# Patient Record
Sex: FEMALE | Race: WHITE | Employment: FULL TIME | URBAN - METROPOLITAN AREA
[De-identification: names, ages, dates, MRNs, and addresses within clinical notes are randomized per-mention and may not be internally consistent; named-entity substitution may affect disease eponyms.]

---

## 2017-02-11 RX ORDER — METFORMIN HYDROCHLORIDE 500 MG/1
TABLET, EXTENDED RELEASE ORAL
Qty: 90 TABLET | Refills: 0 | Status: SHIPPED | OUTPATIENT
Start: 2017-02-11 | End: 2017-05-12 | Stop reason: SDUPTHER

## 2017-03-03 ENCOUNTER — OFFICE VISIT (OUTPATIENT)
Dept: FAMILY MEDICINE CLINIC | Age: 62
End: 2017-03-03

## 2017-03-03 VITALS
RESPIRATION RATE: 12 BRPM | TEMPERATURE: 95.9 F | WEIGHT: 253 LBS | HEART RATE: 81 BPM | BODY MASS INDEX: 39.71 KG/M2 | OXYGEN SATURATION: 93 % | HEIGHT: 67 IN | SYSTOLIC BLOOD PRESSURE: 138 MMHG | DIASTOLIC BLOOD PRESSURE: 88 MMHG

## 2017-03-03 DIAGNOSIS — J06.9 ACUTE UPPER RESPIRATORY INFECTION: Primary | ICD-10-CM

## 2017-03-03 DIAGNOSIS — D22.9 ATYPICAL MOLE: ICD-10-CM

## 2017-03-03 PROCEDURE — 3017F COLORECTAL CA SCREEN DOC REV: CPT | Performed by: FAMILY MEDICINE

## 2017-03-03 PROCEDURE — 1036F TOBACCO NON-USER: CPT | Performed by: FAMILY MEDICINE

## 2017-03-03 PROCEDURE — 3014F SCREEN MAMMO DOC REV: CPT | Performed by: FAMILY MEDICINE

## 2017-03-03 PROCEDURE — 99213 OFFICE O/P EST LOW 20 MIN: CPT | Performed by: FAMILY MEDICINE

## 2017-03-03 PROCEDURE — G8427 DOCREV CUR MEDS BY ELIG CLIN: HCPCS | Performed by: FAMILY MEDICINE

## 2017-03-03 PROCEDURE — G8419 CALC BMI OUT NRM PARAM NOF/U: HCPCS | Performed by: FAMILY MEDICINE

## 2017-03-03 PROCEDURE — G8482 FLU IMMUNIZE ORDER/ADMIN: HCPCS | Performed by: FAMILY MEDICINE

## 2017-03-27 RX ORDER — ATORVASTATIN CALCIUM 20 MG/1
TABLET, FILM COATED ORAL
Qty: 90 TABLET | Refills: 1 | Status: SHIPPED | OUTPATIENT
Start: 2017-03-27 | End: 2017-09-21 | Stop reason: SDUPTHER

## 2017-05-12 RX ORDER — METFORMIN HYDROCHLORIDE 500 MG/1
TABLET, EXTENDED RELEASE ORAL
Qty: 90 TABLET | Refills: 1 | Status: SHIPPED | OUTPATIENT
Start: 2017-05-12 | End: 2017-10-31 | Stop reason: SDUPTHER

## 2017-06-23 RX ORDER — BENAZEPRIL HYDROCHLORIDE AND HYDROCHLOROTHIAZIDE 20; 12.5 MG/1; MG/1
TABLET ORAL
Qty: 90 TABLET | Refills: 0 | Status: SHIPPED | OUTPATIENT
Start: 2017-06-23 | End: 2017-09-21 | Stop reason: SDUPTHER

## 2017-09-21 ENCOUNTER — OFFICE VISIT (OUTPATIENT)
Dept: FAMILY MEDICINE CLINIC | Age: 62
End: 2017-09-21

## 2017-09-21 VITALS
TEMPERATURE: 96.4 F | DIASTOLIC BLOOD PRESSURE: 84 MMHG | WEIGHT: 246 LBS | SYSTOLIC BLOOD PRESSURE: 122 MMHG | HEART RATE: 96 BPM | HEIGHT: 67 IN | BODY MASS INDEX: 38.61 KG/M2 | RESPIRATION RATE: 16 BRPM

## 2017-09-21 DIAGNOSIS — E78.5 DYSLIPIDEMIA: ICD-10-CM

## 2017-09-21 DIAGNOSIS — Z12.31 ENCOUNTER FOR SCREENING MAMMOGRAM FOR BREAST CANCER: ICD-10-CM

## 2017-09-21 DIAGNOSIS — B96.89 ACUTE BACTERIAL SINUSITIS: Primary | ICD-10-CM

## 2017-09-21 DIAGNOSIS — J01.90 ACUTE BACTERIAL SINUSITIS: Primary | ICD-10-CM

## 2017-09-21 DIAGNOSIS — R05.9 COUGH: ICD-10-CM

## 2017-09-21 DIAGNOSIS — I10 ESSENTIAL HYPERTENSION: ICD-10-CM

## 2017-09-21 PROCEDURE — G8417 CALC BMI ABV UP PARAM F/U: HCPCS | Performed by: FAMILY MEDICINE

## 2017-09-21 PROCEDURE — G8427 DOCREV CUR MEDS BY ELIG CLIN: HCPCS | Performed by: FAMILY MEDICINE

## 2017-09-21 PROCEDURE — 1036F TOBACCO NON-USER: CPT | Performed by: FAMILY MEDICINE

## 2017-09-21 PROCEDURE — 3017F COLORECTAL CA SCREEN DOC REV: CPT | Performed by: FAMILY MEDICINE

## 2017-09-21 PROCEDURE — 99213 OFFICE O/P EST LOW 20 MIN: CPT | Performed by: FAMILY MEDICINE

## 2017-09-21 PROCEDURE — 3014F SCREEN MAMMO DOC REV: CPT | Performed by: FAMILY MEDICINE

## 2017-09-21 RX ORDER — GUAIFENESIN AND CODEINE PHOSPHATE 100; 10 MG/5ML; MG/5ML
5 SOLUTION ORAL EVERY 4 HOURS PRN
Qty: 180 ML | Refills: 0 | Status: SHIPPED | OUTPATIENT
Start: 2017-09-21 | End: 2017-10-13 | Stop reason: ALTCHOICE

## 2017-09-21 RX ORDER — AZITHROMYCIN 250 MG/1
TABLET, FILM COATED ORAL
Qty: 1 PACKET | Refills: 1 | Status: SHIPPED | OUTPATIENT
Start: 2017-09-21 | End: 2017-10-01

## 2017-09-21 RX ORDER — ATORVASTATIN CALCIUM 20 MG/1
TABLET, FILM COATED ORAL
Qty: 90 TABLET | Refills: 1 | Status: SHIPPED | OUTPATIENT
Start: 2017-09-21 | End: 2018-03-17 | Stop reason: SDUPTHER

## 2017-09-21 RX ORDER — BENAZEPRIL HYDROCHLORIDE AND HYDROCHLOROTHIAZIDE 20; 12.5 MG/1; MG/1
TABLET ORAL
Qty: 90 TABLET | Refills: 1 | Status: SHIPPED | OUTPATIENT
Start: 2017-09-21 | End: 2018-03-17 | Stop reason: SDUPTHER

## 2017-09-30 ENCOUNTER — HOSPITAL ENCOUNTER (OUTPATIENT)
Dept: WOMENS IMAGING | Age: 62
Discharge: HOME | End: 2017-09-30
Payer: COMMERCIAL

## 2017-09-30 DIAGNOSIS — Z12.31 ENCOUNTER FOR SCREENING MAMMOGRAM FOR BREAST CANCER: ICD-10-CM

## 2017-09-30 PROCEDURE — G0202 SCR MAMMO BI INCL CAD: HCPCS

## 2017-10-13 ENCOUNTER — OFFICE VISIT (OUTPATIENT)
Dept: FAMILY MEDICINE CLINIC | Age: 62
End: 2017-10-13

## 2017-10-13 VITALS
SYSTOLIC BLOOD PRESSURE: 125 MMHG | OXYGEN SATURATION: 93 % | WEIGHT: 246.8 LBS | DIASTOLIC BLOOD PRESSURE: 70 MMHG | TEMPERATURE: 97.2 F | BODY MASS INDEX: 41.12 KG/M2 | HEIGHT: 65 IN | HEART RATE: 92 BPM

## 2017-10-13 DIAGNOSIS — Z00.00 ANNUAL PHYSICAL EXAM: Primary | ICD-10-CM

## 2017-10-13 DIAGNOSIS — Z00.00 ANNUAL PHYSICAL EXAM: ICD-10-CM

## 2017-10-13 DIAGNOSIS — E78.5 DYSLIPIDEMIA: ICD-10-CM

## 2017-10-13 DIAGNOSIS — Z23 NEED FOR INFLUENZA VACCINATION: ICD-10-CM

## 2017-10-13 DIAGNOSIS — E11.9 TYPE 2 DIABETES MELLITUS WITHOUT COMPLICATION, WITHOUT LONG-TERM CURRENT USE OF INSULIN (HCC): ICD-10-CM

## 2017-10-13 DIAGNOSIS — I10 ESSENTIAL HYPERTENSION: ICD-10-CM

## 2017-10-13 LAB
ALBUMIN SERPL-MCNC: 4.4 G/DL (ref 3.9–4.9)
ALP BLD-CCNC: 108 U/L (ref 40–130)
ALT SERPL-CCNC: 42 U/L (ref 0–33)
ANION GAP SERPL CALCULATED.3IONS-SCNC: 18 MEQ/L (ref 7–13)
AST SERPL-CCNC: 32 U/L (ref 0–35)
BASOPHILS ABSOLUTE: 0.1 K/UL (ref 0–0.2)
BASOPHILS RELATIVE PERCENT: 0.8 %
BILIRUB SERPL-MCNC: 0.5 MG/DL (ref 0–1.2)
BUN BLDV-MCNC: 13 MG/DL (ref 8–23)
CALCIUM SERPL-MCNC: 10.2 MG/DL (ref 8.6–10.2)
CHLORIDE BLD-SCNC: 100 MEQ/L (ref 98–107)
CHOLESTEROL, TOTAL: 179 MG/DL (ref 0–199)
CO2: 24 MEQ/L (ref 22–29)
CREAT SERPL-MCNC: 0.55 MG/DL (ref 0.5–0.9)
EOSINOPHILS ABSOLUTE: 0.3 K/UL (ref 0–0.7)
EOSINOPHILS RELATIVE PERCENT: 3.6 %
GFR AFRICAN AMERICAN: >60
GFR NON-AFRICAN AMERICAN: >60
GLOBULIN: 2.4 G/DL (ref 2.3–3.5)
GLUCOSE BLD-MCNC: 185 MG/DL (ref 74–109)
HBA1C MFR BLD: 8 % (ref 4.8–5.9)
HCT VFR BLD CALC: 44.3 % (ref 37–47)
HDLC SERPL-MCNC: 51 MG/DL (ref 40–59)
HEMOGLOBIN: 15.1 G/DL (ref 12–16)
LDL CHOLESTEROL CALCULATED: 90 MG/DL (ref 0–129)
LYMPHOCYTES ABSOLUTE: 3 K/UL (ref 1–4.8)
LYMPHOCYTES RELATIVE PERCENT: 35.2 %
MCH RBC QN AUTO: 31.1 PG (ref 27–31.3)
MCHC RBC AUTO-ENTMCNC: 34.2 % (ref 33–37)
MCV RBC AUTO: 91 FL (ref 82–100)
MONOCYTES ABSOLUTE: 0.5 K/UL (ref 0.2–0.8)
MONOCYTES RELATIVE PERCENT: 6.5 %
NEUTROPHILS ABSOLUTE: 4.5 K/UL (ref 1.4–6.5)
NEUTROPHILS RELATIVE PERCENT: 53.9 %
PDW BLD-RTO: 13.2 % (ref 11.5–14.5)
PLATELET # BLD: 286 K/UL (ref 130–400)
POTASSIUM SERPL-SCNC: 4.6 MEQ/L (ref 3.5–5.1)
RBC # BLD: 4.87 M/UL (ref 4.2–5.4)
SODIUM BLD-SCNC: 142 MEQ/L (ref 132–144)
TOTAL PROTEIN: 6.8 G/DL (ref 6.4–8.1)
TRIGL SERPL-MCNC: 188 MG/DL (ref 0–200)
WBC # BLD: 8.4 K/UL (ref 4.8–10.8)

## 2017-10-13 PROCEDURE — 90688 IIV4 VACCINE SPLT 0.5 ML IM: CPT | Performed by: FAMILY MEDICINE

## 2017-10-13 PROCEDURE — 90471 IMMUNIZATION ADMIN: CPT | Performed by: FAMILY MEDICINE

## 2017-10-13 PROCEDURE — 99396 PREV VISIT EST AGE 40-64: CPT | Performed by: FAMILY MEDICINE

## 2017-10-13 ASSESSMENT — PATIENT HEALTH QUESTIONNAIRE - PHQ9
2. FEELING DOWN, DEPRESSED OR HOPELESS: 0
SUM OF ALL RESPONSES TO PHQ9 QUESTIONS 1 & 2: 0
1. LITTLE INTEREST OR PLEASURE IN DOING THINGS: 0
SUM OF ALL RESPONSES TO PHQ QUESTIONS 1-9: 0

## 2017-10-13 NOTE — PROGRESS NOTES
Subjective:      Patient ID: Abdelrahman Santo is a 65 y.o. female. Chief Complaint   Patient presents with    Diabetes     takes medication everyday, checks sugar at home occasionally. maintains a good diet.  Hypertension     takes medication everyday. does not check blood pressure at home. maintains a good diet.  Hyperlipidemia     avoids fried and fatty foods. excersices.  Annual Exam       HPI    Here for physical exam and checkup today no shortness breath chest pain nausea vomiting      Getting overall her congestion drainage from the other day which is really help some        Try to watch her sugars but doesn't always succeed      Allergies   Allergen Reactions    Pcn [Penicillins]      Throat closes       Outpatient Encounter Prescriptions as of 10/13/2017   Medication Sig Dispense Refill    benazepril-hydrochlorthiazide (LOTENSIN HCT) 20-12.5 MG per tablet TAKE ONE TABLET BY MOUTH EVERY DAY 90 tablet 1    atorvastatin (LIPITOR) 20 MG tablet TAKE 1 TABLET EVERY DAY 90 tablet 1    metFORMIN (GLUCOPHAGE-XR) 500 MG extended release tablet TAKE 1 TABLET BY MOUTH DAILY (WITH BREAKFAST). 90 tablet 1    metaxalone (SKELAXIN) 800 MG tablet Take 1 tablet by mouth 3 times daily 60 tablet 5    aspirin EC 81 MG EC tablet Take 1 tablet by mouth daily. 30 tablet 3    CINNAMON PO Take  by mouth.  Omega-3 Fatty Acids (FISH OIL PO) Take  by mouth.  Ascorbic Acid (VITAMIN C PO) Take  by mouth.  CRANBERRY PO Take  by mouth.  GARLIC PO Take  by mouth.  Multiple Vitamin (THERA) TABS Take  by mouth.  FIBER PO Take  by mouth.  [DISCONTINUED] guaiFENesin-codeine (CHERATUSSIN AC) 100-10 MG/5ML syrup Take 5 mLs by mouth every 4 hours as needed for Cough or Congestion 180 mL 0     No facility-administered encounter medications on file as of 10/13/2017.       Social History     Social History    Marital status:      Spouse name: N/A    Number of children: 2    or changes please call us at once , follow-up in the office as planned,

## 2017-11-01 RX ORDER — METFORMIN HYDROCHLORIDE 500 MG/1
TABLET, EXTENDED RELEASE ORAL
Qty: 90 TABLET | Refills: 1 | Status: SHIPPED | OUTPATIENT
Start: 2017-11-01 | End: 2018-05-07 | Stop reason: SDUPTHER

## 2018-01-29 ENCOUNTER — OFFICE VISIT (OUTPATIENT)
Dept: FAMILY MEDICINE CLINIC | Age: 63
End: 2018-01-29
Payer: COMMERCIAL

## 2018-01-29 VITALS
HEART RATE: 78 BPM | BODY MASS INDEX: 38.14 KG/M2 | SYSTOLIC BLOOD PRESSURE: 118 MMHG | DIASTOLIC BLOOD PRESSURE: 82 MMHG | OXYGEN SATURATION: 96 % | RESPIRATION RATE: 15 BRPM | WEIGHT: 243 LBS | HEIGHT: 67 IN

## 2018-01-29 DIAGNOSIS — E11.9 TYPE 2 DIABETES MELLITUS WITHOUT COMPLICATION, WITHOUT LONG-TERM CURRENT USE OF INSULIN (HCC): ICD-10-CM

## 2018-01-29 DIAGNOSIS — E11.9 TYPE 2 DIABETES MELLITUS WITHOUT COMPLICATION, WITHOUT LONG-TERM CURRENT USE OF INSULIN (HCC): Primary | ICD-10-CM

## 2018-01-29 LAB
CREATININE URINE: 70.5 MG/DL
HBA1C MFR BLD: 6.5 %
MICROALBUMIN UR-MCNC: 1.9 MG/DL
MICROALBUMIN/CREAT UR-RTO: 27 MG/G (ref 0–30)

## 2018-01-29 PROCEDURE — 3014F SCREEN MAMMO DOC REV: CPT | Performed by: FAMILY MEDICINE

## 2018-01-29 PROCEDURE — 3017F COLORECTAL CA SCREEN DOC REV: CPT | Performed by: FAMILY MEDICINE

## 2018-01-29 PROCEDURE — 1036F TOBACCO NON-USER: CPT | Performed by: FAMILY MEDICINE

## 2018-01-29 PROCEDURE — 99213 OFFICE O/P EST LOW 20 MIN: CPT | Performed by: FAMILY MEDICINE

## 2018-01-29 PROCEDURE — G8484 FLU IMMUNIZE NO ADMIN: HCPCS | Performed by: FAMILY MEDICINE

## 2018-01-29 PROCEDURE — G8417 CALC BMI ABV UP PARAM F/U: HCPCS | Performed by: FAMILY MEDICINE

## 2018-01-29 PROCEDURE — 83036 HEMOGLOBIN GLYCOSYLATED A1C: CPT | Performed by: FAMILY MEDICINE

## 2018-01-29 PROCEDURE — 3044F HG A1C LEVEL LT 7.0%: CPT | Performed by: FAMILY MEDICINE

## 2018-01-29 PROCEDURE — G8427 DOCREV CUR MEDS BY ELIG CLIN: HCPCS | Performed by: FAMILY MEDICINE

## 2018-02-27 DIAGNOSIS — Z12.11 COLON CANCER SCREENING: Primary | ICD-10-CM

## 2018-03-19 RX ORDER — ATORVASTATIN CALCIUM 20 MG/1
TABLET, FILM COATED ORAL
Qty: 90 TABLET | Refills: 1 | Status: SHIPPED | OUTPATIENT
Start: 2018-03-19 | End: 2018-09-18 | Stop reason: SDUPTHER

## 2018-03-19 RX ORDER — BENAZEPRIL HYDROCHLORIDE AND HYDROCHLOROTHIAZIDE 20; 12.5 MG/1; MG/1
TABLET ORAL
Qty: 90 TABLET | Refills: 1 | Status: SHIPPED | OUTPATIENT
Start: 2018-03-19 | End: 2018-09-18 | Stop reason: SDUPTHER

## 2018-04-23 ENCOUNTER — OFFICE VISIT (OUTPATIENT)
Dept: PRIMARY CARE CLINIC | Age: 63
End: 2018-04-23
Payer: COMMERCIAL

## 2018-04-23 VITALS
RESPIRATION RATE: 16 BRPM | DIASTOLIC BLOOD PRESSURE: 72 MMHG | SYSTOLIC BLOOD PRESSURE: 136 MMHG | HEIGHT: 67 IN | HEART RATE: 83 BPM | WEIGHT: 244.5 LBS | OXYGEN SATURATION: 96 % | BODY MASS INDEX: 38.37 KG/M2 | TEMPERATURE: 97.9 F

## 2018-04-23 DIAGNOSIS — R05.9 COUGH: ICD-10-CM

## 2018-04-23 DIAGNOSIS — J01.00 ACUTE NON-RECURRENT MAXILLARY SINUSITIS: Primary | ICD-10-CM

## 2018-04-23 PROCEDURE — 3017F COLORECTAL CA SCREEN DOC REV: CPT | Performed by: PHYSICIAN ASSISTANT

## 2018-04-23 PROCEDURE — 99213 OFFICE O/P EST LOW 20 MIN: CPT | Performed by: PHYSICIAN ASSISTANT

## 2018-04-23 PROCEDURE — G8427 DOCREV CUR MEDS BY ELIG CLIN: HCPCS | Performed by: PHYSICIAN ASSISTANT

## 2018-04-23 PROCEDURE — G8417 CALC BMI ABV UP PARAM F/U: HCPCS | Performed by: PHYSICIAN ASSISTANT

## 2018-04-23 PROCEDURE — 1036F TOBACCO NON-USER: CPT | Performed by: PHYSICIAN ASSISTANT

## 2018-04-23 RX ORDER — PROMETHAZINE HYDROCHLORIDE AND CODEINE PHOSPHATE 6.25; 1 MG/5ML; MG/5ML
5 SYRUP ORAL 4 TIMES DAILY PRN
Qty: 140 ML | Refills: 0 | Status: SHIPPED | OUTPATIENT
Start: 2018-04-23 | End: 2019-02-13 | Stop reason: SDUPTHER

## 2018-04-23 RX ORDER — LEVOFLOXACIN 500 MG/1
500 TABLET, FILM COATED ORAL DAILY
Qty: 10 TABLET | Refills: 0 | Status: SHIPPED | OUTPATIENT
Start: 2018-04-23 | End: 2018-05-03

## 2018-04-23 ASSESSMENT — ENCOUNTER SYMPTOMS
SINUS PRESSURE: 1
SORE THROAT: 0
COUGH: 1

## 2018-05-07 RX ORDER — METFORMIN HYDROCHLORIDE 500 MG/1
TABLET, EXTENDED RELEASE ORAL
Qty: 90 TABLET | Refills: 0 | Status: SHIPPED | OUTPATIENT
Start: 2018-05-07 | End: 2018-08-03 | Stop reason: SDUPTHER

## 2018-07-02 ENCOUNTER — OFFICE VISIT (OUTPATIENT)
Dept: FAMILY MEDICINE CLINIC | Age: 63
End: 2018-07-02
Payer: COMMERCIAL

## 2018-07-02 VITALS
HEART RATE: 98 BPM | TEMPERATURE: 98 F | SYSTOLIC BLOOD PRESSURE: 130 MMHG | RESPIRATION RATE: 16 BRPM | BODY MASS INDEX: 38.54 KG/M2 | OXYGEN SATURATION: 96 % | WEIGHT: 239.8 LBS | DIASTOLIC BLOOD PRESSURE: 82 MMHG | HEIGHT: 66 IN

## 2018-07-02 DIAGNOSIS — L24.9 IRRITANT CONTACT DERMATITIS, UNSPECIFIED TRIGGER: Primary | ICD-10-CM

## 2018-07-02 PROCEDURE — 3017F COLORECTAL CA SCREEN DOC REV: CPT | Performed by: FAMILY MEDICINE

## 2018-07-02 PROCEDURE — G8417 CALC BMI ABV UP PARAM F/U: HCPCS | Performed by: FAMILY MEDICINE

## 2018-07-02 PROCEDURE — G8427 DOCREV CUR MEDS BY ELIG CLIN: HCPCS | Performed by: FAMILY MEDICINE

## 2018-07-02 PROCEDURE — 99212 OFFICE O/P EST SF 10 MIN: CPT | Performed by: FAMILY MEDICINE

## 2018-07-02 PROCEDURE — 1036F TOBACCO NON-USER: CPT | Performed by: FAMILY MEDICINE

## 2018-07-04 NOTE — PROGRESS NOTES
Subjective:      Patient ID: Veto Castillo is a 59 y.o. female. Chief Complaint   Patient presents with    Rash     Pt presents today with a rash on her right arm states she was gardening in her back yard and than got the rash. Onset tuesday. Pt states that it is itching and it is getting worse.  Health Maintenance     Reviewed with pt denied shingles and pneumonia will be going to gynecologist for cervical cancer screening and refused colonoscopy       HPI    Here today for rash in her right arm got worsens gardening now she's got a rash ounces (Tuesday and itching getting worse eating drinking pretty well otherwise          Allergies   Allergen Reactions    Pcn [Penicillins]      Throat closes       Outpatient Encounter Prescriptions as of 7/2/2018   Medication Sig Dispense Refill    hydrocortisone 2.5 % cream Apply topically 2 times daily 2 Tube 2    metFORMIN (GLUCOPHAGE-XR) 500 MG extended release tablet TAKE 1 TABLET BY MOUTH DAILY (WITH BREAKFAST). 90 tablet 0    atorvastatin (LIPITOR) 20 MG tablet TAKE 1 TABLET BY MOUTH EVERY DAY 90 tablet 1    benazepril-hydrochlorthiazide (LOTENSIN HCT) 20-12.5 MG per tablet TAKE ONE TABLET BY MOUTH EVERY DAY 90 tablet 1    metaxalone (SKELAXIN) 800 MG tablet Take 1 tablet by mouth 3 times daily 60 tablet 5    aspirin EC 81 MG EC tablet Take 1 tablet by mouth daily. 30 tablet 3    CINNAMON PO Take  by mouth.  Omega-3 Fatty Acids (FISH OIL PO) Take  by mouth.  Ascorbic Acid (VITAMIN C PO) Take  by mouth.  CRANBERRY PO Take  by mouth.  GARLIC PO Take  by mouth.  Multiple Vitamin (THERA) TABS Take  by mouth.  FIBER PO Take  by mouth. No facility-administered encounter medications on file as of 7/2/2018.       Social History     Social History    Marital status:      Spouse name: N/A    Number of children: 2    Years of education: N/A     Occupational History     Other     Social History Main Topics  Smoking status: Former Smoker     Packs/day: 0.50     Years: 20.00     Types: Cigarettes     Quit date: 8/13/2002    Smokeless tobacco: Never Used    Alcohol use No    Drug use: No    Sexual activity: Not Currently     Other Topics Concern    Not on file     Social History Narrative    No narrative on file     Family History   Problem Relation Age of Onset    Diabetes Mother     High Blood Pressure Father     Stroke Father     Heart Surgery Father      Past Medical History:   Diagnosis Date    Allergic rhinitis     Dyslipidemia     Hypertension     Type II or unspecified type diabetes mellitus without mention of complication, not stated as uncontrolled      Past Surgical History:   Procedure Laterality Date    FRACTURE SURGERY      right forearm         REVIEW OF SYSTEMS:   REVIEW OF SYSTEMS:   Patient seen today for exam.  Denies any problems with hearing, headaches or vision. Denies any shortness of breath, chest pain, nausea or vomiting. No black stool, no blood in the stool. No heartburn. Denies any problems with constipation or diarrhea either. No dysuria type symptoms. Objective:     /82 (Site: Left Arm, Position: Sitting, Cuff Size: Large Adult)   Pulse 98   Temp 98 °F (36.7 °C) (Temporal)   Resp 16   Ht 5' 6\" (1.676 m)   Wt 239 lb 12.8 oz (108.8 kg)   LMP 08/13/2006   SpO2 96%   BMI 38.70 kg/m²     Physical Exam      O:    Neurologic exam unremarkable. DTRs in upper and lower extremities within normal limits. Full strength noted    Skin- positive rash bilateral arms right sacral left with some papules and pustules consistent with contact       Assessment:       Diagnosis Orders   1.  Irritant contact dermatitis, unspecified trigger               Plan:        Orders Placed This Encounter   Medications    hydrocortisone 2.5 % cream     Sig: Apply topically 2 times daily     Dispense:  2 Tube     Refill:  2     No orders of the defined types were placed in this

## 2018-07-05 ENCOUNTER — TELEPHONE (OUTPATIENT)
Dept: FAMILY MEDICINE CLINIC | Age: 63
End: 2018-07-05

## 2018-07-06 NOTE — TELEPHONE ENCOUNTER
Lmom for pt doesn't need to come in for lab, just see  in October and will do some blood work at that time (unless she has lab orders from another dr)

## 2018-08-06 RX ORDER — METFORMIN HYDROCHLORIDE 500 MG/1
TABLET, EXTENDED RELEASE ORAL
Qty: 90 TABLET | Refills: 1 | Status: SHIPPED | OUTPATIENT
Start: 2018-08-06 | End: 2019-02-01 | Stop reason: SDUPTHER

## 2018-09-18 RX ORDER — ATORVASTATIN CALCIUM 20 MG/1
TABLET, FILM COATED ORAL
Qty: 90 TABLET | Refills: 1 | Status: SHIPPED | OUTPATIENT
Start: 2018-09-18 | End: 2019-03-19 | Stop reason: SDUPTHER

## 2018-09-18 RX ORDER — BENAZEPRIL HYDROCHLORIDE AND HYDROCHLOROTHIAZIDE 20; 12.5 MG/1; MG/1
TABLET ORAL
Qty: 90 TABLET | Refills: 1 | Status: SHIPPED | OUTPATIENT
Start: 2018-09-18 | End: 2019-03-19 | Stop reason: SDUPTHER

## 2018-10-12 ENCOUNTER — OFFICE VISIT (OUTPATIENT)
Dept: FAMILY MEDICINE CLINIC | Age: 63
End: 2018-10-12
Payer: COMMERCIAL

## 2018-10-12 VITALS
HEART RATE: 82 BPM | RESPIRATION RATE: 16 BRPM | SYSTOLIC BLOOD PRESSURE: 134 MMHG | OXYGEN SATURATION: 94 % | TEMPERATURE: 97.4 F | HEIGHT: 67 IN | WEIGHT: 243.8 LBS | DIASTOLIC BLOOD PRESSURE: 82 MMHG | BODY MASS INDEX: 38.27 KG/M2

## 2018-10-12 DIAGNOSIS — Z00.00 ANNUAL PHYSICAL EXAM: Primary | ICD-10-CM

## 2018-10-12 DIAGNOSIS — E11.9 TYPE 2 DIABETES MELLITUS WITHOUT COMPLICATION, WITHOUT LONG-TERM CURRENT USE OF INSULIN (HCC): ICD-10-CM

## 2018-10-12 DIAGNOSIS — Z12.39 SCREENING FOR BREAST CANCER: ICD-10-CM

## 2018-10-12 DIAGNOSIS — Z23 NEED FOR INFLUENZA VACCINATION: ICD-10-CM

## 2018-10-12 DIAGNOSIS — Z00.00 ANNUAL PHYSICAL EXAM: ICD-10-CM

## 2018-10-12 LAB
ALBUMIN SERPL-MCNC: 4.5 G/DL (ref 3.9–4.9)
ALP BLD-CCNC: 109 U/L (ref 40–130)
ALT SERPL-CCNC: 45 U/L (ref 0–33)
ANION GAP SERPL CALCULATED.3IONS-SCNC: 16 MEQ/L (ref 7–13)
AST SERPL-CCNC: 43 U/L (ref 0–35)
BASOPHILS ABSOLUTE: 0.1 K/UL (ref 0–0.2)
BASOPHILS RELATIVE PERCENT: 1.5 %
BILIRUB SERPL-MCNC: 0.5 MG/DL (ref 0–1.2)
BUN BLDV-MCNC: 13 MG/DL (ref 8–23)
CALCIUM SERPL-MCNC: 9.8 MG/DL (ref 8.6–10.2)
CHLORIDE BLD-SCNC: 98 MEQ/L (ref 98–107)
CHOLESTEROL, TOTAL: 169 MG/DL (ref 0–199)
CO2: 25 MEQ/L (ref 22–29)
CREAT SERPL-MCNC: 0.51 MG/DL (ref 0.5–0.9)
EOSINOPHILS ABSOLUTE: 0.3 K/UL (ref 0–0.7)
EOSINOPHILS RELATIVE PERCENT: 3.3 %
GFR AFRICAN AMERICAN: >60
GFR NON-AFRICAN AMERICAN: >60
GLOBULIN: 2.5 G/DL (ref 2.3–3.5)
GLUCOSE BLD-MCNC: 201 MG/DL (ref 74–109)
HBA1C MFR BLD: 8.3 % (ref 4.8–5.9)
HCT VFR BLD CALC: 46.4 % (ref 37–47)
HDLC SERPL-MCNC: 51 MG/DL (ref 40–59)
HEMOGLOBIN: 15.9 G/DL (ref 12–16)
LDL CHOLESTEROL CALCULATED: 75 MG/DL (ref 0–129)
LYMPHOCYTES ABSOLUTE: 2.9 K/UL (ref 1–4.8)
LYMPHOCYTES RELATIVE PERCENT: 34.6 %
MCH RBC QN AUTO: 31.9 PG (ref 27–31.3)
MCHC RBC AUTO-ENTMCNC: 34.2 % (ref 33–37)
MCV RBC AUTO: 93.3 FL (ref 82–100)
MONOCYTES ABSOLUTE: 0.5 K/UL (ref 0.2–0.8)
MONOCYTES RELATIVE PERCENT: 6 %
NEUTROPHILS ABSOLUTE: 4.6 K/UL (ref 1.4–6.5)
NEUTROPHILS RELATIVE PERCENT: 54.6 %
PDW BLD-RTO: 13.4 % (ref 11.5–14.5)
PLATELET # BLD: 207 K/UL (ref 130–400)
POTASSIUM SERPL-SCNC: 4.8 MEQ/L (ref 3.5–5.1)
RBC # BLD: 4.97 M/UL (ref 4.2–5.4)
SODIUM BLD-SCNC: 139 MEQ/L (ref 132–144)
TOTAL PROTEIN: 7 G/DL (ref 6.4–8.1)
TRIGL SERPL-MCNC: 214 MG/DL (ref 0–200)
WBC # BLD: 8.3 K/UL (ref 4.8–10.8)

## 2018-10-12 PROCEDURE — 90471 IMMUNIZATION ADMIN: CPT | Performed by: FAMILY MEDICINE

## 2018-10-12 PROCEDURE — 99396 PREV VISIT EST AGE 40-64: CPT | Performed by: FAMILY MEDICINE

## 2018-10-12 PROCEDURE — G8482 FLU IMMUNIZE ORDER/ADMIN: HCPCS | Performed by: FAMILY MEDICINE

## 2018-10-12 PROCEDURE — 90688 IIV4 VACCINE SPLT 0.5 ML IM: CPT | Performed by: FAMILY MEDICINE

## 2018-10-12 ASSESSMENT — PATIENT HEALTH QUESTIONNAIRE - PHQ9
SUM OF ALL RESPONSES TO PHQ9 QUESTIONS 1 & 2: 0
1. LITTLE INTEREST OR PLEASURE IN DOING THINGS: 0
SUM OF ALL RESPONSES TO PHQ QUESTIONS 1-9: 0
SUM OF ALL RESPONSES TO PHQ QUESTIONS 1-9: 0
2. FEELING DOWN, DEPRESSED OR HOPELESS: 0

## 2018-10-12 NOTE — PROGRESS NOTES
Vaccine Information Sheet, \"Influenza - Inactivated\"  given to Selina Godinez, or parent/legal guardian of  Selina Godinez and verbalized understanding. Patient responses:    Have you ever had a reaction to a flu vaccine? No  Are you able to eat eggs without adverse effects? Yes  Do you have any current illness? No  Have you ever had Guillian Tecumseh Syndrome? No    Flu vaccine given per order. Please see immunization tab.

## 2018-10-20 ENCOUNTER — HOSPITAL ENCOUNTER (OUTPATIENT)
Dept: WOMENS IMAGING | Age: 63
Discharge: HOME | End: 2018-10-22
Payer: COMMERCIAL

## 2018-10-20 DIAGNOSIS — Z12.39 SCREENING FOR BREAST CANCER: ICD-10-CM

## 2018-10-20 PROCEDURE — 77067 SCR MAMMO BI INCL CAD: CPT

## 2019-02-01 RX ORDER — METFORMIN HYDROCHLORIDE 500 MG/1
500 TABLET, EXTENDED RELEASE ORAL
Qty: 90 TABLET | Refills: 1 | Status: SHIPPED | OUTPATIENT
Start: 2019-02-01

## 2019-02-13 ENCOUNTER — OFFICE VISIT (OUTPATIENT)
Dept: PRIMARY CARE CLINIC | Age: 64
End: 2019-02-13
Payer: COMMERCIAL

## 2019-02-13 VITALS
HEART RATE: 85 BPM | DIASTOLIC BLOOD PRESSURE: 82 MMHG | TEMPERATURE: 98.7 F | OXYGEN SATURATION: 97 % | HEIGHT: 67 IN | SYSTOLIC BLOOD PRESSURE: 128 MMHG | BODY MASS INDEX: 37.9 KG/M2 | WEIGHT: 241.5 LBS | RESPIRATION RATE: 14 BRPM

## 2019-02-13 DIAGNOSIS — J06.9 ACUTE UPPER RESPIRATORY INFECTION: Primary | ICD-10-CM

## 2019-02-13 DIAGNOSIS — R05.9 COUGH: ICD-10-CM

## 2019-02-13 PROCEDURE — 99213 OFFICE O/P EST LOW 20 MIN: CPT | Performed by: NURSE PRACTITIONER

## 2019-02-13 RX ORDER — PROMETHAZINE HYDROCHLORIDE AND CODEINE PHOSPHATE 6.25; 1 MG/5ML; MG/5ML
5 SYRUP ORAL 4 TIMES DAILY PRN
Qty: 60 ML | Refills: 0 | Status: SHIPPED | OUTPATIENT
Start: 2019-02-13 | End: 2019-02-16

## 2019-02-13 RX ORDER — AZITHROMYCIN 250 MG/1
TABLET, FILM COATED ORAL
Qty: 1 PACKET | Refills: 0 | Status: SHIPPED | OUTPATIENT
Start: 2019-02-13

## 2019-02-13 ASSESSMENT — ENCOUNTER SYMPTOMS
SORE THROAT: 1
SHORTNESS OF BREATH: 0
SINUS PAIN: 1
RHINORRHEA: 1
WHEEZING: 0
COUGH: 1
SINUS PRESSURE: 1

## 2019-02-13 ASSESSMENT — PATIENT HEALTH QUESTIONNAIRE - PHQ9
SUM OF ALL RESPONSES TO PHQ QUESTIONS 1-9: 0
SUM OF ALL RESPONSES TO PHQ9 QUESTIONS 1 & 2: 0
SUM OF ALL RESPONSES TO PHQ QUESTIONS 1-9: 0
2. FEELING DOWN, DEPRESSED OR HOPELESS: 0
1. LITTLE INTEREST OR PLEASURE IN DOING THINGS: 0

## 2019-03-19 DIAGNOSIS — E78.5 DYSLIPIDEMIA: ICD-10-CM

## 2019-03-19 DIAGNOSIS — I10 ESSENTIAL HYPERTENSION: Primary | ICD-10-CM

## 2019-03-19 RX ORDER — BENAZEPRIL HYDROCHLORIDE AND HYDROCHLOROTHIAZIDE 20; 12.5 MG/1; MG/1
TABLET ORAL
Qty: 90 TABLET | Refills: 0 | Status: SHIPPED | OUTPATIENT
Start: 2019-03-19

## 2019-03-19 RX ORDER — ATORVASTATIN CALCIUM 20 MG/1
TABLET, FILM COATED ORAL
Qty: 90 TABLET | Refills: 0 | Status: SHIPPED | OUTPATIENT
Start: 2019-03-19

## 2019-04-24 ENCOUNTER — TELEPHONE (OUTPATIENT)
Dept: FAMILY MEDICINE CLINIC | Age: 64
End: 2019-04-24

## 2023-05-22 ENCOUNTER — OFFICE VISIT (OUTPATIENT)
Dept: PRIMARY CARE | Facility: CLINIC | Age: 68
End: 2023-05-22
Payer: MEDICARE

## 2023-05-22 VITALS
TEMPERATURE: 97 F | HEIGHT: 66 IN | RESPIRATION RATE: 16 BRPM | SYSTOLIC BLOOD PRESSURE: 100 MMHG | DIASTOLIC BLOOD PRESSURE: 62 MMHG | WEIGHT: 220 LBS | OXYGEN SATURATION: 97 % | HEART RATE: 98 BPM | BODY MASS INDEX: 35.36 KG/M2

## 2023-05-22 DIAGNOSIS — I10 ESSENTIAL HYPERTENSION: ICD-10-CM

## 2023-05-22 DIAGNOSIS — E66.01 CLASS 2 SEVERE OBESITY DUE TO EXCESS CALORIES WITH SERIOUS COMORBIDITY AND BODY MASS INDEX (BMI) OF 35.0 TO 35.9 IN ADULT (MULTI): ICD-10-CM

## 2023-05-22 DIAGNOSIS — R42 DIZZINESS: Primary | ICD-10-CM

## 2023-05-22 DIAGNOSIS — E11.9 TYPE 2 DIABETES MELLITUS WITHOUT COMPLICATION, WITHOUT LONG-TERM CURRENT USE OF INSULIN (MULTI): ICD-10-CM

## 2023-05-22 DIAGNOSIS — I95.2 HYPOTENSION DUE TO DRUGS: ICD-10-CM

## 2023-05-22 PROBLEM — C54.1 ENDOMETRIAL CARCINOMA (MULTI): Status: ACTIVE | Noted: 2023-05-22

## 2023-05-22 PROBLEM — D64.9 ANEMIA: Status: ACTIVE | Noted: 2023-05-22

## 2023-05-22 PROBLEM — R19.5 POSITIVE COLORECTAL CANCER SCREENING USING COLOGUARD TEST: Status: ACTIVE | Noted: 2023-05-22

## 2023-05-22 PROBLEM — I82.409 DVT (DEEP VENOUS THROMBOSIS) (MULTI): Status: ACTIVE | Noted: 2023-05-22

## 2023-05-22 PROCEDURE — 3074F SYST BP LT 130 MM HG: CPT | Performed by: FAMILY MEDICINE

## 2023-05-22 PROCEDURE — 1159F MED LIST DOCD IN RCRD: CPT | Performed by: FAMILY MEDICINE

## 2023-05-22 PROCEDURE — 3078F DIAST BP <80 MM HG: CPT | Performed by: FAMILY MEDICINE

## 2023-05-22 PROCEDURE — 99214 OFFICE O/P EST MOD 30 MIN: CPT | Performed by: FAMILY MEDICINE

## 2023-05-22 PROCEDURE — 3008F BODY MASS INDEX DOCD: CPT | Performed by: FAMILY MEDICINE

## 2023-05-22 PROCEDURE — 1036F TOBACCO NON-USER: CPT | Performed by: FAMILY MEDICINE

## 2023-05-22 RX ORDER — BENAZEPRIL HYDROCHLORIDE AND HYDROCHLOROTHIAZIDE 20; 12.5 MG/1; MG/1
1 TABLET ORAL DAILY
COMMUNITY
End: 2023-07-10

## 2023-05-22 RX ORDER — EMPAGLIFLOZIN 10 MG/1
10 TABLET, FILM COATED ORAL DAILY
COMMUNITY
Start: 2023-02-25 | End: 2023-05-26

## 2023-05-22 RX ORDER — ASPIRIN 81 MG/1
81 TABLET ORAL DAILY
COMMUNITY
Start: 2022-06-28

## 2023-05-22 RX ORDER — METFORMIN HYDROCHLORIDE 500 MG/1
500 TABLET, EXTENDED RELEASE ORAL 2 TIMES DAILY
COMMUNITY
End: 2023-07-03

## 2023-05-22 RX ORDER — ATORVASTATIN CALCIUM 20 MG/1
20 TABLET, FILM COATED ORAL DAILY
COMMUNITY
End: 2023-08-23

## 2023-05-22 ASSESSMENT — ENCOUNTER SYMPTOMS
LOSS OF SENSATION IN FEET: 0
OCCASIONAL FEELINGS OF UNSTEADINESS: 0
DEPRESSION: 0

## 2023-05-22 NOTE — PROGRESS NOTES
"Subjective   Patient ID: Monica Musa is a 67 y.o. female who presents for Dizziness.    HPI    Pt is here for dizziness x 3 day  Pt states feeling dizziness and lightheaded only when she stands up.  Pt when she move her head or neck it gets worse.  pt states having pressure in he sinus and behind her eyes  Pt  states when she massage around sinus area she start to feel better.       Denies any edema in the legs.  She is taking Jardiance.  She noticed all of this started with Jardiance.    No other concern    Review of systems  ; Patient seen today for exam denies any problems with headaches or vision, denies any shortness of breath chest pain nausea or vomiting, no black stool no blood in the stool no heartburn type symptoms denies any problems with constipation or diarrhea, and no dysuria-type symptoms    The patient's allergies medications were reviewed with them today    The patient's social family and surgical history or also reviewed here today, along with her past medical history.     Objective     Alert and active in  no acute distress  HEENT TMs clear oropharynx negative nares clear no drainage noted neck supple  With no adenopathy   Heart regular rate and rhythm without murmur and no carotid bruits  Lungs- clear to auscultation bilaterally, no wheeze or rhonchi noted  Thyroid -negative masses or nodularity  Abdomen- soft times four quadrants , bowel sounds positive no masses or organomegaly, negative tenderness guarding or rebound  Neurological exam unremarkable- DTRs in upper and lower extremities within normal limits.   skin -no lesions noted    Hair is sitting near ear drum of left ear.      /62 (BP Location: Right arm, Patient Position: Sitting, BP Cuff Size: Adult)   Pulse 98   Temp 36.1 °C (97 °F) (Temporal)   Resp 16   Ht 1.676 m (5' 6\")   Wt 99.8 kg (220 lb)   SpO2 97%   BMI 35.51 kg/m²     Allergies   Allergen Reactions    Penicillins Anaphylaxis and Swelling "       Assessment/Plan   Problem List Items Addressed This Visit    None  Visit Diagnoses       Dizziness    -  Primary    BMI 35.0-35.9,adult        Class 2 severe obesity due to excess calories with serious comorbidity and body mass index (BMI) of 35.0 to 35.9 in adult (CMS/[facility])        Essential hypertension        Type 2 diabetes mellitus without complication, without long-term current use of insulin (CMS/[facility])              Discussed patient's BMI and to institute calorie reduction and increase exercise to decrease risk of diabetes and heart disease in the future.    Increase hydration.    Hold Benazepril/hydrochlorothiazide for the next few days.  When she starts feeling better, she can restart 1/2 tablet of this medication.  If she is doing well in 10-14 days, we will prescribed Benazepril 10 mg, removing hydrochlorothiazide.    If anything worsens or changes please call us at once, follow up in the office as planned.    Scribe Attestation  By signing my name below, ICorrina MA, Scribe   attest that this documentation has been prepared under the direction and in the presence of Guerrero Tavarez DO.

## 2023-05-25 ENCOUNTER — TELEPHONE (OUTPATIENT)
Dept: PRIMARY CARE | Facility: CLINIC | Age: 68
End: 2023-05-25
Payer: MEDICARE

## 2023-05-25 DIAGNOSIS — I10 PRIMARY HYPERTENSION: Primary | ICD-10-CM

## 2023-05-25 RX ORDER — BENAZEPRIL HYDROCHLORIDE 5 MG/1
5 TABLET ORAL DAILY
Qty: 30 TABLET | Refills: 1 | Status: SHIPPED | OUTPATIENT
Start: 2023-05-25 | End: 2023-06-19

## 2023-05-25 NOTE — TELEPHONE ENCOUNTER
Patient calling - was in to see you on 5-22-23 for dizziness.   Her BP was low, at 100/62.   You advised her to stop taking her Benazepril and to call back in a few days to let you know how she was feeling.     She reports that the dizziness has resolved, and light-headedness has improved.     She has not checked her BP at home.     She would like to know fi you want her to start a different BP medication, or cut her current one in half.     Please advise     Julisa- [phone]

## 2023-05-26 DIAGNOSIS — E11.9 TYPE 2 DIABETES MELLITUS WITHOUT COMPLICATIONS (MULTI): ICD-10-CM

## 2023-05-26 RX ORDER — EMPAGLIFLOZIN 10 MG/1
TABLET, FILM COATED ORAL
Qty: 90 TABLET | Refills: 1 | Status: SHIPPED | OUTPATIENT
Start: 2023-05-26 | End: 2023-08-30

## 2023-06-17 DIAGNOSIS — I10 PRIMARY HYPERTENSION: ICD-10-CM

## 2023-06-19 RX ORDER — BENAZEPRIL HYDROCHLORIDE 5 MG/1
TABLET ORAL
Qty: 30 TABLET | Refills: 5 | Status: SHIPPED | OUTPATIENT
Start: 2023-06-19 | End: 2023-12-13

## 2023-07-02 DIAGNOSIS — E11.9 TYPE 2 DIABETES MELLITUS WITHOUT COMPLICATION, WITHOUT LONG-TERM CURRENT USE OF INSULIN (MULTI): ICD-10-CM

## 2023-07-03 RX ORDER — METFORMIN HYDROCHLORIDE 500 MG/1
TABLET, EXTENDED RELEASE ORAL
Qty: 180 TABLET | Refills: 1 | Status: SHIPPED | OUTPATIENT
Start: 2023-07-03 | End: 2023-12-20 | Stop reason: SDUPTHER

## 2023-07-09 DIAGNOSIS — I10 ESSENTIAL (PRIMARY) HYPERTENSION: ICD-10-CM

## 2023-07-10 RX ORDER — BENAZEPRIL HYDROCHLORIDE AND HYDROCHLOROTHIAZIDE 20; 12.5 MG/1; MG/1
TABLET ORAL
Qty: 90 TABLET | Refills: 0 | Status: SHIPPED | OUTPATIENT
Start: 2023-07-10 | End: 2023-08-30

## 2023-08-22 DIAGNOSIS — E78.5 HYPERLIPIDEMIA, UNSPECIFIED: ICD-10-CM

## 2023-08-23 RX ORDER — ATORVASTATIN CALCIUM 20 MG/1
20 TABLET, FILM COATED ORAL DAILY
Qty: 90 TABLET | Refills: 0 | Status: SHIPPED | OUTPATIENT
Start: 2023-08-23 | End: 2023-08-30

## 2023-08-28 DIAGNOSIS — E11.9 TYPE 2 DIABETES MELLITUS WITHOUT COMPLICATIONS (MULTI): ICD-10-CM

## 2023-08-28 DIAGNOSIS — E78.5 HYPERLIPIDEMIA, UNSPECIFIED: ICD-10-CM

## 2023-08-28 DIAGNOSIS — I10 ESSENTIAL (PRIMARY) HYPERTENSION: ICD-10-CM

## 2023-08-30 RX ORDER — ATORVASTATIN CALCIUM 20 MG/1
20 TABLET, FILM COATED ORAL DAILY
Qty: 90 TABLET | Refills: 0 | Status: SHIPPED | OUTPATIENT
Start: 2023-08-30 | End: 2023-12-20 | Stop reason: SDUPTHER

## 2023-08-30 RX ORDER — BENAZEPRIL HYDROCHLORIDE AND HYDROCHLOROTHIAZIDE 20; 12.5 MG/1; MG/1
TABLET ORAL
Qty: 90 TABLET | Refills: 0 | Status: SHIPPED | OUTPATIENT
Start: 2023-08-30 | End: 2023-10-02 | Stop reason: ALTCHOICE

## 2023-08-30 RX ORDER — EMPAGLIFLOZIN 10 MG/1
TABLET, FILM COATED ORAL
Qty: 90 TABLET | Refills: 0 | Status: SHIPPED | OUTPATIENT
Start: 2023-08-30 | End: 2024-02-01

## 2023-10-02 ENCOUNTER — TELEPHONE (OUTPATIENT)
Dept: PRIMARY CARE | Facility: CLINIC | Age: 68
End: 2023-10-02
Payer: MEDICARE

## 2023-10-02 NOTE — TELEPHONE ENCOUNTER
PATIENT CALLED IN BECAUSE SHE IS CONCERNED WITH THE benazepriL-hydrochlorothiazide (Lotensin HCT) 20-12.5 mg tablet [[id number]] BEING ON HER CHART BECAUSE SHE STATES SHE IS NO LONGER ON THIS MEDICATION.  SHE STATED SHE ONLY TAKES THE BENAZEPRIL 5MG    PLEASE UPDATE MEDICATION LIST

## 2023-10-17 ENCOUNTER — TELEPHONE (OUTPATIENT)
Dept: PRIMARY CARE | Facility: CLINIC | Age: 68
End: 2023-10-17
Payer: MEDICARE

## 2023-12-10 DIAGNOSIS — I10 PRIMARY HYPERTENSION: ICD-10-CM

## 2023-12-12 NOTE — TELEPHONE ENCOUNTER
Patient made an appointment for 12/20/23  Has enough meds until then  Please refuse med  Aware you are out of the office today

## 2023-12-13 RX ORDER — BENAZEPRIL HYDROCHLORIDE 5 MG/1
TABLET ORAL
Qty: 90 TABLET | Refills: 0 | Status: SHIPPED | OUTPATIENT
Start: 2023-12-13 | End: 2023-12-20 | Stop reason: SDUPTHER

## 2023-12-18 PROBLEM — E78.5 DYSLIPIDEMIA: Status: ACTIVE | Noted: 2023-12-18

## 2023-12-18 PROBLEM — I10 HYPERTENSION: Status: ACTIVE | Noted: 2019-03-05

## 2023-12-18 PROBLEM — E78.5 HYPERLIPIDEMIA: Status: ACTIVE | Noted: 2020-05-05

## 2023-12-18 PROBLEM — E11.9 TYPE 2 DIABETES MELLITUS WITHOUT COMPLICATION (MULTI): Status: ACTIVE | Noted: 2019-03-05

## 2023-12-18 PROBLEM — J30.9 ALLERGIC RHINITIS: Status: ACTIVE | Noted: 2023-12-18

## 2023-12-18 NOTE — PROGRESS NOTES
Subjective   Patient ID: Monica Musa is a 68 y.o. female who presents for Medicare Annual Wellness Visit Subsequent, Diabetes, Hypertension, and Wrist Pain.    HPI    AWV      DM  Does not  check glucose at home   Today glucose was did not check it today  Eats a generally healthy diet  Staying active  Currently taking jardiance ,metFORMIN XR   Last eye exam last year  Does not see a Podiatrist    HTN, HLD follow up  Denies chest pain,SOB, swelling, headaches, lightheadedness or dizziness.   Does not  check BP at home.   Currently taking benazepril ,atorvastatin     Pt is having right wrist and hand pain   Ongoing for 3 week ago  Pt is having numbness in the finger tips        No other concern or question      Advanced Care Planning  Monica Musa and I discussed their advance care plan preferences such as living will, and durable health care power of , which include: no Attempt Resuscitation, yes Intubate & Ventilate, yes Comfort Care or Life- Sustaning Care. I reminded patient to talk with their health care agent, son  Gian, about their health care goals. Note reflects patient's free will and care goals as expressed to me.      Review of systems  ; Patient seen today for exam denies any problems with headaches or vision, denies any shortness of breath chest pain nausea or vomiting, no black stool no blood in the stool no heartburn type symptoms denies any problems with constipation or diarrhea, and no dysuria-type symptoms    The patient's allergies medications were reviewed with them today    The patient's social family and surgical history or also reviewed here today, along with her past medical history.     Objective     Alert and active in  no acute distress  HEENT TMs clear oropharynx negative nares clear no drainage noted neck supple  With no adenopathy   Heart regular rate and rhythm without murmur and no carotid bruits  Lungs- clear to auscultation bilaterally, no wheeze or rhonchi  "noted  Thyroid -negative masses or nodularity  Abdomen- soft times four quadrants , bowel sounds positive no masses or organomegaly, negative tenderness guarding or rebound  Neurological exam unremarkable- DTRs in upper and lower extremities within normal limits.   skin -no lesions noted      /70 (BP Location: Left arm, Patient Position: Sitting, BP Cuff Size: Adult)   Pulse 74   Temp 36.2 °C (97.2 °F) (Temporal)   Resp 16   Ht 1.676 m (5' 6\")   Wt 100 kg (221 lb)   SpO2 96%   BMI 35.67 kg/m²     Allergies   Allergen Reactions    Penicillins Anaphylaxis and Swelling    Bee Venom Protein (Honey Bee) Swelling       Assessment/Plan   Problem List Items Addressed This Visit       Endometrial carcinoma (CMS/[facility])    Hyperlipidemia    Relevant Orders    Lipid Panel    Hypertension    Relevant Medications    benazepril (Lotensin) 5 mg tablet    Other Relevant Orders    CBC and Auto Differential    Comprehensive Metabolic Panel    Dyslipidemia    Type 2 diabetes mellitus without complication (CMS/[facility])    Relevant Medications    metFORMIN  mg 24 hr tablet    Other Relevant Orders    Albumin , Urine Random    Hemoglobin A1c    BMI 35.0-35.9,adult    Medicare annual wellness visit, subsequent    Class 2 severe obesity due to excess calories with serious comorbidity and body mass index (BMI) of 35.0 to 35.9 in adult (CMS/[facility])     Other Visit Diagnoses       Hyperlipidemia, unspecified        Relevant Medications    atorvastatin (Lipitor) 20 mg tablet    Other Relevant Orders    Lipid Panel    Type 2 diabetes mellitus without complications (CMS/[facility])        Relevant Orders    Albumin , Urine Random    Hemoglobin A1c          Medicare wellness questionnaire reviewed in detail. Advanced Directives reviewed today. Patient advised to provide the office with a copy if has not already done so. No problems with activities of daily living.  Falls risks reviewed.     Discussed patient's BMI and to institute calorie " reduction and increase exercise to decrease risk of diabetes and heart disease in the future.    Refill Atorvastatin, Benazepril, Metformin.    Recommend following up with ophthalmologist once yearly for diabetes.     Labs have been ordered, she/he will have these performed and we will contact her/him with results.  (CBC, CMP, Lipid, A1C, Albumin)  We will wait to send Jardiance until after her labs have been reviewed.    If anything worsens or changes please call us at once, follow up in the office as planned.    Scribe Attestation  By signing my name below, I, Corrina Jack MA, Scribe   attest that this documentation has been prepared under the direction and in the presence of Guerrero Tavarez DO.

## 2023-12-20 ENCOUNTER — LAB (OUTPATIENT)
Dept: LAB | Facility: LAB | Age: 68
End: 2023-12-20
Payer: MEDICARE

## 2023-12-20 ENCOUNTER — OFFICE VISIT (OUTPATIENT)
Dept: PRIMARY CARE | Facility: CLINIC | Age: 68
End: 2023-12-20
Payer: MEDICARE

## 2023-12-20 ENCOUNTER — TELEPHONE (OUTPATIENT)
Dept: PRIMARY CARE | Facility: CLINIC | Age: 68
End: 2023-12-20

## 2023-12-20 VITALS
RESPIRATION RATE: 16 BRPM | HEART RATE: 74 BPM | HEIGHT: 66 IN | SYSTOLIC BLOOD PRESSURE: 132 MMHG | TEMPERATURE: 97.2 F | OXYGEN SATURATION: 96 % | BODY MASS INDEX: 35.52 KG/M2 | WEIGHT: 221 LBS | DIASTOLIC BLOOD PRESSURE: 70 MMHG

## 2023-12-20 DIAGNOSIS — E11.9 TYPE 2 DIABETES MELLITUS WITHOUT COMPLICATION, UNSPECIFIED WHETHER LONG TERM INSULIN USE (MULTI): ICD-10-CM

## 2023-12-20 DIAGNOSIS — R53.81 MALAISE AND FATIGUE: Primary | ICD-10-CM

## 2023-12-20 DIAGNOSIS — E11.9 TYPE 2 DIABETES MELLITUS WITHOUT COMPLICATION, WITHOUT LONG-TERM CURRENT USE OF INSULIN (MULTI): ICD-10-CM

## 2023-12-20 DIAGNOSIS — R20.2 PARESTHESIA OF BOTH HANDS: ICD-10-CM

## 2023-12-20 DIAGNOSIS — I10 HYPERTENSION, UNSPECIFIED TYPE: ICD-10-CM

## 2023-12-20 DIAGNOSIS — Z00.00 MEDICARE ANNUAL WELLNESS VISIT, SUBSEQUENT: ICD-10-CM

## 2023-12-20 DIAGNOSIS — R53.83 MALAISE AND FATIGUE: Primary | ICD-10-CM

## 2023-12-20 DIAGNOSIS — E78.5 HYPERLIPIDEMIA, UNSPECIFIED: ICD-10-CM

## 2023-12-20 DIAGNOSIS — M25.532 BILATERAL WRIST PAIN: ICD-10-CM

## 2023-12-20 DIAGNOSIS — C54.1 ENDOMETRIAL CARCINOMA (MULTI): ICD-10-CM

## 2023-12-20 DIAGNOSIS — E78.5 HYPERLIPIDEMIA, UNSPECIFIED HYPERLIPIDEMIA TYPE: ICD-10-CM

## 2023-12-20 DIAGNOSIS — E78.5 DYSLIPIDEMIA: ICD-10-CM

## 2023-12-20 DIAGNOSIS — E11.9 TYPE 2 DIABETES MELLITUS WITHOUT COMPLICATIONS (MULTI): ICD-10-CM

## 2023-12-20 DIAGNOSIS — I10 PRIMARY HYPERTENSION: ICD-10-CM

## 2023-12-20 DIAGNOSIS — M25.531 BILATERAL WRIST PAIN: ICD-10-CM

## 2023-12-20 DIAGNOSIS — E66.01 CLASS 2 SEVERE OBESITY DUE TO EXCESS CALORIES WITH SERIOUS COMORBIDITY AND BODY MASS INDEX (BMI) OF 35.0 TO 35.9 IN ADULT (MULTI): ICD-10-CM

## 2023-12-20 PROBLEM — E66.812 CLASS 2 SEVERE OBESITY DUE TO EXCESS CALORIES WITH SERIOUS COMORBIDITY AND BODY MASS INDEX (BMI) OF 35.0 TO 35.9 IN ADULT: Status: ACTIVE | Noted: 2023-12-20

## 2023-12-20 LAB
ALBUMIN SERPL BCP-MCNC: 4.6 G/DL (ref 3.4–5)
ALP SERPL-CCNC: 91 U/L (ref 33–136)
ALT SERPL W P-5'-P-CCNC: 37 U/L (ref 7–45)
ANION GAP SERPL CALC-SCNC: 15 MMOL/L (ref 10–20)
AST SERPL W P-5'-P-CCNC: 26 U/L (ref 9–39)
BASOPHILS # BLD AUTO: 0.08 X10*3/UL (ref 0–0.1)
BASOPHILS NFR BLD AUTO: 1.3 %
BILIRUB SERPL-MCNC: 0.4 MG/DL (ref 0–1.2)
BUN SERPL-MCNC: 15 MG/DL (ref 6–23)
CALCIUM SERPL-MCNC: 9.8 MG/DL (ref 8.6–10.3)
CHLORIDE SERPL-SCNC: 103 MMOL/L (ref 98–107)
CHOLEST SERPL-MCNC: 173 MG/DL (ref 0–199)
CHOLESTEROL/HDL RATIO: 3.1
CO2 SERPL-SCNC: 27 MMOL/L (ref 21–32)
CREAT SERPL-MCNC: 0.63 MG/DL (ref 0.5–1.05)
CREAT UR-MCNC: 55.6 MG/DL (ref 20–320)
EOSINOPHIL # BLD AUTO: 0.19 X10*3/UL (ref 0–0.7)
EOSINOPHIL NFR BLD AUTO: 3.1 %
ERYTHROCYTE [DISTWIDTH] IN BLOOD BY AUTOMATED COUNT: 13.2 % (ref 11.5–14.5)
EST. AVERAGE GLUCOSE BLD GHB EST-MCNC: 166 MG/DL
GFR SERPL CREATININE-BSD FRML MDRD: >90 ML/MIN/1.73M*2
GLUCOSE SERPL-MCNC: 166 MG/DL (ref 74–99)
HBA1C MFR BLD: 7.4 %
HCT VFR BLD AUTO: 47.4 % (ref 36–46)
HDLC SERPL-MCNC: 56 MG/DL
HGB BLD-MCNC: 15.7 G/DL (ref 12–16)
IMM GRANULOCYTES # BLD AUTO: 0.03 X10*3/UL (ref 0–0.7)
IMM GRANULOCYTES NFR BLD AUTO: 0.5 % (ref 0–0.9)
LDLC SERPL CALC-MCNC: 79 MG/DL
LYMPHOCYTES # BLD AUTO: 2.31 X10*3/UL (ref 1.2–4.8)
LYMPHOCYTES NFR BLD AUTO: 37.9 %
MCH RBC QN AUTO: 30.4 PG (ref 26–34)
MCHC RBC AUTO-ENTMCNC: 33.1 G/DL (ref 32–36)
MCV RBC AUTO: 92 FL (ref 80–100)
MICROALBUMIN UR-MCNC: 56 MG/L
MICROALBUMIN/CREAT UR: 100.7 UG/MG CREAT
MONOCYTES # BLD AUTO: 0.54 X10*3/UL (ref 0.1–1)
MONOCYTES NFR BLD AUTO: 8.9 %
NEUTROPHILS # BLD AUTO: 2.94 X10*3/UL (ref 1.2–7.7)
NEUTROPHILS NFR BLD AUTO: 48.3 %
NON HDL CHOLESTEROL: 117 MG/DL (ref 0–149)
NRBC BLD-RTO: 0 /100 WBCS (ref 0–0)
PLATELET # BLD AUTO: 317 X10*3/UL (ref 150–450)
POTASSIUM SERPL-SCNC: 4.4 MMOL/L (ref 3.5–5.3)
PROT SERPL-MCNC: 7.1 G/DL (ref 6.4–8.2)
RBC # BLD AUTO: 5.17 X10*6/UL (ref 4–5.2)
SODIUM SERPL-SCNC: 141 MMOL/L (ref 136–145)
TRIGL SERPL-MCNC: 190 MG/DL (ref 0–149)
VLDL: 38 MG/DL (ref 0–40)
WBC # BLD AUTO: 6.1 X10*3/UL (ref 4.4–11.3)

## 2023-12-20 PROCEDURE — 4010F ACE/ARB THERAPY RXD/TAKEN: CPT | Performed by: FAMILY MEDICINE

## 2023-12-20 PROCEDURE — 82570 ASSAY OF URINE CREATININE: CPT

## 2023-12-20 PROCEDURE — 3075F SYST BP GE 130 - 139MM HG: CPT | Performed by: FAMILY MEDICINE

## 2023-12-20 PROCEDURE — 99213 OFFICE O/P EST LOW 20 MIN: CPT | Performed by: FAMILY MEDICINE

## 2023-12-20 PROCEDURE — 36415 COLL VENOUS BLD VENIPUNCTURE: CPT

## 2023-12-20 PROCEDURE — 82043 UR ALBUMIN QUANTITATIVE: CPT

## 2023-12-20 PROCEDURE — 85025 COMPLETE CBC W/AUTO DIFF WBC: CPT

## 2023-12-20 PROCEDURE — G0439 PPPS, SUBSEQ VISIT: HCPCS | Performed by: FAMILY MEDICINE

## 2023-12-20 PROCEDURE — 80061 LIPID PANEL: CPT

## 2023-12-20 PROCEDURE — 1170F FXNL STATUS ASSESSED: CPT | Performed by: FAMILY MEDICINE

## 2023-12-20 PROCEDURE — 1036F TOBACCO NON-USER: CPT | Performed by: FAMILY MEDICINE

## 2023-12-20 PROCEDURE — 3008F BODY MASS INDEX DOCD: CPT | Performed by: FAMILY MEDICINE

## 2023-12-20 PROCEDURE — 1159F MED LIST DOCD IN RCRD: CPT | Performed by: FAMILY MEDICINE

## 2023-12-20 PROCEDURE — 83036 HEMOGLOBIN GLYCOSYLATED A1C: CPT

## 2023-12-20 PROCEDURE — 3078F DIAST BP <80 MM HG: CPT | Performed by: FAMILY MEDICINE

## 2023-12-20 PROCEDURE — 80053 COMPREHEN METABOLIC PANEL: CPT

## 2023-12-20 RX ORDER — ATORVASTATIN CALCIUM 20 MG/1
20 TABLET, FILM COATED ORAL DAILY
Qty: 90 TABLET | Refills: 1 | Status: SHIPPED | OUTPATIENT
Start: 2023-12-20 | End: 2024-06-05

## 2023-12-20 RX ORDER — METFORMIN HYDROCHLORIDE 500 MG/1
500 TABLET, EXTENDED RELEASE ORAL 2 TIMES DAILY
Qty: 180 TABLET | Refills: 1 | Status: SHIPPED | OUTPATIENT
Start: 2023-12-20 | End: 2024-06-05

## 2023-12-20 RX ORDER — BENAZEPRIL HYDROCHLORIDE 5 MG/1
5 TABLET ORAL DAILY
Qty: 90 TABLET | Refills: 1 | Status: SHIPPED | OUTPATIENT
Start: 2023-12-20 | End: 2024-02-01 | Stop reason: SDUPTHER

## 2023-12-20 ASSESSMENT — ACTIVITIES OF DAILY LIVING (ADL)
DOING_HOUSEWORK: INDEPENDENT
TAKING_MEDICATION: INDEPENDENT
GROCERY_SHOPPING: INDEPENDENT
MANAGING_FINANCES: INDEPENDENT
BATHING: INDEPENDENT
DRESSING: INDEPENDENT

## 2023-12-20 ASSESSMENT — PATIENT HEALTH QUESTIONNAIRE - PHQ9
SUM OF ALL RESPONSES TO PHQ9 QUESTIONS 1 AND 2: 0
1. LITTLE INTEREST OR PLEASURE IN DOING THINGS: NOT AT ALL
2. FEELING DOWN, DEPRESSED OR HOPELESS: NOT AT ALL

## 2023-12-20 NOTE — TELEPHONE ENCOUNTER
Patient states that at her appointment she has mention to the intake MA about her fingertips going numb on her right hand. 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  This was not address at her appointment and wanted to know options.   Please advise.

## 2023-12-20 NOTE — TELEPHONE ENCOUNTER
Patient is calling because she just saw you this morning and states that you didn't address the issue she came in for, her numbness in fingers and wrist pain. She states it's been going on for about 3-4 weeks. Wanted to know what you advise that she do    Thanks    Patient's # [phone]    Preferred pharmacy [facility]

## 2023-12-20 NOTE — TELEPHONE ENCOUNTER
----- Message from Guerrero Tavarez DO sent at 12/20/2023  4:42 PM EST -----  Looked at all her labs her sugars are stable kidneys everything are stable except her kidneys are spilling protein,, which can be from years of diabetes hypertension,, I would like her to go back up on the BenzePril  to 2 tablets each day,, her blood counts are back up now I do not think it will make her dizzy,, but it also helps protect her kidney and that protein problem,, if taking 2 at a time makes her dizzy then she should stop it and let me know but I would like her to try that

## 2023-12-21 NOTE — TELEPHONE ENCOUNTER
EMG order placed  Called patient and lmom for patient that most of the time it's related to CTS, sometimes thyroid or B12, will add those onto her labs also she will need an EMG nerve conduction test of both hands to see if she has nerve irritation in her wrists  Gave her the scheduling # to call [phone] option #3  And to rtc with any other questions during regular business hours  [phone]

## 2023-12-22 NOTE — TELEPHONE ENCOUNTER
I left a message for the girls to call her if she is having that much numbness and tingling her hands she needs an EMG nerve conduction study unfortunately we both forgot to ask I and her,, so ordered EMG nerve conduction study and we will see if is a nerve issue with those hands, if she like to see a hand specialist she can refer to Dr. mason         Handled by Angelica.

## 2024-01-30 ENCOUNTER — HOSPITAL ENCOUNTER (EMERGENCY)
Facility: HOSPITAL | Age: 69
Discharge: HOME | End: 2024-01-30
Payer: MEDICARE

## 2024-01-30 ENCOUNTER — APPOINTMENT (OUTPATIENT)
Dept: RADIOLOGY | Facility: HOSPITAL | Age: 69
End: 2024-01-30
Payer: MEDICARE

## 2024-01-30 VITALS
HEART RATE: 75 BPM | SYSTOLIC BLOOD PRESSURE: 162 MMHG | OXYGEN SATURATION: 97 % | RESPIRATION RATE: 18 BRPM | TEMPERATURE: 97.5 F | DIASTOLIC BLOOD PRESSURE: 80 MMHG | BODY MASS INDEX: 33.75 KG/M2 | WEIGHT: 210 LBS | HEIGHT: 66 IN

## 2024-01-30 DIAGNOSIS — S61.209A AVULSION OF FINGER, INITIAL ENCOUNTER: Primary | ICD-10-CM

## 2024-01-30 PROCEDURE — 73130 X-RAY EXAM OF HAND: CPT | Mod: LT

## 2024-01-30 PROCEDURE — 73130 X-RAY EXAM OF HAND: CPT | Mod: LEFT SIDE | Performed by: RADIOLOGY

## 2024-01-30 PROCEDURE — 90715 TDAP VACCINE 7 YRS/> IM: CPT | Performed by: PHYSICIAN ASSISTANT

## 2024-01-30 PROCEDURE — 99284 EMERGENCY DEPT VISIT MOD MDM: CPT | Mod: 25 | Performed by: PHYSICIAN ASSISTANT

## 2024-01-30 PROCEDURE — 90471 IMMUNIZATION ADMIN: CPT | Performed by: PHYSICIAN ASSISTANT

## 2024-01-30 PROCEDURE — 2500000001 HC RX 250 WO HCPCS SELF ADMINISTERED DRUGS (ALT 637 FOR MEDICARE OP): Performed by: PHYSICIAN ASSISTANT

## 2024-01-30 PROCEDURE — 99284 EMERGENCY DEPT VISIT MOD MDM: CPT | Performed by: PHYSICIAN ASSISTANT

## 2024-01-30 PROCEDURE — 2500000004 HC RX 250 GENERAL PHARMACY W/ HCPCS (ALT 636 FOR OP/ED): Performed by: PHYSICIAN ASSISTANT

## 2024-01-30 RX ORDER — ACETAMINOPHEN 325 MG/1
650 TABLET ORAL EVERY 6 HOURS PRN
Qty: 30 TABLET | Refills: 0 | Status: SHIPPED | OUTPATIENT
Start: 2024-01-30

## 2024-01-30 RX ORDER — CEPHALEXIN 250 MG/1
500 CAPSULE ORAL ONCE
Status: DISCONTINUED | OUTPATIENT
Start: 2024-01-30 | End: 2024-01-30

## 2024-01-30 RX ORDER — CEPHALEXIN 500 MG/1
500 CAPSULE ORAL 4 TIMES DAILY
Qty: 28 CAPSULE | Refills: 0 | Status: SHIPPED | OUTPATIENT
Start: 2024-01-30 | End: 2024-01-30 | Stop reason: WASHOUT

## 2024-01-30 RX ORDER — CLINDAMYCIN HYDROCHLORIDE 150 MG/1
450 CAPSULE ORAL 3 TIMES DAILY
Qty: 63 CAPSULE | Refills: 0 | Status: SHIPPED | OUTPATIENT
Start: 2024-01-30 | End: 2024-02-06

## 2024-01-30 RX ADMIN — TETANUS TOXOID, REDUCED DIPHTHERIA TOXOID AND ACELLULAR PERTUSSIS VACCINE, ADSORBED 0.5 ML: 5; 2.5; 8; 8; 2.5 SUSPENSION INTRAMUSCULAR at 16:38

## 2024-01-30 RX ADMIN — CLINDAMYCIN HYDROCHLORIDE 450 MG: 300 CAPSULE ORAL at 16:30

## 2024-01-30 ASSESSMENT — PAIN - FUNCTIONAL ASSESSMENT: PAIN_FUNCTIONAL_ASSESSMENT: 0-10

## 2024-01-30 ASSESSMENT — COLUMBIA-SUICIDE SEVERITY RATING SCALE - C-SSRS
6. HAVE YOU EVER DONE ANYTHING, STARTED TO DO ANYTHING, OR PREPARED TO DO ANYTHING TO END YOUR LIFE?: NO
1. IN THE PAST MONTH, HAVE YOU WISHED YOU WERE DEAD OR WISHED YOU COULD GO TO SLEEP AND NOT WAKE UP?: NO
2. HAVE YOU ACTUALLY HAD ANY THOUGHTS OF KILLING YOURSELF?: NO

## 2024-01-30 ASSESSMENT — PAIN SCALES - GENERAL: PAINLEVEL_OUTOF10: 3

## 2024-01-30 NOTE — ED PROVIDER NOTES
HPI:  68-year-old female with history of DM2, HTN presents as transfer from [facility] for orthopedic hand surgery consultation with concerns for left finger injury.  States that she was using a rotary knife, similar to a pizza cutter, and cut the end of her finger off.  They stated that she needed higher level of care and therefore transferred to [facility] via their private vehicle.    Physical Exam:   GEN: Vitals noted. NAD  EYES:  EOMs grossly intact, anicteric sclera  GRACY: Mucosa moist.  NECK: Supple.  CARD: RRR  PULMONARY: Moving air well. Clear all lung fields.  ABDOMEN: Soft, no guarding, no rigidity. Nontender. NABS  EXTREMITIES: Left third digit with the distal 0.5 cm avulsed including through the fingernail.  Bleeding, pressure applied  SKIN: Intact, warm and dry  NEURO: Alert and oriented x 3, speech is clear, no obvious deficits noted.       ----------------------------------------------------------------------------------------------------------------------------    MDM:  68-year-old female presents as transfer from outside hospital for finger avulsion injury.  Dressing was removed and there was quite a bit of blood soaked through, was still actively bleeding.  New temporary dressing is applied.  Will update her tetanus as it was not done outside hospital and will perform plain film x-ray.  Will have Ortho hand see her.  Diagnoses as of 01/30/24 1623   Avulsion of finger, initial encounter   X-ray independent interpretation shows no fracture.  Ortho hand evaluated patient and washed it out and applied dressing.  Splint is applied.  They recommended empiric antibiotics.  Does have an anaphylaxis allergy to penicillin therefore we will do clindamycin.  Return precautions reviewed.    XR hand left 3+ views   Final Result   Small amount of soft tissue defect at the tip of the left 4th distal   phalanx.        No fracture or radiopaque foreign body.        Signed by: Adalberto Bagley 1/30/2024 3:15 PM   Dictation  workstation:   [id number]        Labs Reviewed - No data to display    ----------------------------------------------------------------------------------------------------------------------------    This note was dictated using a speech recognition program.  While an attempt was made at proof reading to minimize errors, minor errors in transcription may be present call for questions.     Guanaco Beckford PA-C  01/30/24 8315

## 2024-01-30 NOTE — ED TRIAGE NOTES
Pt to ED with c/o left middle digit injury, pt was using a new blade when making a wreath when she sliced the tip. Pt was seen at [facility] and sent to [facility] to see hand specialist.     Hx of prediabetes and htn- compliant with medication regimen.

## 2024-01-30 NOTE — Clinical Note
Monica Musa was seen and treated in our emergency department on 1/30/2024.  She may return to work on 02/01/2024.       If you have any questions or concerns, please don't hesitate to call.      Guanaco Beckford PA-C

## 2024-01-30 NOTE — CONSULTS
Orthopaedic Surgery Consult H&P    HPI:   Orthopaedic Problems/Injuries: L middle finger distal tip partial amp    68 y.o. female (healthy) presents after cutting distal tip of her finger with sewing knife. Initially presented to [facility] and transferred downtown. Also brings with her distal finger tip in jar.    PMH: per above/EMR  PSH: per above/EMR  SocHx:      - Denies IVDU  FamHx:  Non-contributory to this patient's acute orthopaedic problem.   Allergies: Reviewed in EMR  Meds: Reviewed in EMR    ROS      - 14 point ROS negative except as above    Physical Exam:  Gen: AOx3, NAD  HEENT: normocephalic atraumatic  Psych: appropriate mood and affect  Resp: nonlabored breathing  Cardiac: Extremities WWP, RRR to peripheral palpation  Neuro: CN 2-12 grossly intact  Skin: no rashes    Left Hand:  - Distal tip partial amp of middle finger  - No exposed bone or tendon  - Full active ROM of all digits  - Painful at site of injury  - SILT M/U/R  - Fires AIN/PIN/Ulnar distributions  - No scissoring noted with full fist    A full secondary exam was performed and all relevant findings discussed and noted above.    Imaging:  AP and lateral radiographs of the left hand display no fractures.    Assessment:  Orthopaedic Problems/Injuries: Left middle finger distal tip partial amp. No exposed tendon or bone. Venous ooze controlled with Surgicel. Irrigated at bedside. Dressed with bulky soft dressing and placed in alumifoam splint. Fu in 1-2 weeks for wound check. Plan for healing by secondary intention.    Plan:  - No acute ortho surgical intervention  - WB: WBAT LUE in alumifoam splint  - Keflex x5-7 days  - Recommend removal of dressing after 5 days and encourage daily warm soaks TID    - Dispo: per ED    Bhavik Tineo MD  PGY-2 Orthopaedic Surgery  On-call Resident    This patient was seen and staffed within 30 minutes of initial consult.

## 2024-01-30 NOTE — DISCHARGE INSTRUCTIONS
Take the full course of the Keflex.  As instructed by the hand surgeons take down the dressing after 5 days and perform soaks per their instructions.  Take Tylenol for pain relief.  Return for any signs of infection.  Follow-up with your hand surgery specialist, if you would like to follow-up with  call [phone]

## 2024-01-31 ENCOUNTER — OFFICE VISIT (OUTPATIENT)
Dept: ORTHOPEDIC SURGERY | Facility: CLINIC | Age: 69
End: 2024-01-31
Payer: MEDICARE

## 2024-01-31 DIAGNOSIS — S68.113A TRAUMATIC AMPUTATION OF TIP OF LEFT MIDDLE FINGER, INITIAL ENCOUNTER: Primary | ICD-10-CM

## 2024-01-31 PROCEDURE — 1125F AMNT PAIN NOTED PAIN PRSNT: CPT | Performed by: INTERNAL MEDICINE

## 2024-01-31 PROCEDURE — 99213 OFFICE O/P EST LOW 20 MIN: CPT | Performed by: INTERNAL MEDICINE

## 2024-01-31 PROCEDURE — 99203 OFFICE O/P NEW LOW 30 MIN: CPT | Performed by: INTERNAL MEDICINE

## 2024-01-31 PROCEDURE — 4010F ACE/ARB THERAPY RXD/TAKEN: CPT | Performed by: INTERNAL MEDICINE

## 2024-01-31 PROCEDURE — 3008F BODY MASS INDEX DOCD: CPT | Performed by: INTERNAL MEDICINE

## 2024-01-31 PROCEDURE — 1036F TOBACCO NON-USER: CPT | Performed by: INTERNAL MEDICINE

## 2024-01-31 NOTE — PROGRESS NOTES
Acute Injury New Patient Visit    CC: No chief complaint on file.      HPI: Monica is a 68 y.o. female presents for evaluation for acute left middle finger injury sustained yesterday after she cut herself with a knife. She went to ER, had x-rays taken and was given a tetanus shot  She is here for her initial evaluation.  Denies any fevers or chills.        Review of Systems   GENERAL: Negative for malaise, significant weight loss, fever  MUSCULOSKELETAL: See HPI  NEURO:  Negative for numbness / tingling     Past Medical History  Past Medical History:   Diagnosis Date    Body mass index (BMI) 36.0-36.9, adult 09/02/2021    BMI 36.0-36.9,adult    Body mass index (BMI) 37.0-37.9, adult 02/05/2021    BMI 37.0-37.9, adult    Encounter for general adult medical examination without abnormal findings     Annual physical exam    Morbid (severe) obesity due to excess calories (CMS/[facility]) 08/26/2021    Class 2 severe obesity due to excess calories with serious comorbidity and body mass index (BMI) of 37.0 to 37.9 in adult    Morbid (severe) obesity due to excess calories (CMS/HCC) 06/09/2022    Class 2 severe obesity due to excess calories with serious comorbidity and body mass index (BMI) of 36.0 to 36.9 in adult    Personal history of other diseases of the respiratory system 01/03/2020    History of acute bronchitis    Personal history of other drug therapy 10/11/2019    History of influenza vaccination       Medication review  Medication Documentation Review Audit       Reviewed by Roland Roberts CMA (Medical Assistant) on 12/20/23 at 0758      Medication Order Taking? Sig Documenting Provider Last Dose Status   aspirin 81 mg EC tablet [id number] Yes Take 1 tablet (81 mg) by mouth once daily. Historical Provider, MD Taking Active   atorvastatin (Lipitor) 20 mg tablet [id number] Yes TAKE 1 TABLET BY MOUTH EVERY DAY Guerrero Tavarez DO Taking Active   benazepril (Lotensin) 5 mg tablet [id number] Yes TAKE 1 TABLET BY MOUTH EVERY  DAY Guerrero Tavarez, DO Taking Active   Jardiance 10 mg [id number] Yes TAKE 1 TABLET BY MOUTH EVERY DAY Guerrero Tavarez, DO Taking Active   metFORMIN XR (Glucophage-XR) 500 mg 24 hr tablet [id number] Yes TAKE 1 TABLET BY MOUTH TWICE A DAY Max Eduardo MD Taking Active                    Allergies  Allergies   Allergen Reactions    Penicillins Anaphylaxis and Swelling    Bee Venom Protein (Honey Bee) Swelling       Social History  Social History     Socioeconomic History    Marital status:      Spouse name: Not on file    Number of children: Not on file    Years of education: Not on file    Highest education level: Not on file   Occupational History    Not on file   Tobacco Use    Smoking status: Never    Smokeless tobacco: Never   Substance and Sexual Activity    Alcohol use: Not Currently    Drug use: Never    Sexual activity: Not on file   Other Topics Concern    Not on file   Social History Narrative    Not on file     Social Determinants of Health     Financial Resource Strain: Not on file   Food Insecurity: Not on file   Transportation Needs: Not on file   Physical Activity: Not on file   Stress: Not on file   Social Connections: Not on file   Intimate Partner Violence: Not on file   Housing Stability: Not on file       Surgical History  Past Surgical History:   Procedure Laterality Date    OTHER SURGICAL HISTORY  06/09/2022    Arm surgery    OTHER SURGICAL HISTORY  07/25/2022    Salpingo-oophorectomy bilateral    OTHER SURGICAL HISTORY  07/25/2022    Laparoscopic hysterectomy    OTHER SURGICAL HISTORY  07/25/2022    Cystoscopy    OTHER SURGICAL HISTORY  07/25/2022    Lymph node surgery       Physical Exam:  GENERAL:  Patient is awake, alert, and oriented to person place and time.  Patient appears well nourished and well kept.  Affect Calm, Not Acutely Distressed.  HEENT:  Normocephalic, Atraumatic, EOMI  CARDIOVASCULAR:  Hemodynamically stable.  RESPIRATORY:  Normal respirations with unlabored  breathing.  Extremity: Left hand shows splint is intact and dressing is intact the left third finger.  Slight bloody's saturation of the finger dressing.  No erythema tracking.  Dressing intact.      Diagnostics: X-rays reviewed  XR hand left 3+ views  Narrative: Interpreted By:  Adalberto Bagley,   STUDY:  XR HAND LEFT 3+ VIEWS      INDICATION:  Signs/Symptoms:4th digit tip inj.      COMPARISON:  None      ACCESSION NUMBER(S):  [id number]      ORDERING CLINICIAN:  JIA KRISHNAMURTHY      FINDINGS:  Small amount of soft tissue defect at the tip of the left 4th distal  phalanx.      No fracture or radiopaque foreign body.      Moderate osteoarthritis left hand.      Impression: Small amount of soft tissue defect at the tip of the left 4th distal  phalanx.      No fracture or radiopaque foreign body.      Signed by: Adalberto Bagley 1/30/2024 3:15 PM  Dictation workstation:   [id number]      Procedure: None    Assessment: Left 3rd finger laceration with partial tip amputation    Plan: Monica presents today for initial evaluation for acute left 3rd finger injury sustained yesterday. She sustained a laceration of the 3rd finger with partial tip amputation, she was transferred to [facility] ER and saw a hand specialist and recommended close follow-up.  She is here at the acute injury clinic, we will arrange for patient to see Dr. Camacho tomorrow to discuss further treatment options possible skin graft for the partial tip amputation or healing by secondary intention.  Patient up-to-date on her tetanus shot and given oral antibiotics which she is currently taking.    No orders of the defined types were placed in this encounter.     At the conclusion of the visit there were no further questions by the patient/family regarding their plan of care.  Patient was instructed to call or return with any issues, questions, or concerns regarding their injury and/or treatment plan described above.     01/31/24 at 2:22 PM - Uriel Ashby  MD  Scribe Attestation  By signing my name below, I, Radha Johnson   attest that this documentation has been prepared under the direction and in the presence of Uriel Ashby MD.    Office: [phone]    This note was prepared using voice recognition software.  The details of this note are correct and have been reviewed, and corrected to the best of my ability.  Some grammatical errors may persist related to the Dragon software.

## 2024-02-01 ENCOUNTER — OFFICE VISIT (OUTPATIENT)
Dept: ORTHOPEDIC SURGERY | Facility: CLINIC | Age: 69
End: 2024-02-01
Payer: MEDICARE

## 2024-02-01 DIAGNOSIS — I10 PRIMARY HYPERTENSION: ICD-10-CM

## 2024-02-01 DIAGNOSIS — E11.9 TYPE 2 DIABETES MELLITUS WITHOUT COMPLICATIONS (MULTI): ICD-10-CM

## 2024-02-01 DIAGNOSIS — S68.113A TRAUMATIC AMPUTATION OF TIP OF LEFT MIDDLE FINGER, INITIAL ENCOUNTER: Primary | ICD-10-CM

## 2024-02-01 PROCEDURE — 1036F TOBACCO NON-USER: CPT | Performed by: ORTHOPAEDIC SURGERY

## 2024-02-01 PROCEDURE — 99214 OFFICE O/P EST MOD 30 MIN: CPT | Performed by: ORTHOPAEDIC SURGERY

## 2024-02-01 PROCEDURE — 1159F MED LIST DOCD IN RCRD: CPT | Performed by: ORTHOPAEDIC SURGERY

## 2024-02-01 PROCEDURE — 4010F ACE/ARB THERAPY RXD/TAKEN: CPT | Performed by: ORTHOPAEDIC SURGERY

## 2024-02-01 PROCEDURE — 1125F AMNT PAIN NOTED PAIN PRSNT: CPT | Performed by: ORTHOPAEDIC SURGERY

## 2024-02-01 PROCEDURE — 3008F BODY MASS INDEX DOCD: CPT | Performed by: ORTHOPAEDIC SURGERY

## 2024-02-01 RX ORDER — EMPAGLIFLOZIN 10 MG/1
TABLET, FILM COATED ORAL
Qty: 90 TABLET | Refills: 0 | Status: SHIPPED | OUTPATIENT
Start: 2024-02-01 | End: 2024-04-29

## 2024-02-01 RX ORDER — BENAZEPRIL HYDROCHLORIDE 5 MG/1
5 TABLET ORAL 2 TIMES DAILY
Qty: 180 TABLET | Refills: 0 | Status: SHIPPED | OUTPATIENT
Start: 2024-02-01 | End: 2024-04-29

## 2024-02-01 NOTE — TELEPHONE ENCOUNTER
Patient is calling - message from 12/22/23 - Dr. Tavarez wanted her to increase her Benazepril 5 mg from 1 to 2 tablets daily - she is running short on medications now- needs a new prescription sent to pharmacy with new directions.    [facility]    [phone]    Last OV;  12/20/23

## 2024-02-01 NOTE — LETTER
February 1, 2024    Monica Musa  [id number]  [place] [facility] [address]      Dear MsManuelito Musa:    Keep current dressing in place for 3 days.  You can remove it at home and begin to wash the hands daily with soap and water in the sink.    Reapply dressing daily with base layer of nonadherent gauze (Adaptic), plain gauze, cotton roll, Coban.    May remove dressing for showering/bathing purposes.  Try to avoid soaking or submerging underwater.  Normal showers and hand washing are okay.    Hold aspirin for 3 days, resume on Sunday if limited bleeding with dressing change.  If significant oozing persists, hold aspirin for 7 days total.    If at any point you begin to experience a increased amount of bleeding from the dressing, hold pressure to the tip of the digit for 5 to 10 minutes and reinforce dressing.    If you have any questions or concerns, please don't hesitate to call. [phone].    Sincerely,            Gonzalo Camacho DO        CC: No Recipients

## 2024-02-01 NOTE — PROGRESS NOTES
2/1/2024    Chief Complaint   Patient presents with    Left Hand - Pain, New Patient Visit     Traumatic amputation Lt index finger distal phalanx  DOI: 1/30/24         History of Present Illness:  Patient Monica Musa , 68 y.o. female, presents today, 2/1/2024, for evaluation of left middle finger  laceration .  Monica comes in today with left long finger traumatic amputation through the tip of the distal portion of the digit.  This was a self-inflicted accidental wound with a  when she was crafting on 1/29/2024.  She originally went to an urgent care facility that sent her to [facility] for immediate hand specialist evaluation.  She was seen and evaluated in the ER where tetanus shot was updated she was given oral clindamycin which she has been taking and is compliant with.  After consultation with hand surgery resident and fellow a Surgicel dressing was placed and they recommended for healing by secondary intention.  She has kept her dressing in place and was sent for further orthopedic evaluation and follow-up.       Review of Systems:   GENERAL: Negative  GI: Negative  MUSCULOSKELETAL: See HPI  SKIN: Negative  NEURO:  Negative     Physical Exam:  GENERAL:  Alert and oriented to person, place, and time.  No acute distress and breathing comfortably; pleasant and cooperative with the examination.  HEENT:  Head is normocephalic and atraumatic.  NECK:  Supple, no visible swelling.  CARDIOVASCULAR:  No palpable tachycardia.  LUNGS:  No audible wheezing or labored breathing.  ABDOMEN:  Nondistended.  Extremities: Evaluation of left upper extremity finds the patient to have a palpable radial artery at the wrist with brisk capillary refill to all digits. The patient has intact sensorium to axillary, radial, median and ulnar nerves. There are no open wounds. There are no signs of infection. There is no evidence of lymphedema or lymphatic streaking. The patient has supple compartments of the left  arm, forearm and hand.  After digital block was performed Surgicel dressing was removed in the office today.  There was some mild oozing from the tip of the digit.  No exposed tendon or bone.  It was approximately 6 mm at largest diameter wound.  Nail plate was intact.     Imaging:  Radiographs [facility] PACS system show no acute fracture or dislocation.     Assessment:  Traumatic amputation/ laceration to left long finger digit.     Plan:  Operative and nonoperative treatment strategies were discussed at length.  Recommendations were made for continued nonoperative management.  Will continue with the oral clindamycin still completion.  Patient does take 81 mg aspirin daily we discussed stopping this for the next 3 to 7 days depending on the amount of wound bleeding/drainage she continues to have.  New nonadherent dressing was applied in the office today and patient was instructed to keep this intact for 3 days at that point she begin showering and washing her hands normally at home and changing the dressing once daily.  Supplies were provided as well as written instructions.  Follow-up with our office in 2 weeks for repeat clinical exam, no x-rays necessary upon return.  All questions answered at today's visit.    In a face to face encounter, I performed a history and physical examination, discussed pertinent diagnostic studies if indicated, and discussed diagnosis and management strategies with both the patient and the mid-level provider. I reviewed the mid-level's note and agree with the documented findings and plan of care.      Patient presents today for evaluation of traumatic amputation to tip of left long finger.  She was placed into an adherent dressing.  We could not get it off easily in the office.  She elected for digital block and debridement of the dressing from the wound.  With the Gelfoam  from the digit the wound was inspected.  It measured 12 mm in maximal dimension with no obvious exposed bone.   Patient was noted to have a distal oblique amputation to the tip of the finger involving just soft tissue.  We talked about options for coverage including skin graft versus healing by secondary intention.  She elects for observation and healing by secondary intention.  She will keep on with her clindamycin dosing and do daily dressing changes starting in 3 days.  A nonadherent dressing was placed today.  If she is having a lot of bleeding with her dressing changes she may consider stopping her aspirin for a week or so to make things a bit easier.  X-rays of the left long finger upon return to the office in 2 weeks.

## 2024-02-08 ENCOUNTER — HOSPITAL ENCOUNTER (OUTPATIENT)
Dept: NEUROLOGY | Facility: CLINIC | Age: 69
Discharge: HOME | End: 2024-02-08
Payer: MEDICARE

## 2024-02-08 DIAGNOSIS — R20.2 PARESTHESIA OF BOTH HANDS: ICD-10-CM

## 2024-02-08 PROCEDURE — 95886 MUSC TEST DONE W/N TEST COMP: CPT | Performed by: SPECIALIST

## 2024-02-08 PROCEDURE — 95909 NRV CNDJ TST 5-6 STUDIES: CPT | Performed by: SPECIALIST

## 2024-02-15 ENCOUNTER — HOSPITAL ENCOUNTER (OUTPATIENT)
Dept: RADIOLOGY | Facility: CLINIC | Age: 69
Discharge: HOME | End: 2024-02-15
Payer: MEDICARE

## 2024-02-15 ENCOUNTER — OFFICE VISIT (OUTPATIENT)
Dept: ORTHOPEDIC SURGERY | Facility: CLINIC | Age: 69
End: 2024-02-15
Payer: MEDICARE

## 2024-02-15 DIAGNOSIS — S68.111A TRAUMATIC AMPUTATION OF LEFT INDEX FINGER: ICD-10-CM

## 2024-02-15 DIAGNOSIS — S68.113A TRAUMATIC AMPUTATION OF TIP OF LEFT MIDDLE FINGER, INITIAL ENCOUNTER: ICD-10-CM

## 2024-02-15 PROCEDURE — 1125F AMNT PAIN NOTED PAIN PRSNT: CPT | Performed by: ORTHOPAEDIC SURGERY

## 2024-02-15 PROCEDURE — 4010F ACE/ARB THERAPY RXD/TAKEN: CPT | Performed by: ORTHOPAEDIC SURGERY

## 2024-02-15 PROCEDURE — 73140 X-RAY EXAM OF FINGER(S): CPT | Mod: LEFT SIDE | Performed by: ORTHOPAEDIC SURGERY

## 2024-02-15 PROCEDURE — 3008F BODY MASS INDEX DOCD: CPT | Performed by: ORTHOPAEDIC SURGERY

## 2024-02-15 PROCEDURE — 1159F MED LIST DOCD IN RCRD: CPT | Performed by: ORTHOPAEDIC SURGERY

## 2024-02-15 PROCEDURE — 1036F TOBACCO NON-USER: CPT | Performed by: ORTHOPAEDIC SURGERY

## 2024-02-15 PROCEDURE — 73140 X-RAY EXAM OF FINGER(S): CPT | Mod: LT

## 2024-02-15 NOTE — PROGRESS NOTES
History present illness: Patient presents today status post soft tissue amputation to left long finger.  She is undergoing nonoperative management.  She denies symptoms compatible with infection.        Physical examination:  General: Alert and oriented to person, place, and time.  No acute distress and breathing comfortably: Pleasant and cooperative with examination.  Extremities: Evaluation of the left upper extremity finds the patient had palpable radial artery at the wrist with brisk capillary refill to all digits.  Patient has intact sensation to axillary radial median and ulnar nerves.  Left long finger shows oblique amputation with smaller wound size as compared to previous visit.  There are no signs of infection.  There is no evidence of lymphedema or lymphatic streaking.  The patient has supple compartments to left arm forearm and hand.      Diagnostic studies:      Assessment: Left long finger amputation undergoing nonoperative management      Plan: Treatment options were discussed.  She is doing 50-50 peroxide to cleanse the digit.  At this point there is no signs of infection.  That may be a bit caustic and may be interfering with healing.  Would recommend to move strictly to soap and water and to do exercises to preserve range of motion.  2-week follow-up for x-rays of the left index.      Procedure:        Procedure:

## 2024-02-20 NOTE — PROGRESS NOTES
Patient ID: Monica Musa is a 68 y.o. female.  Primary Care Provider: Guerrero Tavarez DO    Subjective    HPI: 68 yo female referred by Dr. Lal for stage 1b grade 1 endometrial cancer.     Cancer History:   - 7/8/2022 TLH, BSO, SLND combined case with myself and Dr. Lal. 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 There was no lymphovascular invasion. There was a focus of MELF pattern invasion. Tumor board recommendations: Surveillance. MMR testing on prior endometrial biopsy specimen was normal.       Past Medical History:   - Bradycardia  - Diabetes  - HLD  - HTN    Past Surgical History:   - None other than hysterectomy    OBGYN History: menopausal, no prior abnormal pap smears    Family History: No history of ovarian/fallopian tube, endometrial, breast, pancreas, or GI cancers.     Social History: Denies smoking, alcohol, or illicit drug use.       Objective    Visit Vitals  /84 (BP Location: Right arm, Patient Position: Sitting, BP Cuff Size: Adult)   Pulse 88   Temp 36 °C (96.8 °F) (Temporal)   Resp 16        Interval history:  Patient is a 68 year old female with with stage 1B grade 1 endometrial cancer s/p TLH, BSO, SLND on 7/8/22.  Here for 6 month follow up.  Patient having some numbness in right hand, thinks its carpal tunnel.  Patient denies any vaginal bleeding, pink tinged discharge. Patient denies any constipation or diarrhea.  Appetite has been good.  Energy levels are baseline.  Patient denies hematuria.  Patient denies urinary leakage or incontinence. Patient had some episodes of rectal bleeding after taking antibiotics, resolved.  Mammogram is up to date.  Patient is not currently sexually active.      Physical Exam:    Constitutional: Doing well. HÉCTOR  Eyes: PERRL  ENMT: Moist mucus membranes  Head/Neck: Supple. Symmetrical  Cardiovascular: Regular, rate and rhythm. 2+ equal pulses of the  extremities  Respiratory/Thorax: CTA. RRR. Chest rise symmetrical.  Gastrointestinal: Non-distended, soft, non-tender  Genitourinary:   Normal external female genitalia. No vulvar lesions noted  Speculum exam: Smooth vaginal walls without lesions or masses. Vaginal cuff visualized without lesions.   Bimanual exam: Smooth vaginal wall without lesions or masses.  Surgically absent uterus, cervix, and adnexa.    Rectovaginal exam: smooth rectovaginal septum without lesions or masses  Musculoskeletal: ROM intact, no joint swelling, normal strength  Extremities: No edema  Neurological: Alert and oriented x 3. Pleasant and cooperative.  Lymphatic: No lymphadenopathy. No lymphedema  Psychological: Appropriate mood and behavior  Skin: Warm and dry, no lesions, no rashes    A complete review of systems was performed and all systems were normal except what is noted in the interval history.          Assessment/Plan   Patient is a 68 year old female with with stage 1B grade 1 endometrial cancer s/p TLH, BSO, SLND on 7/8/22.  HÉCTOR 1.5 years      PLAN:  F/U in 6 months or as needed  Physical examination was within normal limits today.  She is currently HÉCTOR.  We reviewed signs and symptoms of possible recurrence with the patient and she will call our office should she experience any of these.

## 2024-02-21 ENCOUNTER — OFFICE VISIT (OUTPATIENT)
Dept: GYNECOLOGIC ONCOLOGY | Facility: CLINIC | Age: 69
End: 2024-02-21
Payer: MEDICARE

## 2024-02-21 VITALS
DIASTOLIC BLOOD PRESSURE: 84 MMHG | BODY MASS INDEX: 34.2 KG/M2 | RESPIRATION RATE: 16 BRPM | WEIGHT: 211.86 LBS | HEART RATE: 88 BPM | SYSTOLIC BLOOD PRESSURE: 132 MMHG | TEMPERATURE: 96.8 F

## 2024-02-21 DIAGNOSIS — C54.1 ENDOMETRIAL CANCER (MULTI): Primary | ICD-10-CM

## 2024-02-21 PROCEDURE — 1126F AMNT PAIN NOTED NONE PRSNT: CPT | Performed by: NURSE PRACTITIONER

## 2024-02-21 PROCEDURE — 3079F DIAST BP 80-89 MM HG: CPT | Performed by: NURSE PRACTITIONER

## 2024-02-21 PROCEDURE — 1036F TOBACCO NON-USER: CPT | Performed by: NURSE PRACTITIONER

## 2024-02-21 PROCEDURE — 1159F MED LIST DOCD IN RCRD: CPT | Performed by: NURSE PRACTITIONER

## 2024-02-21 PROCEDURE — 3075F SYST BP GE 130 - 139MM HG: CPT | Performed by: NURSE PRACTITIONER

## 2024-02-21 PROCEDURE — 3008F BODY MASS INDEX DOCD: CPT | Performed by: NURSE PRACTITIONER

## 2024-02-21 PROCEDURE — 4010F ACE/ARB THERAPY RXD/TAKEN: CPT | Performed by: NURSE PRACTITIONER

## 2024-02-21 PROCEDURE — 99213 OFFICE O/P EST LOW 20 MIN: CPT | Performed by: NURSE PRACTITIONER

## 2024-02-21 ASSESSMENT — PAIN SCALES - GENERAL: PAINLEVEL: 0-NO PAIN

## 2024-02-27 ENCOUNTER — TELEPHONE (OUTPATIENT)
Dept: PRIMARY CARE | Facility: CLINIC | Age: 69
End: 2024-02-27
Payer: MEDICARE

## 2024-02-27 DIAGNOSIS — G56.03 BILATERAL CARPAL TUNNEL SYNDROME: ICD-10-CM

## 2024-02-27 NOTE — TELEPHONE ENCOUNTER
----- Message from Max Eduardo MD sent at 2/27/2024  7:23 AM EST -----  EMG nerve conduction study significant for moderate to severe carpal tunnel syndrome.  If continues to be symptomatic the neck step would be referral to orthopedic hand specialist or one of the orthopedic doctors that takes care of carpal tunnel syndrome.  Please let her know and arrange

## 2024-02-29 ENCOUNTER — HOSPITAL ENCOUNTER (OUTPATIENT)
Dept: RADIOLOGY | Facility: CLINIC | Age: 69
Discharge: HOME | End: 2024-02-29
Payer: MEDICARE

## 2024-02-29 ENCOUNTER — OFFICE VISIT (OUTPATIENT)
Dept: ORTHOPEDIC SURGERY | Facility: CLINIC | Age: 69
End: 2024-02-29
Payer: MEDICARE

## 2024-02-29 VITALS — HEIGHT: 66 IN | WEIGHT: 211 LBS | BODY MASS INDEX: 33.91 KG/M2

## 2024-02-29 DIAGNOSIS — S68.113A TRAUMATIC AMPUTATION OF TIP OF LEFT MIDDLE FINGER, INITIAL ENCOUNTER: ICD-10-CM

## 2024-02-29 DIAGNOSIS — G56.01 CARPAL TUNNEL SYNDROME OF RIGHT WRIST: ICD-10-CM

## 2024-02-29 PROCEDURE — 73140 X-RAY EXAM OF FINGER(S): CPT | Mod: LT

## 2024-02-29 PROCEDURE — 3008F BODY MASS INDEX DOCD: CPT | Performed by: ORTHOPAEDIC SURGERY

## 2024-02-29 PROCEDURE — 1126F AMNT PAIN NOTED NONE PRSNT: CPT | Performed by: ORTHOPAEDIC SURGERY

## 2024-02-29 PROCEDURE — 2500000005 HC RX 250 GENERAL PHARMACY W/O HCPCS: Performed by: ORTHOPAEDIC SURGERY

## 2024-02-29 PROCEDURE — 2500000004 HC RX 250 GENERAL PHARMACY W/ HCPCS (ALT 636 FOR OP/ED): Performed by: ORTHOPAEDIC SURGERY

## 2024-02-29 PROCEDURE — 1159F MED LIST DOCD IN RCRD: CPT | Performed by: ORTHOPAEDIC SURGERY

## 2024-02-29 PROCEDURE — 1036F TOBACCO NON-USER: CPT | Performed by: ORTHOPAEDIC SURGERY

## 2024-02-29 PROCEDURE — 73140 X-RAY EXAM OF FINGER(S): CPT | Mod: LEFT SIDE | Performed by: ORTHOPAEDIC SURGERY

## 2024-02-29 PROCEDURE — 99214 OFFICE O/P EST MOD 30 MIN: CPT | Performed by: ORTHOPAEDIC SURGERY

## 2024-02-29 PROCEDURE — L3908 WHO COCK-UP NONMOLDE PRE OTS: HCPCS | Performed by: ORTHOPAEDIC SURGERY

## 2024-02-29 PROCEDURE — 20526 THER INJECTION CARP TUNNEL: CPT | Mod: RT | Performed by: ORTHOPAEDIC SURGERY

## 2024-02-29 PROCEDURE — 4010F ACE/ARB THERAPY RXD/TAKEN: CPT | Performed by: ORTHOPAEDIC SURGERY

## 2024-02-29 PROCEDURE — 99214 OFFICE O/P EST MOD 30 MIN: CPT | Mod: 25 | Performed by: ORTHOPAEDIC SURGERY

## 2024-02-29 RX ORDER — LIDOCAINE HYDROCHLORIDE 10 MG/ML
1 INJECTION INFILTRATION; PERINEURAL
Status: COMPLETED | OUTPATIENT
Start: 2024-02-29 | End: 2024-02-29

## 2024-02-29 RX ADMIN — LIDOCAINE HYDROCHLORIDE 1 ML: 10 INJECTION, SOLUTION INFILTRATION; PERINEURAL at 10:33

## 2024-02-29 RX ADMIN — TRIAMCINOLONE ACETONIDE 10 MG: 10 INJECTION, SUSPENSION INTRA-ARTICULAR; INTRALESIONAL at 10:33

## 2024-02-29 NOTE — PROGRESS NOTES
History present illness: Patient presents today for evaluation of the bilateral upper extremities.  She is status post soft tissue amputation through left long fingertip.  No symptoms compatible with infection.  Her motion is coming along fine to that digit.  The wound is healing nicely.  She has symptoms compatible with right carpal tunnel syndrome.  This is a new discussion today.  The patient had an EMG nerve conduction study test that showed evidence for a mild right C8-T1 radiculopathy along with right carpal tunnel syndrome.  She has numbness and tingling during the day and symptoms that wake from sleep at nighttime.      Past medical history: The patient's past medical history, family history, social history, and review of systems were documented on the patient medical intake.  The updated data was reviewed in the electronic medical record.  History is negative except otherwise stated in history of present illness.        Physical examination:  General: Alert and oriented to person, place, and time.  No acute distress and breathing comfortably: Pleasant and cooperative with examination.  HEENT: Head is normocephalic and atraumatic.  Neck: Supple, no visible swelling.  Cardiovascular: No palpable tachycardia  Lungs: No audible wheezing or labored breathing  Abdomen: Nondistended.  Extremities: Evaluation of the bilateral upper extremities finds the patient had palpable radial artery at the wrists with brisk capillary refill to all digits.  Patient has intact sensation to axillary radial median and ulnar nerves.  Healing wound to tip of left long finger without active signs of infection.  The patient has well-preserved flexion extension arc to the left long finger.  The wound is clearly improving in terms of size..  There are no signs of infection.  There is no evidence of lymphedema or lymphatic streaking.  The patient has supple compartments to bilateral arm forearm and hand.  Positive Tinel's over course of  median nerve to right wrist.  Positive dry compression test and Phalen's maneuver.      Radiology:      Assessment: Soft tissue amputation to left long finger undergoing nonoperative management, doing well.  Right carpal tunnel syndrome.      Plan: Regarding left long finger recommendations made for basic wound care activity restriction range of motion exercises surveillance for signs of infection and follow-up in 4 weeks.  On the right we talked about operative and nonoperative strategies for carpal tunnel syndrome.  Patient elects for initial trial of carpal tunnel injection and bracing in 4-week follow-up as well.  No x-rays upon return to the office.        Procedure:        Hand / UE Inj/Asp: R carpal tunnel for carpal tunnel syndrome on 2/29/2024 10:33 AM  Indications: diagnostic and therapeutic  Details: 25 G needle, volar approach  Medications: 10 mg triamcinolone acetonide 10 mg/mL; 1 mL lidocaine 10 mg/mL (1 %)  Outcome: tolerated well, no immediate complications    Right Carpal Tunnel Injection: It was explained to the patient that the risks of a steroid injection include but are not limited to infection, local skin irritation, skin atrophy, calcification, continued pain and discomfort, elevated blood sugar, burning, failure to relieve pain, and possible late infection. The patient verbalized good insight and verbalized consent for the injection. It was further explained that the post-injection discomfort can be alleviated with additional medications, ice, elevation, and rest over the first 24 hours, and that these modalities are recommended.  After informed consent was provided, patient identification was confirmed, and allergies were verified, the patient was appropriately positioned. The site was marked and time-out performed.    Using aseptic technique, a solution containing 1 mL of 10 mg of Kenalog and 1 mL of 1% lidocaine without epinephrine was injected into the patient's right carpal tunnel. A  25-gauge needle was inserted just ulnar to the anatomic course of the palmaris longus tendon at the level of the distal wrist flexion crease. It was slowly advanced deep to the distal antebrachial fascia. The solution was then injected slowly, taking care to ensure that the patient experienced no paresthesias during the injection of the solution. After injection of the solution, the patient was asked to perform active flexion and extension of the digits and wrist to help disperse the solution. A band-aid was then placed at the injection site. The patient tolerated this right carpal tunnel injection well.      Procedure, treatment alternatives, risks and benefits explained, specific risks discussed. Consent was given by the patient. Immediately prior to procedure a time out was called to verify the correct patient, procedure, equipment, support staff and site/side marked as required. Patient was prepped and draped in the usual sterile fashion.

## 2024-03-28 ENCOUNTER — OFFICE VISIT (OUTPATIENT)
Dept: ORTHOPEDIC SURGERY | Facility: CLINIC | Age: 69
End: 2024-03-28
Payer: MEDICARE

## 2024-03-28 DIAGNOSIS — G56.01 CARPAL TUNNEL SYNDROME OF RIGHT WRIST: Primary | ICD-10-CM

## 2024-03-28 PROCEDURE — 4010F ACE/ARB THERAPY RXD/TAKEN: CPT | Performed by: ORTHOPAEDIC SURGERY

## 2024-03-28 PROCEDURE — 1159F MED LIST DOCD IN RCRD: CPT | Performed by: ORTHOPAEDIC SURGERY

## 2024-03-28 PROCEDURE — 3008F BODY MASS INDEX DOCD: CPT | Performed by: ORTHOPAEDIC SURGERY

## 2024-03-28 PROCEDURE — 99214 OFFICE O/P EST MOD 30 MIN: CPT | Performed by: ORTHOPAEDIC SURGERY

## 2024-03-28 PROCEDURE — 1036F TOBACCO NON-USER: CPT | Performed by: ORTHOPAEDIC SURGERY

## 2024-03-28 NOTE — H&P (VIEW-ONLY)
History present illness: Patient presents today for evaluation of symptoms compatible with right carpal tunnel syndrome.  Previous right steroid injection to the carpal tunnel definitely helped but symptoms did not resolve completely.  She still bothered by this.  EMG shows evidence for acute on chronic moderate to severe right carpal tunnel syndrome and a CAT 1 mild radiculopathy.  The patient is bothered through median nerve distribution to the right hand.  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  She is got some hypersensitivity there but overall that is doing great.      Past medical history: The patient's past medical history, family history, social history, and review of systems were documented on the patient medical intake.  The updated data was reviewed in the electronic medical record.  History is negative except otherwise stated in history of present illness.        Physical examination:  General: Alert and oriented to person, place, and time.  No acute distress and breathing comfortably: Pleasant and cooperative with examination.  HEENT: Head is normocephalic and atraumatic.  Neck: Supple, no visible swelling.  Cardiovascular: No palpable tachycardia  Lungs: No audible wheezing or labored breathing  Abdomen: Nondistended.  Extremities: Evaluation of the right upper extremity finds the patient had palpable radial artery at the wrist with brisk capillary refill to all digits.  Patient has intact sensation to axillary radial median and ulnar nerves.  There are no open wounds.  There are no signs of infection.  There is no evidence of lymphedema or lymphatic streaking.  The patient has supple compartments to right arm forearm and hand.  Positive Tinel's over course of median nerve to right wrist.  Positive direct compression test and Phalen's maneuver.      Radiology:      Assessment: Right carpal tunnel syndrome with failure of nonoperative treatment strategies      Plan:  Treatment options were discussed.  We talked about operative and nonoperative strategies.  We talked about intraoperative techniques and postoperative protocols.  Patient elects to proceed forth with surgery by way of right carpal tunnel release.  Plan for wide-awake approach to anesthesia.        Procedure:

## 2024-04-15 NOTE — DISCHARGE INSTRUCTIONS
Nerve Blocks    Why is this procedure done?  Nerve blocks can help to manage pain. By giving you a drug into an exact group of nerves, your doctor may be able to block pain to a specific part of your body. Some nerve blocks are used after surgery, especially if you have had an abdominal surgery. Nerve blocks are used before surgery to lessen the need for opioid drugs during and after surgery.  There are a few kinds of nerve blocks. Some nerve blocks are used to:  Treat pain. These may have a pain drug and a drug to help with swelling.  Find where your pain is coming from. This kind of nerve block will have a pain drug that lasts for only a certain amount of time.  See if another kind of treatment like surgery will help your pain.  Prevent pain during or after a procedure.  Help you avoid surgery.  Give relief of pain during and after surgery.  Lessen the use of opioid drugs needed for pain after surgery.  Block the pain in an area of the body during surgery as anesthesia.  What will the results be?  The nerve block may help to treat or ease your pain. The area may be numb. You may have some pain relief right away. You may be able to use fewer pain medicines after surgery. It also may be easier for you to move around after surgery. Some nerve blocks go away within a few hours. Others give you pain relief for a day or so to a few months or longer. Some nerve blocks can take a few days to work fully.  What happens before the procedure?  Your doctor will ask you about your health history. Talk to the doctor about:  All the drugs you are taking. Be sure to include all prescription, over the counter, and herbal supplements. Tell the doctor if you have any drug allergy. Bring a list of drugs you take with you.  Any bleeding problems. Be sure to tell your doctor if you are taking any drugs that may cause bleeding. Some of these are warfarin, rivaroxaban, apixaban, ticagrelor, clopidogrel, ibuprofen, naproxen, or aspirin.  Certain vitamins and herbs, such as garlic and fish oil, may also add to the risk for bleeding. You may need to stop these drugs as well. Talk to your doctor about them.  What happens during the procedure?  Sometimes, the doctor will give you a special drug to make you sleepy for the nerve block. Other times, you are completely awake. You may also have a nerve block as a part of your surgery.  The doctor will position you in a way to give them easy access to where you will be having the nerve block.  The doctor will clean the area and give you a local numbing drug. The doctor will use a long thin needle to give you the nerve block. Often, the doctor will use a special x-ray, ultrasound, or CT scan to make sure the needle is in the right place. The doctor will inject the drug close to the nerve that is causing your pain. Sometimes they inject the drug in an area that will block pain from a few nerves.  The doctor will take out the needle and place a clean bandage on your skin.  Sometimes, the doctor may leave a catheter in place to deliver medicine over 1 to 2 days. You may have to return to your doctor's office to have the catheter removed.  The procedure takes 15 to 30 minutes.  What happens after the procedure?  If you are not having surgery, you will be able to go home the same day. You may be asked to rest for 15 to 30 minutes. If the doctor gives you a special drug to make you sleepy for the procedure, you will need someone to drive you home.  What care is needed at home?  You will be allowed to shower or take a bath later that same day unless you have a catheter still in place.  You may need other medicines to help with pain as your nerve block wears off.  What follow-up care is needed?  As your body absorbs the drugs, your pain may come back. Talk to your doctor about if you need another nerve block and how often you can have a nerve block.  What problems could happen?  Infection  Bleeding or bruising  Pain  at the injection site  Raised blood sugar  Rash  Itching  Numbness  Nerve injury  Allergic reaction  Puncture or laceration of an organ like the liver, spleen. or bowel  Numbing medicine injected into a blood vessel  Where can I learn more?  American Society of Regional Anesthesia  https://www.chai.com/patient-information/regional-anesthesia  NHS  https://www.nhs.uk/conditions/epidural/  NHS  https://www.nhs.uk/conditions/local-anaesthesia/  [employer] Society of [place]  http://www.radiologyinfo.org/en/info.cfm?pg=nerveblock  Last Reviewed Date  2020-04-22  Medication given may have significant effects after discharge. Therefore on the day of surgery:  1) You must be accompanied by a responsible adult upon discharge and for 24 hours after surgery.  Do not drive a motor vehicle, operate machinery, power tools or appliance, drink alcoholic beverages, or make critical decisions for 24 hours.  2) Be aware of dizziness, which may cause a fall. Change positions slowly.  3) Eating: you may resume your regular diet but it is better to increase intake slowly with mild foods and working up to your regular diet. No greasy, fried or spicy foods today.  4) Nausea/Vomiting: Nausea and vomiting may occur as you become more active or begin to increase food intake. If this should happen, decrease activity and return to liquids. If the problem persists, call your surgeon  5) Pain: Your surgeon may have given you a prescription for pain medication. Take pain medication with food as prescribed. Pain medication may cause constipation, so drink plenty of fluids. If your pain medication does not provide adequate relief, call your surgeon  6) Urinating: Notify your surgeon if you have not urinated within 12 hours after discharge  7) Ice: Apply ice to operative site for 20 min 5-6 times a day or use Polar care as instructed  8) Dressing:   [x]  Remove dressing in 3 Days   [x]  Leave open to air or apply simple Band-Aid after  initial dressing is taken off and incision is dry. (If Steri-Strips are applied, leave them in place.)   [x]  No baths, hots tubs, pools, or submerging in fresh water sources. Okay to begin showering and normal hand washing after dressing removal.     []  Leave dressing in place. Keep dressing/ incision clean and dry.      9) Activity:    Shoulder/ Elbow/Hand:   [x]  Elevate extremity    []  Sling   [] At all times (except for exercises and showering)  [] As needed only for comfort   [] Begin daily motion exercises out of sling as instructed   [x]  Bend and flex fingers/wrist/elbow frequently   [] Non-weight bearing/No lifting/gripping/squeezing to the surgical limb   [x] No lifting greater than 1 lb until follow-up visit      10) Begin physical therapy if advised by your physician:   [] Before returning to see you doctor    [x] Will discuss possible need at follow-up visit   [] Will be paired with your follow-up visit in [place]    11) Call your doctor at [phone] for an appointment (or follow up as scheduled)    Contact [facility] office if  Increased redness, swelling, drainage of any kind, and/or pain to surgery site.  As well as new onset fevers and or chills.  These could signify an infection.  Calf or thigh tenderness to touch as well as increased swelling or redness.  This could signify a clot formation.  Numbness or tingling to an area around the incision site or below the incision site (toes). Or if the operative extremity becomes cold, blue.  Any rash appears, increased  or new onset nausea/vomiting occur.  This may indicate a reaction to a medication.  Temp is 38.5 C (101F)  12) If you have any concerns or questions, please call [facility] surgeon on call. The 24- hour phone is [phone]  13) If you are unable to contact your surgeon, in an emergency situation, go to the nearest hospital

## 2024-04-16 NOTE — PREPROCEDURE INSTRUCTIONS
NPO Instructions:  Do not eat any food after midnight the night before your surgery/procedure.    Additional Instructions:                                                      Add Progress Note...

## 2024-04-18 ENCOUNTER — ANESTHESIA EVENT (OUTPATIENT)
Dept: OPERATING ROOM | Facility: HOSPITAL | Age: 69
End: 2024-04-18
Payer: MEDICARE

## 2024-04-18 RX ORDER — HYDROCODONE BITARTRATE AND ACETAMINOPHEN 5; 325 MG/1; MG/1
1 TABLET ORAL EVERY 8 HOURS PRN
Qty: 6 TABLET | Refills: 0 | Status: SHIPPED | OUTPATIENT
Start: 2024-04-18 | End: 2024-04-20

## 2024-04-18 SDOH — HEALTH STABILITY: MENTAL HEALTH: CURRENT SMOKER: 0

## 2024-04-18 NOTE — ANESTHESIA PREPROCEDURE EVALUATION
Monica Musa is a 68 y.o. female here for:    RIGHT CARPAL TUNNEL RELEASE, WIDE AWAKE BLOCK DONE BY ANESTHESIA TEAM, DIABETIC  With Gonzalo Camacho DO  Pre-Op Diagnosis Codes:     * Carpal tunnel syndrome, right upper limb [G56.01]    Relevant Problems   Cardiac   (+) Hyperlipidemia   (+) Hypertension      Neuro   (+) Carpal tunnel syndrome, right upper limb      Endocrine   (+) Class 2 severe obesity due to excess calories with serious comorbidity and body mass index (BMI) of 35.0 to 35.9 in adult (Multi)   (+) Type 2 diabetes mellitus without complication (Multi)      Hematology   (+) Anemia   (+) DVT (deep venous thrombosis) (Multi)      Musculoskeletal   (+) Carpal tunnel syndrome, right upper limb      GYN   (+) Endometrial carcinoma (Multi)       Lab Results   Component Value Date    HGB 15.7 12/20/2023    HCT 47.4 (H) 12/20/2023    WBC 6.1 12/20/2023     12/20/2023     12/20/2023    K 4.4 12/20/2023     12/20/2023    CREATININE 0.63 12/20/2023    BUN 15 12/20/2023     EKG 2022:  Sinus rhythm with 1st degree AV block  Otherwise normal ECG  No previous ECGs available    Social History     Tobacco Use   Smoking Status Never   Smokeless Tobacco Never       Allergies   Allergen Reactions    Penicillins Anaphylaxis and Swelling    Bee Venom Protein (Honey Bee) Swelling       Current Outpatient Medications   Medication Instructions    acetaminophen (TYLENOL) 650 mg, oral, Every 6 hours PRN    aspirin 81 mg, oral, Daily    atorvastatin (LIPITOR) 20 mg, oral, Daily    benazepril (LOTENSIN) 5 mg, oral, 2 times daily    Jardiance 10 mg TAKE 1 TABLET BY MOUTH EVERY DAY    metFORMIN XR (GLUCOPHAGE-XR) 500 mg, oral, 2 times daily       Past Surgical History:   Procedure Laterality Date    OTHER SURGICAL HISTORY Right 06/09/2022    Arm surgery    OTHER SURGICAL HISTORY  07/25/2022    Salpingo-oophorectomy bilateral    OTHER SURGICAL HISTORY  07/25/2022    Laparoscopic hysterectomy    OTHER SURGICAL  HISTORY  07/25/2022    Cystoscopy    OTHER SURGICAL HISTORY  07/25/2022    Lymph node surgery       Family History   Family history unknown: Yes       NPO Details:  No data recorded    Physical Exam    Airway  Mallampati: III  TM distance: >3 FB     Cardiovascular    Dental    Pulmonary    Abdominal            Anesthesia Plan    History of general anesthesia?: yes  History of complications of general anesthesia?: no    ASA 3     MAC     The patient is not a current smoker.    intravenous induction   Anesthetic plan and risks discussed with patient.    Plan discussed with CRNA.

## 2024-04-19 ENCOUNTER — ANESTHESIA (OUTPATIENT)
Dept: OPERATING ROOM | Facility: HOSPITAL | Age: 69
End: 2024-04-19
Payer: MEDICARE

## 2024-04-19 ENCOUNTER — HOSPITAL ENCOUNTER (OUTPATIENT)
Facility: HOSPITAL | Age: 69
Setting detail: OUTPATIENT SURGERY
Discharge: HOME | End: 2024-04-19
Attending: ORTHOPAEDIC SURGERY | Admitting: ORTHOPAEDIC SURGERY
Payer: MEDICARE

## 2024-04-19 VITALS
HEART RATE: 75 BPM | HEIGHT: 66 IN | BODY MASS INDEX: 34.08 KG/M2 | OXYGEN SATURATION: 96 % | RESPIRATION RATE: 17 BRPM | SYSTOLIC BLOOD PRESSURE: 182 MMHG | WEIGHT: 212.08 LBS | DIASTOLIC BLOOD PRESSURE: 74 MMHG | TEMPERATURE: 98.2 F

## 2024-04-19 DIAGNOSIS — G89.18 POST-OP PAIN: Primary | ICD-10-CM

## 2024-04-19 DIAGNOSIS — G56.01 CARPAL TUNNEL SYNDROME, RIGHT UPPER LIMB: ICD-10-CM

## 2024-04-19 LAB — GLUCOSE BLD MANUAL STRIP-MCNC: 183 MG/DL (ref 74–99)

## 2024-04-19 PROCEDURE — 3600000003 HC OR TIME - INITIAL BASE CHARGE - PROCEDURE LEVEL THREE: Performed by: ORTHOPAEDIC SURGERY

## 2024-04-19 PROCEDURE — 82947 ASSAY GLUCOSE BLOOD QUANT: CPT

## 2024-04-19 PROCEDURE — A4217 STERILE WATER/SALINE, 500 ML: HCPCS | Performed by: ORTHOPAEDIC SURGERY

## 2024-04-19 PROCEDURE — 7100000010 HC PHASE TWO TIME - EACH INCREMENTAL 1 MINUTE: Performed by: ORTHOPAEDIC SURGERY

## 2024-04-19 PROCEDURE — 64721 CARPAL TUNNEL SURGERY: CPT | Performed by: ORTHOPAEDIC SURGERY

## 2024-04-19 PROCEDURE — 3700000001 HC GENERAL ANESTHESIA TIME - INITIAL BASE CHARGE: Performed by: ORTHOPAEDIC SURGERY

## 2024-04-19 PROCEDURE — 2500000004 HC RX 250 GENERAL PHARMACY W/ HCPCS (ALT 636 FOR OP/ED): Performed by: STUDENT IN AN ORGANIZED HEALTH CARE EDUCATION/TRAINING PROGRAM

## 2024-04-19 PROCEDURE — 2500000004 HC RX 250 GENERAL PHARMACY W/ HCPCS (ALT 636 FOR OP/ED): Performed by: PHYSICIAN ASSISTANT

## 2024-04-19 PROCEDURE — 3700000002 HC GENERAL ANESTHESIA TIME - EACH INCREMENTAL 1 MINUTE: Performed by: ORTHOPAEDIC SURGERY

## 2024-04-19 PROCEDURE — 3600000008 HC OR TIME - EACH INCREMENTAL 1 MINUTE - PROCEDURE LEVEL THREE: Performed by: ORTHOPAEDIC SURGERY

## 2024-04-19 PROCEDURE — 7100000009 HC PHASE TWO TIME - INITIAL BASE CHARGE: Performed by: ORTHOPAEDIC SURGERY

## 2024-04-19 PROCEDURE — 2500000004 HC RX 250 GENERAL PHARMACY W/ HCPCS (ALT 636 FOR OP/ED): Performed by: ORTHOPAEDIC SURGERY

## 2024-04-19 PROCEDURE — 2500000005 HC RX 250 GENERAL PHARMACY W/O HCPCS: Performed by: STUDENT IN AN ORGANIZED HEALTH CARE EDUCATION/TRAINING PROGRAM

## 2024-04-19 RX ORDER — SODIUM CHLORIDE, SODIUM LACTATE, POTASSIUM CHLORIDE, CALCIUM CHLORIDE 600; 310; 30; 20 MG/100ML; MG/100ML; MG/100ML; MG/100ML
100 INJECTION, SOLUTION INTRAVENOUS CONTINUOUS
Status: DISCONTINUED | OUTPATIENT
Start: 2024-04-19 | End: 2024-04-19 | Stop reason: HOSPADM

## 2024-04-19 RX ORDER — SODIUM CHLORIDE 0.9 G/100ML
IRRIGANT IRRIGATION AS NEEDED
Status: DISCONTINUED | OUTPATIENT
Start: 2024-04-19 | End: 2024-04-19 | Stop reason: HOSPADM

## 2024-04-19 RX ADMIN — SODIUM CHLORIDE, POTASSIUM CHLORIDE, SODIUM LACTATE AND CALCIUM CHLORIDE 100 ML/HR: 600; 310; 30; 20 INJECTION, SOLUTION INTRAVENOUS at 05:48

## 2024-04-19 RX ADMIN — LIDOCAINE HYDROCHLORIDE,EPINEPHRINE BITARTRATE 11 ML: 10; .01 INJECTION, SOLUTION INFILTRATION; PERINEURAL at 07:07

## 2024-04-19 ASSESSMENT — COLUMBIA-SUICIDE SEVERITY RATING SCALE - C-SSRS
2. HAVE YOU ACTUALLY HAD ANY THOUGHTS OF KILLING YOURSELF?: NO
6. HAVE YOU EVER DONE ANYTHING, STARTED TO DO ANYTHING, OR PREPARED TO DO ANYTHING TO END YOUR LIFE?: NO
1. IN THE PAST MONTH, HAVE YOU WISHED YOU WERE DEAD OR WISHED YOU COULD GO TO SLEEP AND NOT WAKE UP?: NO

## 2024-04-19 ASSESSMENT — PAIN - FUNCTIONAL ASSESSMENT
PAIN_FUNCTIONAL_ASSESSMENT: 0-10
PAIN_FUNCTIONAL_ASSESSMENT: 0-10

## 2024-04-19 ASSESSMENT — PAIN SCALES - GENERAL
PAINLEVEL_OUTOF10: 0 - NO PAIN
PAINLEVEL_OUTOF10: 0 - NO PAIN

## 2024-04-19 NOTE — OP NOTE
RIGHT CARPAL TUNNEL RELEASE, WIDE AWAKE BLOCK DONE BY ANESTHESIA TEAM, DIABETIC (R) Operative Note     Date: 2024  OR Location: [facility]    Name: Monica Musa, : 1955, Age: 68 y.o., MRN: [id number], Sex: female      Preoperative diagnosis: Right carpal tunnel syndrome    Postoperative diagnosis: Right carpal tunnel syndrome    Procedure planned: Right carpal tunnel release    Procedure performed: Right carpal tunnel release    Surgeon: Gonzalo Camacho D.O.    Assistant:CHERRI Boston  The physician assistant was present to the entire case. Given the nature of the disease process and the procedure to be performed a skilled surgical assistant was necessary during the case. The assistant was necessary in order to hold retractors and directly assist in the operation. A certified scrub tech was at the back table managing instruments and supplies for the surgical case.    Anesthesia: Local field block using lidocaine with epinephrine solution and monitored by the anesthesia team    Estimated blood loss: Less than 10 mL    Drains: None    Tourniquet: None    Specimens: None    Implants: None    Indications: The patient presented to the office with subjective symptoms physical examination an EMG nerve conduction study test consistent with right carpal tunnel syndrome.  The patient had failure of nonoperative treatment strategies.  After full discussion regarding risks benefits and alternatives the patient elected to forego any additional nonoperative management in favor of right carpal tunnel release.  Informed consent was signed and placed in the chart.    Complications: None noted at the time of surgery    Description of operation: The patient was taken to the operative suite and placed in the supine position on the operating table.  A timeout was performed and the right carpal tunnel was confirmed to be the operative site.  The patient was carefully positioned on the table in such a fashion as to pad all  bony prominences and peripheral nerves.  The patient was then prepped and draped in the normal sterile fashion.  After prepping and draping a longitudinal incision was marked out directly overlying the transverse carpal ligament in line with the ring finger ray.  Forceps were used to gently grasp and pinch the skin in the zone of intended surgical dissection to check for adequate anesthesia.  After confirming adequate anesthesia the 15 blade was used to incise skin and dissect down to the subcutaneous plane.  The palmar aponeurosis was divided in line with the incision to expose the transverse carpal ligament.  The transverse carpal ligament was then meticulously divided under direct visualization, working from distal to proximal, taking care to avoid iatrogenic injury to the underlying contents of the carpal tunnel.  The tenotomy scissors were used to release the most proximal fibers of the transverse carpal ligament along with the most distal fibers of the antebrachial fascia.  This was done under direct visualization.  The distal release of the transverse carpal ligament was carried to the level of the fat change.  Direct inspection and digital palpation confirmed complete release of the carpal tunnel.  Copious irrigation was performed.  Interrupted nylon stitches were used to close the skin.  A bulky nonadherent dressing was placed.  The patient was then allowed to head to recovery in stable condition.  Overall the patient tolerated the procedure well.    Disposition: Stable to PACU              Gonzalo Camacho  Phone Number: [phone]

## 2024-04-19 NOTE — ANESTHESIA POSTPROCEDURE EVALUATION
Patient: Monica Musa    Procedure Summary       Date: 04/19/24 Room / Location: [facility] OR 04 / [facility]    Anesthesia Start: 0726 Anesthesia Stop: 0739    Procedure: RIGHT CARPAL TUNNEL RELEASE, WIDE AWAKE BLOCK DONE BY ANESTHESIA TEAM, DIABETIC (Right: Wrist) Diagnosis:       Carpal tunnel syndrome, right upper limb      (Carpal tunnel syndrome, right upper limb [G56.01])    Surgeons: Gonzalo Camacho DO Responsible Provider: Leonardo Wallace MD    Anesthesia Type: MAC ASA Status: 3            Anesthesia Type: MAC      Anesthesia Post Evaluation    Patient location during evaluation: bedside  Patient participation: complete - patient participated  Level of consciousness: awake and alert  Pain management: adequate  Airway patency: patent  Cardiovascular status: acceptable  Respiratory status: acceptable  Hydration status: acceptable  Postoperative Nausea and Vomiting: none      No notable events documented.

## 2024-04-19 NOTE — INTERVAL H&P NOTE
H&P reviewed. The patient was examined and there are no changes to the H&P.    ROS otherwise negative unless noted in HPI.    Amaris West PA-C

## 2024-04-28 DIAGNOSIS — I10 PRIMARY HYPERTENSION: ICD-10-CM

## 2024-04-28 DIAGNOSIS — E11.9 TYPE 2 DIABETES MELLITUS WITHOUT COMPLICATIONS (MULTI): ICD-10-CM

## 2024-04-29 RX ORDER — EMPAGLIFLOZIN 10 MG/1
TABLET, FILM COATED ORAL
Qty: 90 TABLET | Refills: 0 | Status: SHIPPED | OUTPATIENT
Start: 2024-04-29

## 2024-04-29 RX ORDER — BENAZEPRIL HYDROCHLORIDE 5 MG/1
5 TABLET ORAL 2 TIMES DAILY
Qty: 180 TABLET | Refills: 0 | Status: SHIPPED | OUTPATIENT
Start: 2024-04-29

## 2024-05-02 ENCOUNTER — OFFICE VISIT (OUTPATIENT)
Dept: ORTHOPEDIC SURGERY | Facility: CLINIC | Age: 69
End: 2024-05-02
Payer: MEDICARE

## 2024-05-02 DIAGNOSIS — G56.01 CARPAL TUNNEL SYNDROME OF RIGHT WRIST: Primary | ICD-10-CM

## 2024-05-02 PROCEDURE — 99024 POSTOP FOLLOW-UP VISIT: CPT | Performed by: ORTHOPAEDIC SURGERY

## 2024-05-02 PROCEDURE — 4010F ACE/ARB THERAPY RXD/TAKEN: CPT | Performed by: ORTHOPAEDIC SURGERY

## 2024-05-02 PROCEDURE — 1036F TOBACCO NON-USER: CPT | Performed by: ORTHOPAEDIC SURGERY

## 2024-05-02 PROCEDURE — 1159F MED LIST DOCD IN RCRD: CPT | Performed by: ORTHOPAEDIC SURGERY

## 2024-05-02 PROCEDURE — 3008F BODY MASS INDEX DOCD: CPT | Performed by: ORTHOPAEDIC SURGERY

## 2024-05-02 NOTE — PROGRESS NOTES
5/2/2024    Chief Complaint   Patient presents with    Right Wrist - Post-op     Rt CTR  DOS: 4/19/24       History of Present Illness:  Patient Monica Musa , 68 y.o. female, presents today, 5/2/2024, for evaluation of right hand  carpal tunnel release, 2 weeks postop.  She reports improvement in numbness and tingling overall, she still has some mild symptoms to the thumb index and long finger but overall this gets better daily.  She denies any fever chills or constitutional symptoms .         Review of Systems:   GENERAL: Negative  GI: Negative  MUSCULOSKELETAL: See HPI  SKIN: Negative  NEURO:  Negative     Physical Exam:  GENERAL:  Alert and oriented to person, place, and time.  No acute distress and breathing comfortably; pleasant and cooperative with the examination.  HEENT:  Head is normocephalic and atraumatic.  NECK:  Supple, no visible swelling.  CARDIOVASCULAR:  No palpable tachycardia.  LUNGS:  No audible wheezing or labored breathing.  ABDOMEN:  Nondistended.  Extremities: The surgical incision is clean, dry, intact, and appears to be healing well.  No active bleeding, erythema, warmth, drainage, or signs of infection.  Appropriate functional ROM demonstrated with flexion/extension of the digits, and flexion/extension/pronosupination of the wrist.     Imaging/Test Results:  None today.       Assessment:  Right carpal tunnel release, 2 weeks postop with improvement of symptoms.     Plan:  Sutures were removed in the office today.  The patient can begin to weight bear as tolerated.  We discussed and reviewed home exercise program for range of motion recovery, scar massage, and desensitization techniques.  They can return to activities as tolerated.  The patient will follow-up with our office in 8 weeks for repeat clinical exam, no x-rays necessary upon return.  All patient questions answered at today's visit.    Amaris West PA-C

## 2024-06-04 DIAGNOSIS — E78.5 HYPERLIPIDEMIA, UNSPECIFIED: ICD-10-CM

## 2024-06-04 DIAGNOSIS — E11.9 TYPE 2 DIABETES MELLITUS WITHOUT COMPLICATION, WITHOUT LONG-TERM CURRENT USE OF INSULIN (MULTI): ICD-10-CM

## 2024-06-05 RX ORDER — METFORMIN HYDROCHLORIDE 500 MG/1
500 TABLET, EXTENDED RELEASE ORAL 2 TIMES DAILY
Qty: 180 TABLET | Refills: 0 | Status: SHIPPED | OUTPATIENT
Start: 2024-06-05

## 2024-06-05 RX ORDER — ATORVASTATIN CALCIUM 20 MG/1
20 TABLET, FILM COATED ORAL DAILY
Qty: 90 TABLET | Refills: 0 | Status: SHIPPED | OUTPATIENT
Start: 2024-06-05

## 2024-06-12 ENCOUNTER — APPOINTMENT (OUTPATIENT)
Dept: RADIOLOGY | Facility: HOSPITAL | Age: 69
End: 2024-06-12
Payer: MEDICARE

## 2024-06-12 DIAGNOSIS — Z12.31 SCREENING MAMMOGRAM FOR BREAST CANCER: ICD-10-CM

## 2024-06-13 NOTE — PROGRESS NOTES
Subjective   Patient ID: Monica Musa is a 68 y.o. female who presents for Medicare Annual Wellness Visit Subsequent, Diabetes, Hypertension, and Hyperlipidemia.    HPI    Patient presents today for a Medicare AWV, and follow-up on Diabetes, Hypertension, and Hyperlipidemia. Does follow a low sugar, low sodium, and low fat diet. Does not check sugar and/or BP at home. Exercises 5x weeks . Blood work 12/20/23. Denies chest pain.     She is having trouble with BM. She does use Metamucil.     Medication were reviewed     Has no other new problem /question.  Advanced Care Planning  Monica Musa and I discussed their advance care plan preferences such as living will, and durable health care power of , which include: yes Attempt Resuscitation, no Intubate & Ventilate, no Comfort Care or Life- Sustaning Care. I reminded patient to talk with their health care agent, son sulema, about their health care goals. Note reflects patient's free will and care goals as expressed to me.       Review of systems  ; Patient seen today for exam denies any problems with headaches or vision, denies any shortness of breath chest pain nausea or vomiting, no black stool no blood in the stool no heartburn type symptoms denies any problems with constipation or diarrhea, and no dysuria-type symptoms    The patient's allergies medications were reviewed with them today    The patient's social family and surgical history or also reviewed here today, along with her past medical history.     Objective     Alert and active in  no acute distress  HEENT TMs clear oropharynx negative nares clear no drainage noted neck supple  With no adenopathy   Heart regular rate and rhythm without murmur and no carotid bruits  Lungs- clear to auscultation bilaterally, no wheeze or rhonchi noted  Thyroid -negative masses or nodularity  Abdomen- soft times four quadrants , bowel sounds positive no masses or organomegaly, negative tenderness guarding or  "rebound  Neurological exam unremarkable- DTRs in upper and lower extremities within normal limits.   skin -no lesions noted      /70 (BP Location: Left arm, Patient Position: Sitting, BP Cuff Size: Adult)   Pulse 65   Temp 36.4 °C (97.5 °F) (Temporal)   Resp 16   Ht 1.676 m (5' 6\")   Wt 95.7 kg (211 lb)   SpO2 94%   BMI 34.06 kg/m²     Allergies   Allergen Reactions    Penicillins Anaphylaxis and Swelling    Bee Venom Protein (Honey Bee) Swelling       Assessment/Plan   Problem List Items Addressed This Visit       Endometrial carcinoma (Multi)    Hyperlipidemia    Hypertension    Type 2 diabetes mellitus without complication (Multi)    Relevant Orders    POCT glycosylated hemoglobin (Hb A1C) manually resulted (Completed)    Medicare annual wellness visit, subsequent - Primary    Class 2 severe obesity due to excess calories with serious comorbidity and body mass index (BMI) of 35.0 to 35.9 in adult (Multi)    Secondary malignant neoplasm of genital organs (Multi)     Other Visit Diagnoses       Special screening for malignant neoplasm of colon        Polyp of colon, unspecified part of colon, unspecified type        Type 2 diabetes mellitus without complications (Multi)        Relevant Orders    POCT glycosylated hemoglobin (Hb A1C) manually resulted (Completed)            Medicare wellness questionnaire reviewed in detail. No problems with activities of daily living. Home safety issues reviewed.  Advanced care planning discussed with patient. Reminded to have an updated will if needed.    Discussed patient's BMI and to institute calorie reduction and increase exercise to decrease risk of diabetes and heart disease in the future.    Refill Benazepril, Jardiance.     Colonoscopy ordered.     Recommend Benefiber daily.   She can increase apple intake.   Increase water intake, recommend half her body weight in ounces of fluids daily.     A1C performed, 7.4%.    Follow up in 6 months or sooner if " necessary.     If anything worsens or changes please call us at once, follow up in the office as planned.    Scribe Attestation  By signing my name below, I, Corrina Jack MA, Scribe   attest that this documentation has been prepared under the direction and in the presence of Guerrero Tavarez DO.

## 2024-06-14 ENCOUNTER — APPOINTMENT (OUTPATIENT)
Dept: PRIMARY CARE | Facility: CLINIC | Age: 69
End: 2024-06-14
Payer: MEDICARE

## 2024-06-14 VITALS
WEIGHT: 211 LBS | DIASTOLIC BLOOD PRESSURE: 70 MMHG | HEIGHT: 66 IN | RESPIRATION RATE: 16 BRPM | OXYGEN SATURATION: 94 % | BODY MASS INDEX: 33.91 KG/M2 | SYSTOLIC BLOOD PRESSURE: 118 MMHG | HEART RATE: 65 BPM | TEMPERATURE: 97.5 F

## 2024-06-14 DIAGNOSIS — C54.1 ENDOMETRIAL CARCINOMA (MULTI): ICD-10-CM

## 2024-06-14 DIAGNOSIS — I10 PRIMARY HYPERTENSION: ICD-10-CM

## 2024-06-14 DIAGNOSIS — I10 HYPERTENSION, UNSPECIFIED TYPE: ICD-10-CM

## 2024-06-14 DIAGNOSIS — E66.01 CLASS 2 SEVERE OBESITY DUE TO EXCESS CALORIES WITH SERIOUS COMORBIDITY AND BODY MASS INDEX (BMI) OF 35.0 TO 35.9 IN ADULT (MULTI): ICD-10-CM

## 2024-06-14 DIAGNOSIS — Z12.11 SPECIAL SCREENING FOR MALIGNANT NEOPLASM OF COLON: ICD-10-CM

## 2024-06-14 DIAGNOSIS — E11.9 TYPE 2 DIABETES MELLITUS WITHOUT COMPLICATIONS (MULTI): ICD-10-CM

## 2024-06-14 DIAGNOSIS — K63.5 POLYP OF COLON, UNSPECIFIED PART OF COLON, UNSPECIFIED TYPE: ICD-10-CM

## 2024-06-14 DIAGNOSIS — E78.5 HYPERLIPIDEMIA, UNSPECIFIED HYPERLIPIDEMIA TYPE: ICD-10-CM

## 2024-06-14 DIAGNOSIS — E11.9 TYPE 2 DIABETES MELLITUS WITHOUT COMPLICATION, UNSPECIFIED WHETHER LONG TERM INSULIN USE (MULTI): ICD-10-CM

## 2024-06-14 DIAGNOSIS — Z00.00 MEDICARE ANNUAL WELLNESS VISIT, SUBSEQUENT: Primary | ICD-10-CM

## 2024-06-14 DIAGNOSIS — C79.82 SECONDARY MALIGNANT NEOPLASM OF GENITAL ORGANS (MULTI): ICD-10-CM

## 2024-06-14 PROBLEM — I82.409 DVT (DEEP VENOUS THROMBOSIS) (MULTI): Status: RESOLVED | Noted: 2023-05-22 | Resolved: 2024-06-14

## 2024-06-14 LAB — POC HEMOGLOBIN A1C: 7.4 % (ref 4.2–6.5)

## 2024-06-14 PROCEDURE — 4010F ACE/ARB THERAPY RXD/TAKEN: CPT | Performed by: FAMILY MEDICINE

## 2024-06-14 PROCEDURE — 3008F BODY MASS INDEX DOCD: CPT | Performed by: FAMILY MEDICINE

## 2024-06-14 PROCEDURE — 1036F TOBACCO NON-USER: CPT | Performed by: FAMILY MEDICINE

## 2024-06-14 PROCEDURE — 1170F FXNL STATUS ASSESSED: CPT | Performed by: FAMILY MEDICINE

## 2024-06-14 PROCEDURE — 1157F ADVNC CARE PLAN IN RCRD: CPT | Performed by: FAMILY MEDICINE

## 2024-06-14 PROCEDURE — G0439 PPPS, SUBSEQ VISIT: HCPCS | Performed by: FAMILY MEDICINE

## 2024-06-14 PROCEDURE — 1159F MED LIST DOCD IN RCRD: CPT | Performed by: FAMILY MEDICINE

## 2024-06-14 PROCEDURE — 99213 OFFICE O/P EST LOW 20 MIN: CPT | Performed by: FAMILY MEDICINE

## 2024-06-14 PROCEDURE — 83036 HEMOGLOBIN GLYCOSYLATED A1C: CPT | Performed by: FAMILY MEDICINE

## 2024-06-14 PROCEDURE — 1158F ADVNC CARE PLAN TLK DOCD: CPT | Performed by: FAMILY MEDICINE

## 2024-06-14 PROCEDURE — 3074F SYST BP LT 130 MM HG: CPT | Performed by: FAMILY MEDICINE

## 2024-06-14 PROCEDURE — 1123F ACP DISCUSS/DSCN MKR DOCD: CPT | Performed by: FAMILY MEDICINE

## 2024-06-14 PROCEDURE — 3078F DIAST BP <80 MM HG: CPT | Performed by: FAMILY MEDICINE

## 2024-06-14 RX ORDER — BENAZEPRIL HYDROCHLORIDE 5 MG/1
5 TABLET ORAL 2 TIMES DAILY
Qty: 180 TABLET | Refills: 1 | Status: SHIPPED | OUTPATIENT
Start: 2024-06-14

## 2024-06-14 ASSESSMENT — ACTIVITIES OF DAILY LIVING (ADL)
MANAGING_FINANCES: INDEPENDENT
DRESSING: INDEPENDENT
BATHING: INDEPENDENT
TAKING_MEDICATION: INDEPENDENT
DOING_HOUSEWORK: INDEPENDENT
GROCERY_SHOPPING: INDEPENDENT

## 2024-06-14 ASSESSMENT — PATIENT HEALTH QUESTIONNAIRE - PHQ9
SUM OF ALL RESPONSES TO PHQ9 QUESTIONS 1 AND 2: 0
2. FEELING DOWN, DEPRESSED OR HOPELESS: NOT AT ALL
1. LITTLE INTEREST OR PLEASURE IN DOING THINGS: NOT AT ALL

## 2024-06-19 DIAGNOSIS — Z12.11 COLON CANCER SCREENING: Primary | ICD-10-CM

## 2024-06-19 RX ORDER — SODIUM, POTASSIUM,MAG SULFATES 17.5-3.13G
SOLUTION, RECONSTITUTED, ORAL ORAL
Qty: 354 ML | Refills: 0 | Status: SHIPPED | OUTPATIENT
Start: 2024-06-19

## 2024-06-27 ENCOUNTER — APPOINTMENT (OUTPATIENT)
Dept: RADIOLOGY | Facility: CLINIC | Age: 69
End: 2024-06-27
Payer: MEDICARE

## 2024-07-02 ENCOUNTER — APPOINTMENT (OUTPATIENT)
Dept: ORTHOPEDIC SURGERY | Facility: CLINIC | Age: 69
End: 2024-07-02
Payer: MEDICARE

## 2024-07-16 ENCOUNTER — APPOINTMENT (OUTPATIENT)
Dept: RADIOLOGY | Facility: CLINIC | Age: 69
End: 2024-07-16
Payer: MEDICARE

## 2024-07-16 VITALS — WEIGHT: 211 LBS | HEIGHT: 66 IN | BODY MASS INDEX: 33.91 KG/M2

## 2024-07-16 DIAGNOSIS — Z12.31 SCREENING MAMMOGRAM FOR BREAST CANCER: ICD-10-CM

## 2024-07-16 PROCEDURE — 77063 BREAST TOMOSYNTHESIS BI: CPT

## 2024-07-16 PROCEDURE — 77063 BREAST TOMOSYNTHESIS BI: CPT | Performed by: RADIOLOGY

## 2024-07-16 PROCEDURE — 77067 SCR MAMMO BI INCL CAD: CPT | Performed by: RADIOLOGY

## 2024-07-22 ENCOUNTER — ANESTHESIA EVENT (OUTPATIENT)
Dept: GASTROENTEROLOGY | Facility: EXTERNAL LOCATION | Age: 69
End: 2024-07-22

## 2024-07-31 ENCOUNTER — ANESTHESIA (OUTPATIENT)
Dept: GASTROENTEROLOGY | Facility: EXTERNAL LOCATION | Age: 69
End: 2024-07-31

## 2024-07-31 ENCOUNTER — APPOINTMENT (OUTPATIENT)
Dept: GASTROENTEROLOGY | Facility: EXTERNAL LOCATION | Age: 69
End: 2024-07-31
Payer: MEDICARE

## 2024-07-31 VITALS
BODY MASS INDEX: 33.75 KG/M2 | DIASTOLIC BLOOD PRESSURE: 76 MMHG | HEART RATE: 77 BPM | WEIGHT: 210 LBS | SYSTOLIC BLOOD PRESSURE: 149 MMHG | HEIGHT: 66 IN | TEMPERATURE: 97.2 F | RESPIRATION RATE: 14 BRPM | OXYGEN SATURATION: 77 %

## 2024-07-31 DIAGNOSIS — Z12.11 SPECIAL SCREENING FOR MALIGNANT NEOPLASM OF COLON: Primary | ICD-10-CM

## 2024-07-31 DIAGNOSIS — K63.5 POLYP OF COLON, UNSPECIFIED PART OF COLON, UNSPECIFIED TYPE: ICD-10-CM

## 2024-07-31 PROCEDURE — G0105 COLORECTAL SCRN; HI RISK IND: HCPCS | Performed by: INTERNAL MEDICINE

## 2024-07-31 RX ORDER — PROPOFOL 10 MG/ML
INJECTION, EMULSION INTRAVENOUS AS NEEDED
Status: DISCONTINUED | OUTPATIENT
Start: 2024-07-31 | End: 2024-07-31

## 2024-07-31 RX ORDER — SODIUM CHLORIDE 9 MG/ML
20 INJECTION, SOLUTION INTRAVENOUS CONTINUOUS
Status: DISCONTINUED | OUTPATIENT
Start: 2024-07-31 | End: 2024-08-01 | Stop reason: HOSPADM

## 2024-07-31 RX ORDER — LIDOCAINE HYDROCHLORIDE 20 MG/ML
INJECTION, SOLUTION INFILTRATION; PERINEURAL AS NEEDED
Status: DISCONTINUED | OUTPATIENT
Start: 2024-07-31 | End: 2024-07-31

## 2024-07-31 RX ORDER — SODIUM CHLORIDE 9 MG/ML
INJECTION, SOLUTION INTRAVENOUS CONTINUOUS PRN
Status: DISCONTINUED | OUTPATIENT
Start: 2024-07-31 | End: 2024-07-31

## 2024-07-31 ASSESSMENT — PAIN - FUNCTIONAL ASSESSMENT
PAIN_FUNCTIONAL_ASSESSMENT: 0-10

## 2024-07-31 ASSESSMENT — PAIN SCALES - GENERAL
PAINLEVEL_OUTOF10: 0 - NO PAIN
PAINLEVEL_OUTOF10: 0 - NO PAIN
PAIN_LEVEL: 0
PAINLEVEL_OUTOF10: 4
PAINLEVEL_OUTOF10: 0 - NO PAIN
PAINLEVEL_OUTOF10: 0 - NO PAIN

## 2024-07-31 NOTE — ANESTHESIA PREPROCEDURE EVALUATION
Patient: Monica Musa    Procedure Information       Date/Time: 07/31/24 0730    Scheduled providers: Antonio Evans MD; ZAYDA Hopper-CRNA    Procedure: COLONOSCOPY    Location: [facility]            Relevant Problems   Cardiac   (+) Hyperlipidemia   (+) Hypertension      Neuro   (+) Carpal tunnel syndrome, right upper limb      Endocrine   (+) Class 2 severe obesity due to excess calories with serious comorbidity and body mass index (BMI) of 35.0 to 35.9 in adult (Multi)   (+) Type 2 diabetes mellitus without complication (Multi)      Hematology   (+) Anemia      Musculoskeletal   (+) Carpal tunnel syndrome, right upper limb      GYN   (+) Endometrial carcinoma (Multi)       Clinical information reviewed:   Tobacco  Allergies  Meds   Med Hx  Surg Hx  OB Status  Fam Hx  Soc   Hx        NPO Detail:  NPO/Void Status  Date of Last Liquid: 07/31/24  Time of Last Liquid: 0300  Date of Last Solid: 07/29/24  Time of Last Solid: 1800         Physical Exam    Airway  Mallampati: III  TM distance: >3 FB     Cardiovascular - normal exam     Dental - normal exam     Pulmonary - normal exam     Abdominal   (+) obese         Anesthesia Plan    History of general anesthesia?: yes  History of complications of general anesthesia?: no    ASA 2     MAC   (Patient positioned self to comfort prior to sedation administered; eyes closed; continuous monitoring.  Preoxygenated 2L prior to procedure.)  intravenous induction   Anesthetic plan and risks discussed with patient.    Plan discussed with CRNA.

## 2024-07-31 NOTE — DISCHARGE INSTRUCTIONS
Patient Instructions Post Procedure      The anesthetics, sedatives or narcotics which were given to you today will be acting in your body for the next 24 hours, so you might feel a little sleepy or groggy.  This feeling should slowly wear off. Carefully read and follow the instructions.     You received sedation today:  - Do not drive or operate any machinery or power tools of any kind.   - No alcoholic beverages today, not even beer or wine.  - Do not make any important decisions or sign any legal documents.  - No over the counter medications that contain alcohol or that may cause drowsiness.    While it is common to experience mild to moderate abdominal distention, gas, or belching after your procedure, if any of these symptoms occur following discharge from the GI Lab or within one week of having your procedure, call the [facility] to be advised whether a visit to your nearest Urgent Care or Emergency Department is indicated.  Take this paper with you if you go.   - If you develop an allergic reaction to the medications that were given during your procedure such as difficulty breathing, rash, hives, severe nausea, vomiting or lightheadedness.  - If you experience chest pain, shortness of breath, severe abdominal pain, fevers and chills.  -If you develop signs and symptoms of bleeding such as blood in your spit, if your stools turn black, tarry, or bloody  - If you have not urinated within 8 hours following your procedure.  - If your IV site becomes painful, red, inflamed, or looks infected.    If you received a biopsy/polypectomy/sphincterotomy the following instructions apply below:  __ Do not use Aspirin containing products, non-steroidal medications or anti-coagulants for one week following your procedure. (Examples of these types of medications are: Advil, Arthrotec, Aleve, Coumadin, Ecotrin, Heparin, Ibuprofen, Indocin, Motrin, Naprosyn, Nuprin, Plavix, Vioxx, and Voltarin, or their generic  forms.  This list is not all-inclusive.  Check with your physician or pharmacist before resuming medications.)   __ Eat a soft diet today.  Avoid foods that are poorly digested for the next 24 hours.  These foods would include: nuts, beans, lettuce, red meats, and fried foods. Start with liquids and advance your diet as tolerated, gradually work up to eating solids.   __ Do not have a Barium Study or Enema for one week.    Your physician recommends the additional following instructions:    -You have a contact number available for emergencies. The signs and symptoms of potential delayed complications were discussed with you. You may return to normal activities tomorrow.  -Resume your previous diet or other if specified.  -Continue your present medications.   -We are waiting for your pathology results, if applicable.  -The findings and recommendations have been discussed with you and/or family.  - Please see Medication Reconciliation Form for new medication/medications prescribed.     If you experience any problems or have any questions following discharge from the GI Lab, please call: [phone] from 7 am- 4:30 pm.  In the event of an emergency please go to the closest Emergency Department or call Dr. Evans [phone]

## 2024-07-31 NOTE — Clinical Note
Patient tolerated the procedure well and is comfortable with no complaints of pain. Vital signs stable. Arousable prior to transport. Patient transported to PACU via stretcher. Handoff completed. Abdomen soft. Pt. Repostioned from left lateral to back to left lateral throughout procedure Carmen Otoole

## 2024-07-31 NOTE — SIGNIFICANT EVENT
Lungs clear to auscultation prior to discharge.  Patient clearing throat occasionally. Instructed to  have warm liquids at home. Pt verbalized understanding.

## 2024-07-31 NOTE — H&P
Procedure H&P    Patient Profile-Procedures  Name Monica Musa  Date of Birth 1955  MRN [id number]  Address   [address] [address] [id number] [place] [address]    Primary Phone Number [phone]  Secondary Phone Number    Guerrero Love    Procedure(s):  Procedures: Colonoscopy  Primary contact name and number   Extended Emergency Contact Information  Primary Emergency Contact: Alexander Musa  Mobile Phone: [phone]  Relation: Spouse    General Health  Weight   Vitals:    07/31/24 0721   Weight: 95.3 kg (210 lb)     BMI Body mass index is 33.89 kg/m².    Allergies  Allergies   Allergen Reactions    Penicillins Anaphylaxis and Swelling    Bee Venom Protein (Honey Bee) Swelling       Past Medical History   Past Medical History:   Diagnosis Date    Anemia     Body mass index (BMI) 36.0-36.9, adult 09/02/2021    BMI 36.0-36.9,adult    Body mass index (BMI) 37.0-37.9, adult 02/05/2021    BMI 37.0-37.9, adult    Cancer (Multi) 2021    Diabetes mellitus (Multi)     DVT (deep venous thrombosis) (Multi) 2023    LEFT LEG    Encounter for general adult medical examination without abnormal findings     Annual physical exam    Fracture of wrist 2019    History of ovarian cancer     Hyperlipidemia     Hypertension     Morbid (severe) obesity due to excess calories (Multi) 08/26/2021    Class 2 severe obesity due to excess calories with serious comorbidity and body mass index (BMI) of 37.0 to 37.9 in adult    Morbid (severe) obesity due to excess calories (Multi) 06/09/2022    Class 2 severe obesity due to excess calories with serious comorbidity and body mass index (BMI) of 36.0 to 36.9 in adult    Personal history of other diseases of the respiratory system 01/03/2020    History of acute bronchitis    Personal history of other drug therapy 10/11/2019    History of influenza vaccination       Provider assessment  Diagnosis: Colon Cancer Screening/Surveillance   Medication Reviewed - yes  Prior to  Admission medications    Medication Sig Start Date End Date Taking? Authorizing Provider   aspirin 81 mg EC tablet Take 1 tablet (81 mg) by mouth once daily. 6/28/22  Yes Historical Provider, MD   atorvastatin (Lipitor) 20 mg tablet TAKE 1 TABLET BY MOUTH EVERY DAY 6/5/24  Yes Guerrero Tavarez,    benazepril (Lotensin) 5 mg tablet Take 1 tablet (5 mg) by mouth 2 times a day. 6/14/24  Yes Guerrero Tavarez DO   empagliflozin (Jardiance) 10 mg Take 1 tablet (10 mg) by mouth once daily. 6/14/24  Yes Guerrero Tavarez DO   metFORMIN  mg 24 hr tablet TAKE 1 TABLET BY MOUTH TWICE A DAY 6/5/24  Yes Guerrero Tavarez DO   acetaminophen (TylenoL) 325 mg tablet Take 2 tablets (650 mg) by mouth every 6 hours if needed for mild pain (1 - 3) or fever (temp greater than 38.0 C). 1/30/24   Guanaco Beckford PA-C   sodium,potassium,mag sulfates (Suprep) 17.5-3.13-1.6 gram recon soln solution Take one bottle twice as directed by the prep instructions 6/19/24 7/31/24  Antonio Evans MD       Physical Exam  Vitals:    07/31/24 0721   BP: 175/84   Pulse: 100   Resp: 18   Temp: 36.6 °C (97.9 °F)        General: A&Ox3, NAD.  HEENT: AT/NC.   CV: RRR. No murmur.  Resp: CTA bilaterally. No wheezing, rhonchi or rales.   GI: Soft, NT/ND. BSx4.  Extrem: No edema. Pulses intact.  Neuro: No focal deficits.   Psych: Normal mood and affect.      Procedure Plan - pre-procedural (re)assesment completed by physician:  discharge/transfer patient when discharge criteria met    ASA status 2  Mallampati score 3    Antonio Evans MD  7/31/2024 7:41 AM

## 2024-07-31 NOTE — ANESTHESIA POSTPROCEDURE EVALUATION
Patient: Monica Musa    Procedure Summary       Date: 07/31/24 Room / Location: [facility]    Anesthesia Start: 0741 Anesthesia Stop:     Procedure: COLONOSCOPY Diagnosis: Polyp of colon, unspecified part of colon, unspecified type    Scheduled Providers: Antonio Evans MD; JANE Hopper Responsible Provider: JANE Hopper    Anesthesia Type: MAC ASA Status: 2            Anesthesia Type: MAC    Vitals Value Taken Time   /74 07/31/24 0815   Temp 36.2 07/31/24 0815   Pulse 79 07/31/24 0815   Resp 15 07/31/24 0815   SpO2 96 07/31/24 0815       Anesthesia Post Evaluation    Patient location during evaluation: bedside  Patient participation: complete - patient participated  Level of consciousness: awake  Pain score: 0  Pain management: adequate  Airway patency: patent  Cardiovascular status: acceptable  Respiratory status: acceptable and room air  Hydration status: acceptable  Postoperative Nausea and Vomiting: none      No notable events documented.

## 2024-08-12 ASSESSMENT — ENCOUNTER SYMPTOMS
ANOREXIA: 1
CONSTIPATION: 1
BELCHING: 1
WEIGHT LOSS: 1
ABDOMINAL PAIN: 1
DIARRHEA: 1
MYALGIAS: 1
NAUSEA: 1

## 2024-08-12 NOTE — PROGRESS NOTES
Patient ID: Monica Musa is a 69 y.o. female.  Primary Care Provider: Guerrero Tavarez DO    Subjective    HPI: 68 yo female referred by Dr. Lal for stage 1b grade 1 endometrial cancer.     Cancer History:   - 7/8/2022 TLH, BSO, SLND combined case with Dr. Lal. 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 There was no lymphovascular invasion. There was a focus of MELF pattern invasion. Tumor board recommendations: Surveillance. MMR testing on prior endometrial biopsy specimen was normal.       Past Medical History:   - Bradycardia  - Diabetes  - HLD  - HTN    Past Surgical History:   - None other than hysterectomy    OBGYN History: menopausal, no prior abnormal pap smears    Family History: No history of ovarian/fallopian tube, endometrial, breast, pancreas, or GI cancers.     Social History: Denies smoking, alcohol, or illicit drug use.       Objective    Visit Vitals  /80 (BP Location: Right arm, Patient Position: Sitting, BP Cuff Size: Adult)   Pulse 79   Temp 36 °C (96.8 °F) (Temporal)   Resp 16          Interval history:  Patient is a 68 year old female with with stage 1B grade 1 endometrial cancer s/p TLH, BSO, SLND on 7/8/22.  Here for 6 month follow up.  Been having some constipation, taking Miralax daily.  Just had colonoscopy which was negative but said she had tortuous colon.  Patient has been having abdominal pain, upper middle, states she has been taking probiotic and Pepcid to help with symptoms.  She has noticed certain foods she doesn't tolerate any longer and cause more pain.  She notices more bloating also.   She denies any bladder issues.  Denies vaginal bleeding or pink tinged discharge.  Mammogram is up to date.         Physical Exam:    Constitutional: Looks good  Eyes: PERRL  ENMT: Moist mucus membranes  Head/Neck: Supple. Symmetrical  Cardiovascular: Regular, rate and rhythm. 2+ equal pulses of the  extremities  Respiratory/Thorax: CTA. RRR. Chest rise symmetrical.  Gastrointestinal: Non-distended, soft, non-tender  Genitourinary:   Normal external female genitalia. No vulvar lesions noted  Speculum exam: Smooth vaginal walls without lesions or masses. Vaginal cuff visualized without lesions.   Bimanual exam: Smooth vaginal wall without lesions or masses.  Surgically absent uterus, cervix, and adnexa.    Rectovaginal exam: smooth rectovaginal septum without lesions or masses  Musculoskeletal: ROM intact, no joint swelling, normal strength  Extremities: No edema  Neurological: Alert and oriented x 3. Pleasant and cooperative.  Lymphatic: No lymphadenopathy. No lymphedema  Psychological: Appropriate mood and behavior  Skin: Warm and dry, no lesions, no rashes    A complete review of systems was performed and all systems were normal except what is noted in the interval history.          Assessment/Plan   Patient is a 69 year old female with with stage 1B grade 1 endometrial cancer s/p TLH, BSO, SLND on 7/8/22.  Abdominal pain.        PLAN:  CT A/P, F/U results  Telehealth visit to follow

## 2024-08-14 ENCOUNTER — OFFICE VISIT (OUTPATIENT)
Dept: GYNECOLOGIC ONCOLOGY | Facility: CLINIC | Age: 69
End: 2024-08-14
Payer: MEDICARE

## 2024-08-14 ENCOUNTER — APPOINTMENT (OUTPATIENT)
Dept: PRIMARY CARE | Facility: CLINIC | Age: 69
End: 2024-08-14
Payer: MEDICARE

## 2024-08-14 VITALS
BODY MASS INDEX: 32.59 KG/M2 | HEART RATE: 79 BPM | WEIGHT: 201.94 LBS | RESPIRATION RATE: 16 BRPM | DIASTOLIC BLOOD PRESSURE: 80 MMHG | OXYGEN SATURATION: 96 % | SYSTOLIC BLOOD PRESSURE: 134 MMHG | TEMPERATURE: 96.8 F

## 2024-08-14 VITALS
WEIGHT: 201 LBS | DIASTOLIC BLOOD PRESSURE: 76 MMHG | BODY MASS INDEX: 32.3 KG/M2 | SYSTOLIC BLOOD PRESSURE: 130 MMHG | OXYGEN SATURATION: 98 % | RESPIRATION RATE: 18 BRPM | HEART RATE: 72 BPM | HEIGHT: 66 IN

## 2024-08-14 DIAGNOSIS — E11.9 TYPE 2 DIABETES MELLITUS WITHOUT COMPLICATION, UNSPECIFIED WHETHER LONG TERM INSULIN USE (MULTI): ICD-10-CM

## 2024-08-14 DIAGNOSIS — E78.5 HYPERLIPIDEMIA, UNSPECIFIED HYPERLIPIDEMIA TYPE: ICD-10-CM

## 2024-08-14 DIAGNOSIS — R10.9 ABDOMINAL PAIN, UNSPECIFIED ABDOMINAL LOCATION: Primary | ICD-10-CM

## 2024-08-14 DIAGNOSIS — K59.09 OTHER CONSTIPATION: ICD-10-CM

## 2024-08-14 DIAGNOSIS — E66.09 CLASS 1 OBESITY DUE TO EXCESS CALORIES WITH SERIOUS COMORBIDITY AND BODY MASS INDEX (BMI) OF 32.0 TO 32.9 IN ADULT: ICD-10-CM

## 2024-08-14 DIAGNOSIS — R10.10 PAIN OF UPPER ABDOMEN: ICD-10-CM

## 2024-08-14 DIAGNOSIS — C54.1 ENDOMETRIAL CARCINOMA (MULTI): ICD-10-CM

## 2024-08-14 DIAGNOSIS — I10 HYPERTENSION, UNSPECIFIED TYPE: ICD-10-CM

## 2024-08-14 DIAGNOSIS — C54.1 ENDOMETRIAL CANCER (MULTI): Primary | ICD-10-CM

## 2024-08-14 PROCEDURE — 4010F ACE/ARB THERAPY RXD/TAKEN: CPT | Performed by: FAMILY MEDICINE

## 2024-08-14 PROCEDURE — 3008F BODY MASS INDEX DOCD: CPT | Performed by: FAMILY MEDICINE

## 2024-08-14 PROCEDURE — 3075F SYST BP GE 130 - 139MM HG: CPT | Performed by: FAMILY MEDICINE

## 2024-08-14 PROCEDURE — 3079F DIAST BP 80-89 MM HG: CPT | Performed by: NURSE PRACTITIONER

## 2024-08-14 PROCEDURE — 1159F MED LIST DOCD IN RCRD: CPT | Performed by: NURSE PRACTITIONER

## 2024-08-14 PROCEDURE — 4010F ACE/ARB THERAPY RXD/TAKEN: CPT | Performed by: NURSE PRACTITIONER

## 2024-08-14 PROCEDURE — 1123F ACP DISCUSS/DSCN MKR DOCD: CPT | Performed by: FAMILY MEDICINE

## 2024-08-14 PROCEDURE — 3078F DIAST BP <80 MM HG: CPT | Performed by: FAMILY MEDICINE

## 2024-08-14 PROCEDURE — 1157F ADVNC CARE PLAN IN RCRD: CPT | Performed by: FAMILY MEDICINE

## 2024-08-14 PROCEDURE — 1126F AMNT PAIN NOTED NONE PRSNT: CPT | Performed by: NURSE PRACTITIONER

## 2024-08-14 PROCEDURE — 1036F TOBACCO NON-USER: CPT | Performed by: FAMILY MEDICINE

## 2024-08-14 PROCEDURE — 3075F SYST BP GE 130 - 139MM HG: CPT | Performed by: NURSE PRACTITIONER

## 2024-08-14 PROCEDURE — 1157F ADVNC CARE PLAN IN RCRD: CPT | Performed by: NURSE PRACTITIONER

## 2024-08-14 PROCEDURE — 1036F TOBACCO NON-USER: CPT | Performed by: NURSE PRACTITIONER

## 2024-08-14 PROCEDURE — 99214 OFFICE O/P EST MOD 30 MIN: CPT | Performed by: NURSE PRACTITIONER

## 2024-08-14 PROCEDURE — 99214 OFFICE O/P EST MOD 30 MIN: CPT | Performed by: FAMILY MEDICINE

## 2024-08-14 PROCEDURE — 1160F RVW MEDS BY RX/DR IN RCRD: CPT | Performed by: FAMILY MEDICINE

## 2024-08-14 PROCEDURE — 1159F MED LIST DOCD IN RCRD: CPT | Performed by: FAMILY MEDICINE

## 2024-08-14 PROCEDURE — 1158F ADVNC CARE PLAN TLK DOCD: CPT | Performed by: FAMILY MEDICINE

## 2024-08-14 RX ORDER — OMEPRAZOLE 20 MG/1
20 TABLET, DELAYED RELEASE ORAL
COMMUNITY

## 2024-08-14 ASSESSMENT — PATIENT HEALTH QUESTIONNAIRE - PHQ9
1. LITTLE INTEREST OR PLEASURE IN DOING THINGS: NOT AT ALL
SUM OF ALL RESPONSES TO PHQ9 QUESTIONS 1 AND 2: 0
2. FEELING DOWN, DEPRESSED OR HOPELESS: NOT AT ALL

## 2024-08-14 ASSESSMENT — PAIN SCALES - GENERAL: PAINLEVEL: 0-NO PAIN

## 2024-08-14 NOTE — PROGRESS NOTES
"Subjective   Patient ID: Monica Musa is a 69 y.o. female who presents for Abdominal Pain.    HPI    Patient presents today for stomach issues. Ongoing x 1 month.  Symptoms include pressure, cramps, constipation.  Patient saw her oncologist this morning and was told she could have a hernia. She states she has not seen the hernia. Pain is 9-10/10 when flared up. States that she does not have daily BM. Does not get a lot of acid due to Omeprazole. States Benefiber did not work for her.       Just saw her cancer specialist did order a CAT scan to make sure everything is very stable we will be able to see if there is any type of hernia as she does feel a bulging      Review of systems  ; Patient seen today for exam denies any problems with headaches or vision, denies any shortness of breath chest pain nausea or vomiting, no black stool no blood in the stool pos   heartburn type symptoms but a lot better since starting over-the-counter omeprazole denies any problems with constipation or diarrhea, and no dysuria-type symptoms    The patient's allergies medications were reviewed with them today    The patient's social family and surgical history or also reviewed here today, along with her past medical history.     Objective     Alert and active in  no acute distress  HEENT TMs clear oropharynx negative nares clear no drainage noted neck supple  With no adenopathy   Heart regular rate and rhythm without murmur and no carotid bruits  Lungs- clear to auscultation bilaterally, no wheeze or rhonchi noted  Thyroid -negative masses or nodularity  Abdomen- soft times four quadrants , bowel sounds positive no masses or organomegaly, negative tenderness guarding or rebound//abdominal incisional hernia noted periumbilical to the left  Neurological exam unremarkable- DTRs in upper and lower extremities within normal limits.   skin -no lesions noted      /76   Pulse 72   Resp 18   Ht 1.676 m (5' 6\")   Wt 91.2 kg (201 " lb)   SpO2 98%   BMI 32.44 kg/m²     Allergies   Allergen Reactions    Penicillins Anaphylaxis and Swelling    Bee Venom Protein (Honey Bee) Swelling       Assessment/Plan   Problem List Items Addressed This Visit       Endometrial carcinoma (Multi)    Hyperlipidemia    Hypertension    Type 2 diabetes mellitus without complication (Multi)     Other Visit Diagnoses       Abdominal pain, unspecified abdominal location    -  Primary    BMI 32.0-32.9,adult        Class 1 obesity due to excess calories with serious comorbidity and body mass index (BMI) of 32.0 to 32.9 in adult        Other constipation              Discussed patient's BMI and to institute calorie reduction and increase exercise to decrease risk of diabetes and heart disease in the future.    Reviewed colonoscopy.    Recommend Miralax twice daily.  She can also try Senakot.   Continue to eat apples daily.     Try not to eat late at night.  Potatoes and rice can cause constipation.    She should drink at least 100 ounces of water daily.     If she is not improving, then she will call and we will make some changes.     If anything worsens or changes please call us at once, follow up in the office as planned.    Scribe Attestation  By signing my name below, I, Corrina Jack MA, Scribe   attest that this documentation has been prepared under the direction and in the presence of Guerrero Tavarez DO.

## 2024-08-23 ENCOUNTER — LAB (OUTPATIENT)
Dept: LAB | Facility: LAB | Age: 69
End: 2024-08-23
Payer: MEDICARE

## 2024-08-23 DIAGNOSIS — R53.81 MALAISE AND FATIGUE: ICD-10-CM

## 2024-08-23 DIAGNOSIS — R53.83 MALAISE AND FATIGUE: ICD-10-CM

## 2024-08-23 DIAGNOSIS — C54.1 ENDOMETRIAL CANCER (MULTI): ICD-10-CM

## 2024-08-23 DIAGNOSIS — R10.10 PAIN OF UPPER ABDOMEN: ICD-10-CM

## 2024-08-23 LAB
CREAT SERPL-MCNC: 0.59 MG/DL (ref 0.5–1.05)
EGFRCR SERPLBLD CKD-EPI 2021: >90 ML/MIN/1.73M*2
TSH SERPL-ACNC: 1.59 MIU/L (ref 0.44–3.98)
VIT B12 SERPL-MCNC: 400 PG/ML (ref 211–911)

## 2024-08-23 PROCEDURE — 82565 ASSAY OF CREATININE: CPT

## 2024-08-23 PROCEDURE — 84443 ASSAY THYROID STIM HORMONE: CPT

## 2024-08-23 PROCEDURE — 82607 VITAMIN B-12: CPT

## 2024-08-23 PROCEDURE — 36415 COLL VENOUS BLD VENIPUNCTURE: CPT

## 2024-08-26 ENCOUNTER — HOSPITAL ENCOUNTER (OUTPATIENT)
Dept: RADIOLOGY | Facility: CLINIC | Age: 69
Discharge: HOME | End: 2024-08-26
Payer: MEDICARE

## 2024-08-26 DIAGNOSIS — C54.1 ENDOMETRIAL CANCER (MULTI): ICD-10-CM

## 2024-08-26 DIAGNOSIS — R10.10 PAIN OF UPPER ABDOMEN: ICD-10-CM

## 2024-08-26 PROCEDURE — 74177 CT ABD & PELVIS W/CONTRAST: CPT

## 2024-08-26 PROCEDURE — 2550000001 HC RX 255 CONTRASTS: Performed by: NURSE PRACTITIONER

## 2024-08-26 PROCEDURE — 74177 CT ABD & PELVIS W/CONTRAST: CPT | Performed by: RADIOLOGY

## 2024-08-27 ENCOUNTER — DOCUMENTATION (OUTPATIENT)
Dept: GYNECOLOGIC ONCOLOGY | Facility: CLINIC | Age: 69
End: 2024-08-27
Payer: MEDICARE

## 2024-08-27 ENCOUNTER — HOSPITAL ENCOUNTER (INPATIENT)
Facility: HOSPITAL | Age: 69
LOS: 3 days | Discharge: HOME | End: 2024-08-30
Attending: OBSTETRICS & GYNECOLOGY | Admitting: STUDENT IN AN ORGANIZED HEALTH CARE EDUCATION/TRAINING PROGRAM
Payer: MEDICARE

## 2024-08-27 DIAGNOSIS — K56.600 PARTIAL INTESTINAL OBSTRUCTION, UNSPECIFIED CAUSE (MULTI): ICD-10-CM

## 2024-08-27 DIAGNOSIS — K56.609 BOWEL OBSTRUCTION (MULTI): Primary | ICD-10-CM

## 2024-08-27 DIAGNOSIS — S61.209A AVULSION OF FINGER, INITIAL ENCOUNTER: ICD-10-CM

## 2024-08-27 DIAGNOSIS — C54.1 ENDOMETRIAL CANCER (MULTI): Primary | ICD-10-CM

## 2024-08-27 DIAGNOSIS — C54.1 ENDOMETRIAL CARCINOMA (MULTI): ICD-10-CM

## 2024-08-27 DIAGNOSIS — R11.0 NAUSEA: ICD-10-CM

## 2024-08-27 PROCEDURE — 85027 COMPLETE CBC AUTOMATED: CPT

## 2024-08-27 PROCEDURE — 83735 ASSAY OF MAGNESIUM: CPT

## 2024-08-27 PROCEDURE — 1170000001 HC PRIVATE ONCOLOGY ROOM DAILY

## 2024-08-27 PROCEDURE — 86901 BLOOD TYPING SEROLOGIC RH(D): CPT

## 2024-08-27 PROCEDURE — 36415 COLL VENOUS BLD VENIPUNCTURE: CPT

## 2024-08-27 PROCEDURE — 80048 BASIC METABOLIC PNL TOTAL CA: CPT

## 2024-08-27 RX ORDER — LIDOCAINE HYDROCHLORIDE 20 MG/ML
1 JELLY TOPICAL ONCE
Status: DISCONTINUED | OUTPATIENT
Start: 2024-08-27 | End: 2024-08-28

## 2024-08-27 RX ORDER — ONDANSETRON HYDROCHLORIDE 2 MG/ML
4 INJECTION, SOLUTION INTRAVENOUS EVERY 6 HOURS PRN
Status: DISCONTINUED | OUTPATIENT
Start: 2024-08-27 | End: 2024-08-30 | Stop reason: HOSPADM

## 2024-08-27 RX ORDER — PANTOPRAZOLE SODIUM 40 MG/10ML
40 INJECTION, POWDER, LYOPHILIZED, FOR SOLUTION INTRAVENOUS DAILY
Status: DISCONTINUED | OUTPATIENT
Start: 2024-08-28 | End: 2024-08-28

## 2024-08-27 RX ORDER — METOCLOPRAMIDE 10 MG/1
10 TABLET ORAL EVERY 6 HOURS PRN
Status: DISCONTINUED | OUTPATIENT
Start: 2024-08-27 | End: 2024-08-28

## 2024-08-27 RX ORDER — METOCLOPRAMIDE HYDROCHLORIDE 5 MG/ML
10 INJECTION INTRAMUSCULAR; INTRAVENOUS EVERY 6 HOURS PRN
Status: DISCONTINUED | OUTPATIENT
Start: 2024-08-27 | End: 2024-08-28

## 2024-08-27 RX ORDER — ONDANSETRON 4 MG/1
4 TABLET, FILM COATED ORAL EVERY 6 HOURS PRN
Status: DISCONTINUED | OUTPATIENT
Start: 2024-08-27 | End: 2024-08-30 | Stop reason: HOSPADM

## 2024-08-27 RX ORDER — ENOXAPARIN SODIUM 100 MG/ML
40 INJECTION SUBCUTANEOUS EVERY 24 HOURS
Status: DISCONTINUED | OUTPATIENT
Start: 2024-08-27 | End: 2024-08-30 | Stop reason: HOSPADM

## 2024-08-27 RX ORDER — SODIUM CHLORIDE, SODIUM LACTATE, POTASSIUM CHLORIDE, CALCIUM CHLORIDE 600; 310; 30; 20 MG/100ML; MG/100ML; MG/100ML; MG/100ML
50 INJECTION, SOLUTION INTRAVENOUS CONTINUOUS
Status: DISCONTINUED | OUTPATIENT
Start: 2024-08-27 | End: 2024-08-30 | Stop reason: HOSPADM

## 2024-08-27 ASSESSMENT — COGNITIVE AND FUNCTIONAL STATUS - GENERAL
MOBILITY SCORE: 23
DAILY ACTIVITIY SCORE: 24
PATIENT BASELINE BEDBOUND: NO
CLIMB 3 TO 5 STEPS WITH RAILING: A LITTLE

## 2024-08-27 NOTE — PROGRESS NOTES
Patient called with CT scan results and recommendations for direct admission to hospital.  Order for admission entered.  Team notified.

## 2024-08-27 NOTE — HOSPITAL COURSE
68 year old female with with stage 1B grade 1 endometrial cancer s/p TLH, BSO, SLND on 7/8/22 admitted for partial small bowel obstruction diagnosed on outpatient imaging. On admission, she was made NPO and an NG tube was placed. Dexamethasone was started (discontinued at time of discharge). On HOD#2 a clamp trial was successful and NG tube was removed. Her diet was advanced as tolerated to a low residue diet and was able to tolerate PO without nausea, vomiting, or pain. She was discharged home in stable condition with follow up with gynecology oncology.     CT A/P 8/26/2024:  IMPRESSION:  1.  Findings of small bowel obstruction with transition point  involving a loop of pelvic ileum.  2. Small volume ascites. Left pelvic nodule with some nodularity  along the left paracolic gutter worrisome for peritoneal  carcinomatosis.  3. Indeterminate 14 mm right adrenal nodule, most likely adenoma.    Cancer History:   - 7/8/2022 TLH, BSO, SLND combined case with Dr. Lal. 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 There was no lymphovascular invasion. There was a focus of MELF pattern invasion. Tumor board recommendations: Surveillance. MMR testing on prior endometrial biopsy specimen was normal.   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  - CT 8/26/24: Small volume ascites. Left pelvic nodule with some nodularity  along the left paracolic gutter worrisome for peritoneal carcinomatosis.      Past Medical History:   - Bradycardia  - Diabetes  - HLD  - HTN     Past Surgical History:   - None other than hysterectomy    Meds: aspirin, lipitor, benazepril, empagliflozin, metformin, tylenol, potassium    All: PCN (anaphylaxis)     OBGYN History: menopausal, no prior abnormal pap smears     Family History: No history of ovarian/fallopian tube, endometrial, breast, pancreas, or GI cancers.      Social History: Denies smoking, alcohol, or illicit drug use.

## 2024-08-28 ENCOUNTER — APPOINTMENT (OUTPATIENT)
Dept: RADIOLOGY | Facility: HOSPITAL | Age: 69
End: 2024-08-28
Payer: MEDICARE

## 2024-08-28 LAB
ABO GROUP (TYPE) IN BLOOD: NORMAL
ANION GAP SERPL CALC-SCNC: 15 MMOL/L (ref 10–20)
ANTIBODY SCREEN: NORMAL
BUN SERPL-MCNC: 11 MG/DL (ref 6–23)
CALCIUM SERPL-MCNC: 9 MG/DL (ref 8.6–10.6)
CHLORIDE SERPL-SCNC: 106 MMOL/L (ref 98–107)
CO2 SERPL-SCNC: 23 MMOL/L (ref 21–32)
CREAT SERPL-MCNC: 0.43 MG/DL (ref 0.5–1.05)
EGFRCR SERPLBLD CKD-EPI 2021: >90 ML/MIN/1.73M*2
ERYTHROCYTE [DISTWIDTH] IN BLOOD BY AUTOMATED COUNT: 13.4 % (ref 11.5–14.5)
GLUCOSE BLD MANUAL STRIP-MCNC: 127 MG/DL (ref 74–99)
GLUCOSE SERPL-MCNC: 107 MG/DL (ref 74–99)
HCT VFR BLD AUTO: 37.6 % (ref 36–46)
HGB BLD-MCNC: 13.2 G/DL (ref 12–16)
MAGNESIUM SERPL-MCNC: 1.84 MG/DL (ref 1.6–2.4)
MCH RBC QN AUTO: 29.4 PG (ref 26–34)
MCHC RBC AUTO-ENTMCNC: 35.1 G/DL (ref 32–36)
MCV RBC AUTO: 84 FL (ref 80–100)
NRBC BLD-RTO: 0 /100 WBCS (ref 0–0)
PLATELET # BLD AUTO: 330 X10*3/UL (ref 150–450)
POTASSIUM SERPL-SCNC: 3.7 MMOL/L (ref 3.5–5.3)
RBC # BLD AUTO: 4.49 X10*6/UL (ref 4–5.2)
RH FACTOR (ANTIGEN D): NORMAL
SODIUM SERPL-SCNC: 140 MMOL/L (ref 136–145)
WBC # BLD AUTO: 6.5 X10*3/UL (ref 4.4–11.3)

## 2024-08-28 PROCEDURE — 0D9670Z DRAINAGE OF STOMACH WITH DRAINAGE DEVICE, VIA NATURAL OR ARTIFICIAL OPENING: ICD-10-PCS | Performed by: OBSTETRICS & GYNECOLOGY

## 2024-08-28 PROCEDURE — 2500000004 HC RX 250 GENERAL PHARMACY W/ HCPCS (ALT 636 FOR OP/ED)

## 2024-08-28 PROCEDURE — 74018 RADEX ABDOMEN 1 VIEW: CPT

## 2024-08-28 PROCEDURE — 74018 RADEX ABDOMEN 1 VIEW: CPT | Performed by: RADIOLOGY

## 2024-08-28 PROCEDURE — 2500000002 HC RX 250 W HCPCS SELF ADMINISTERED DRUGS (ALT 637 FOR MEDICARE OP, ALT 636 FOR OP/ED)

## 2024-08-28 PROCEDURE — 2500000001 HC RX 250 WO HCPCS SELF ADMINISTERED DRUGS (ALT 637 FOR MEDICARE OP)

## 2024-08-28 PROCEDURE — 99222 1ST HOSP IP/OBS MODERATE 55: CPT

## 2024-08-28 PROCEDURE — 2500000004 HC RX 250 GENERAL PHARMACY W/ HCPCS (ALT 636 FOR OP/ED): Performed by: NURSE PRACTITIONER

## 2024-08-28 PROCEDURE — 82947 ASSAY GLUCOSE BLOOD QUANT: CPT

## 2024-08-28 PROCEDURE — 1170000001 HC PRIVATE ONCOLOGY ROOM DAILY

## 2024-08-28 RX ORDER — BISMUTH SUBSALICYLATE 262 MG
1 TABLET,CHEWABLE ORAL DAILY
COMMUNITY

## 2024-08-28 RX ORDER — PANTOPRAZOLE SODIUM 20 MG/1
20 TABLET, DELAYED RELEASE ORAL
Status: DISCONTINUED | OUTPATIENT
Start: 2024-08-28 | End: 2024-08-29

## 2024-08-28 RX ORDER — POLYETHYLENE GLYCOL 3350 17 G/17G
17 POWDER, FOR SOLUTION ORAL 2 TIMES DAILY
COMMUNITY

## 2024-08-28 RX ORDER — SENNOSIDES 8.6 MG/1
2 TABLET ORAL NIGHTLY
COMMUNITY

## 2024-08-28 RX ORDER — ATORVASTATIN CALCIUM 20 MG/1
20 TABLET, FILM COATED ORAL DAILY
Status: DISCONTINUED | OUTPATIENT
Start: 2024-08-28 | End: 2024-08-30 | Stop reason: HOSPADM

## 2024-08-28 RX ORDER — LORAZEPAM 2 MG/ML
0.5 INJECTION INTRAMUSCULAR ONCE
Status: COMPLETED | OUTPATIENT
Start: 2024-08-28 | End: 2024-08-28

## 2024-08-28 RX ORDER — ACETAMINOPHEN 325 MG/1
650 TABLET ORAL EVERY 6 HOURS PRN
Status: DISCONTINUED | OUTPATIENT
Start: 2024-08-28 | End: 2024-08-30 | Stop reason: HOSPADM

## 2024-08-28 RX ORDER — ASPIRIN 81 MG/1
81 TABLET ORAL DAILY
Status: DISCONTINUED | OUTPATIENT
Start: 2024-08-28 | End: 2024-08-30 | Stop reason: HOSPADM

## 2024-08-28 RX ORDER — LISINOPRIL 10 MG/1
10 TABLET ORAL DAILY
Status: DISCONTINUED | OUTPATIENT
Start: 2024-08-28 | End: 2024-08-30 | Stop reason: HOSPADM

## 2024-08-28 ASSESSMENT — PAIN SCALES - GENERAL
PAINLEVEL_OUTOF10: 0 - NO PAIN
PAINLEVEL_OUTOF10: 2

## 2024-08-28 ASSESSMENT — COGNITIVE AND FUNCTIONAL STATUS - GENERAL
MOBILITY SCORE: 24
DAILY ACTIVITIY SCORE: 24

## 2024-08-28 ASSESSMENT — PAIN - FUNCTIONAL ASSESSMENT: PAIN_FUNCTIONAL_ASSESSMENT: 0-10

## 2024-08-28 NOTE — H&P
"History Of Present Illness  68 year old female with stage 1B grade 1 endometrial cancer s/p TLH, BSO, SLND on 7/8/22 presenting due to newly diagnosed SBO.      Pt recently had clinic appointment while undergoing q6 month surveillance visits. Reported bloating, intermittent N/V, and intermittent abdominal pain at that time. CT ordered and was obtained 8/26, demonstrated SBO with transition point and air fluid levels. Pt direct admitted to [facility]. Reports these symptoms have been going on for approx 1 month but have been worsening recently. Felt 10/10 pain on the way to the hospital but then resolved since she got here. Describes that pain, bloating, and \"hard\" feeling in her stomach come in waves and then she has nausea and emesis every few days when she feels like \"things aren't getting through.\" Does have regular bowel movements, last was earlier in day prior to hospital presentation and was well-formed. Had loose stool one day prior. Does take miralax and senna.     CT A/P 8/26/2024:  IMPRESSION:  1.  Findings of small bowel obstruction with transition point  involving a loop of pelvic ileum.  2. Small volume ascites. Left pelvic nodule with some nodularity  along the left paracolic gutter worrisome for peritoneal  carcinomatosis.  3. Indeterminate 14 mm right adrenal nodule, most likely adenoma.    Cancer History:   - 7/8/2022 TLH, BSO, SLND combined case with Dr. Lal. 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 There was no lymphovascular invasion. There was a focus of MELF pattern invasion. Tumor board recommendations: Surveillance. MMR testing on prior endometrial biopsy specimen was normal.   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  - CT 8/26/24: Small volume ascites. Left pelvic nodule with some nodularity  along the left paracolic gutter worrisome for peritoneal carcinomatosis.      Past Medical History:   - Hx of bradycardia  - " "Diabetes  - HLD  - HTN     Past Surgical History:   - 7/8/2022 TLH, BSO, SLND  - Several arm and wrist surgeries after injuries    Meds: aspirin, lipitor, benazepril, empagliflozin, metformin    All: PCN (anaphylaxis)     OBGYN History: menopausal, no prior abnormal pap smears     Family History: No history of ovarian/fallopian tube, endometrial, breast, pancreas, or GI cancers.      Social History: Denies smoking, alcohol, or illicit drug use.     Review of Systems   All other systems reviewed and are negative.      Physical Exam  Constitutional: No visible distress, alert and cooperative  Respiratory/Thorax: Normal respiratory effort on RA  Cardiovascular: Reg rate  Gastrointestinal: mildly distended, nontender, somewhat firm near epigastric region and otherwise soft, hypoactive bowel sounds  Neurological: A&Ox3  Psychological: Appropriate mood and behavior         Last Recorded Vitals  Blood pressure 156/79, pulse 83, resp. rate 16, height 1.676 m (5' 6\"), weight 87.6 kg (193 lb 3.2 oz), SpO2 96%.    Relevant Results  CT A/P 8/26/2024:  IMPRESSION:  1.  Findings of small bowel obstruction with transition point  involving a loop of pelvic ileum.  2. Small volume ascites. Left pelvic nodule with some nodularity  along the left paracolic gutter worrisome for peritoneal  carcinomatosis.  3. Indeterminate 14 mm right adrenal nodule, most likely adenoma.    Lab Results   Component Value Date    WBC 6.5 08/27/2024    HGB 13.2 08/27/2024    HCT 37.6 08/27/2024     08/27/2024     Lab Results   Component Value Date    GLUCOSE 107 (H) 08/27/2024     08/27/2024    K 3.7 08/27/2024     08/27/2024    CO2 23 08/27/2024    ANIONGAP 15 08/27/2024    BUN 11 08/27/2024    CREATININE 0.43 (L) 08/27/2024    EGFR >90 08/27/2024    CALCIUM 9.0 08/27/2024            Assessment/Plan   Assessment & Plan  Partial bowel obstruction (Multi)    Bowel obstruction (Multi)      68 year old female with stage 1B grade 1 " endometrial cancer s/p TLH, BSO, SLND on 7/8/22 presenting due to newly diagnosed SBO.      SBO  - Seen on CT 8/26 with transition point as well as air fluid levels. Approx 1 month of symptoms prior. Is having bowel movements and passing gas.  - Labs on admission without significant electrolyte abnormalities.  - NPO diet  - Discussed with patient option for NG tube. Discussed that sometimes SBO can be treated with NPO diet prior to placement of NG tube and some patients can ultimately avoid NG tube. However, discussed that placement can lead to quicker resolution of symptoms. Pt elects NGT placement at this time.    Hx of stage 1B grade 1 endometrial cancer  - s/p TLH, BSO, SLND on 7/8/22. Had been undergoing surveillance since that surgery and pt had HÉCTOR for 2 years without active treatments  - CT 8/26 did demonstrate small volume ascites and left pelvic nodule with some nodularity  along the left paracolic gutter worrisome for peritoneal carcinomatosis.  - Will discuss next steps    DM  - home metformin, empagoflozin held.   - POC BG q4 while NPO. Will add SSI if needed.    HTN  - benazepril at home, lisinopril ordered while admitted   - bASA, atorvastatin cont.    Code status: Full code, confirmed on admission    D/w Dr. Harmon, and to be d/w Dr. Polo Lyn MD PGY-3  GYN ONC, pager [phone]

## 2024-08-28 NOTE — PROGRESS NOTES
Pharmacy Medication History Review    Monica Musa is a 69 y.o. female admitted for Partial bowel obstruction (Multi). Pharmacy reviewed the patient's yqzrc-is-ywogineen medications and allergies for accuracy.    Medications ADDED:  Garlic, multivitamin, Nutrafol, antihistamine, senna, MiraLax  Medications CHANGED:  Benazepril  Medications REMOVED:   None    The list below reflects the updated PTA list.   Prior to Admission Medications   Prescriptions Last Dose Informant   UNABLE TO FIND  Self   Sig: Take 4 capsules by mouth once daily. Med Name: Nutrafol.   UNKNOWN TO PATIENT  Self   Sig: Take 1 tablet by mouth once daily. Antihistamine; white package, white round pill. From [employer].   acetaminophen (TylenoL) 325 mg tablet  Self   Sig: Take 2 tablets (650 mg) by mouth every 6 hours if needed for mild pain (1 - 3) or fever (temp greater than 38.0 C).   Patient taking differently: Take 3 tablets (975 mg) by mouth once daily at bedtime.   aspirin 81 mg EC tablet  Self   Sig: Take 1 tablet (81 mg) by mouth once daily.   atorvastatin (Lipitor) 20 mg tablet  Self   Sig: TAKE 1 TABLET BY MOUTH EVERY DAY   benazepril (Lotensin) 5 mg tablet  Self   Sig: Take 1 tablet (5 mg) by mouth 2 times a day.   Patient taking differently: Take 2 tablets (10 mg) by mouth once daily.   empagliflozin (Jardiance) 10 mg  Self   Sig: Take 1 tablet (10 mg) by mouth once daily.   garlic tablet  Self   Sig: Take 1 tablet by mouth once daily.   metFORMIN  mg 24 hr tablet  Self   Sig: TAKE 1 TABLET BY MOUTH TWICE A DAY   Multivitamin tablet  Self   Sig: Take 1 tablet by mouth once daily.     omeprazole OTC (PriLOSEC OTC) 20 mg EC tablet  Self   Sig: Take 1 tablet (20 mg) by mouth once daily as needed (acid reflux). Take before breakfast. Do not crush, chew, or split.   polyethylene glycol (Glycolax, Miralax) 17 gram packet  Self   Sig: Take 17 g by mouth 2 times a day. Mix with 8oz. Of liquid.   sennosides (senna) 8.6 mg tablet  Self  "  Sig: Take 2 tablets (17.2 mg) by mouth once daily at bedtime.      Facility-Administered Medications: None      The list below reflects the updated allergy list. Please review each documented allergy for additional clarification and justification.  Allergies  Reviewed by Parth Quiros on 8/28/2024        Severity Reactions Comments    Penicillins High Anaphylaxis, Swelling     Bee Venom Protein (honey Bee) Low Swelling           Patient accepts M2B at discharge.     Sources:   Last fill history  Patient reported    Additional Comments:  Patient is a good historian; able to recall most names, doses, and frequencies  Patient takes benazepril once daily, not twice daily, but the same daily dose  Patient takes an unknown antihistamine    PARTH QUIROS  PGY-1 Pharmacy Resident  08/28/24     Secure Chat preferred   If no response call [phone] or "[employer]era \"Med Rec\"    "

## 2024-08-28 NOTE — PROGRESS NOTES
08/28/24 1315   Discharge Planning   Living Arrangements Spouse/significant other   Support Systems Spouse/significant other   Assistance Needed None   Type of Residence Private residence   Do you have animals or pets at home? No   Who is requesting discharge planning? Provider   Home or Post Acute Services None   Expected Discharge Disposition Home   Does the patient need discharge transport arranged? No  (Family will provide transporation home)     69 year old female with stage 1B grade 1 endometrial cancer s/p TLH, BSO, SLND on 7/8/22 presenting due to newly diagnosed SBO.     Plan per Medical/Surgical Team: NPO, patient elected to have NGT placed    Met with patient and introduced myself as care coordinator and member of the care transitions team for discharge planning. Per the medical team, this patient has no anticipated discharge needs; full assessment deferred at this time. Will continue to follow for any home going needs that arise. ADOD 8/31-9/1 home no needs. Charlene Paula RN

## 2024-08-28 NOTE — PROGRESS NOTES
08/28/24 1315   Discharge Planning   Living Arrangements Spouse/significant other   Support Systems Spouse/significant other   Assistance Needed None   Type of Residence Private residence   Do you have animals or pets at home? No   Who is requesting discharge planning? Provider   Home or Post Acute Services None   Expected Discharge Disposition Home   Does the patient need discharge transport arranged? No  (Family will provide transporation home)          Met with patient and introduced myself as care coordinator and member of the care transitions team for discharge planning.     Per the medical team, this patient has no anticipated discharge needs; full assessment deferred at this time. Will continue to follow for any home going needs that arise. Charlene Paula RN

## 2024-08-29 DIAGNOSIS — C54.1 ENDOMETRIAL CANCER (MULTI): Primary | ICD-10-CM

## 2024-08-29 DIAGNOSIS — C54.1 ENDOMETRIAL CARCINOMA (MULTI): Primary | ICD-10-CM

## 2024-08-29 LAB
ANION GAP SERPL CALC-SCNC: 20 MMOL/L (ref 10–20)
BUN SERPL-MCNC: 10 MG/DL (ref 6–23)
CALCIUM SERPL-MCNC: 9.1 MG/DL (ref 8.6–10.6)
CHLORIDE SERPL-SCNC: 105 MMOL/L (ref 98–107)
CO2 SERPL-SCNC: 19 MMOL/L (ref 21–32)
CREAT SERPL-MCNC: 0.39 MG/DL (ref 0.5–1.05)
EGFRCR SERPLBLD CKD-EPI 2021: >90 ML/MIN/1.73M*2
ERYTHROCYTE [DISTWIDTH] IN BLOOD BY AUTOMATED COUNT: 13.4 % (ref 11.5–14.5)
GLUCOSE BLD MANUAL STRIP-MCNC: 120 MG/DL (ref 74–99)
GLUCOSE BLD MANUAL STRIP-MCNC: 201 MG/DL (ref 74–99)
GLUCOSE SERPL-MCNC: 72 MG/DL (ref 74–99)
HCT VFR BLD AUTO: 40.3 % (ref 36–46)
HGB BLD-MCNC: 13.7 G/DL (ref 12–16)
MAGNESIUM SERPL-MCNC: 1.97 MG/DL (ref 1.6–2.4)
MCH RBC QN AUTO: 30 PG (ref 26–34)
MCHC RBC AUTO-ENTMCNC: 34 G/DL (ref 32–36)
MCV RBC AUTO: 88 FL (ref 80–100)
NRBC BLD-RTO: 0 /100 WBCS (ref 0–0)
PLATELET # BLD AUTO: 322 X10*3/UL (ref 150–450)
POTASSIUM SERPL-SCNC: 3.5 MMOL/L (ref 3.5–5.3)
RBC # BLD AUTO: 4.57 X10*6/UL (ref 4–5.2)
SODIUM SERPL-SCNC: 140 MMOL/L (ref 136–145)
WBC # BLD AUTO: 5.8 X10*3/UL (ref 4.4–11.3)

## 2024-08-29 PROCEDURE — 82947 ASSAY GLUCOSE BLOOD QUANT: CPT

## 2024-08-29 PROCEDURE — 83735 ASSAY OF MAGNESIUM: CPT | Performed by: NURSE PRACTITIONER

## 2024-08-29 PROCEDURE — 1170000001 HC PRIVATE ONCOLOGY ROOM DAILY

## 2024-08-29 PROCEDURE — 85027 COMPLETE CBC AUTOMATED: CPT | Performed by: NURSE PRACTITIONER

## 2024-08-29 PROCEDURE — 2500000004 HC RX 250 GENERAL PHARMACY W/ HCPCS (ALT 636 FOR OP/ED)

## 2024-08-29 PROCEDURE — 36415 COLL VENOUS BLD VENIPUNCTURE: CPT | Performed by: NURSE PRACTITIONER

## 2024-08-29 PROCEDURE — 2500000004 HC RX 250 GENERAL PHARMACY W/ HCPCS (ALT 636 FOR OP/ED): Performed by: NURSE PRACTITIONER

## 2024-08-29 PROCEDURE — 2500000001 HC RX 250 WO HCPCS SELF ADMINISTERED DRUGS (ALT 637 FOR MEDICARE OP)

## 2024-08-29 PROCEDURE — RXMED WILLOW AMBULATORY MEDICATION CHARGE

## 2024-08-29 PROCEDURE — 99232 SBSQ HOSP IP/OBS MODERATE 35: CPT

## 2024-08-29 PROCEDURE — 80048 BASIC METABOLIC PNL TOTAL CA: CPT | Performed by: NURSE PRACTITIONER

## 2024-08-29 RX ORDER — POTASSIUM CHLORIDE 14.9 MG/ML
20 INJECTION INTRAVENOUS
Status: COMPLETED | OUTPATIENT
Start: 2024-08-29 | End: 2024-08-29

## 2024-08-29 RX ORDER — PANTOPRAZOLE SODIUM 40 MG/10ML
40 INJECTION, POWDER, LYOPHILIZED, FOR SOLUTION INTRAVENOUS
Status: DISCONTINUED | OUTPATIENT
Start: 2024-08-29 | End: 2024-08-30 | Stop reason: HOSPADM

## 2024-08-29 RX ORDER — ONDANSETRON 4 MG/1
4 TABLET, FILM COATED ORAL EVERY 6 HOURS PRN
Qty: 20 TABLET | Refills: 0 | Status: SHIPPED | OUTPATIENT
Start: 2024-08-29

## 2024-08-29 RX ORDER — ACETAMINOPHEN 325 MG/1
975 TABLET ORAL NIGHTLY
COMMUNITY
Start: 2024-08-29

## 2024-08-29 SDOH — SOCIAL STABILITY: SOCIAL INSECURITY: DO YOU FEEL UNSAFE GOING BACK TO THE PLACE WHERE YOU ARE LIVING?: YES

## 2024-08-29 SDOH — ECONOMIC STABILITY: INCOME INSECURITY: HOW HARD IS IT FOR YOU TO PAY FOR THE VERY BASICS LIKE FOOD, HOUSING, MEDICAL CARE, AND HEATING?: NOT VERY HARD

## 2024-08-29 SDOH — SOCIAL STABILITY: SOCIAL INSECURITY: ARE THERE ANY APPARENT SIGNS OF INJURIES/BEHAVIORS THAT COULD BE RELATED TO ABUSE/NEGLECT?: YES

## 2024-08-29 SDOH — ECONOMIC STABILITY: INCOME INSECURITY: IN THE LAST 12 MONTHS, WAS THERE A TIME WHEN YOU WERE NOT ABLE TO PAY THE MORTGAGE OR RENT ON TIME?: NO

## 2024-08-29 SDOH — SOCIAL STABILITY: SOCIAL INSECURITY: HAS ANYONE EVER THREATENED TO HURT YOUR FAMILY OR YOUR PETS?: YES

## 2024-08-29 SDOH — SOCIAL STABILITY: SOCIAL INSECURITY: ABUSE: ADULT

## 2024-08-29 SDOH — SOCIAL STABILITY: SOCIAL INSECURITY: HAVE YOU HAD THOUGHTS OF HARMING ANYONE ELSE?: NO

## 2024-08-29 SDOH — SOCIAL STABILITY: SOCIAL INSECURITY: DOES ANYONE TRY TO KEEP YOU FROM HAVING/CONTACTING OTHER FRIENDS OR DOING THINGS OUTSIDE YOUR HOME?: YES

## 2024-08-29 SDOH — ECONOMIC STABILITY: TRANSPORTATION INSECURITY
IN THE PAST 12 MONTHS, HAS LACK OF TRANSPORTATION KEPT YOU FROM MEETINGS, WORK, OR FROM GETTING THINGS NEEDED FOR DAILY LIVING?: NO

## 2024-08-29 SDOH — SOCIAL STABILITY: SOCIAL INSECURITY: DO YOU FEEL ANYONE HAS EXPLOITED OR TAKEN ADVANTAGE OF YOU FINANCIALLY OR OF YOUR PERSONAL PROPERTY?: YES

## 2024-08-29 SDOH — ECONOMIC STABILITY: HOUSING INSECURITY: AT ANY TIME IN THE PAST 12 MONTHS, WERE YOU HOMELESS OR LIVING IN A SHELTER (INCLUDING NOW)?: NO

## 2024-08-29 SDOH — SOCIAL STABILITY: SOCIAL INSECURITY: WERE YOU ABLE TO COMPLETE ALL THE BEHAVIORAL HEALTH SCREENINGS?: YES

## 2024-08-29 SDOH — ECONOMIC STABILITY: TRANSPORTATION INSECURITY
IN THE PAST 12 MONTHS, HAS THE LACK OF TRANSPORTATION KEPT YOU FROM MEDICAL APPOINTMENTS OR FROM GETTING MEDICATIONS?: NO

## 2024-08-29 SDOH — ECONOMIC STABILITY: HOUSING INSECURITY: IN THE PAST 12 MONTHS, HOW MANY TIMES HAVE YOU MOVED WHERE YOU WERE LIVING?: 1

## 2024-08-29 SDOH — SOCIAL STABILITY: SOCIAL INSECURITY: ARE YOU OR HAVE YOU BEEN THREATENED OR ABUSED PHYSICALLY, EMOTIONALLY, OR SEXUALLY BY ANYONE?: YES

## 2024-08-29 ASSESSMENT — PAIN - FUNCTIONAL ASSESSMENT
PAIN_FUNCTIONAL_ASSESSMENT: 0-10
PAIN_FUNCTIONAL_ASSESSMENT: 0-10

## 2024-08-29 ASSESSMENT — ACTIVITIES OF DAILY LIVING (ADL)
GROOMING: INDEPENDENT
HEARING - RIGHT EAR: FUNCTIONAL
WALKS IN HOME: INDEPENDENT
ADEQUATE_TO_COMPLETE_ADL: YES
HEARING - LEFT EAR: FUNCTIONAL
BATHING: INDEPENDENT
FEEDING YOURSELF: INDEPENDENT
ASSISTIVE_DEVICE: EYEGLASSES
DRESSING YOURSELF: INDEPENDENT
JUDGMENT_ADEQUATE_SAFELY_COMPLETE_DAILY_ACTIVITIES: YES
TOILETING: INDEPENDENT
PATIENT'S MEMORY ADEQUATE TO SAFELY COMPLETE DAILY ACTIVITIES?: YES

## 2024-08-29 ASSESSMENT — COGNITIVE AND FUNCTIONAL STATUS - GENERAL
DAILY ACTIVITIY SCORE: 24
CLIMB 3 TO 5 STEPS WITH RAILING: A LITTLE
MOBILITY SCORE: 23

## 2024-08-29 ASSESSMENT — COLUMBIA-SUICIDE SEVERITY RATING SCALE - C-SSRS
6. HAVE YOU EVER DONE ANYTHING, STARTED TO DO ANYTHING, OR PREPARED TO DO ANYTHING TO END YOUR LIFE?: NO
2. HAVE YOU ACTUALLY HAD ANY THOUGHTS OF KILLING YOURSELF?: NO
1. IN THE PAST MONTH, HAVE YOU WISHED YOU WERE DEAD OR WISHED YOU COULD GO TO SLEEP AND NOT WAKE UP?: NO

## 2024-08-29 ASSESSMENT — PATIENT HEALTH QUESTIONNAIRE - PHQ9
1. LITTLE INTEREST OR PLEASURE IN DOING THINGS: NOT AT ALL
2. FEELING DOWN, DEPRESSED OR HOPELESS: NOT AT ALL
SUM OF ALL RESPONSES TO PHQ9 QUESTIONS 1 & 2: 0

## 2024-08-29 ASSESSMENT — PAIN SCALES - GENERAL
PAINLEVEL_OUTOF10: 0 - NO PAIN

## 2024-08-29 ASSESSMENT — LIFESTYLE VARIABLES
HOW OFTEN DO YOU HAVE A DRINK CONTAINING ALCOHOL: NEVER
SKIP TO QUESTIONS 9-10: 1
AUDIT-C TOTAL SCORE: 0
HOW OFTEN DO YOU HAVE 6 OR MORE DRINKS ON ONE OCCASION: NEVER
AUDIT-C TOTAL SCORE: 0
HOW MANY STANDARD DRINKS CONTAINING ALCOHOL DO YOU HAVE ON A TYPICAL DAY: PATIENT DOES NOT DRINK

## 2024-08-29 NOTE — PROGRESS NOTES
"Monica Musa is a 69 y.o. female on day 2 of admission presenting with Partial bowel obstruction (Multi).      Subjective   Denies having any abdominal pain or N/V this morning. Still passing gas. Last bowel movement yesterday. Desires to have NGT removed this morning.     Objective     Last Recorded Vitals  Blood pressure 135/68, pulse 72, temperature 36.5 °C (97.7 °F), temperature source Temporal, resp. rate 18, height 1.676 m (5' 6\"), weight 87.6 kg (193 lb 3.2 oz), SpO2 96%.  Intake/Output last 3 Shifts:    Intake/Output Summary (Last 24 hours) at 8/29/2024 0648  Last data filed at 8/29/2024 0551  Gross per 24 hour   Intake 1264 ml   Output 875 ml   Net 389 ml       Physical Exam      Assessment/Plan     Assessment & Plan  Partial bowel obstruction (Multi)  68 year old female with stage 1B grade 1 endometrial cancer s/p TLH, BSO, SLND on 7/8/22 presenting due to newly diagnosed SBO.       SBO  - Seen on CT 8/26 with transition point as well as air fluid levels. Approx 1 month of symptoms prior. Is having bowel movements and passing gas  - NPO with NGT in place. Slowing output, abdominal pain resolved and continuing to pass flatus, therefore for clamp trial today    Hx of stage 1B grade 1 endometrial cancer  - s/p TLH, BSO, SLND on 7/8/22. Had been undergoing surveillance since that surgery and pt had HÉCTOR for 2 years without active treatments  - CT 8/26 did demonstrate small volume ascites and left pelvic nodule with some nodularity  along the left paracolic gutter worrisome for peritoneal carcinomatosis. Will plan for outpatient IR bx      DM  - home metformin, empagoflozin held.   - POC BG q4 while NPO. Will add SSI if needed.     HTN  - benazepril at home, lisinopril ordered while admitted   - bASA, atorvastatin cont.     Dispo: pending resolution of partial SBO    D/w and to be seen with Dr. Ramona Zuniga MD, PGY-3  Gynecologic Oncology  Pager [phone]   "

## 2024-08-29 NOTE — DISCHARGE INSTRUCTIONS
Call the Gynecologic Oncology office at [phone] if you have:  Vaginal bleeding soaking >2 pads per hour for 2 hours  Temperature greater than 100.4  Persistent nausea and vomiting despite medications  Severe uncontrolled pain despite medications  Extreme fatigue  Any other questions or concerns you may have after discharge    In an emergency, call 911 or go to an Emergency Department at a nearby hospital.

## 2024-08-29 NOTE — ASSESSMENT & PLAN NOTE
68 year old female with stage 1B grade 1 endometrial cancer s/p TLH, BSO, SLND on 7/8/22 presenting due to newly diagnosed SBO.       SBO  - Seen on CT 8/26 with transition point as well as air fluid levels. Approx 1 month of symptoms prior. Is having bowel movements and passing gas  - NPO with NGT in place. Slowing output, abdominal pain resolved and continuing to pass flatus, therefore for clamp trial today    Hx of stage 1B grade 1 endometrial cancer  - s/p TLH, BSO, SLND on 7/8/22. Had been undergoing surveillance since that surgery and pt had HÉCTOR for 2 years without active treatments  - CT 8/26 did demonstrate small volume ascites and left pelvic nodule with some nodularity  along the left paracolic gutter worrisome for peritoneal carcinomatosis. Will plan for outpatient IR bx      DM  - home metformin, empagoflozin held.   - POC BG q4 while NPO. Will add SSI if needed.     HTN  - benazepril at home, lisinopril ordered while admitted   - bASA, atorvastatin cont.     Dispo: pending resolution of partial SBO

## 2024-08-30 ENCOUNTER — PHARMACY VISIT (OUTPATIENT)
Dept: PHARMACY | Facility: CLINIC | Age: 69
End: 2024-08-30
Payer: MEDICARE

## 2024-08-30 ENCOUNTER — TUMOR BOARD CONFERENCE (OUTPATIENT)
Dept: HEMATOLOGY/ONCOLOGY | Facility: HOSPITAL | Age: 69
End: 2024-08-30
Payer: MEDICARE

## 2024-08-30 VITALS
HEIGHT: 66 IN | SYSTOLIC BLOOD PRESSURE: 106 MMHG | TEMPERATURE: 96.6 F | WEIGHT: 193.2 LBS | OXYGEN SATURATION: 99 % | DIASTOLIC BLOOD PRESSURE: 65 MMHG | RESPIRATION RATE: 18 BRPM | HEART RATE: 56 BPM | BODY MASS INDEX: 31.05 KG/M2

## 2024-08-30 LAB
ANION GAP SERPL CALC-SCNC: 16 MMOL/L (ref 10–20)
BUN SERPL-MCNC: 10 MG/DL (ref 6–23)
CALCIUM SERPL-MCNC: 9.4 MG/DL (ref 8.6–10.6)
CHLORIDE SERPL-SCNC: 106 MMOL/L (ref 98–107)
CO2 SERPL-SCNC: 23 MMOL/L (ref 21–32)
CREAT SERPL-MCNC: 0.48 MG/DL (ref 0.5–1.05)
EGFRCR SERPLBLD CKD-EPI 2021: >90 ML/MIN/1.73M*2
ERYTHROCYTE [DISTWIDTH] IN BLOOD BY AUTOMATED COUNT: 13.4 % (ref 11.5–14.5)
GLUCOSE BLD MANUAL STRIP-MCNC: 205 MG/DL (ref 74–99)
GLUCOSE SERPL-MCNC: 64 MG/DL (ref 74–99)
HCT VFR BLD AUTO: 38.4 % (ref 36–46)
HGB BLD-MCNC: 12.9 G/DL (ref 12–16)
MAGNESIUM SERPL-MCNC: 1.84 MG/DL (ref 1.6–2.4)
MCH RBC QN AUTO: 29.7 PG (ref 26–34)
MCHC RBC AUTO-ENTMCNC: 33.6 G/DL (ref 32–36)
MCV RBC AUTO: 88 FL (ref 80–100)
NRBC BLD-RTO: 0 /100 WBCS (ref 0–0)
PLATELET # BLD AUTO: 340 X10*3/UL (ref 150–450)
POTASSIUM SERPL-SCNC: 3.6 MMOL/L (ref 3.5–5.3)
RBC # BLD AUTO: 4.35 X10*6/UL (ref 4–5.2)
SODIUM SERPL-SCNC: 141 MMOL/L (ref 136–145)
WBC # BLD AUTO: 6.4 X10*3/UL (ref 4.4–11.3)

## 2024-08-30 PROCEDURE — 83735 ASSAY OF MAGNESIUM: CPT | Performed by: NURSE PRACTITIONER

## 2024-08-30 PROCEDURE — 2500000001 HC RX 250 WO HCPCS SELF ADMINISTERED DRUGS (ALT 637 FOR MEDICARE OP)

## 2024-08-30 PROCEDURE — 82947 ASSAY GLUCOSE BLOOD QUANT: CPT

## 2024-08-30 PROCEDURE — 2500000004 HC RX 250 GENERAL PHARMACY W/ HCPCS (ALT 636 FOR OP/ED)

## 2024-08-30 PROCEDURE — 36415 COLL VENOUS BLD VENIPUNCTURE: CPT | Performed by: NURSE PRACTITIONER

## 2024-08-30 PROCEDURE — 2500000004 HC RX 250 GENERAL PHARMACY W/ HCPCS (ALT 636 FOR OP/ED): Performed by: NURSE PRACTITIONER

## 2024-08-30 PROCEDURE — 82374 ASSAY BLOOD CARBON DIOXIDE: CPT | Performed by: NURSE PRACTITIONER

## 2024-08-30 PROCEDURE — 85027 COMPLETE CBC AUTOMATED: CPT | Performed by: NURSE PRACTITIONER

## 2024-08-30 RX ORDER — POTASSIUM CHLORIDE 14.9 MG/ML
20 INJECTION INTRAVENOUS
Status: COMPLETED | OUTPATIENT
Start: 2024-08-30 | End: 2024-08-30

## 2024-08-30 RX ORDER — MAGNESIUM SULFATE HEPTAHYDRATE 40 MG/ML
2 INJECTION, SOLUTION INTRAVENOUS ONCE
Status: COMPLETED | OUTPATIENT
Start: 2024-08-30 | End: 2024-08-30

## 2024-08-30 ASSESSMENT — COGNITIVE AND FUNCTIONAL STATUS - GENERAL
MOBILITY SCORE: 24
DAILY ACTIVITIY SCORE: 24

## 2024-08-30 ASSESSMENT — PAIN - FUNCTIONAL ASSESSMENT
PAIN_FUNCTIONAL_ASSESSMENT: 0-10

## 2024-08-30 ASSESSMENT — PAIN SCALES - GENERAL
PAINLEVEL_OUTOF10: 0 - NO PAIN

## 2024-08-30 NOTE — NURSING NOTE
Monica Musa discharged at 4:48 PM  and 08/30/24   Patient discharged home via son  PIV removed with no complications   Discharge education and teaching completed with Monica Musa.  RN signature: Anisha Mcdermott

## 2024-08-30 NOTE — CARE PLAN
The patient's goals for the shift include going home    The clinical goals for the shift include pt remains free from injury and manage pain throughout shift      Problem: Pain  Goal: Takes deep breaths with improved pain control throughout the shift  Outcome: Progressing  Goal: Turns in bed with improved pain control throughout the shift  Outcome: Progressing  Goal: Walks with improved pain control throughout the shift  Outcome: Progressing  Goal: Performs ADL's with improved pain control throughout shift  Outcome: Progressing  Goal: Participates in PT with improved pain control throughout the shift  Outcome: Progressing  Goal: Free from opioid side effects throughout the shift  Outcome: Progressing  Goal: Free from acute confusion related to pain meds throughout the shift  Outcome: Progressing

## 2024-08-30 NOTE — TUMOR BOARD NOTE
Gynecologic Oncology Tumor Board 2024    Specialties Present: Gyn Onc, Radiology, Genetics, Radiation oncology, Pathology, Nurse Navigator, Research    Monica Musa  69 y.o.    1955  MRN [id number]    Provider: Connie Greene MD  Disease Site/Stage: Uterine  Pathology: Grade 1 endometrioid adenocarcinoma (77% MMI, no LVSI, pMMR, focal MELF pattern)  Prior Therapy:  2022 TLH, BSO, SLND  Impression: 68y with history of stage 1B grade 1 endometrioid endometrial cancer now with concerns for recurrence.     We reviewed previous medical and familial history, history of present illness, and recent lab results along with all available histopathologic and imaging studies. The tumor board considered available treatment options and made the following recommendations.    Recommendations: Consider IR biopsy of left paracolic gutter lesion vs. left pelvic nodule. Can also obtain repeat imaging after resolution of bowel obstruction.        Clinical Trial Consideration: None Available    National site-specific guidelines were discussed with respect to the case. It is recognized that there may be additional factors not discussed in the Review Board discussion that can influence management decisions, and alternative management options which fall within the standard of care. Accordingly the final treatment disposition will be determined by the patient, in the context of an informed discussion with their providers. The actual prescribed management or treatment plan may or may not be consistent with the Review Board recommendations.

## 2024-08-30 NOTE — CONSULTS
"Nutrition Diet Education  Provider consult order     Education Documentation  Low Residue/Fiber, taught by Rima Ro RDN, LD at 8/30/2024 10:37 AM.  Learner: Patient  Readiness: Acceptance  Method: Handout  Response: Verbalizes Understanding  Comment: Pt reports she will review handouts and reach out if pt has any questions. Provided patient with outpatient RDN handout      Provided pt with \"Diet to Prevent Bowel Obstruction\" educational handout. Discussed pt is at an increased risk for reoccurrence of a bowel obstruction & current diet aims to lower that risk. Identified that plant based foods are sources of dietary fiber. Stressed to the patient the importance of opting for low fiber foods that are more easily digested & reduce the amount of food waste moving through the intestines. Provided recommendation for pt to choose foods with <1g fiber per serving. Discussed the benefit of manually breaking down fiber prior to ingestion via cooking, pureeing, & chewing foods well. Also instructed pt on the importance of adequate fluid intake throughout the day. Discussed lists of foods recommended/not recommended, as well as, possible early signs of SBO for which pt should contact their physician. Pt denies any additional questions at completion of education.      Follow Up:     Time Spent (min): 30 minutes  Last Date of Nutrition Visit: 08/30/24  Nutrition Follow-Up Needed?: Dietitian to reassess per policy  Follow up Comment: low fiber diet, prevent bowel obstruction diet    "

## 2024-08-30 NOTE — DISCHARGE SUMMARY
Discharge Diagnosis  Partial bowel obstruction (Multi)    Issues Requiring Follow-Up  Plan for biopsy/further imaging of pelvic nodule     Test Results Pending At Discharge  Pending Labs       No current pending labs.            Hospital Course  68 year old female with with stage 1B grade 1 endometrial cancer s/p TLH, BSO, SLND on 7/8/22 admitted for partial small bowel obstruction diagnosed on outpatient imaging. On admission, she was made NPO and an NG tube was placed. Dexamethasone was started (discontinued at time of discharge). On HOD#2 a clamp trial was successful and NG tube was removed. Her diet was advanced as tolerated to a low residue diet and was able to tolerate PO without nausea, vomiting, or pain. She was discharged home in stable condition with follow up with gynecology oncology.     CT A/P 8/26/2024:  IMPRESSION:  1.  Findings of small bowel obstruction with transition point  involving a loop of pelvic ileum.  2. Small volume ascites. Left pelvic nodule with some nodularity  along the left paracolic gutter worrisome for peritoneal  carcinomatosis.  3. Indeterminate 14 mm right adrenal nodule, most likely adenoma.    Cancer History:   - 7/8/2022 TLH, BSO, SLND combined case with Dr. Lal. 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 There was no lymphovascular invasion. There was a focus of MELF pattern invasion. Tumor board recommendations: Surveillance. MMR testing on prior endometrial biopsy specimen was normal.   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  - CT 8/26/24: Small volume ascites. Left pelvic nodule with some nodularity  along the left paracolic gutter worrisome for peritoneal carcinomatosis.      Past Medical History:   - Bradycardia  - Diabetes  - HLD  - HTN     Past Surgical History:   - None other than hysterectomy    Meds: aspirin, lipitor, benazepril, empagliflozin, metformin, tylenol, potassium    All: PCN  (anaphylaxis)     OBGYN History: menopausal, no prior abnormal pap smears     Family History: No history of ovarian/fallopian tube, endometrial, breast, pancreas, or GI cancers.      Social History: Denies smoking, alcohol, or illicit drug use.     Pertinent Physical Exam At Time of Discharge  Physical Exam  General: Alert, no acute distress   HEENT: normocephalic, EOMI, clear sclera  Cardio: Warm and well perfused, RRR  Resp: breathing comfortably on room air, CTAB  Abd: soft, non-tender, nondistended, no guarding or rebound  Neuro: grossly intact, no focal deficits  Extremities: full ROM, no calf tenderness  Psych: A&O x3, appropriate mood and affect      Home Medications     Medication List      START taking these medications     ondansetron 4 mg tablet; Commonly known as: Zofran; Take 1 tablet (4 mg)   by mouth every 6 hours if needed for nausea or vomiting.     CONTINUE taking these medications     acetaminophen 325 mg tablet; Commonly known as: TylenoL; Take 3 tablets   (975 mg) by mouth once daily at bedtime.   aspirin 81 mg EC tablet   atorvastatin 20 mg tablet; Commonly known as: Lipitor; TAKE 1 TABLET BY   MOUTH EVERY DAY   benazepril 5 mg tablet; Commonly known as: Lotensin; Take 1 tablet (5   mg) by mouth 2 times a day.   empagliflozin 10 mg; Commonly known as: Jardiance; Take 1 tablet (10 mg)   by mouth once daily.   garlic tablet   metFORMIN  mg 24 hr tablet; Commonly known as: Glucophage-XR; TAKE   1 TABLET BY MOUTH TWICE A DAY   multivitamin tablet   omeprazole OTC 20 mg EC tablet; Commonly known as: PriLOSEC OTC   polyethylene glycol 17 gram/dose powder; Commonly known as: Glycolax,   Miralax   senna 8.6 mg tablet; Generic drug: sennosides   UNABLE TO FIND   UNKNOWN TO PATIENT       Outpatient Follow-Up  Future Appointments   Date Time Provider Department Center   9/16/2024  4:00 PM MD MEGAN Aviles   2/26/2025 10:20 AM Aria Sahni, APRN-CNP [id number]     Seen  and discussed with Dr. Ramona Phelan MD  PGY1, Obstetrics and Gynecology

## 2024-08-31 DIAGNOSIS — E11.9 TYPE 2 DIABETES MELLITUS WITHOUT COMPLICATION, WITHOUT LONG-TERM CURRENT USE OF INSULIN (MULTI): ICD-10-CM

## 2024-09-03 ENCOUNTER — APPOINTMENT (OUTPATIENT)
Dept: GYNECOLOGIC ONCOLOGY | Facility: CLINIC | Age: 69
End: 2024-09-03
Payer: MEDICARE

## 2024-09-04 RX ORDER — METFORMIN HYDROCHLORIDE 500 MG/1
500 TABLET, EXTENDED RELEASE ORAL 2 TIMES DAILY
Qty: 180 TABLET | Refills: 1 | Status: SHIPPED | OUTPATIENT
Start: 2024-09-04

## 2024-09-06 ENCOUNTER — APPOINTMENT (OUTPATIENT)
Dept: HEMATOLOGY/ONCOLOGY | Facility: HOSPITAL | Age: 69
End: 2024-09-06
Payer: MEDICARE

## 2024-09-09 ENCOUNTER — TELEPHONE (OUTPATIENT)
Dept: GYNECOLOGIC ONCOLOGY | Facility: HOSPITAL | Age: 69
End: 2024-09-09
Payer: MEDICARE

## 2024-09-09 NOTE — TELEPHONE ENCOUNTER
Phoned patient to notify that radiologist in interventional radiology notified Dr. Greene that pelvic nodule is in an area that he does not feel would be safe to biopsy.     Patient states she is tolerating a liquid diet and denies n/v, abdominal pain and is moving bowels.   Patient states if she tries to eat solid food she develops abdominal pain, nausea and vomiting.    Dr. Greene updated and patient is scheduled for phone visit with Dr. Greene on 9/10/24.    Patient notified and in agreement with plan.

## 2024-09-10 ENCOUNTER — TELEMEDICINE (OUTPATIENT)
Dept: GYNECOLOGIC ONCOLOGY | Facility: HOSPITAL | Age: 69
End: 2024-09-10
Payer: MEDICARE

## 2024-09-10 DIAGNOSIS — C54.1 ENDOMETRIAL CARCINOMA (MULTI): Primary | ICD-10-CM

## 2024-09-10 PROCEDURE — 1111F DSCHRG MED/CURRENT MED MERGE: CPT | Performed by: OBSTETRICS & GYNECOLOGY

## 2024-09-10 PROCEDURE — 4010F ACE/ARB THERAPY RXD/TAKEN: CPT | Performed by: OBSTETRICS & GYNECOLOGY

## 2024-09-10 PROCEDURE — 1123F ACP DISCUSS/DSCN MKR DOCD: CPT | Performed by: OBSTETRICS & GYNECOLOGY

## 2024-09-10 PROCEDURE — 99212 OFFICE O/P EST SF 10 MIN: CPT | Performed by: OBSTETRICS & GYNECOLOGY

## 2024-09-10 PROCEDURE — 1157F ADVNC CARE PLAN IN RCRD: CPT | Performed by: OBSTETRICS & GYNECOLOGY

## 2024-09-10 NOTE — PROGRESS NOTES
Consent:  Verbal consent was requested and obtained from patient on this date for a telehealth visit.    Patient ID: Monica Musa is a 69 y.o. female.  Referring Physician: No referring provider defined for this encounter.  Primary Care Provider: Guerrero Tavarez DO  Patient ID: Monica Musa is a 69 y.o. female.  Primary Care Provider: Guerrero Tavarez DO    Subjective    HPI: 66 yo female referred by Dr. Lal for stage 1b grade 1 endometrial cancer.     Cancer History:   - 7/8/2022 TLH, BSO, SLND combined case with Dr. Lal. 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 There was no lymphovascular invasion. There was a focus of MELF pattern invasion. Tumor board recommendations: Surveillance. MMR testing on prior endometrial biopsy specimen was normal.       Past Medical History:   - Bradycardia  - Diabetes  - HLD  - HTN    Past Surgical History:   - None other than hysterectomy    OBGYN History: menopausal, no prior abnormal pap smears    Family History: No history of ovarian/fallopian tube, endometrial, breast, pancreas, or GI cancers.     Social History: Denies smoking, alcohol, or illicit drug use.     Interval update:  Patient recently presented to the hospital for SBO and was found to have concern carcinomatosis as the etiology.  Left pelvic nodule and small ascites noted.  She improved with conservative measures and was discharged home.  IOR-biopsy was requested but was deemed not feasible given the location.  She has been feeling OK at home, although can only tolerate liquids.  Has been drinking protein shakes, drinking broth, etc.  Passing gas and having bowel movements.      A complete review of systems was performed and all systems were normal except what is noted in the interval history.        Objective    There were no vitals taken for this visit.  Phone call visit       Performance Status:  Symptomatic; fully  ambulatory    Assessment/Plan   Patient is a 69 year old female with with stage 1B grade 1 endometrial cancer s/p TLH, BSO, SLND on 7/8/22 now with SBO secondary to suspected carcinomatosis      Assessment/Plan    Oncology History Overview Note   - 7/8/2022 TLH, BSO, SLND combined case with Dr. Lal. 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 There was no lymphovascular invasion. There was a focus of MELF pattern invasion. Tumor board recommendations: Surveillance. MMR testing on prior endometrial biopsy specimen was normal.   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  - CT 8/26/24: Small volume ascites. Left pelvic nodule with some nodularity  along the left paracolic gutter worrisome for peritoneal carcinomatosis.  Admitted with bowel obstruction.  No lesion large enough for biopsy     Endometrial carcinoma (Multi)   5/22/2023 Initial Diagnosis    Endometrial carcinoma (Multi)          -We discussed that biopsy is necessary to guide treatment given that imaging is suspicious for recurrence but cannot rule out a second primary, particularly given disease pattern  -Given IR biopsy not possible we recommended proceeding with diagnostic laparoscopy which she agrees to proceed with  -Plan laparoscopy, lysis of adhesions, biopsy, any indicated procedures.  Will eval cause of SBO at time of surgery.    Carlin Harmon MD  D/w Dr. Greene    I saw and evaluated the patient. I personally obtained the key and critical portions of the history and physical exam or was physically present for key and critical portions performed by the resident/fellow. I reviewed the resident/fellow's documentation and discussed the patient with the resident/fellow. I agree with the resident/fellow's medical decision making as documented in the note.

## 2024-09-11 DIAGNOSIS — I10 PRIMARY HYPERTENSION: ICD-10-CM

## 2024-09-11 RX ORDER — BENAZEPRIL HYDROCHLORIDE 5 MG/1
5 TABLET ORAL DAILY
Qty: 90 TABLET | Refills: 1 | Status: SHIPPED | OUTPATIENT
Start: 2024-09-11

## 2024-09-12 NOTE — HOSPITAL COURSE
[ ] consent  [x] add to list  [x] preop meds    68 yo female with with stage 1B grade 1 endometrial cancer s/p TLH, BSO, SLND (7/8/22), and new SBO secondary to suspected carcinomatosis, presenting for diagnostic laparoscopy, SAHRA, biopsy, any other indicated procedures to evaluate for suspected recurrence.    Labs 8/30: Hb 12.9, Plt 340, Cr 0.48     --    Imaging - CT C/A/P 8/26:  IMPRESSION:  1.  Findings of small bowel obstruction with transition point  involving a loop of pelvic ileum.  2. Small volume ascites. Left pelvic nodule with some nodularity  along the left paracolic gutter worrisome for peritoneal  carcinomatosis.  3. Indeterminate 14 mm right adrenal nodule, most likely adenoma.    Cancer History:   - 7/8/2022 TLH, BSO, SLND combined case with Dr. Lal. 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 There was no lymphovascular invasion. There was a focus of MELF pattern invasion. Tumor board recommendations: Surveillance. MMR testing on prior endometrial biopsy specimen was normal.   - 8/26/24: Admitted for SBO improved with conservative measures however imaging c/f recurrence (see above)    [facility] Tumor Marker(s):  Lab Results   Component Value Date     42.9 (H) 06/09/2022        Past Medical History:   - Bradycardia  - Diabetes  - HLD  - HTN     Past Surgical History:   - None other than hysterectomy     OBGYN History: menopausal, no prior abnormal pap smears     Family History: No history of ovarian/fallopian tube, endometrial, breast, pancreas, or GI cancers.      Social History: Denies smoking, alcohol, or illicit drug use.

## 2024-09-16 ENCOUNTER — APPOINTMENT (OUTPATIENT)
Dept: GYNECOLOGIC ONCOLOGY | Facility: CLINIC | Age: 69
End: 2024-09-16
Payer: MEDICARE

## 2024-09-16 DIAGNOSIS — C54.1 ENDOMETRIAL CARCINOMA (MULTI): Primary | ICD-10-CM

## 2024-09-16 NOTE — PROGRESS NOTES
Patient ID: Monica Musa is a 69 y.o. female.  Referring Physician: No referring provider defined for this encounter.  Primary Care Provider: Guerrero Tavarez DO    Subjective    HPI      Objective    BSA: There is no height or weight on file to calculate BSA.  There were no vitals taken for this visit.     Physical Exam    Performance Status:  {ECOG performance status:22913}    Assessment/Plan     Oncology History Overview Note   - 7/8/2022 TLH, BSO, SLND combined case with Dr. Lal. 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 There was no lymphovascular invasion. There was a focus of MELF pattern invasion. Tumor board recommendations: Surveillance. MMR testing on prior endometrial biopsy specimen was normal.   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  - CT 8/26/24: Small volume ascites. Left pelvic nodule with some nodularity  along the left paracolic gutter worrisome for peritoneal carcinomatosis.  Admitted with bowel obstruction.  No lesion large enough for biopsy     Endometrial carcinoma (Multi)   5/22/2023 Initial Diagnosis    Endometrial carcinoma (Multi)          {Assess/PlanSmartLinks:83021}    Treatment Plans       No treatment plans exist

## 2024-09-18 ENCOUNTER — HOSPITAL ENCOUNTER (OUTPATIENT)
Facility: HOSPITAL | Age: 69
Setting detail: OUTPATIENT SURGERY
Discharge: HOME | End: 2024-09-18
Attending: OBSTETRICS & GYNECOLOGY | Admitting: OBSTETRICS & GYNECOLOGY
Payer: MEDICARE

## 2024-09-18 ENCOUNTER — ANESTHESIA (OUTPATIENT)
Dept: OPERATING ROOM | Facility: HOSPITAL | Age: 69
End: 2024-09-18
Payer: MEDICARE

## 2024-09-18 ENCOUNTER — ANESTHESIA EVENT (OUTPATIENT)
Dept: OPERATING ROOM | Facility: HOSPITAL | Age: 69
End: 2024-09-18
Payer: MEDICARE

## 2024-09-18 VITALS
TEMPERATURE: 98.1 F | BODY MASS INDEX: 29.75 KG/M2 | SYSTOLIC BLOOD PRESSURE: 127 MMHG | RESPIRATION RATE: 16 BRPM | OXYGEN SATURATION: 94 % | HEART RATE: 68 BPM | WEIGHT: 184.3 LBS | DIASTOLIC BLOOD PRESSURE: 60 MMHG

## 2024-09-18 DIAGNOSIS — C54.1 ENDOMETRIAL CARCINOMA (MULTI): ICD-10-CM

## 2024-09-18 DIAGNOSIS — G89.18 POSTOPERATIVE PAIN: Primary | ICD-10-CM

## 2024-09-18 LAB
ABO GROUP (TYPE) IN BLOOD: NORMAL
ANTIBODY SCREEN: NORMAL
GLUCOSE BLD MANUAL STRIP-MCNC: 108 MG/DL (ref 74–99)
GLUCOSE BLD MANUAL STRIP-MCNC: 142 MG/DL (ref 74–99)
RH FACTOR (ANTIGEN D): NORMAL

## 2024-09-18 PROCEDURE — 3600000003 HC OR TIME - INITIAL BASE CHARGE - PROCEDURE LEVEL THREE: Performed by: OBSTETRICS & GYNECOLOGY

## 2024-09-18 PROCEDURE — 2500000004 HC RX 250 GENERAL PHARMACY W/ HCPCS (ALT 636 FOR OP/ED)

## 2024-09-18 PROCEDURE — 3700000002 HC GENERAL ANESTHESIA TIME - EACH INCREMENTAL 1 MINUTE: Performed by: OBSTETRICS & GYNECOLOGY

## 2024-09-18 PROCEDURE — 2500000001 HC RX 250 WO HCPCS SELF ADMINISTERED DRUGS (ALT 637 FOR MEDICARE OP): Performed by: ANESTHESIOLOGY

## 2024-09-18 PROCEDURE — 36415 COLL VENOUS BLD VENIPUNCTURE: CPT

## 2024-09-18 PROCEDURE — 82947 ASSAY GLUCOSE BLOOD QUANT: CPT

## 2024-09-18 PROCEDURE — A38570 PR LAP,LYMPH NODE BX

## 2024-09-18 PROCEDURE — 2500000005 HC RX 250 GENERAL PHARMACY W/O HCPCS

## 2024-09-18 PROCEDURE — 3600000008 HC OR TIME - EACH INCREMENTAL 1 MINUTE - PROCEDURE LEVEL THREE: Performed by: OBSTETRICS & GYNECOLOGY

## 2024-09-18 PROCEDURE — 2500000001 HC RX 250 WO HCPCS SELF ADMINISTERED DRUGS (ALT 637 FOR MEDICARE OP)

## 2024-09-18 PROCEDURE — 96372 THER/PROPH/DIAG INJ SC/IM: CPT

## 2024-09-18 PROCEDURE — 2720000007 HC OR 272 NO HCPCS: Performed by: OBSTETRICS & GYNECOLOGY

## 2024-09-18 PROCEDURE — A38570 PR LAP,LYMPH NODE BX: Performed by: ANESTHESIOLOGY

## 2024-09-18 PROCEDURE — 2500000005 HC RX 250 GENERAL PHARMACY W/O HCPCS: Performed by: OBSTETRICS & GYNECOLOGY

## 2024-09-18 PROCEDURE — 2500000004 HC RX 250 GENERAL PHARMACY W/ HCPCS (ALT 636 FOR OP/ED): Performed by: ANESTHESIOLOGY

## 2024-09-18 PROCEDURE — 86901 BLOOD TYPING SEROLOGIC RH(D): CPT

## 2024-09-18 PROCEDURE — 3700000001 HC GENERAL ANESTHESIA TIME - INITIAL BASE CHARGE: Performed by: OBSTETRICS & GYNECOLOGY

## 2024-09-18 PROCEDURE — 7100000010 HC PHASE TWO TIME - EACH INCREMENTAL 1 MINUTE: Performed by: OBSTETRICS & GYNECOLOGY

## 2024-09-18 PROCEDURE — 99223 1ST HOSP IP/OBS HIGH 75: CPT

## 2024-09-18 PROCEDURE — 2500000002 HC RX 250 W HCPCS SELF ADMINISTERED DRUGS (ALT 637 FOR MEDICARE OP, ALT 636 FOR OP/ED)

## 2024-09-18 PROCEDURE — 7100000002 HC RECOVERY ROOM TIME - EACH INCREMENTAL 1 MINUTE: Performed by: OBSTETRICS & GYNECOLOGY

## 2024-09-18 PROCEDURE — 7100000009 HC PHASE TWO TIME - INITIAL BASE CHARGE: Performed by: OBSTETRICS & GYNECOLOGY

## 2024-09-18 PROCEDURE — 88305 TISSUE EXAM BY PATHOLOGIST: CPT | Mod: TC,SUR | Performed by: STUDENT IN AN ORGANIZED HEALTH CARE EDUCATION/TRAINING PROGRAM

## 2024-09-18 PROCEDURE — 7100000001 HC RECOVERY ROOM TIME - INITIAL BASE CHARGE: Performed by: OBSTETRICS & GYNECOLOGY

## 2024-09-18 RX ORDER — PROPOFOL 10 MG/ML
INJECTION, EMULSION INTRAVENOUS AS NEEDED
Status: DISCONTINUED | OUTPATIENT
Start: 2024-09-18 | End: 2024-09-18

## 2024-09-18 RX ORDER — APREPITANT 40 MG/1
CAPSULE ORAL AS NEEDED
Status: DISCONTINUED | OUTPATIENT
Start: 2024-09-18 | End: 2024-09-18

## 2024-09-18 RX ORDER — ROPIVACAINE HYDROCHLORIDE 5 MG/ML
INJECTION, SOLUTION EPIDURAL; INFILTRATION; PERINEURAL AS NEEDED
Status: DISCONTINUED | OUTPATIENT
Start: 2024-09-18 | End: 2024-09-18

## 2024-09-18 RX ORDER — ACETAMINOPHEN 325 MG/1
975 TABLET ORAL ONCE
Status: COMPLETED | OUTPATIENT
Start: 2024-09-18 | End: 2024-09-18

## 2024-09-18 RX ORDER — ACETAMINOPHEN 325 MG/1
650 TABLET ORAL EVERY 6 HOURS PRN
Qty: 20 TABLET | Refills: 0 | Status: SHIPPED | OUTPATIENT
Start: 2024-09-18 | End: 2024-09-28

## 2024-09-18 RX ORDER — CELECOXIB 200 MG/1
400 CAPSULE ORAL ONCE
Status: COMPLETED | OUTPATIENT
Start: 2024-09-18 | End: 2024-09-18

## 2024-09-18 RX ORDER — FENTANYL CITRATE 50 UG/ML
INJECTION, SOLUTION INTRAMUSCULAR; INTRAVENOUS AS NEEDED
Status: DISCONTINUED | OUTPATIENT
Start: 2024-09-18 | End: 2024-09-18

## 2024-09-18 RX ORDER — SODIUM CHLORIDE 0.9 G/100ML
IRRIGANT IRRIGATION AS NEEDED
Status: DISCONTINUED | OUTPATIENT
Start: 2024-09-18 | End: 2024-09-18 | Stop reason: HOSPADM

## 2024-09-18 RX ORDER — IBUPROFEN 600 MG/1
600 TABLET ORAL EVERY 6 HOURS PRN
Qty: 20 TABLET | Refills: 0 | Status: SHIPPED | OUTPATIENT
Start: 2024-09-18 | End: 2024-09-28

## 2024-09-18 RX ORDER — LIDOCAINE HYDROCHLORIDE 20 MG/ML
INJECTION, SOLUTION INFILTRATION; PERINEURAL AS NEEDED
Status: DISCONTINUED | OUTPATIENT
Start: 2024-09-18 | End: 2024-09-18

## 2024-09-18 RX ORDER — DROPERIDOL 2.5 MG/ML
0.62 INJECTION, SOLUTION INTRAMUSCULAR; INTRAVENOUS ONCE AS NEEDED
Status: DISCONTINUED | OUTPATIENT
Start: 2024-09-18 | End: 2024-09-18 | Stop reason: HOSPADM

## 2024-09-18 RX ORDER — ESMOLOL HYDROCHLORIDE 10 MG/ML
INJECTION INTRAVENOUS AS NEEDED
Status: DISCONTINUED | OUTPATIENT
Start: 2024-09-18 | End: 2024-09-18

## 2024-09-18 RX ORDER — HYDROMORPHONE HYDROCHLORIDE 1 MG/ML
0.5 INJECTION, SOLUTION INTRAMUSCULAR; INTRAVENOUS; SUBCUTANEOUS EVERY 5 MIN PRN
Status: DISCONTINUED | OUTPATIENT
Start: 2024-09-18 | End: 2024-09-18 | Stop reason: HOSPADM

## 2024-09-18 RX ORDER — SODIUM CHLORIDE, SODIUM LACTATE, POTASSIUM CHLORIDE, CALCIUM CHLORIDE 600; 310; 30; 20 MG/100ML; MG/100ML; MG/100ML; MG/100ML
100 INJECTION, SOLUTION INTRAVENOUS CONTINUOUS
Status: DISCONTINUED | OUTPATIENT
Start: 2024-09-18 | End: 2024-09-18 | Stop reason: HOSPADM

## 2024-09-18 RX ORDER — LABETALOL HYDROCHLORIDE 5 MG/ML
5 INJECTION, SOLUTION INTRAVENOUS ONCE AS NEEDED
Status: DISCONTINUED | OUTPATIENT
Start: 2024-09-18 | End: 2024-09-18 | Stop reason: HOSPADM

## 2024-09-18 RX ORDER — MEPERIDINE HYDROCHLORIDE 25 MG/ML
12.5 INJECTION INTRAMUSCULAR; INTRAVENOUS; SUBCUTANEOUS EVERY 10 MIN PRN
Status: DISCONTINUED | OUTPATIENT
Start: 2024-09-18 | End: 2024-09-18 | Stop reason: HOSPADM

## 2024-09-18 RX ORDER — METHOCARBAMOL 100 MG/ML
1000 INJECTION, SOLUTION INTRAMUSCULAR; INTRAVENOUS ONCE
Status: COMPLETED | OUTPATIENT
Start: 2024-09-18 | End: 2024-09-18

## 2024-09-18 RX ORDER — LIDOCAINE HYDROCHLORIDE 10 MG/ML
0.1 INJECTION, SOLUTION EPIDURAL; INFILTRATION; INTRACAUDAL; PERINEURAL ONCE
Status: DISCONTINUED | OUTPATIENT
Start: 2024-09-18 | End: 2024-09-18 | Stop reason: HOSPADM

## 2024-09-18 RX ORDER — ROCURONIUM BROMIDE 10 MG/ML
INJECTION, SOLUTION INTRAVENOUS AS NEEDED
Status: DISCONTINUED | OUTPATIENT
Start: 2024-09-18 | End: 2024-09-18

## 2024-09-18 RX ORDER — GABAPENTIN 300 MG/1
300 CAPSULE ORAL ONCE
Status: COMPLETED | OUTPATIENT
Start: 2024-09-18 | End: 2024-09-18

## 2024-09-18 RX ORDER — OXYCODONE HYDROCHLORIDE 5 MG/1
5 TABLET ORAL EVERY 4 HOURS PRN
Status: DISCONTINUED | OUTPATIENT
Start: 2024-09-18 | End: 2024-09-18 | Stop reason: HOSPADM

## 2024-09-18 RX ORDER — SODIUM CHLORIDE, SODIUM LACTATE, POTASSIUM CHLORIDE, CALCIUM CHLORIDE 600; 310; 30; 20 MG/100ML; MG/100ML; MG/100ML; MG/100ML
20 INJECTION, SOLUTION INTRAVENOUS CONTINUOUS
Status: DISCONTINUED | OUTPATIENT
Start: 2024-09-18 | End: 2024-09-18 | Stop reason: HOSPADM

## 2024-09-18 RX ORDER — DROPERIDOL 2.5 MG/ML
INJECTION, SOLUTION INTRAMUSCULAR; INTRAVENOUS AS NEEDED
Status: DISCONTINUED | OUTPATIENT
Start: 2024-09-18 | End: 2024-09-18

## 2024-09-18 RX ORDER — ONDANSETRON HYDROCHLORIDE 2 MG/ML
INJECTION, SOLUTION INTRAVENOUS AS NEEDED
Status: DISCONTINUED | OUTPATIENT
Start: 2024-09-18 | End: 2024-09-18

## 2024-09-18 RX ORDER — TRAMADOL HYDROCHLORIDE 50 MG/1
50 TABLET ORAL EVERY 6 HOURS PRN
Qty: 12 TABLET | Refills: 0 | Status: SHIPPED | OUTPATIENT
Start: 2024-09-18

## 2024-09-18 RX ORDER — HYDROMORPHONE HYDROCHLORIDE 1 MG/ML
0.2 INJECTION, SOLUTION INTRAMUSCULAR; INTRAVENOUS; SUBCUTANEOUS EVERY 5 MIN PRN
Status: DISCONTINUED | OUTPATIENT
Start: 2024-09-18 | End: 2024-09-18 | Stop reason: HOSPADM

## 2024-09-18 RX ORDER — MIDAZOLAM HYDROCHLORIDE 1 MG/ML
INJECTION INTRAMUSCULAR; INTRAVENOUS AS NEEDED
Status: DISCONTINUED | OUTPATIENT
Start: 2024-09-18 | End: 2024-09-18

## 2024-09-18 RX ORDER — ALBUTEROL SULFATE 0.83 MG/ML
2.5 SOLUTION RESPIRATORY (INHALATION) ONCE AS NEEDED
Status: DISCONTINUED | OUTPATIENT
Start: 2024-09-18 | End: 2024-09-18 | Stop reason: HOSPADM

## 2024-09-18 RX ORDER — ESMOLOL HYDROCHLORIDE 10 MG/ML
INJECTION, SOLUTION INTRAVENOUS CONTINUOUS PRN
Status: DISCONTINUED | OUTPATIENT
Start: 2024-09-18 | End: 2024-09-18

## 2024-09-18 RX ORDER — HEPARIN SODIUM 5000 [USP'U]/ML
5000 INJECTION, SOLUTION INTRAVENOUS; SUBCUTANEOUS ONCE
Status: DISCONTINUED | OUTPATIENT
Start: 2024-09-18 | End: 2024-09-18 | Stop reason: HOSPADM

## 2024-09-18 RX ORDER — MIDAZOLAM HYDROCHLORIDE 1 MG/ML
1 INJECTION INTRAMUSCULAR; INTRAVENOUS ONCE AS NEEDED
Status: DISCONTINUED | OUTPATIENT
Start: 2024-09-18 | End: 2024-09-18 | Stop reason: HOSPADM

## 2024-09-18 RX ORDER — PHENYLEPHRINE HCL IN 0.9% NACL 0.4MG/10ML
SYRINGE (ML) INTRAVENOUS AS NEEDED
Status: DISCONTINUED | OUTPATIENT
Start: 2024-09-18 | End: 2024-09-18

## 2024-09-18 RX ORDER — LABETALOL HYDROCHLORIDE 5 MG/ML
INJECTION, SOLUTION INTRAVENOUS AS NEEDED
Status: DISCONTINUED | OUTPATIENT
Start: 2024-09-18 | End: 2024-09-18

## 2024-09-18 ASSESSMENT — PAIN SCALES - GENERAL
PAINLEVEL_OUTOF10: 5 - MODERATE PAIN
PAINLEVEL_OUTOF10: 4
PAIN_LEVEL: 2
PAINLEVEL_OUTOF10: 0 - NO PAIN
PAINLEVEL_OUTOF10: 0 - NO PAIN
PAINLEVEL_OUTOF10: 3
PAINLEVEL_OUTOF10: 4
PAINLEVEL_OUTOF10: 3
PAINLEVEL_OUTOF10: 3
PAINLEVEL_OUTOF10: 2
PAINLEVEL_OUTOF10: 0 - NO PAIN
PAINLEVEL_OUTOF10: 5 - MODERATE PAIN
PAINLEVEL_OUTOF10: 0 - NO PAIN
PAINLEVEL_OUTOF10: 3
PAINLEVEL_OUTOF10: 3
PAINLEVEL_OUTOF10: 5 - MODERATE PAIN

## 2024-09-18 ASSESSMENT — PAIN - FUNCTIONAL ASSESSMENT
PAIN_FUNCTIONAL_ASSESSMENT: 0-10

## 2024-09-18 ASSESSMENT — PAIN DESCRIPTION - LOCATION
LOCATION: ABDOMEN

## 2024-09-18 ASSESSMENT — PAIN DESCRIPTION - ORIENTATION
ORIENTATION: MID

## 2024-09-18 NOTE — ANESTHESIA PROCEDURE NOTES
Peripheral Block    Patient location during procedure: pre-op  Start time: 9/18/2024 7:40 AM  End time: 9/18/2024 7:55 AM  Reason for block: at surgeon's request and post-op pain management  Staffing  Performed: resident and attending   Authorized by: Brianna Levin MD    Performed by: David Ellis MD  Preanesthetic Checklist  Completed: patient identified, IV checked, site marked, risks and benefits discussed, surgical consent, monitors and equipment checked, pre-op evaluation and timeout performed   Timeout performed at: 9/18/2024 7:40 AM  Peripheral Block  Patient position: laying flat  Prep: ChloraPrep  Patient monitoring: heart rate and continuous pulse ox  Block type: QL  Laterality: B/L  Injection technique: single-shot  Guidance: ultrasound guided  Local infiltration: ropivacaine  Infiltration strength: 0.5 %  Dose: 30 mL  Needle  Needle type: Tuohy   Needle gauge: 20 G  Needle length: 8 cm  Needle localization: ultrasound guidance     image stored in chart  Catheter size: 5 Fr  Assessment  Injection assessment: negative aspiration for heme, no paresthesia on injection, incremental injection and local visualized surrounding nerve on ultrasound  Heart rate change: no  Slow fractionated injection: yes  Additional Notes  QL single shot. informed consent obtained. risks and benefits discussed. ASA monitors placed, timeout performed. Pt positioned, prepped with chlorhexidine, draped with sterile towels. Ultrasound guidance used with visualization of the needle throughout duration of the procedure. Aspiration was negative. A total of 30 cc 0.5% ropivacaine, 50mcg epinephrine, and 4mg decadron injected between both sides. Patient tolerated procedure well.     Timeout by John

## 2024-09-18 NOTE — ANESTHESIA PREPROCEDURE EVALUATION
Patient: Monica Musa    Procedure Information       Anesthesia Start Date/Time: 09/18/24 0809    Procedure: Diagnostic laparoscopy with peritoneal biopsies    Location: [facility] OR 02 / Virtual [facility] OR    Surgeons: Connie Greene MD            Relevant Problems   Cardiac   (+) Hyperlipidemia   (+) Hypertension      Neuro   (+) Carpal tunnel syndrome, right upper limb      Endocrine   (+) Class 2 severe obesity due to excess calories with serious comorbidity and body mass index (BMI) of 35.0 to 35.9 in adult (Multi)   (+) Type 2 diabetes mellitus without complication (Multi)      Hematology   (+) Anemia      Musculoskeletal   (+) Carpal tunnel syndrome, right upper limb      GYN   (+) Endometrial carcinoma (Multi)       Clinical information reviewed:   Tobacco  Allergies  Meds   Med Hx  Surg Hx  OB Status  Fam Hx  Soc   Hx        NPO Detail:  NPO/Void Status  Carbohydrate Drink Given Prior to Surgery? : N  Date of Last Liquid: 09/18/24  Time of Last Liquid: 0000  Date of Last Solid: 09/18/24  Time of Last Solid: 0000  Last Intake Type: Food  Time of Last Void: 0500         Physical Exam    Airway  Mallampati: II     Cardiovascular - normal exam  Rhythm: regular  Rate: normal     Dental    Pulmonary - normal exam     Abdominal - normal exam         Anesthesia Plan    History of general anesthesia?: unknown/emergency  History of complications of general anesthesia?: yes    ASA 3     general     Anesthetic plan and risks discussed with patient.

## 2024-09-18 NOTE — H&P
History Of Present Illness  66 yo female with with stage 1B grade 1 endometrial cancer s/p TLH, BSO, SLND (7/8/22), and new SBO secondary to suspected carcinomatosis, presenting for diagnostic laparoscopy, SAHRA, biopsy, any other indicated procedures to evaluate for suspected recurrence.    Labs 8/30: Hb 12.9, Plt 340, Cr 0.48     --    Imaging - CT C/A/P 8/26:  IMPRESSION:  1.  Findings of small bowel obstruction with transition point  involving a loop of pelvic ileum.  2. Small volume ascites. Left pelvic nodule with some nodularity  along the left paracolic gutter worrisome for peritoneal  carcinomatosis.  3. Indeterminate 14 mm right adrenal nodule, most likely adenoma.    Cancer History:   - 7/8/2022 TLH, BSO, SLND combined case with Dr. Lal. 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 There was no lymphovascular invasion. There was a focus of MELF pattern invasion. Tumor board recommendations: Surveillance. MMR testing on prior endometrial biopsy specimen was normal.   - 8/26/24: Admitted for SBO improved with conservative measures however imaging c/f recurrence (see above)    GYO Tumor Marker(s):  Lab Results   Component Value Date     42.9 (H) 06/09/2022        Past Medical History:   - Bradycardia  - Diabetes  - HLD  - HTN     Past Surgical History:   - None other than hysterectomy     OBGYN History: menopausal, no prior abnormal pap smears     Family History: No history of ovarian/fallopian tube, endometrial, breast, pancreas, or GI cancers.      Social History: Denies smoking, alcohol, or illicit drug use.     Review of Systems   All other systems reviewed and are negative.       Physical Exam  Constitutional: No visible distress, alert and cooperative  Respiratory/Thorax: Normal respiratory effort on RA  Cardiovascular: Reg rate  Gastrointestinal: soft, nondistended  Neurological: A&Ox3  Psychological: Appropriate mood and  behavior       Last Recorded Vitals  There were no vitals taken for this visit.       Assessment/Plan   Assessment & Plan  Endometrial carcinoma (Multi)      68 yo female with with stage 1B grade 1 endometrial cancer s/p TLH, BSO, SLND (7/8/22), and new SBO secondary to suspected carcinomatosis, presenting for diagnostic laparoscopy, SAHRA, biopsy, any other indicated procedures to evaluate for suspected recurrence.    - Proceed with above procedure    Pt seen and d/w Dr. Ramona Lyn MD PGY-3  GYN ONC, pager [phone]

## 2024-09-18 NOTE — ANESTHESIA POSTPROCEDURE EVALUATION
Patient: Monica Musa    Procedure Summary       Date: 09/18/24 Room / Location: [facility] OR 02 / Virtual [facility]    Anesthesia Start: 0809 Anesthesia Stop: 0949    Procedure: Diagnostic laparoscopy with peritoneal biopsies Diagnosis:       Endometrial carcinoma (Multi)      (Endometrial carcinoma (Multi) [C54.1])    Surgeons: Connie Greene MD Responsible Provider: Aris Javier MD    Anesthesia Type: general ASA Status: Not recorded            Anesthesia Type: general    Vitals Value Taken Time   /63 09/18/24 0945   Temp 36.7 °C (98.1 °F) 09/18/24 0940   Pulse 64 09/18/24 0948   Resp 15 09/18/24 0948   SpO2 96 % 09/18/24 0948   Vitals shown include unfiled device data.    Anesthesia Post Evaluation    Patient location during evaluation: PACU  Patient participation: complete - patient participated  Level of consciousness: awake and alert  Pain score: 2  Pain management: adequate  Airway patency: patent  Cardiovascular status: acceptable  Respiratory status: acceptable  Hydration status: acceptable  Postoperative Nausea and Vomiting: none    There were no known notable events for this encounter.

## 2024-09-18 NOTE — OP NOTE
Diagnostic laparoscopy with peritoneal biopsies Operative Note     Date: 2024  OR Location: [facility] OR    Name: Monica Musa, : 1955, Age: 69 y.o., MRN: [id number], Sex: female    Diagnosis  Pre-op Diagnosis      * Endometrial carcinoma (Multi) [C54.1] Post-op Diagnosis     * Endometrial carcinoma (Multi) [C54.1]     Procedures  Diagnostic laparoscopy with peritoneal biopsies  [id number] - MD LAP,LYMPH NODE BX      Surgeons      * Connie Greene - Primary    Resident/Fellow/Other Assistant:  Surgeons and Role:     * Sahri Myers MD - Fellow    Procedure Summary  Anesthesia: General  ASA: III  Anesthesia Staff: Anesthesiologist: Aris Javier MD  CRNA: ZAYDA Gabriel-CRNA  Estimated Blood Loss: 5mL  Intra-op Medications:   Administrations occurring from 0745 to 1045 on 24:   Medication Name Total Dose   sodium chloride 0.9 % irrigation solution 1,700 mL   HYDROmorphone (Dilaudid) injection 0.2 mg 0.2 mg   methocarbamol (Robaxin) injection 1,000 mg 1,000 mg              Anesthesia Record               Intraprocedure I/O Totals          Intake    LR bolus 1000.00 mL    Total Intake 1000 mL       Output    NG/OG Tube Output 250 mL    Total Output 250 mL       Net    Net Volume 750 mL          Specimen:   ID Type Source Tests Collected by Time   1 : PERITONEAL NODULES Tissue PERITONEUM EXCISION SURGICAL PATHOLOGY EXAM Connie Greene MD 2024 0903        Staff:   Circulator: Sosa Deleon Person: Tate  Circulator: Kathie         Drains and/or Catheters:   [REMOVED] Urethral Catheter Non-latex 16 Fr. (Removed)       Findings: Peritoneal carcinomatosis with scattered white lesions on the small bowel loops, peritoneum of the anterior abdominal wall and diaphragm, Falciform ligament, and liver surface. [place] of lesions along thickened peritoneum in lower midline of anterior abdominal wall peritoneum. Diffusely dilated loops of small bowel throughout abdomen and pelvis with loop  of small bowel adhesed to lower anterior abdominal wall.       Indications: Monica Musa is an 69 y.o. female who is having surgery for Endometrial carcinoma (Multi) [C54.1].      Procedure Details:   After informed consent was obtained, the patient was taken to the operating room. Heparin was administered and SCDs were in place and turned on. General anesthesia was administered. She was then positioned in the dorsal lithotomy position with her arms carefully tucked. She was prepped and draped in the usual sterile fashion. A Aldridge catheter was placed. A small amount of bleeding was appreciated from the vagina, so a speculum was placed and the vagina was inspected with atropic vaginal mucosa and no evidence of any lesions.    A vertical incision was made supraumbilically after injecting with bupivacaine and carried to the fascia with hemostats and S retractors. The fascia was elevated with 2 Renae clamps and then incised with a scalpel. The fascia was then tagged with 0-vicryl suture. The peritoneum was identified, elevated with Renae clamps and entered sharply. The Garza port was placed and intraperitoneal entry was confirmed under direct visualization. Pneumoperitoneum was established. A 5 mm port was then placed in the right and the left lower quadrants.    Initial survey of the abdomen found it to be devoid of trauma from entry. Dilated loops of bowel filled the abdomen and pelvis. Peritoneal carcinomatosis was appreciated with lesions as noted above. The patient was placed in Trendelenburg and attention turned to the pelvis. The dilated loops of small bowel obstructed our view of the bladder and vaginal cuff. One loop of bowel was noted to be adhesed to the lower anterior abdominal wall with either tumor nodules or inflammatory tissue changes along its surface.    Biopsies were taken from the confluence of nodules on the lower abdominal peritoneum and the right lateral abdominal wall peritoneum. An  additional biopsy was taken from the Falciform ligament.    Good hemostasis was noted. All instruments and trocars were removed under direct visualization. The umbilical port was closed using an additional figure of eight suture with 0-vicryl followed by the stitches previously placed. All port sites were closed with 4-0 Monocryl. Steristrips were placed.    The acorn manipulator was removed and hemostasis was noted from the cervix. Aldridge catheter was removed. All counts were correct, the patient tolerated the procedure well. Dr. Greene was present for the entire procedure. The patient was taken to PACU in stable condition.     Complications:  None; patient tolerated the procedure well.    Disposition: PACU - hemodynamically stable.  Condition: stable       Attending Attestation:     Connie Greene  Phone Number: [phone]

## 2024-09-18 NOTE — CONSULTS
Monica Musa is a 69 y.o. year old female patient who presents for Diagnostic laparoscopy, lysis of adhesions, biopsy, possible laparotomy  with . Acute Pain consulted for block for postoperative pain control.     Anticipated Postop Pain Issues -   Palliative: typically relieved with IV analgesics and regional local anesthetics  Provocative: typically with movement  Quality: typically burning and aching  Radiation: typically none  Severity: typically severe 8-10/10  Timing: typically constant    Past Medical History:   Diagnosis Date    Anemia     Body mass index (BMI) 36.0-36.9, adult 09/02/2021    BMI 36.0-36.9,adult    Body mass index (BMI) 37.0-37.9, adult 02/05/2021    BMI 37.0-37.9, adult    Cancer (Multi) 2021    Diabetes mellitus (Multi)     DVT (deep venous thrombosis) (Multi) 2023    LEFT LEG    Encounter for general adult medical examination without abnormal findings     Annual physical exam    Fracture of wrist 2019    History of ovarian cancer     Hyperlipidemia     Hypertension     Morbid (severe) obesity due to excess calories (Multi) 08/26/2021    Class 2 severe obesity due to excess calories with serious comorbidity and body mass index (BMI) of 37.0 to 37.9 in adult    Morbid (severe) obesity due to excess calories (Multi) 06/09/2022    Class 2 severe obesity due to excess calories with serious comorbidity and body mass index (BMI) of 36.0 to 36.9 in adult    Personal history of other diseases of the respiratory system 01/03/2020    History of acute bronchitis    Personal history of other drug therapy 10/11/2019    History of influenza vaccination        Past Surgical History:   Procedure Laterality Date    OTHER SURGICAL HISTORY Right 06/09/2022    Arm surgery    OTHER SURGICAL HISTORY  07/25/2022    Salpingo-oophorectomy bilateral    OTHER SURGICAL HISTORY  07/25/2022    Laparoscopic hysterectomy    OTHER SURGICAL HISTORY  07/25/2022    Cystoscopy    OTHER SURGICAL HISTORY   07/25/2022    Lymph node surgery        Family History   Problem Relation Name Age of Onset    Diabetes Mother Concha Maher         Social History     Socioeconomic History    Marital status:      Spouse name: Not on file    Number of children: Not on file    Years of education: Not on file    Highest education level: Not on file   Occupational History    Not on file   Tobacco Use    Smoking status: Former     Current packs/day: 0.50     Average packs/day: 0.5 packs/day for 10.0 years (5.0 ttl pk-yrs)     Types: Cigarettes    Smokeless tobacco: Never   Vaping Use    Vaping status: Never Used   Substance and Sexual Activity    Alcohol use: Never    Drug use: Never    Sexual activity: Not on file   Other Topics Concern    Not on file   Social History Narrative    Not on file     Social Determinants of Health     Financial Resource Strain: Low Risk  (8/29/2024)    Overall Financial Resource Strain (CARDIA)     Difficulty of Paying Living Expenses: Not very hard   Food Insecurity: Not on file   Transportation Needs: No Transportation Needs (8/29/2024)    PRAPARE - Transportation     Lack of Transportation (Medical): No     Lack of Transportation (Non-Medical): No   Physical Activity: Not on file   Stress: Not on file   Social Connections: Not on file   Intimate Partner Violence: Not on file   Housing Stability: Low Risk  (8/29/2024)    Housing Stability Vital Sign     Unable to Pay for Housing in the Last Year: No     Number of Times Moved in the Last Year: 1     Homeless in the Last Year: No        Allergies   Allergen Reactions    Penicillins Anaphylaxis and Swelling    Bee Venom Protein (Honey Bee) Swelling         Review of Systems  Gen: No fatigue, anorexia, insomnia, fever.   Eyes: No vision loss, double vision, drainage, eye pain.   ENT: No pharyngitis, dry mouth, no hearing changes or ear discharge  Cardiac: No chest pain, palpitations, syncope, near syncope.   Pulmonary: No shortness of breath,  cough, hemoptysis.   Heme/lymph: No swollen glands, fever, bleeding.   GI: No abdominal pain, change in bowel habits, melena, hematemesis, hematochezia, nausea, vomiting, diarrhea.   : No discharge, dysuria, frequency, urgency, hematuria.  Endo: No polyuria or weight loss.   Musculoskeletal: Negative for any pain or loss of ROM/weakness  Skin: No rashes or lesions  Neuro: Normal speech, no numbness or weakness. No gait difficulties  Review of systems is otherwise negative unless stated above or in history of present illness.    Physical Exam:  Constitutional:  no distress, alert and cooperative  Eyes: clear sclera  Head/Neck: No apparent injury, trachea midline  Respiratory/Thorax: Patent airways, thorax symmetric, breathing comfortably  Cardiovascular: no pitting edema  Gastrointestinal: Nondistended  Musculoskeletal: ROM intact  Extremities: no clubbing  Neurological: alert, fernández x4  Psychological: Appropriate affect    Results for orders placed or performed during the hospital encounter of 09/18/24 (from the past 24 hour(s))   POCT GLUCOSE   Result Value Ref Range    POCT Glucose 108 (H) 74 - 99 mg/dL      Monica Musa is a 69 y.o. year old female patient who presents for Diagnostic laparoscopy, lysis of adhesions, biopsy, possible laparotomy  with . Acute Pain consulted for block for postoperative pain control.     Plan:    - Bilateral single shot QL  blocks performed preoperatively on 9/18/24  - Please be aware of local anesthetic toxic dose and absorption variability before considering lidocaine patches  - Acute pain service will follow   - Rest of pain management per primary team    Acute Pain Resident  pg [phone] ph [phone]

## 2024-09-18 NOTE — DISCHARGE INSTRUCTIONS
Gynecologic Surgery Postoperative Instructions    Call the Gynecologic Oncology office at [phone] for any problems and/or concerns.    Walk as much as possible, it is ok to use stairs carefully  Continue the coughing and deep breathing exercises that your learned in the hospital  No lifting/straining greater than 15 pounds for 2 weeks (avoid strenuous activity)  Prevent constipation by using stool softeners and staying hydrated, so that you do not strain against your stitches or have pain from constipation    Call your doctor's office right away for:  Fever above 100.4/shaking chills  Bright red vaginal bleeding or bleeding that soaks more than two sanitary pads per hour  Inability to urinate  Severe pain or bloating in your abdomen  Persistent nausea/vomiting  Redness, swelling, or drainage at your incision sites  For chest pain/shortness of breath-call 102    You will be going home with prescriptions for pain medication. If you are able to take them, alternate a dose of Acetaminophen (Tylenol) and Ibuprofen every 3 hours. (For example, 650 mg of Tylenol at 09:00am, 600 mg Ibuprofen at 12:00pm, 650 mg of Tylenol at 3:00pm, 600mg Ibuprofen at 6:00pm). You may also take the two medicines together every 6 hours if that is easier. Use any prescribed narcotic (tramadol or oxycodone) for breakthrough pain as prescribed. Use a stool softener, such as Miralax to prevent constipation from the narcotic medication.     Your incisions have brown bandages as well as white surgical tape. You may remove the brown bandages the day after your surgery, but leave the surgical tape in place. You may shower with the surgical tape in place. Allow warm, soapy water to run over your incisions, then pat to dry. Do not scrub the incisions. The surgical tape will start to peel off on its own in the a couple of days to a week.

## 2024-09-18 NOTE — ANESTHESIA PROCEDURE NOTES
Airway  Date/Time: 9/18/2024 8:22 AM  Urgency: elective    Airway not difficult    Staffing  Performed: CRNA   Authorized by: Aris Javier MD    Performed by: ZAYDA Gabriel-MAN  Patient location during procedure: OR    Indications and Patient Condition  Indications for airway management: anesthesia  Sedation level: deep  Preoxygenated: yes  Patient position: sniffing  Mask difficulty assessment: 1 - vent by mask    Final Airway Details  Final airway type: endotracheal airway      Successful airway: ETT     Successful intubation technique: direct laryngoscopy  Endotracheal tube insertion site: oral  Blade: Claudia  Blade size: #3  ETT size (mm): 6.5  Cormack-Lehane Classification: grade I - full view of glottis  Placement verified by: capnometry   Measured from: lips  ETT to lips (cm): 21  Number of attempts at approach: 1

## 2024-09-19 ENCOUNTER — TELEPHONE (OUTPATIENT)
Dept: INTERNAL MEDICINE | Facility: HOSPITAL | Age: 69
End: 2024-09-19
Payer: MEDICARE

## 2024-09-19 NOTE — TELEPHONE ENCOUNTER
Called patient at designated contact number.     POD 1 from Diagnostic laparoscopy, lysis of adhesions, biopsy .    Received Bilateral single shot QL  blocks for post-surgical pain control.    Block has fully resolved. Denies any new weakness or paresthesias (numbness/tingling) . Denies edema, erythema or induration at injection site(s).    Patient is satisfied with current pain management.    All questions answered. Patient instructed to call surgical office with any further questions or concerns.      Denia Castillo, APRN-CNP

## 2024-09-20 LAB
LAB AP ASR DISCLAIMER: NORMAL
LABORATORY COMMENT REPORT: NORMAL
PATH REPORT.COMMENTS IMP SPEC: NORMAL
PATH REPORT.FINAL DX SPEC: NORMAL
PATH REPORT.GROSS SPEC: NORMAL
PATH REPORT.RELEVANT HX SPEC: NORMAL
PATH REPORT.TOTAL CANCER: NORMAL

## 2024-09-23 DIAGNOSIS — R11.0 NAUSEA: ICD-10-CM

## 2024-09-23 DIAGNOSIS — E78.5 HYPERLIPIDEMIA, UNSPECIFIED: ICD-10-CM

## 2024-09-23 RX ORDER — ONDANSETRON 4 MG/1
4 TABLET, FILM COATED ORAL EVERY 6 HOURS PRN
Qty: 20 TABLET | Refills: 0 | Status: SHIPPED | OUTPATIENT
Start: 2024-09-23

## 2024-09-23 RX ORDER — ATORVASTATIN CALCIUM 20 MG/1
20 TABLET, FILM COATED ORAL DAILY
Qty: 90 TABLET | Refills: 0 | Status: SHIPPED | OUTPATIENT
Start: 2024-09-23

## 2024-09-24 ENCOUNTER — TELEPHONE (OUTPATIENT)
Dept: PRIMARY CARE | Facility: CLINIC | Age: 69
End: 2024-09-24

## 2024-09-24 ENCOUNTER — LAB (OUTPATIENT)
Dept: LAB | Facility: CLINIC | Age: 69
End: 2024-09-24
Payer: MEDICARE

## 2024-09-24 DIAGNOSIS — C78.5 METASTATIC ADENOCARCINOMA TO COLORECTAL REGION (MULTI): ICD-10-CM

## 2024-09-24 DIAGNOSIS — R19.5 POSITIVE COLORECTAL CANCER SCREENING USING COLOGUARD TEST: ICD-10-CM

## 2024-09-24 DIAGNOSIS — R19.5 POSITIVE COLORECTAL CANCER SCREENING USING COLOGUARD TEST: Primary | ICD-10-CM

## 2024-09-24 LAB
ALBUMIN SERPL BCP-MCNC: 3.9 G/DL (ref 3.4–5)
ALP SERPL-CCNC: 127 U/L (ref 33–136)
ALT SERPL W P-5'-P-CCNC: 21 U/L (ref 7–45)
ANION GAP SERPL CALC-SCNC: 19 MMOL/L (ref 10–20)
AST SERPL W P-5'-P-CCNC: 20 U/L (ref 9–39)
BASOPHILS # BLD AUTO: 0.03 X10*3/UL (ref 0–0.1)
BASOPHILS NFR BLD AUTO: 0.4 %
BILIRUB SERPL-MCNC: 0.6 MG/DL (ref 0–1.2)
BUN SERPL-MCNC: 16 MG/DL (ref 6–23)
CALCIUM SERPL-MCNC: 9.4 MG/DL (ref 8.6–10.3)
CHLORIDE SERPL-SCNC: 102 MMOL/L (ref 98–107)
CO2 SERPL-SCNC: 23 MMOL/L (ref 21–32)
CREAT SERPL-MCNC: 0.48 MG/DL (ref 0.5–1.05)
EGFRCR SERPLBLD CKD-EPI 2021: >90 ML/MIN/1.73M*2
EOSINOPHIL # BLD AUTO: 0.08 X10*3/UL (ref 0–0.7)
EOSINOPHIL NFR BLD AUTO: 1.1 %
ERYTHROCYTE [DISTWIDTH] IN BLOOD BY AUTOMATED COUNT: 14.6 % (ref 11.5–14.5)
GLUCOSE SERPL-MCNC: 128 MG/DL (ref 74–99)
HCT VFR BLD AUTO: 44.1 % (ref 36–46)
HGB BLD-MCNC: 14.9 G/DL (ref 12–16)
IMM GRANULOCYTES # BLD AUTO: 0.43 X10*3/UL (ref 0–0.7)
IMM GRANULOCYTES NFR BLD AUTO: 6 % (ref 0–0.9)
LYMPHOCYTES # BLD AUTO: 2.15 X10*3/UL (ref 1.2–4.8)
LYMPHOCYTES NFR BLD AUTO: 29.9 %
MCH RBC QN AUTO: 30 PG (ref 26–34)
MCHC RBC AUTO-ENTMCNC: 33.8 G/DL (ref 32–36)
MCV RBC AUTO: 89 FL (ref 80–100)
MONOCYTES # BLD AUTO: 0.78 X10*3/UL (ref 0.1–1)
MONOCYTES NFR BLD AUTO: 10.8 %
NEUTROPHILS # BLD AUTO: 3.72 X10*3/UL (ref 1.2–7.7)
NEUTROPHILS NFR BLD AUTO: 51.8 %
NRBC BLD-RTO: ABNORMAL /100{WBCS}
PLATELET # BLD AUTO: 372 X10*3/UL (ref 150–450)
POTASSIUM SERPL-SCNC: 3.8 MMOL/L (ref 3.5–5.3)
PROT SERPL-MCNC: 6.2 G/DL (ref 6.4–8.2)
RBC # BLD AUTO: 4.96 X10*6/UL (ref 4–5.2)
SODIUM SERPL-SCNC: 140 MMOL/L (ref 136–145)
WBC # BLD AUTO: 7.2 X10*3/UL (ref 4.4–11.3)

## 2024-09-24 PROCEDURE — 85025 COMPLETE CBC W/AUTO DIFF WBC: CPT

## 2024-09-24 PROCEDURE — 36415 COLL VENOUS BLD VENIPUNCTURE: CPT

## 2024-09-24 PROCEDURE — 86706 HEP B SURFACE ANTIBODY: CPT

## 2024-09-24 PROCEDURE — 86704 HEP B CORE ANTIBODY TOTAL: CPT

## 2024-09-24 PROCEDURE — 84075 ASSAY ALKALINE PHOSPHATASE: CPT

## 2024-09-24 PROCEDURE — 87340 HEPATITIS B SURFACE AG IA: CPT

## 2024-09-24 NOTE — TELEPHONE ENCOUNTER
PATIENT CALLING, SHE IS AWARE THAT YOU ARE OUT OF THE OFFICE TODAY BUT WANTED MESSAGE LEFT FOR WHEN YOU RETURN.    PATIENT IS WANTING TO KNOW IF YOU CAN GIVE HER A CALL. SHE STATES THAT SHE WAS JUST RECENTLY DIAGNOSIS WITH CANCER AND DOES NOT FEEL LIKE SHE IS GETTING THE ANSWERS THAT SHE NEEDS.    SHE IS WANTING TO KNOW IF YOU CAN GIVE HER A CALL TO DISCUSS EVERYTHING THAT IS GOING ON WITH HER CANCER. 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    PLEASE CALL PATIENT [phone]

## 2024-09-25 LAB
HBV CORE AB SER QL: NONREACTIVE
HBV SURFACE AB SER-ACNC: 4 MIU/ML
HBV SURFACE AG SERPL QL IA: NONREACTIVE
LAB AP ASR DISCLAIMER: NORMAL
LABORATORY COMMENT REPORT: NORMAL
PATH REPORT.ADDENDUM SPEC: NORMAL
PATH REPORT.COMMENTS IMP SPEC: NORMAL
PATH REPORT.FINAL DX SPEC: NORMAL
PATH REPORT.GROSS SPEC: NORMAL
PATH REPORT.RELEVANT HX SPEC: NORMAL
PATH REPORT.TOTAL CANCER: NORMAL

## 2024-09-26 ENCOUNTER — TELEPHONE (OUTPATIENT)
Dept: PRIMARY CARE | Facility: CLINIC | Age: 69
End: 2024-09-26
Payer: MEDICARE

## 2024-09-26 DIAGNOSIS — C54.1 ENDOMETRIAL CARCINOMA (MULTI): ICD-10-CM

## 2024-09-26 DIAGNOSIS — K63.5 POLYP OF COLON, UNSPECIFIED PART OF COLON, UNSPECIFIED TYPE: ICD-10-CM

## 2024-09-26 DIAGNOSIS — K21.00 GASTROESOPHAGEAL REFLUX DISEASE WITH ESOPHAGITIS WITHOUT HEMORRHAGE: Primary | ICD-10-CM

## 2024-09-26 RX ORDER — PANTOPRAZOLE SODIUM 40 MG/1
40 TABLET, DELAYED RELEASE ORAL DAILY
Qty: 30 TABLET | Refills: 1 | Status: SHIPPED | OUTPATIENT
Start: 2024-09-26 | End: 2024-11-25

## 2024-09-26 NOTE — TELEPHONE ENCOUNTER
I called and LM for patient to call the office.     Her EGD is scheduled for  Wednesday 10/2/2024 @ 1:00 pm. At the [facility]    Patient needs to arrive at noon    She will need a   Stop Aspirin 2 days prior  No NSAIDS (Ibuprofen ect) until at least after her EGD    No eating after the midnight before the procedure.   Nothing to drink 4 hours prior         Please transfer call to Rowena when patient calls back

## 2024-09-26 NOTE — TELEPHONE ENCOUNTER
Patient aware      Patient was inquiring what she can take for acid reflux. She has taken tums with no relief.       She is also requesting a handicap placard

## 2024-09-26 NOTE — ADDENDUM NOTE
Addended by: LILO BLOUNT on: 9/26/2024 08:47 AM     Modules accepted: Orders     calvin all pertinent systems normal

## 2024-09-26 NOTE — TELEPHONE ENCOUNTER
Guerrero Tavarez, DO  You1 hour ago (9:18 AM)       I sent acid pill to her drugstore take 1 every morning and yes okay for handicap placard       Patient aware

## 2024-09-27 ENCOUNTER — ANESTHESIA EVENT (OUTPATIENT)
Dept: GASTROENTEROLOGY | Facility: EXTERNAL LOCATION | Age: 69
End: 2024-09-27

## 2024-10-02 ENCOUNTER — APPOINTMENT (OUTPATIENT)
Dept: GASTROENTEROLOGY | Facility: EXTERNAL LOCATION | Age: 69
End: 2024-10-02
Payer: MEDICARE

## 2024-10-02 ENCOUNTER — ANESTHESIA (OUTPATIENT)
Dept: GASTROENTEROLOGY | Facility: EXTERNAL LOCATION | Age: 69
End: 2024-10-02

## 2024-10-02 VITALS
WEIGHT: 173 LBS | OXYGEN SATURATION: 97 % | BODY MASS INDEX: 27.8 KG/M2 | SYSTOLIC BLOOD PRESSURE: 120 MMHG | HEART RATE: 68 BPM | DIASTOLIC BLOOD PRESSURE: 64 MMHG | TEMPERATURE: 98.2 F | HEIGHT: 66 IN | RESPIRATION RATE: 12 BRPM

## 2024-10-02 DIAGNOSIS — C54.1 ENDOMETRIAL CARCINOMA (MULTI): ICD-10-CM

## 2024-10-02 DIAGNOSIS — C80.1 SIGNET RING CELL ADENOCARCINOMA (MULTI): Primary | ICD-10-CM

## 2024-10-02 PROCEDURE — 43239 EGD BIOPSY SINGLE/MULTIPLE: CPT | Performed by: INTERNAL MEDICINE

## 2024-10-02 PROCEDURE — 43251 EGD REMOVE LESION SNARE: CPT | Performed by: INTERNAL MEDICINE

## 2024-10-02 RX ORDER — SODIUM CHLORIDE 9 MG/ML
20 INJECTION, SOLUTION INTRAVENOUS CONTINUOUS
Status: DISCONTINUED | OUTPATIENT
Start: 2024-10-02 | End: 2024-10-03 | Stop reason: HOSPADM

## 2024-10-02 RX ORDER — PROPOFOL 10 MG/ML
INJECTION, EMULSION INTRAVENOUS AS NEEDED
Status: DISCONTINUED | OUTPATIENT
Start: 2024-10-02 | End: 2024-10-02

## 2024-10-02 RX ORDER — LIDOCAINE HYDROCHLORIDE 20 MG/ML
INJECTION, SOLUTION INFILTRATION; PERINEURAL AS NEEDED
Status: DISCONTINUED | OUTPATIENT
Start: 2024-10-02 | End: 2024-10-02

## 2024-10-02 RX ORDER — SODIUM CHLORIDE 9 MG/ML
INJECTION, SOLUTION INTRAVENOUS CONTINUOUS PRN
Status: DISCONTINUED | OUTPATIENT
Start: 2024-10-02 | End: 2024-10-02

## 2024-10-02 SDOH — HEALTH STABILITY: MENTAL HEALTH: CURRENT SMOKER: 0

## 2024-10-02 ASSESSMENT — PAIN SCALES - GENERAL
PAIN_LEVEL: 0
PAINLEVEL_OUTOF10: 0 - NO PAIN

## 2024-10-02 ASSESSMENT — PAIN - FUNCTIONAL ASSESSMENT
PAIN_FUNCTIONAL_ASSESSMENT: 0-10

## 2024-10-02 NOTE — H&P
Procedure H&P    Patient Profile-Procedures  Name Monica Musa  Date of Birth 1955  MRN [id number]  Address   [address] [facility] [id number] [address] [facility] [address]    Primary Phone Number [phone]  Secondary Phone Number    Guerrero Love    Procedure(s):  Procedures: EGD  Primary contact name and number   Extended Emergency Contact Information  Primary Emergency Contact: Alexander Musa  Home Phone: [phone]  Mobile Phone: [phone]  Relation: Spouse  Secondary Emergency Contact: Rodrigo Musa  Mobile Phone: [phone]  Relation: Son    General Health  Weight   Vitals:    10/02/24 1240   Weight: 78.5 kg (173 lb)     BMI Body mass index is 27.92 kg/m².    Allergies  Allergies   Allergen Reactions    Penicillins Anaphylaxis and Swelling    Bee Venom Protein (Honey Bee) Swelling       Past Medical History   Past Medical History:   Diagnosis Date    Anemia     Body mass index (BMI) 36.0-36.9, adult 09/02/2021    BMI 36.0-36.9,adult    Body mass index (BMI) 37.0-37.9, adult 02/05/2021    BMI 37.0-37.9, adult    Cancer (Multi) 2021    Diabetes mellitus (Multi)     DVT (deep venous thrombosis) (Multi) 2023    LEFT LEG    Encounter for general adult medical examination without abnormal findings     Annual physical exam    Fracture of wrist 2019    History of ovarian cancer     Hyperlipidemia     Hypertension     Morbid (severe) obesity due to excess calories (Multi) 08/26/2021    Class 2 severe obesity due to excess calories with serious comorbidity and body mass index (BMI) of 37.0 to 37.9 in adult    Morbid (severe) obesity due to excess calories (Multi) 06/09/2022    Class 2 severe obesity due to excess calories with serious comorbidity and body mass index (BMI) of 36.0 to 36.9 in adult    Personal history of other diseases of the respiratory system 01/03/2020    History of acute bronchitis    Personal history of other drug therapy 10/11/2019    History of influenza vaccination        Provider assessment  Diagnosis:  suspected gastric cancer  Medication Reviewed - yes  Prior to Admission medications    Medication Sig Start Date End Date Taking? Authorizing Provider   atorvastatin (Lipitor) 20 mg tablet TAKE 1 TABLET BY MOUTH EVERY DAY 9/23/24  Yes Guerrero Tavarez DO   benazepril (Lotensin) 5 mg tablet TAKE 1 TABLET BY MOUTH EVERY DAY 9/11/24  Yes Guerrero Tavarez DO   garlic tablet Take 1 tablet by mouth once daily.   Yes Historical Provider, MD   metFORMIN  mg 24 hr tablet TAKE 1 TABLET BY MOUTH TWICE A DAY 9/4/24  Yes Guerrero Tavarez DO   multivitamin tablet Take 1 tablet by mouth once daily.   Yes Historical Provider, MD   omeprazole OTC (PriLOSEC OTC) 20 mg EC tablet Take 1 tablet (20 mg) by mouth once daily as needed (acid reflux). Take before breakfast. Do not crush, chew, or split.   Yes Historical Provider, MD   ondansetron (Zofran) 4 mg tablet Take 1 tablet (4 mg) by mouth every 6 hours if needed for nausea or vomiting. 9/23/24  Yes Connie Greene MD   pantoprazole (ProtoNix) 40 mg EC tablet Take 1 tablet (40 mg) by mouth once daily. ( Please take in am) 9/26/24 11/25/24 Yes Guerrero Tavarez DO   polyethylene glycol (Glycolax, Miralax) 17 gram packet Mix 17 g of powder and drink 2 times a day. Mix with 8oz. Of liquid.   Yes Historical Provider, MD   sennosides (senna) 8.6 mg tablet Take 2 tablets (17.2 mg) by mouth once daily at bedtime.   Yes Historical Provider, MD   traMADol (Ultram) 50 mg tablet Take 1 tablet (50 mg) by mouth every 6 hours if needed for severe pain (7 - 10). 9/18/24  Yes Janey Nevarez, APRN-CNP   acetaminophen (Tylenol) 325 mg tablet Take 2 tablets (650 mg) by mouth every 6 hours if needed for mild pain (1 - 3) for up to 20 doses. 9/18/24 9/28/24  Shari Myers MD   aspirin 81 mg EC tablet Take 1 tablet (81 mg) by mouth once daily. 6/28/22   Historical Provider, MD   empagliflozin (Jardiance) 10 mg Take 1 tablet (10 mg) by mouth once daily. 6/14/24    Guerrero Tavarez,    ibuprofen 600 mg tablet Take 1 tablet (600 mg) by mouth every 6 hours if needed for moderate pain (4 - 6) for up to 20 doses. 9/18/24 9/28/24  Shari Myers MD   UNABLE TO FIND Take 4 capsules by mouth once daily. Med Name: Nutrafol.    Historical Provider, MD   UNKNOWN TO PATIENT Take 1 tablet by mouth once daily. Antihistamine; white package, white round pill. From [employer].    Historical Provider, MD       Physical Exam  Vitals:    10/02/24 1240   BP: 160/77   Pulse: 70   Resp: 12   Temp: 36 °C (96.8 °F)   SpO2: 98%        General: A&Ox3, NAD.  HEENT: AT/NC.   CV: RRR. No murmur.  Resp: CTA bilaterally. No wheezing, rhonchi or rales.   GI: Soft, NT/ND. BSx4.  Extrem: No edema. Pulses intact.  Neuro: No focal deficits.   Psych: Normal mood and affect.      Procedure Plan - pre-procedural (re)assesment completed by physician:  discharge/transfer patient when discharge criteria met    ASA status 3  Mallampati score 2    Antonio Evans MD  10/2/2024 12:57 PM

## 2024-10-02 NOTE — ANESTHESIA POSTPROCEDURE EVALUATION
Patient: Monica Musa    Procedure Summary       Date: 10/02/24 Room / Location: [facility]    Anesthesia Start: 1257 Anesthesia Stop:     Procedure: EGD Diagnosis: Endometrial carcinoma (Multi)    Scheduled Providers: Antonio Evans MD Responsible Provider: JANE Melton    Anesthesia Type: MAC ASA Status: 2            Anesthesia Type: MAC    Vitals Value Taken Time   /57 10/02/24 1317   Temp 36.8 °C (98.2 °F) 10/02/24 1317   Pulse 64 10/02/24 1317   Resp 18 10/02/24 1317   SpO2 94 % 10/02/24 1317       Anesthesia Post Evaluation    Patient location during evaluation: bedside  Patient participation: complete - patient cannot participate  Level of consciousness: responsive to verbal stimuli and sedated  Pain score: 0  Pain management: adequate  Airway patency: patent  Cardiovascular status: acceptable and hemodynamically stable  Respiratory status: acceptable  Hydration status: acceptable  Postoperative Nausea and Vomiting: none    No notable events documented.

## 2024-10-02 NOTE — DISCHARGE INSTRUCTIONS

## 2024-10-02 NOTE — ANESTHESIA PREPROCEDURE EVALUATION
Patient: Monica Musa    Procedure Information       Date/Time: 10/02/24 1300    Scheduled providers: Antonio Evans MD    Procedure: EGD    Location: [facility]            Relevant Problems   Cardiac   (+) Hyperlipidemia   (+) Hypertension      Neuro   (+) Carpal tunnel syndrome, right upper limb      Endocrine   (+) Class 2 severe obesity due to excess calories with serious comorbidity and body mass index (BMI) of 35.0 to 35.9 in adult   (+) Type 2 diabetes mellitus without complication (Multi)      Hematology   (+) Anemia      Musculoskeletal   (+) Carpal tunnel syndrome, right upper limb      GYN   (+) Endometrial carcinoma (Multi)       Clinical information reviewed:                   NPO Detail:  No data recorded     Physical Exam    Airway  Mallampati: II  TM distance: >3 FB  Neck ROM: full     Cardiovascular - normal exam     Dental - normal exam     Pulmonary - normal exam  Breath sounds clear to auscultation     Abdominal          Anesthesia Plan    History of general anesthesia?: yes  History of complications of general anesthesia?: no    ASA 3     MAC     The patient is not a current smoker.    intravenous induction   Anesthetic plan and risks discussed with patient.    Plan discussed with CRNA.

## 2024-10-03 ENCOUNTER — LAB (OUTPATIENT)
Dept: LAB | Facility: CLINIC | Age: 69
End: 2024-10-03
Payer: MEDICARE

## 2024-10-03 ENCOUNTER — OFFICE VISIT (OUTPATIENT)
Dept: HEMATOLOGY/ONCOLOGY | Facility: CLINIC | Age: 69
End: 2024-10-03
Payer: MEDICARE

## 2024-10-03 ENCOUNTER — PATIENT OUTREACH (OUTPATIENT)
Dept: HEMATOLOGY/ONCOLOGY | Facility: CLINIC | Age: 69
End: 2024-10-03

## 2024-10-03 VITALS
TEMPERATURE: 98.1 F | BODY MASS INDEX: 29.43 KG/M2 | WEIGHT: 182.32 LBS | HEART RATE: 77 BPM | OXYGEN SATURATION: 96 % | SYSTOLIC BLOOD PRESSURE: 136 MMHG | RESPIRATION RATE: 16 BRPM | DIASTOLIC BLOOD PRESSURE: 74 MMHG

## 2024-10-03 DIAGNOSIS — C18.1 MALIGNANT NEOPLASM OF APPENDIX (MULTI): ICD-10-CM

## 2024-10-03 DIAGNOSIS — C80.0 CARCINOMATOSIS (MULTI): ICD-10-CM

## 2024-10-03 DIAGNOSIS — C80.0 CARCINOMATOSIS (MULTI): Primary | ICD-10-CM

## 2024-10-03 DIAGNOSIS — C24.8 MALIGNANT NEOPLASM OF OVERLAPPING SITES OF BILIARY TRACT (MULTI): ICD-10-CM

## 2024-10-03 DIAGNOSIS — R11.0 NAUSEA: ICD-10-CM

## 2024-10-03 LAB
ALBUMIN SERPL BCP-MCNC: 3.8 G/DL (ref 3.4–5)
ALP SERPL-CCNC: 100 U/L (ref 33–136)
ALT SERPL W P-5'-P-CCNC: 28 U/L (ref 7–45)
ANION GAP SERPL CALC-SCNC: 15 MMOL/L (ref 10–20)
AST SERPL W P-5'-P-CCNC: 25 U/L (ref 9–39)
BASOPHILS # BLD AUTO: 0.03 X10*3/UL (ref 0–0.1)
BASOPHILS NFR BLD AUTO: 0.4 %
BILIRUB SERPL-MCNC: 0.6 MG/DL (ref 0–1.2)
BUN SERPL-MCNC: 13 MG/DL (ref 6–23)
CALCIUM SERPL-MCNC: 9.7 MG/DL (ref 8.6–10.3)
CHLORIDE SERPL-SCNC: 103 MMOL/L (ref 98–107)
CO2 SERPL-SCNC: 27 MMOL/L (ref 21–32)
CREAT SERPL-MCNC: 0.44 MG/DL (ref 0.5–1.05)
EGFRCR SERPLBLD CKD-EPI 2021: >90 ML/MIN/1.73M*2
EOSINOPHIL # BLD AUTO: 0.05 X10*3/UL (ref 0–0.7)
EOSINOPHIL NFR BLD AUTO: 0.7 %
ERYTHROCYTE [DISTWIDTH] IN BLOOD BY AUTOMATED COUNT: 15.6 % (ref 11.5–14.5)
GLUCOSE SERPL-MCNC: 134 MG/DL (ref 74–99)
HCT VFR BLD AUTO: 40.4 % (ref 36–46)
HGB BLD-MCNC: 14 G/DL (ref 12–16)
IMM GRANULOCYTES # BLD AUTO: 0.14 X10*3/UL (ref 0–0.7)
IMM GRANULOCYTES NFR BLD AUTO: 1.8 % (ref 0–0.9)
LYMPHOCYTES # BLD AUTO: 1.94 X10*3/UL (ref 1.2–4.8)
LYMPHOCYTES NFR BLD AUTO: 25.4 %
MCH RBC QN AUTO: 30.4 PG (ref 26–34)
MCHC RBC AUTO-ENTMCNC: 34.7 G/DL (ref 32–36)
MCV RBC AUTO: 88 FL (ref 80–100)
MONOCYTES # BLD AUTO: 0.84 X10*3/UL (ref 0.1–1)
MONOCYTES NFR BLD AUTO: 11 %
NEUTROPHILS # BLD AUTO: 4.63 X10*3/UL (ref 1.2–7.7)
NEUTROPHILS NFR BLD AUTO: 60.7 %
PLATELET # BLD AUTO: 324 X10*3/UL (ref 150–450)
POTASSIUM SERPL-SCNC: 3.2 MMOL/L (ref 3.5–5.3)
PROT SERPL-MCNC: 5.9 G/DL (ref 6.4–8.2)
RBC # BLD AUTO: 4.61 X10*6/UL (ref 4–5.2)
SODIUM SERPL-SCNC: 142 MMOL/L (ref 136–145)
WBC # BLD AUTO: 7.6 X10*3/UL (ref 4.4–11.3)

## 2024-10-03 PROCEDURE — 3075F SYST BP GE 130 - 139MM HG: CPT | Performed by: INTERNAL MEDICINE

## 2024-10-03 PROCEDURE — 99215 OFFICE O/P EST HI 40 MIN: CPT | Performed by: INTERNAL MEDICINE

## 2024-10-03 PROCEDURE — 86301 IMMUNOASSAY TUMOR CA 19-9: CPT

## 2024-10-03 PROCEDURE — 85025 COMPLETE CBC W/AUTO DIFF WBC: CPT

## 2024-10-03 PROCEDURE — 84075 ASSAY ALKALINE PHOSPHATASE: CPT

## 2024-10-03 PROCEDURE — 36415 COLL VENOUS BLD VENIPUNCTURE: CPT

## 2024-10-03 PROCEDURE — 1159F MED LIST DOCD IN RCRD: CPT | Performed by: INTERNAL MEDICINE

## 2024-10-03 PROCEDURE — 81232 DPYD GENE COMMON VARIANTS: CPT

## 2024-10-03 PROCEDURE — 4010F ACE/ARB THERAPY RXD/TAKEN: CPT | Performed by: INTERNAL MEDICINE

## 2024-10-03 PROCEDURE — 82378 CARCINOEMBRYONIC ANTIGEN: CPT

## 2024-10-03 PROCEDURE — 1157F ADVNC CARE PLAN IN RCRD: CPT | Performed by: INTERNAL MEDICINE

## 2024-10-03 PROCEDURE — 1123F ACP DISCUSS/DSCN MKR DOCD: CPT | Performed by: INTERNAL MEDICINE

## 2024-10-03 PROCEDURE — 3078F DIAST BP <80 MM HG: CPT | Performed by: INTERNAL MEDICINE

## 2024-10-03 PROCEDURE — 1126F AMNT PAIN NOTED NONE PRSNT: CPT | Performed by: INTERNAL MEDICINE

## 2024-10-03 PROCEDURE — 99205 OFFICE O/P NEW HI 60 MIN: CPT | Performed by: INTERNAL MEDICINE

## 2024-10-03 RX ORDER — PALONOSETRON 0.05 MG/ML
250 INJECTION, SOLUTION INTRAVENOUS ONCE
OUTPATIENT
Start: 2024-10-18

## 2024-10-03 RX ORDER — ONDANSETRON HYDROCHLORIDE 8 MG/1
8 TABLET, FILM COATED ORAL EVERY 8 HOURS PRN
Qty: 30 TABLET | Refills: 5 | Status: SHIPPED | OUTPATIENT
Start: 2024-10-03

## 2024-10-03 RX ORDER — LOPERAMIDE HYDROCHLORIDE 2 MG/1
CAPSULE ORAL
Qty: 100 CAPSULE | Refills: 2 | Status: SHIPPED | OUTPATIENT
Start: 2024-10-03

## 2024-10-03 RX ORDER — DIPHENHYDRAMINE HYDROCHLORIDE 50 MG/ML
50 INJECTION INTRAMUSCULAR; INTRAVENOUS AS NEEDED
OUTPATIENT
Start: 2024-10-16

## 2024-10-03 RX ORDER — PROCHLORPERAZINE MALEATE 10 MG
10 TABLET ORAL EVERY 6 HOURS PRN
OUTPATIENT
Start: 2024-10-16

## 2024-10-03 RX ORDER — FLUOROURACIL 50 MG/ML
400 INJECTION, SOLUTION INTRAVENOUS ONCE
OUTPATIENT
Start: 2024-10-16

## 2024-10-03 RX ORDER — ALBUTEROL SULFATE 0.83 MG/ML
3 SOLUTION RESPIRATORY (INHALATION) AS NEEDED
OUTPATIENT
Start: 2024-10-16

## 2024-10-03 RX ORDER — HEPARIN 100 UNIT/ML
500 SYRINGE INTRAVENOUS AS NEEDED
OUTPATIENT
Start: 2024-10-03

## 2024-10-03 RX ORDER — HEPARIN SODIUM,PORCINE/PF 10 UNIT/ML
50 SYRINGE (ML) INTRAVENOUS AS NEEDED
OUTPATIENT
Start: 2024-10-03

## 2024-10-03 RX ORDER — EPINEPHRINE 0.3 MG/.3ML
0.3 INJECTION SUBCUTANEOUS EVERY 5 MIN PRN
OUTPATIENT
Start: 2024-10-16

## 2024-10-03 RX ORDER — FAMOTIDINE 10 MG/ML
20 INJECTION INTRAVENOUS ONCE AS NEEDED
OUTPATIENT
Start: 2024-10-16

## 2024-10-03 RX ORDER — LORAZEPAM 2 MG/ML
1 INJECTION INTRAMUSCULAR AS NEEDED
OUTPATIENT
Start: 2024-10-16

## 2024-10-03 RX ORDER — PROCHLORPERAZINE MALEATE 10 MG
10 TABLET ORAL EVERY 6 HOURS PRN
Qty: 30 TABLET | Refills: 5 | Status: SHIPPED | OUTPATIENT
Start: 2024-10-03

## 2024-10-03 RX ORDER — PROCHLORPERAZINE EDISYLATE 5 MG/ML
10 INJECTION INTRAMUSCULAR; INTRAVENOUS EVERY 6 HOURS PRN
OUTPATIENT
Start: 2024-10-16

## 2024-10-03 ASSESSMENT — PAIN SCALES - GENERAL: PAINLEVEL: 0-NO PAIN

## 2024-10-03 NOTE — PROGRESS NOTES
Patient ID: Monica Musa is a 69 y.o. female.  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 IR guided biopsy was not possible and underwent a laparoscopic biopsy of a peritoneal nodule on 9/18/2024 which revealed invasive adenocarcinoma with signet ring features favoring gastrointestinal origin.  There are some features of goblet cells which favor appendiceal primary however pancreaticobiliary origin cannot be excluded.  MSI status intact.  [id number] also completed on blood from 9/30/2024 which was unremarkable.  Tempus on tissue in process.  Colonoscopy on 7/31/2024 was technically difficult due to scattered diverticulosis in the ascending colon and some hemorrhoids.  EGD come completed yesterday shows abnormal mucosa at the GE junction but no clear mass however there was diffuse nodular gastritis which was biopsied and pathology is pending.    Patient reports that no significant pain.  States that the nausea she was having has since resolved.  No new cough, chest pain, shortness of breath.  No new fevers.  Patient is tearful regarding diagnosis and neck steps.  Her son is a physical therapist nearby.  She lives at home with her .    Objective   BSA: There is no height or weight on file to calculate BSA.  There were no vitals taken for this visit.    Past Medical History:   Diagnosis Date    Anemia     Body mass index (BMI) 36.0-36.9, adult 09/02/2021    BMI 36.0-36.9,adult    Body mass index (BMI) 37.0-37.9, adult 02/05/2021    BMI 37.0-37.9, adult    Cancer (Multi) 2021    Diabetes mellitus (Multi)     DVT (deep venous thrombosis) (Multi) 2023    LEFT LEG    Encounter for general adult  medical examination without abnormal findings     Annual physical exam    Fracture of wrist 2019    History of ovarian cancer     Hyperlipidemia     Hypertension     Morbid (severe) obesity due to excess calories (Multi) 08/26/2021    Class 2 severe obesity due to excess calories with serious comorbidity and body mass index (BMI) of 37.0 to 37.9 in adult    Morbid (severe) obesity due to excess calories (Multi) 06/09/2022    Class 2 severe obesity due to excess calories with serious comorbidity and body mass index (BMI) of 36.0 to 36.9 in adult    Personal history of other diseases of the respiratory system 01/03/2020    History of acute bronchitis    Personal history of other drug therapy 10/11/2019    History of influenza vaccination     Past Surgical History:   Procedure Laterality Date    OTHER SURGICAL HISTORY Right 06/09/2022    Arm surgery    OTHER SURGICAL HISTORY  07/25/2022    Salpingo-oophorectomy bilateral    OTHER SURGICAL HISTORY  07/25/2022    Laparoscopic hysterectomy    OTHER SURGICAL HISTORY  07/25/2022    Cystoscopy    OTHER SURGICAL HISTORY  07/25/2022    Lymph node surgery         Family History   Problem Relation Name Age of Onset    Diabetes Mother Concha Maher      Oncology History Overview Note   - 7/8/2022 TLH, BSO, SLND combined case with Dr. Lal. 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 There was no lymphovascular invasion. There was a focus of MELF pattern invasion. Tumor board recommendations: Surveillance. MMR testing on prior endometrial biopsy specimen was normal.   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  - CT 8/26/24: Small volume ascites. Left pelvic nodule with some nodularity  along the left paracolic gutter worrisome for peritoneal carcinomatosis.  Admitted with bowel obstruction.  No lesion large enough for biopsy     Endometrial carcinoma (Multi)   5/22/2023 Initial Diagnosis    Endometrial  carcinoma (Multi)         Monica Musa  reports that she has quit smoking. Her smoking use included cigarettes. She has a 5 pack-year smoking history. She has never used smokeless tobacco.  She  reports no history of alcohol use.  She  reports no history of drug use.    Vitals:    10/03/24 1314   BP: 136/74   Pulse: 77   Resp: 16   Temp: 36.7 °C (98.1 °F)   SpO2: 96%       .Gen: appears well in clinic, NAD  HEENT: atraumatic head, normocephalic, EOMI, conjunctiva normal  LUNG: no increased WOB, CTAB  CV: No JVD. 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  There are some features of goblet cells which favor appendiceal primary however pancreaticobiliary origin cannot be excluded.  MSI status intact  - [id number] also completed on blood from 9/30/2024 which was unremarkable  - Tempus on tissue in process  - Colonoscopy on 7/31/2024 was technically difficult due to scattered diverticulosis in the ascending colon and some hemorrhoids  - EGD 10/2/24 shows abnormal mucosa at the GE junction but no clear mass however there was diffuse nodular gastritis which was biopsied and pathology is pending.      10/3/24  Today we discussed diagnosis of likely advanced appendiceal carcinoma given there is no mass  seen on recent imaging 8/26/2024 with no masses seen  -Goblet cells seen on pathology likely appendiceal origin  -Will plan for CA 19-9 and CEA as baseline  -Will await EGD pathology to confirm no evidence of malignancy in the esophagus  -Discussed neck step is systemic therapy with modified FOLFOX   - could consider HIPEC after 3-4 cycles based on response  - Dr Ontiveros aware of patient  -Refer port placement next week and cycle 1 day 1 on 10/9/2024  -Reviewed side effects of 5-FU, oxaliplatin and consent signed  -Prescription sent for nausea to be used as needed  -We will plan for day 3 Aloxi  -Given limited support at home will likely hold off on disconnect at home for now    Diagnoses and all orders for this visit:  Carcinomatosis (Multi)  -     IR CVC port placement; Future  -     CBC and Auto Differential; Future  -     Comprehensive Metabolic Panel; Future  -     CEA (Carcinoembryonic Antigen); Future  -     Cancer antigen 19-9; Future  -     Dihydropyrimidine Dehydrogenase Genotype; Future  -     ondansetron (Zofran) 8 mg tablet; Take 1 tablet (8 mg) by mouth every 8 hours if needed for nausea or vomiting.  -     prochlorperazine (Compazine) 10 mg tablet; Take 1 tablet (10 mg) by mouth every 6 hours if needed for nausea or vomiting.  -     loperamide (Imodium) 2 mg capsule; Take 2 capsules (4 mg) by mouth with the first episode of diarrhea and 1 capsule (2 mg) by mouth with any additional episodes. Maximum 8 capsules (16 mg) per day.  -     Infusion Appointment Request; Future  -     Oncology Line Draw Appointment Request; Future  -     Infusion Appointment Request; Future  -     Infusion Appointment Request; Future  -     Clinic Appointment Request; Future  -     Infusion Appointment Request; Future  -     Oncology Line Draw Appointment Request; Future  -     CBC and Auto Differential; Future  -     Comprehensive metabolic panel; Future  Nausea  -     Referral to Hematology and Oncology  Malignant neoplasm of  appendix (Multi)  -     CEA (Carcinoembryonic Antigen); Future  -     signatera (o); Other-indicate in comment; unknown - Miscellaneous Test; Future  -     ondansetron (Zofran) 8 mg tablet; Take 1 tablet (8 mg) by mouth every 8 hours if needed for nausea or vomiting.  -     prochlorperazine (Compazine) 10 mg tablet; Take 1 tablet (10 mg) by mouth every 6 hours if needed for nausea or vomiting.  -     loperamide (Imodium) 2 mg capsule; Take 2 capsules (4 mg) by mouth with the first episode of diarrhea and 1 capsule (2 mg) by mouth with any additional episodes. Maximum 8 capsules (16 mg) per day.  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)  -     Cancer antigen 19-9; Future  Other orders  -     ondansetron (Zofran) 16 mg in dextrose 5% 50 mL IVPB  -     dexAMETHasone (Decadron) in dextrose 5% 50 mL IV 12 mg  -     prochlorperazine (Compazine) tablet 10 mg  -     prochlorperazine (Compazine) injection 10 mg  -     OXALIplatin (Eloxatin) 165 mg in dextrose 5% 533 mL IV  -     leucovorin 780 mg in dextrose 5% 139 mL IV  -     fluorouracil (Adrucil) IV syringe 775 mg  -     fluorouracil (Adrucil) 2,400 mg/m2 = 4,700 mg in sodium chloride 0.9% 138 mL IV via Home Infusion  -     LORazepam (Ativan) injection 1 mg  -     sodium chloride 0.9 % bolus 500 mL  -     dextrose 5 % in water (D5W) bolus  -     diphenhydrAMINE (BENADryl) injection 50 mg  -     methylPREDNISolone sod succinate (SOLU-Medrol) 40 mg/mL injection 40 mg  -     famotidine PF (Pepcid) injection 20 mg  -     EPINEPHrine (Epipen)  injection syringe 0.3 mg  -     albuterol 2.5 mg /3 mL (0.083 %) nebulizer solution 3 mL  -     palonosetron (Aloxi) injection 250 mcg           Margot Smart MD         Time Spent  Prep time on day of patient encounter: 5 minutes  Time spent directly with patient, family or caregiver: 45 minutes  Additional Time Spent on Patient Care Activities: 7 minutes  Documentation Time: 5 minutes  Other Time Spent: 0 minutes  Total: 62 minutes

## 2024-10-03 NOTE — PROGRESS NOTES
I spoke with Monica, we reviewed her treatment plan and a updated schedule was given to her. We reviewed the treatment regimen folfox and education handouts were given on oxaliplatin and 5FU (home pump was also reviewed). We discussed potential side effects including: cold sensitivity, neuropathy, n/v/c/d, fevers and hypersensitivity reactions. A yellow card was given and when to go to the ER.  Monica is also aware to call the clinic with any questions/concerns;clinic and on-call service phone numbers provided.    Home medications (zofran & compazine) were also reviewed and pt is aware of how/when to take.     Monica is aware that blood will be checked routinely throughout the course of treatment and is agreeable to having a port/lab visits scheduled the day before treatment days. Review with Monica what to look for in terms of infection or any issues at the port site. She is also aware that the IR team will give her a call with the date/time of her port placement.    She is also aware that Dr. Smart does schedule supportive care visits and that she will be seeing Boni madrid throughout the course of treatment as well.     Handouts given and reviewed including; gathering place, caregiver resources, safe handling of bodily fluids & financial counselor.     Monica is also aware that the clinic does have social workers and registered dieticians that can offer resources and assistance as well. I will reach out to both Genesis Huggins and Connie Gallo to touch base with Monica as a new patient.     Red chemo bag was given to the patient. And she is aware to call the clinic with any further questions/concerns. She was appreciative of the education.

## 2024-10-04 ENCOUNTER — TELEPHONE (OUTPATIENT)
Dept: HEMATOLOGY/ONCOLOGY | Facility: CLINIC | Age: 69
End: 2024-10-04
Payer: MEDICARE

## 2024-10-04 DIAGNOSIS — C18.1 MALIGNANT NEOPLASM OF APPENDIX (MULTI): ICD-10-CM

## 2024-10-04 LAB
CANCER AG19-9 SERPL-ACNC: <4 U/ML
CEA SERPL-MCNC: 1.4 UG/L

## 2024-10-04 NOTE — TELEPHONE ENCOUNTER
Spoke with the patient. Pt aware of port placement for October 10th. Pt is agreeable to start chemo on October 16th and have a port draw for lab work on October 15th. Explained I would update Dr. Smart to change the dates for the patient's chemo orders and then a  should call her for appts by Monday. Pt verbalized understanding and had no further questions or concerns at this time.

## 2024-10-04 NOTE — TELEPHONE ENCOUNTER
Scheduling orders for FUV/port lab draw 10.15, treatment 10.16 and pump disconnect 10.18 pended to Boni for approval

## 2024-10-07 ENCOUNTER — OFFICE VISIT (OUTPATIENT)
Dept: GYNECOLOGIC ONCOLOGY | Facility: CLINIC | Age: 69
End: 2024-10-07
Payer: MEDICARE

## 2024-10-07 VITALS
BODY MASS INDEX: 30.05 KG/M2 | HEART RATE: 86 BPM | RESPIRATION RATE: 16 BRPM | WEIGHT: 180.34 LBS | SYSTOLIC BLOOD PRESSURE: 126 MMHG | DIASTOLIC BLOOD PRESSURE: 84 MMHG | HEIGHT: 65 IN | TEMPERATURE: 97.7 F | OXYGEN SATURATION: 97 %

## 2024-10-07 DIAGNOSIS — C24.8 MALIGNANT NEOPLASM OF OVERLAPPING SITES OF BILIARY TRACT (MULTI): ICD-10-CM

## 2024-10-07 DIAGNOSIS — C18.1 MALIGNANT NEOPLASM OF APPENDIX (MULTI): ICD-10-CM

## 2024-10-07 DIAGNOSIS — C80.0 CARCINOMATOSIS (MULTI): ICD-10-CM

## 2024-10-07 DIAGNOSIS — C54.1 ENDOMETRIAL CARCINOMA (MULTI): ICD-10-CM

## 2024-10-07 DIAGNOSIS — C80.0 CARCINOMATOSIS (MULTI): Primary | ICD-10-CM

## 2024-10-07 PROCEDURE — 1159F MED LIST DOCD IN RCRD: CPT | Performed by: OBSTETRICS & GYNECOLOGY

## 2024-10-07 PROCEDURE — 99211 OFF/OP EST MAY X REQ PHY/QHP: CPT | Performed by: OBSTETRICS & GYNECOLOGY

## 2024-10-07 PROCEDURE — 1126F AMNT PAIN NOTED NONE PRSNT: CPT | Performed by: OBSTETRICS & GYNECOLOGY

## 2024-10-07 PROCEDURE — 3074F SYST BP LT 130 MM HG: CPT | Performed by: OBSTETRICS & GYNECOLOGY

## 2024-10-07 PROCEDURE — 1157F ADVNC CARE PLAN IN RCRD: CPT | Performed by: OBSTETRICS & GYNECOLOGY

## 2024-10-07 PROCEDURE — 1123F ACP DISCUSS/DSCN MKR DOCD: CPT | Performed by: OBSTETRICS & GYNECOLOGY

## 2024-10-07 PROCEDURE — 1036F TOBACCO NON-USER: CPT | Performed by: OBSTETRICS & GYNECOLOGY

## 2024-10-07 PROCEDURE — 3079F DIAST BP 80-89 MM HG: CPT | Performed by: OBSTETRICS & GYNECOLOGY

## 2024-10-07 PROCEDURE — 3008F BODY MASS INDEX DOCD: CPT | Performed by: OBSTETRICS & GYNECOLOGY

## 2024-10-07 ASSESSMENT — PAIN SCALES - GENERAL: PAINLEVEL: 0-NO PAIN

## 2024-10-07 NOTE — TELEPHONE ENCOUNTER
I spoke Bridget, and she is aware that her appts have been updated through cycle 2. All questions were answered at this time and bridget was appreciative of the call.    **Bridget was coming into the clinic today for a appt with the gyn onc team and I gave her a updated schedule. Nothing further needed at this time.

## 2024-10-07 NOTE — PROGRESS NOTES
"Patient ID: Monica Musa is a 69 y.o. female.  Referring Physician: No referring provider defined for this encounter.  Primary Care Provider: Guerrero Tavarez,     Subjective    Pathology from biopsy - c/w signet ring adenocarcinoma, non gyn primary. Saw Dr. Smart - will be starting chemo    Interval History:  She is scheduled for her port on Thursday and infusion starts next Wednesday, her EGD obtained 4 biopsies, path pending. The patient feels shooting pain with eating, denies passing flatus, she states her cancer started in her appendix. Confirms she has skin cancer too and her brother passed last year from lung cancer, other brother has colon cancer and another brother unknown cancer too,1 of them had negative genetic test results. States she lost 70 lbs in 3 months.         Objective    BSA: 1.94 meters squared  /84 (BP Location: Right arm, Patient Position: Sitting, BP Cuff Size: Adult)   Pulse 86   Temp 36.5 °C (97.7 °F) (Temporal)   Resp 16   Ht 1.655 m (5' 5.16\")   Wt 81.8 kg (180 lb 5.4 oz)   SpO2 97%   BMI 29.87 kg/m²      Physical Exam  Constitutional:       General: She is not in acute distress.     Appearance: Normal appearance.   Cardiovascular:      Rate and Rhythm: Normal rate and regular rhythm.   Pulmonary:      Effort: Pulmonary effort is normal.      Breath sounds: Normal breath sounds.   Abdominal:      General: Bowel sounds are increased. There is no distension.      Comments: Lap sites well healed   Musculoskeletal:      Right forearm: Normal.      Left forearm: Normal.      Right hand: Normal.      Left hand: Normal.      Right lower leg: Normal.      Left lower leg: Normal.      Right foot: Normal.      Left foot: Normal.         Performance Status:  Symptomatic; in bed <50% of the day    Assessment/Plan     Oncology History Overview Note   - 7/8/2022 TLH, BSO, SLND combined case with Dr. Lal. Final pathology reported as endometrial adenocarcinoma endometrioid type " FIGO grade 1 with 77% myometrial invasion. There was no lymphovascular invasion. There was a focus of MELF pattern invasion. Tumor board recommendations: Surveillance. MMR testing on prior endometrial biopsy specimen was normal.   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  - CT 8/26/24: Small volume ascites. Left pelvic nodule with some nodularity  along the left paracolic gutter worrisome for peritoneal carcinomatosis.  Admitted with bowel obstruction.  No lesion large enough for biopsy     Endometrial carcinoma (Multi)   5/22/2023 Initial Diagnosis    Endometrial carcinoma (Multi)     Malignant neoplasm of appendix (Multi)   10/3/2024 Initial Diagnosis    Malignant neoplasm of appendix (Multi)     10/16/2024 -  Chemotherapy    mFOLFOX6 (Fluorouracil Continuous Infusion / Leucovorin / Oxaliplatin), 14 Day Cycles     Carcinomatosis (Multi)   10/3/2024 Initial Diagnosis    Carcinomatosis (Multi)     10/16/2024 -  Chemotherapy    mFOLFOX6 (Fluorouracil Continuous Infusion / Leucovorin / Oxaliplatin), 14 Day Cycles          Problem List Items Addressed This Visit    None      Treatment Plans       Name Type Plan Dates Plan Provider         Active    mFOLFOX6 (Fluorouracil Continuous Infusion / Leucovorin / Oxaliplatin), 14 Day Cycles Oncology Treatment  10/3/2024 - Present Margot Smart MD                  - Pathology from biopsy - c/w signet ring adenocarcinoma, non gyn primary. Saw Dr. Smart - will be starting chemotherapy next week.  Continue follow up with med onc.  - Follow up Feb 2025 for endometrial cancer surveillance      Scribe Attestation  By signing my name below, IFara Scribe   attest that this documentation has been prepared under the direction and in the presence of Connie Greene MD.

## 2024-10-08 LAB
DPYD GENE MUT ANL BLD/T: NORMAL
ELECTRONICALLY SIGNED BY: NORMAL

## 2024-10-09 ENCOUNTER — APPOINTMENT (OUTPATIENT)
Dept: HEMATOLOGY/ONCOLOGY | Facility: CLINIC | Age: 69
End: 2024-10-09
Payer: MEDICARE

## 2024-10-09 LAB — SCAN RESULT: NORMAL

## 2024-10-10 ENCOUNTER — HOSPITAL ENCOUNTER (OUTPATIENT)
Dept: RADIOLOGY | Facility: HOSPITAL | Age: 69
Discharge: HOME | End: 2024-10-10
Payer: MEDICARE

## 2024-10-10 VITALS
SYSTOLIC BLOOD PRESSURE: 119 MMHG | DIASTOLIC BLOOD PRESSURE: 66 MMHG | RESPIRATION RATE: 14 BRPM | HEART RATE: 70 BPM | OXYGEN SATURATION: 96 %

## 2024-10-10 DIAGNOSIS — C80.0 CARCINOMATOSIS (MULTI): ICD-10-CM

## 2024-10-10 LAB
INR PPP: 1.1 (ref 0.9–1.1)
PROTHROMBIN TIME: 12.2 SECONDS (ref 9.8–12.8)

## 2024-10-10 PROCEDURE — 99152 MOD SED SAME PHYS/QHP 5/>YRS: CPT | Performed by: RADIOLOGY

## 2024-10-10 PROCEDURE — 99153 MOD SED SAME PHYS/QHP EA: CPT | Performed by: RADIOLOGY

## 2024-10-10 PROCEDURE — 36010 PLACE CATHETER IN VEIN: CPT | Mod: RT | Performed by: RADIOLOGY

## 2024-10-10 PROCEDURE — 76942 ECHO GUIDE FOR BIOPSY: CPT | Mod: RT | Performed by: RADIOLOGY

## 2024-10-10 PROCEDURE — 85610 PROTHROMBIN TIME: CPT | Performed by: RADIOLOGY

## 2024-10-10 PROCEDURE — 2500000004 HC RX 250 GENERAL PHARMACY W/ HCPCS (ALT 636 FOR OP/ED): Performed by: RADIOLOGY

## 2024-10-10 PROCEDURE — 36415 COLL VENOUS BLD VENIPUNCTURE: CPT | Performed by: RADIOLOGY

## 2024-10-10 PROCEDURE — 2780000003 HC OR 278 NO HCPCS

## 2024-10-10 PROCEDURE — 7100000009 HC PHASE TWO TIME - INITIAL BASE CHARGE

## 2024-10-10 PROCEDURE — 2720000007 HC OR 272 NO HCPCS

## 2024-10-10 PROCEDURE — 2500000005 HC RX 250 GENERAL PHARMACY W/O HCPCS: Performed by: RADIOLOGY

## 2024-10-10 PROCEDURE — 36561 INSERT TUNNELED CV CATH: CPT | Mod: RT | Performed by: RADIOLOGY

## 2024-10-10 PROCEDURE — 99152 MOD SED SAME PHYS/QHP 5/>YRS: CPT

## 2024-10-10 PROCEDURE — C1788 PORT, INDWELLING, IMP: HCPCS

## 2024-10-10 PROCEDURE — 99153 MOD SED SAME PHYS/QHP EA: CPT

## 2024-10-10 PROCEDURE — 7100000010 HC PHASE TWO TIME - EACH INCREMENTAL 1 MINUTE

## 2024-10-10 RX ORDER — HEPARIN 100 UNIT/ML
SYRINGE INTRAVENOUS
Status: COMPLETED | OUTPATIENT
Start: 2024-10-10 | End: 2024-10-10

## 2024-10-10 RX ORDER — MIDAZOLAM HYDROCHLORIDE 1 MG/ML
INJECTION, SOLUTION INTRAMUSCULAR; INTRAVENOUS
Status: COMPLETED | OUTPATIENT
Start: 2024-10-10 | End: 2024-10-10

## 2024-10-10 RX ORDER — LIDOCAINE HYDROCHLORIDE AND EPINEPHRINE 10; 10 MG/ML; UG/ML
INJECTION, SOLUTION INFILTRATION; PERINEURAL
Status: COMPLETED | OUTPATIENT
Start: 2024-10-10 | End: 2024-10-10

## 2024-10-10 RX ORDER — FENTANYL CITRATE 50 UG/ML
INJECTION, SOLUTION INTRAMUSCULAR; INTRAVENOUS
Status: COMPLETED | OUTPATIENT
Start: 2024-10-10 | End: 2024-10-10

## 2024-10-10 ASSESSMENT — PAIN SCALES - GENERAL

## 2024-10-10 NOTE — PRE-PROCEDURE NOTE
Interventional Radiology Preprocedure Note    Indication for procedure: The encounter diagnosis was Carcinomatosis (Multi). For mediport placement.    Relevant review of systems: NA    Relevant Labs:   Lab Results   Component Value Date    CREATININE 0.44 (L) 10/03/2024    EGFR >90 10/03/2024    INR 1.1 10/10/2024    PROTIME 12.2 10/10/2024       Planned Sedation/Anesthesia: Moderate    Airway assessment: normal    Directed physical examination:    Awake and alert, oriented  No acute distress  Regular rate, rhythm  Breathing non-labored    Mallampati: II (hard and soft palate, upper portion of tonsils and uvula visible)    ASA Score: ASA 3 - Patient with moderate systemic disease with functional limitations    Benefits, risks and alternatives of procedure and planned sedation have been discussed with the patient and/or their representative. All questions answered and they agree to proceed.

## 2024-10-10 NOTE — POST-PROCEDURE NOTE
Interventional Radiology Brief Postprocedure Note    Attending: Thierno Killian MD      Assistant: none    Diagnosis: Carcinomatosis    Description of procedure: Right chest Mediport placed. The port is ready for immediate use.       Anesthesia:  Moderate sedation    Complications: None    Estimated Blood Loss: minimal    Medications (Filter: Administrations occurring from 1405 to 1454 on 10/10/24) As of 10/10/24 1454      oxygen (O2) therapy (L/min) Total volume:  Not documented*   *Total volume has not been documented. View each administration to see the amount administered.     Date/Time Rate/Dose/Volume Action       10/10/24  1413 3 L/min - 180,000 mL/hr New Bag               midazolam (Versed) injection (mg) Total dose:  2 mg      Date/Time Rate/Dose/Volume Action       10/10/24  1428 2 mg Given               fentaNYL PF (Sublimaze) injection (mcg) Total dose:  50 mcg      Date/Time Rate/Dose/Volume Action       10/10/24  1428 50 mcg Given               lidocaine-epinephrine (Xylocaine W/EPI) 1 %-1:100,000 injection (mL) Total volume:  18 mL      Date/Time Rate/Dose/Volume Action       10/10/24  1438 3 mL Given      1440 15 mL Given               heparin flush 100 unit/mL syringe (Units) Total dose:  500 Units      Date/Time Rate/Dose/Volume Action       10/10/24  1445 500 Units Given                   No specimens collected      See detailed result report with images in PACS.    The patient tolerated the procedure well without incident or complication and is in stable condition.

## 2024-10-10 NOTE — Clinical Note
Right chest mediport inserted. Incision internally sutured then skin approximated with dermabond. Jugular venotomy site also approximated with dermabond. Pt tolerated procedure well.

## 2024-10-11 ENCOUNTER — APPOINTMENT (OUTPATIENT)
Dept: HEMATOLOGY/ONCOLOGY | Facility: CLINIC | Age: 69
End: 2024-10-11
Payer: MEDICARE

## 2024-10-13 LAB
LAB AP ASR DISCLAIMER: NORMAL
LABORATORY COMMENT REPORT: NORMAL
PATH REPORT.ADDENDUM SPEC: NORMAL
PATH REPORT.FINAL DX SPEC: NORMAL
PATH REPORT.GROSS SPEC: NORMAL
PATH REPORT.TOTAL CANCER: NORMAL

## 2024-10-14 ENCOUNTER — APPOINTMENT (OUTPATIENT)
Dept: HEMATOLOGY/ONCOLOGY | Facility: CLINIC | Age: 69
End: 2024-10-14
Payer: MEDICARE

## 2024-10-15 ENCOUNTER — APPOINTMENT (OUTPATIENT)
Dept: HEMATOLOGY/ONCOLOGY | Facility: CLINIC | Age: 69
End: 2024-10-15
Payer: MEDICARE

## 2024-10-16 ENCOUNTER — SOCIAL WORK (OUTPATIENT)
Dept: HEMATOLOGY/ONCOLOGY | Facility: CLINIC | Age: 69
End: 2024-10-16

## 2024-10-16 ENCOUNTER — NUTRITION (OUTPATIENT)
Dept: HEMATOLOGY/ONCOLOGY | Facility: CLINIC | Age: 69
End: 2024-10-16

## 2024-10-16 ENCOUNTER — INFUSION (OUTPATIENT)
Dept: HEMATOLOGY/ONCOLOGY | Facility: CLINIC | Age: 69
End: 2024-10-16
Payer: MEDICARE

## 2024-10-16 VITALS
TEMPERATURE: 97.5 F | DIASTOLIC BLOOD PRESSURE: 80 MMHG | BODY MASS INDEX: 29.03 KG/M2 | WEIGHT: 175.27 LBS | HEART RATE: 90 BPM | SYSTOLIC BLOOD PRESSURE: 121 MMHG | OXYGEN SATURATION: 98 % | RESPIRATION RATE: 18 BRPM

## 2024-10-16 VITALS — HEIGHT: 65 IN | BODY MASS INDEX: 29.2 KG/M2 | WEIGHT: 175.27 LBS

## 2024-10-16 DIAGNOSIS — C18.1 MALIGNANT NEOPLASM OF APPENDIX (MULTI): ICD-10-CM

## 2024-10-16 DIAGNOSIS — C80.0 CARCINOMATOSIS (MULTI): ICD-10-CM

## 2024-10-16 PROCEDURE — 2500000004 HC RX 250 GENERAL PHARMACY W/ HCPCS (ALT 636 FOR OP/ED): Performed by: INTERNAL MEDICINE

## 2024-10-16 PROCEDURE — 96413 CHEMO IV INFUSION 1 HR: CPT

## 2024-10-16 PROCEDURE — 96416 CHEMO PROLONG INFUSE W/PUMP: CPT

## 2024-10-16 PROCEDURE — 2500000005 HC RX 250 GENERAL PHARMACY W/O HCPCS: Performed by: INTERNAL MEDICINE

## 2024-10-16 PROCEDURE — 96446 CHEMOTX ADMN PERTL CAV IMPL: CPT

## 2024-10-16 PROCEDURE — 96415 CHEMO IV INFUSION ADDL HR: CPT

## 2024-10-16 PROCEDURE — 96411 CHEMO IV PUSH ADDL DRUG: CPT

## 2024-10-16 RX ORDER — HEPARIN 100 UNIT/ML
500 SYRINGE INTRAVENOUS AS NEEDED
Status: DISCONTINUED | OUTPATIENT
Start: 2024-10-16 | End: 2024-10-16 | Stop reason: HOSPADM

## 2024-10-16 RX ORDER — FAMOTIDINE 10 MG/ML
20 INJECTION INTRAVENOUS ONCE AS NEEDED
Status: DISCONTINUED | OUTPATIENT
Start: 2024-10-16 | End: 2024-10-16 | Stop reason: HOSPADM

## 2024-10-16 RX ORDER — FLUOROURACIL 50 MG/ML
400 INJECTION, SOLUTION INTRAVENOUS ONCE
Status: COMPLETED | OUTPATIENT
Start: 2024-10-16 | End: 2024-10-16

## 2024-10-16 RX ORDER — ONDANSETRON HYDROCHLORIDE 8 MG/1
16 TABLET, FILM COATED ORAL ONCE
Status: COMPLETED | OUTPATIENT
Start: 2024-10-16 | End: 2024-10-16

## 2024-10-16 RX ORDER — ALBUTEROL SULFATE 0.83 MG/ML
3 SOLUTION RESPIRATORY (INHALATION) AS NEEDED
Status: DISCONTINUED | OUTPATIENT
Start: 2024-10-16 | End: 2024-10-16 | Stop reason: HOSPADM

## 2024-10-16 RX ORDER — HEPARIN SODIUM,PORCINE/PF 10 UNIT/ML
50 SYRINGE (ML) INTRAVENOUS AS NEEDED
Status: CANCELLED | OUTPATIENT
Start: 2024-10-16

## 2024-10-16 RX ORDER — HEPARIN SODIUM,PORCINE/PF 10 UNIT/ML
50 SYRINGE (ML) INTRAVENOUS AS NEEDED
Status: DISCONTINUED | OUTPATIENT
Start: 2024-10-16 | End: 2024-10-16 | Stop reason: HOSPADM

## 2024-10-16 RX ORDER — PROCHLORPERAZINE MALEATE 10 MG
10 TABLET ORAL EVERY 6 HOURS PRN
Status: DISCONTINUED | OUTPATIENT
Start: 2024-10-16 | End: 2024-10-16 | Stop reason: HOSPADM

## 2024-10-16 RX ORDER — PROCHLORPERAZINE EDISYLATE 5 MG/ML
10 INJECTION INTRAMUSCULAR; INTRAVENOUS EVERY 6 HOURS PRN
Status: DISCONTINUED | OUTPATIENT
Start: 2024-10-16 | End: 2024-10-16 | Stop reason: HOSPADM

## 2024-10-16 RX ORDER — DIPHENHYDRAMINE HYDROCHLORIDE 50 MG/ML
50 INJECTION INTRAMUSCULAR; INTRAVENOUS AS NEEDED
Status: DISCONTINUED | OUTPATIENT
Start: 2024-10-16 | End: 2024-10-16 | Stop reason: HOSPADM

## 2024-10-16 RX ORDER — LORAZEPAM 2 MG/ML
1 INJECTION INTRAMUSCULAR AS NEEDED
Status: DISCONTINUED | OUTPATIENT
Start: 2024-10-16 | End: 2024-10-16 | Stop reason: HOSPADM

## 2024-10-16 RX ORDER — EPINEPHRINE 0.3 MG/.3ML
0.3 INJECTION SUBCUTANEOUS EVERY 5 MIN PRN
Status: DISCONTINUED | OUTPATIENT
Start: 2024-10-16 | End: 2024-10-16 | Stop reason: HOSPADM

## 2024-10-16 RX ORDER — HEPARIN 100 UNIT/ML
500 SYRINGE INTRAVENOUS AS NEEDED
Status: CANCELLED | OUTPATIENT
Start: 2024-10-16

## 2024-10-16 RX ORDER — DEXAMETHASONE 6 MG/1
12 TABLET ORAL ONCE
Status: COMPLETED | OUTPATIENT
Start: 2024-10-16 | End: 2024-10-16

## 2024-10-16 ASSESSMENT — PAIN SCALES - GENERAL: PAINLEVEL_OUTOF10: 0-NO PAIN

## 2024-10-16 NOTE — PROGRESS NOTES
PSYCHOSOCIAL ASSESSMENT     Demographic Information  Monica Musa  1955  [id number]  Assessment Type:  Initial Assessment  Date of assessment: 10/16/24  Provider(s): Dr. 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  Concerns with Housing Environment: None     Safety  Patient safe at home? yes  History of Domestic Violence: none disclosed per patient     Functional Status  Functional status: Independent  Patient currently ambulates: Independently  Patient has following equipment: None  Other physical health issues that the patient is experiencing: n/a  What supports are in place to assist the patient: None  Transportation: self and family/friends     Finances/Insurance  Insurance: [employer] Medicare; $1500 OOP max and less than $800 remaining  Does the patient have any pending insurance applications: No   Hospital Financial Assistance: None  Patient's income source: penitentiary  Work History: retired teacher  Food Insecurity: No   Does the patient have any financial concerns: Yes, the patient inquired about cost of [facility] testing as well as cost of treatment. I explained that [facility] may not charge patients after insurance pays, but will get more information about this and follow-up with her. I explained that she has a $1500 OOP max and this is the most she will pay for medical services for the year. She expressed understanding and states she can likely afford the $1500. I explained that we often look into assistance options with the help of our Financial Counselors and Financial Navigators. Some options are income based.  She is agreeable to a referral to the FC team to further discuss and referral made today via email.   Any difficulties affording medications? No   Applicable [place]: No      Advance Directives  No Advance Directives scanned into the system; will discuss with the patient in the future as appropriate  Health Care Agent Status:Not Activated  Health Care Agent, When applicable: per demographics, son, Rodrigo Musa, is listed as health care agent  Comments: the patient is her own decision-maker at this time    Zoroastrian or Spiritual Identity  Comments: Protestant; Evangelical not discussed today    Mental Health  Active SI/HI: No  Mental Health Diagnosis, if applicable: n/a  Mood: appropriate to situation; alert and oriented x3 and easily engaged in conversation  Concerns relating to substance use (including alcohol/tobacco): n/a  Patient identified coping skills/strengths: supportive relationships, self-advocate, information-seeking, motivated for treatment  Cognitive Comments: no cognitive issues noted    Assessment  Potential Barriers to Care:  None Identified    Narrative: I met with the patient as she was receiving her initial treatment today to introduce social work services and offer support. She was very pleasant and easily engaged in conversation. We discussed financial and insurance questions as mentioned above; I referred her to Financial Counseling for further discussion and will follow-up with her after I learn more about billing for [employer]. She noted strong support through her sons and friends and reports doing well at home currently. I provided information regarding services and additional support available through [facility]. [facility] and agreed to call for an appointment. I also provided information regarding the role of social work at [facility] as well as coping with cancer/relaxation techniques. She expressed appreciation for the visit and denied further needs  at this time. I encouraged her to reach out to me should any arise and my contact information was provided. Social work will remain available to assist this patient.    FABRICIO Muñiz, HEVER-S

## 2024-10-16 NOTE — PROGRESS NOTES
"NUTRITION Assessment NOTE    Nutrition Assessment     Reason for Visit:  Monica Musa is a 69 y.o. 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  Noted hx of endometrial cancer.  Noted recent recent admission with partial SBO end of August.   Consult received as pt starting her infusion as well as weight loss and eating mostly liquids. Visit with patient today during her infusion at [facility].     Lab Results   Component Value Date/Time    GLUCOSE 134 (H) 10/03/2024 1411     10/03/2024 1411    K 3.2 (L) 10/03/2024 1411     10/03/2024 1411    CO2 27 10/03/2024 1411    ANIONGAP 15 10/03/2024 1411    BUN 13 10/03/2024 1411    CREATININE 0.44 (L) 10/03/2024 1411    EGFR >90 10/03/2024 1411    CALCIUM 9.7 10/03/2024 1411    ALBUMIN 3.8 10/03/2024 1411    ALKPHOS 100 10/03/2024 1411    PROT 5.9 (L) 10/03/2024 1411    AST 25 10/03/2024 1411    BILITOT 0.6 10/03/2024 1411    ALT 28 10/03/2024 1411     No results found for: \"VITD25\"    Anthropometrics:  Anthropometrics  Height: 165.5 cm (5' 5.16\")  Weight: 79.5 kg (175 lb 4.3 oz)  BMI (Calculated): 29.02  IBW/kg (Dietitian Calculated): 56.81 kg  Percent of IBW: 140 %  Weight Change  Weight History / % Weight Change: 36 lbs (17%) wt loss in the last 3 months and recent 9 lbs (5%) in the last month.  Significant Weight Loss: Yes  Interpretation of Weight Loss: >7.5% in 3 months (and 5% in the last month)        Wt Readings from Last 10 Encounters:   10/16/24 79.5 kg (175 lb 4.3 oz)   10/16/24 79.5 kg (175 lb 4.3 oz)   10/07/24 81.8 kg (180 lb 5.4 oz)   10/03/24 82.7 kg (182 lb 5.1 oz)   10/02/24 78.5 kg (173 lb)   09/18/24 83.6 kg (184 lb 4.9 oz)   08/27/24 87.6 kg (193 lb 3.2 oz)   08/14/24 91.2 kg (201 lb)   08/14/24 91.6 kg (201 lb 15.1 oz)   07/31/24 95.3 kg (210 lb)        Food And Nutrient Intake:  Food and Nutrient History  Food and Nutrient History: unable to tolerate most " "foods, reports of feeling like it is \"sitting in her stomach\".  Stating vomits at times with this feeling.  Energy Intake: Poor < 50 %  Fluid Intake: Good; stated drinks fluids all day; water, broth, juice, milk.  GI Symptoms: nausea, vomiting, bloating, constipation, abdominal pain, early satiety  GI Symptoms greater than 2 weeks: yes     Food Intake  Amount of Food: will have milk with pills; broths, juice, premier protein shake 1 per day, tried a baked potato and did ok with it, applesauce.  Food Variety: Absent                                            Food Supplement Intake  Oral Nutrition Supplements: Premier Protein (1 per day (160 calories, 30g protein per serving).  She reports only can tolerate chocolate.)    Medication and Complementary/Alternative Medicine Use  Prescription Medication Use: jardiance, metformin, zofran, protonix, zofran, compazine.      Nutrition Focused Physical Exam Findings:      Subcutaneous Fat Loss  Orbital Fat Pads: Mild-Moderate (slight dark circles and slight hollowing)  Buccal Fat Pads: Mild-Moderate (flat cheeks, minimal bounce)  Triceps: Severe (negligible fat tissue)    Muscle Wasting  Temporalis: Mild-Moderate (slight depression)              Energy Needs  Calculated Energy Needs Using Equations  Height: 165.5 cm (5' 5.16\")  Estimated Energy Needs  Total Energy Estimated Needs (kCal): 2000 kCal  Total Estimated Energy Need per Day (kCal/kg): 25 kCal/kg  Estimated Fluid Needs  Total Fluid Estimated Needs (mL): 2000 mL  Method for Estimating Needs: 1ml/kcal  Estimated Protein Needs  Total Protein Estimated Needs (g): 80 g  Method for Estimating Needs: 80-95g protein/day (1.0-1.2g protein/kg)        Nutrition Diagnosis   Malnutrition Diagnosis  Patient has Malnutrition Diagnosis: Yes  Diagnosis Status: New  Malnutrition Diagnosis: Moderate malnutrition related to chronic disease or condition  As Evidenced by: wt loss of >7.5% in 3 months; <75% of estimated nutritional needs " met.    Nutrition Diagnosis  Patient has Nutrition Diagnosis: Yes  Diagnosis Status (1): New  Nutrition Diagnosis 1: Inadequate oral intake  Related to (1): GI intolerances  As Evidenced by (1): pt reported mostly liquid intake; wt loss of 36 lbs (17%) in the last 3 months.  Additional Nutrition Diagnosis: Diagnosis 2  Diagnosis Status (2): New  Nutrition Diagnosis 2: Increased nutrient needs  Related to (2): increased demand for calories, protein and fluids  As Evidenced by (2): chemotherapy for diagnosis of appendiceal carcinoma.    Nutrition Interventions/Recommendations   Nutrition Prescription  Individualized Nutrition Prescription Provided for : Smaller frequent meals including protein rich foods; fluid intake of at least 64 oz per day.  Trial of smoothies with adding ensure clear or 1 scoop protein powder    Food and Nutrition Delivery  Food and Nutrition Delivery  Meals & Snacks: Modify schedule of foods/fluids  Goals: smaller frequent meals/snacks  Medical Food Supplement: Premier Protein, Ensure Clear  Goals: Continue with premier protein shakes as tolerated; provided ensure clear samples to try, can add into smoothies if not like taste alone.    Nutrition Education  Nutrition Education  Nutrition Education Content: Content related nutrition education  Goals: Provided eating hints book and discussed.    Coordination of Care  Coordination of Nutrition Care by a Nutrition Professional  Collaboration and referral of nutrition care: Collaboration by nutrition professional with other providers    Patient Instructions   Smaller frequent meals including lean protein foods.  At least 64 oz of fluids per day.  Trial of ensure clear, can add to smoothies, or add 1 scoop protein powder.  Continue with premier protein shakes as tolerated.     Nutrition Monitoring and Evaluation   Food/Nutrient Related History Monitoring  Monitoring and Evaluation Plan: Energy intake, Fluid intake, Protein intake  Energy Intake:  Estimated energy intake  Criteria: 8395-6489 calories per day  Fluid Intake: Estimated fluid intake  Criteria: 2000-2300ml fluids per day  Estimated protein intake: Estimated protein intake  Criteria: increase lower fat protein rich food as tolerated; goal is around 80-95g protein per day.  Body Composition/Growth/Weight History  Monitoring and Evaluation Plan: Weight  Weight: Weight change  Criteria: weight maintenance  Biochemical Data, Medical Tests and Procedures  Monitoring and Evaluation Plan: Electrolyte/renal panel  Nutrition Focused Physical Findings  Monitoring and Evaluation Plan: Digestive System      Time Spent  Prep time on day of patient encounter: 10 minutes  Time spent directly with patient, family or caregiver: 20 minutes  Additional Time Spent on Patient Care Activities: 0 minutes  Documentation Time: 15 minutes  Other Time Spent: 0 minutes  Total: 45 minutes

## 2024-10-16 NOTE — PATIENT INSTRUCTIONS
Smaller frequent meals including lean protein foods.  At least 64 oz of fluids per day.  Trial of ensure clear, can add to smoothies, or add 1 scoop protein powder.  Continue with premier protein shakes as tolerated.

## 2024-10-17 LAB
DNA RANGE(S) EXAMINED NAR: NORMAL
GENE DIS ANL INTERP-IMP: NORMAL
GENE DIS ASSESSED: NORMAL
GENE MUT TESTED BLD/T: 1.6 M/MB
REASON FOR STUDY: NORMAL
TEMPUS GENOMIC NOTE: NORMAL
TEMPUS GERMLINE NOTE: NORMAL
TEMPUS LCA: NORMAL
TEMPUS MSI NOTE: NORMAL
TEMPUS PERTINENTNEGATIVES: NORMAL
TEMPUS PORTAL: NORMAL
TEMPUS THERAPY1: NORMAL
TEMPUS THERAPYCOUNT: 1

## 2024-10-18 ENCOUNTER — INFUSION (OUTPATIENT)
Dept: HEMATOLOGY/ONCOLOGY | Facility: CLINIC | Age: 69
End: 2024-10-18
Payer: MEDICARE

## 2024-10-18 ENCOUNTER — APPOINTMENT (OUTPATIENT)
Dept: HEMATOLOGY/ONCOLOGY | Facility: CLINIC | Age: 69
End: 2024-10-18
Payer: MEDICARE

## 2024-10-18 ENCOUNTER — SOCIAL WORK (OUTPATIENT)
Dept: HEMATOLOGY/ONCOLOGY | Facility: CLINIC | Age: 69
End: 2024-10-18

## 2024-10-18 VITALS
OXYGEN SATURATION: 97 % | TEMPERATURE: 97 F | BODY MASS INDEX: 28.73 KG/M2 | SYSTOLIC BLOOD PRESSURE: 107 MMHG | RESPIRATION RATE: 16 BRPM | HEART RATE: 76 BPM | DIASTOLIC BLOOD PRESSURE: 70 MMHG | WEIGHT: 173.5 LBS

## 2024-10-18 DIAGNOSIS — C18.1 MALIGNANT NEOPLASM OF APPENDIX (MULTI): ICD-10-CM

## 2024-10-18 DIAGNOSIS — C80.0 CARCINOMATOSIS (MULTI): ICD-10-CM

## 2024-10-18 DIAGNOSIS — K21.00 GASTROESOPHAGEAL REFLUX DISEASE WITH ESOPHAGITIS WITHOUT HEMORRHAGE: ICD-10-CM

## 2024-10-18 PROCEDURE — 96523 IRRIG DRUG DELIVERY DEVICE: CPT

## 2024-10-18 RX ORDER — HEPARIN 100 UNIT/ML
500 SYRINGE INTRAVENOUS AS NEEDED
OUTPATIENT
Start: 2024-10-18

## 2024-10-18 RX ORDER — PALONOSETRON 0.05 MG/ML
250 INJECTION, SOLUTION INTRAVENOUS ONCE
Status: DISCONTINUED | OUTPATIENT
Start: 2024-10-18 | End: 2024-10-18 | Stop reason: HOSPADM

## 2024-10-18 RX ORDER — HEPARIN SODIUM,PORCINE/PF 10 UNIT/ML
50 SYRINGE (ML) INTRAVENOUS AS NEEDED
OUTPATIENT
Start: 2024-10-18

## 2024-10-18 RX ORDER — PANTOPRAZOLE SODIUM 40 MG/1
40 TABLET, DELAYED RELEASE ORAL DAILY
Qty: 90 TABLET | Refills: 0 | Status: SHIPPED | OUTPATIENT
Start: 2024-10-18 | End: 2025-01-16

## 2024-10-18 ASSESSMENT — PAIN SCALES - GENERAL: PAINLEVEL_OUTOF10: 0-NO PAIN

## 2024-10-18 NOTE — PROGRESS NOTES
Social work continues to follow this patient for ongoing assessment and support. I met briefly with the patient as she returned to clinic for pump disconnect today. She was present with a friend. She remains very pleasant and easily engaged in conversation. Per her question earlier this week, I looked into charges for [facility] testing. I learned that [facility] accepts the insurance allowed amount and does not bill patients the difference between the billed and allowed amounts. I also learned that insurance may assign patient responsibility related to deductible and coinsurance. Additionally, [employer] has options for financial assistance if needed. I shared the same with the patient. I explained it looks like she has met her deductible, however I am uncertain if she will be charged for [employer] testing. She expressed understanding and appreciation for the information. No further needs identified today. Social work will remain available to assist this patient.    Genesis Huggins, FABRICIO, HEVER-S

## 2024-10-19 NOTE — PROGRESS NOTES
Patient ID: Monica Musa is a 69 y.o. female.  Referring Physician: Margot Smart MD  [id number] [facility] [address]  [place] [id number]  Primary Care Provider: Guerrero Tavarez DO    Oncology History Overview Note   - 7/8/2022 TLH, BSO, SLND combined case with Dr. Lal. 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 There was no lymphovascular invasion. There was a focus of MELF pattern invasion. Tumor board recommendations: Surveillance. MMR testing on prior endometrial biopsy specimen was normal.   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  - CT 8/26/24: Small volume ascites. Left pelvic nodule with some nodularity  along the left paracolic gutter worrisome for peritoneal carcinomatosis.  Admitted with bowel obstruction.  No lesion large enough for biopsy  - 9/2024 diagnostic laparoscopy - biopsy c/w non gyn primary - referred to med onc     Endometrial carcinoma (Multi)   5/22/2023 Initial Diagnosis    Endometrial carcinoma (Multi)     Malignant neoplasm of appendix (Multi)   10/3/2024 Initial Diagnosis    Malignant neoplasm of appendix (Multi)     10/16/2024 -  Chemotherapy    mFOLFOX6 (Fluorouracil Continuous Infusion / Leucovorin / Oxaliplatin), 14 Day Cycles     Carcinomatosis (Multi)   10/3/2024 Initial Diagnosis    Carcinomatosis (Multi)     10/16/2024 -  Chemotherapy    mFOLFOX6 (Fluorouracil Continuous Infusion / Leucovorin / Oxaliplatin), 14 Day Cycles           Subjective    HPI   Monica Musa is a 68 yo F with history of grade 1 endometroid adenocarcinoma MMI, no LVSI, pMMR s/p TLH, BSP, SLND 7/8/22. 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     Since last visit patient had a mediport placed on 10/10/24 and cycle 1 mFOLFOX on 10/16/24. She reports she has not tolerated treatment well. She says she has been vomiting after eating/drinking, taking zofran which helps resolve nausea but does  not stop the vomiting. She has not tried compazine. She reports she was having solid bowel movements daily or more, but developed diarrhea this morning x1. She increased her water intake yesterday since cold sensitivity resolved. She reports dizziness with position changes and a lot of fatigue. She reports her appetite is not good, drinking Premier Protein shakes.     Patient reports no significant pain.  States no new cough, chest pain, shortness of breath.  No new fevers, edema, skin changes, mouth sores, falls, vision changes/double vision, headaches, numbness/tingling new or worsening, one sided weakness or slurred speech.    She lives at home with her .    Objective   Visit Vitals  /90 (BP Location: Right arm, Patient Position: Sitting, BP Cuff Size: Adult)   Pulse 110   Temp 36.6 °C (97.9 °F) (Temporal)   Resp 16   Wt 73.5 kg (162 lb 0.6 oz)   SpO2 97%   BMI 26.83 kg/m²   OB Status Hysterectomy   Smoking Status Former   BSA 1.84 m²       Past Medical History:   Diagnosis Date    Anemia     Body mass index (BMI) 36.0-36.9, adult 09/02/2021    BMI 36.0-36.9,adult    Body mass index (BMI) 37.0-37.9, adult 02/05/2021    BMI 37.0-37.9, adult    Cancer (Multi) 2021    Diabetes mellitus (Multi)     DVT (deep venous thrombosis) (Multi) 2023    LEFT LEG    Encounter for general adult medical examination without abnormal findings     Annual physical exam    Fracture of wrist 2019    History of ovarian cancer     Hyperlipidemia     Hypertension     Morbid (severe) obesity due to excess calories (Multi) 08/26/2021    Class 2 severe obesity due to excess calories with serious comorbidity and body mass index (BMI) of 37.0 to 37.9 in adult    Morbid (severe) obesity due to excess calories (Multi) 06/09/2022    Class 2 severe obesity due to excess calories with serious comorbidity and body mass index (BMI) of 36.0 to 36.9 in adult    Personal history of other diseases of the respiratory system 01/03/2020     History of acute bronchitis    Personal history of other drug therapy 10/11/2019    History of influenza vaccination     Past Surgical History:   Procedure Laterality Date    OTHER SURGICAL HISTORY Right 06/09/2022    Arm surgery    OTHER SURGICAL HISTORY  07/25/2022    Salpingo-oophorectomy bilateral    OTHER SURGICAL HISTORY  07/25/2022    Laparoscopic hysterectomy    OTHER SURGICAL HISTORY  07/25/2022    Cystoscopy    OTHER SURGICAL HISTORY  07/25/2022    Lymph node surgery         Family History   Problem Relation Name Age of Onset    Diabetes Mother Concha Musa  reports that she has quit smoking. Her smoking use included cigarettes. She has a 5 pack-year smoking history. She has never used smokeless tobacco.  She  reports no history of alcohol use.  She  reports no history of drug use.    Physical Exam  Constitutional:       General: She is not in acute distress.     Appearance: Normal appearance.   HENT:      Head: Normocephalic.      Mouth/Throat:      Mouth: Mucous membranes are moist.      Pharynx: No oropharyngeal exudate or posterior oropharyngeal erythema.   Eyes:      General: No scleral icterus.     Pupils: Pupils are equal, round, and reactive to light.   Cardiovascular:      Rate and Rhythm: Normal rate and regular rhythm.   Pulmonary:      Effort: Pulmonary effort is normal. No respiratory distress.      Breath sounds: Normal breath sounds. No wheezing.   Abdominal:      General: Abdomen is flat. Bowel sounds are normal. There is no distension.      Palpations: Abdomen is soft.      Tenderness: There is no abdominal tenderness.   Musculoskeletal:         General: Normal range of motion.      Cervical back: Normal range of motion and neck supple.   Skin:     General: Skin is warm and dry.   Neurological:      General: No focal deficit present.      Mental Status: She is alert and oriented to person, place, and time. Mental status is at baseline.      Motor: No  weakness.      Gait: Gait normal.   Psychiatric:         Mood and Affect: Mood normal.         Behavior: Behavior normal.         Judgment: Judgment normal.           Performance Status:  Asymptomatic    - EGD 10/2/24 shows abnormal mucosa at the GE junction but no clear mass however there was diffuse nodular gastritis which was biopsied and pathology report states: neg for H Pylori. A. DUODENAL BULB  BIOPSY:   --SMALL BOWEL MUCOSA WITH PRESERVED VILLOUS ARCHITECTURE AND FOVEOLAR METAPLASIA, CONSISTENT WITH PEPTIC INJURY  B. STOMACH ANTRUM BIOPSY:   --CHRONIC INACTIVE GASTRITIS  --NO MORPHOLOGIC EVIDENCE OF HELICOBACTER PYLORI-LIKE ORGANISMS  C. ESOPHAGOGASTRIC JUNCTION BIOPSY:   --ACUTELY INFLAMED SQUAMOCOLUMNAR JUNCTION WITH REFLUX ASSOCIATED CHANGE  --INTESTINAL METAPLASIA, DYSPLASIA OR MALIGNANCY ARE NOT IDENTIFIED  D. 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  There are some features of goblet cells which favor appendiceal primary however pancreaticobiliary origin cannot be excluded.  MSI status intact  - [id number] also completed on blood from 9/30/2024 which was unremarkable  - Tempus on tissue in process  - Colonoscopy on 7/31/2024 was technically difficult due to scattered diverticulosis in the ascending colon and some hemorrhoids  - EGD 10/2/24 shows abnormal mucosa at the GE junction but no clear mass however there was diffuse nodular gastritis which was biopsied and pathology report states: neg for H Pylori.   - No variants detected in the DPYD gene    - Signatera in process  - 10/3/24: CA19.9 <4.00, CEA 1.4,     10/3/24  Today we discussed diagnosis of likely advanced appendiceal carcinoma given there is no mass seen on recent imaging 8/26/2024 with no masses seen  -Goblet cells seen on pathology likely appendiceal origin  -Will plan for CA 19-9 and CEA as baseline  -Will await EGD pathology to confirm no evidence of malignancy in the esophagus  -Discussed neck step is systemic therapy with modified FOLFOX   - could consider HIPEC after 3-4 cycles based on response  - Dr Ontiveros aware of patient  -Refer port placement next week and cycle 1 day 1 on 10/9/2024  -Reviewed side effects of 5-FU, oxaliplatin and consent signed  -Prescription sent for nausea to be used as needed  -We will plan for day 3 Aloxi  -Given limited support at home will likely hold off on disconnect at home for now      10/23/2024:   - Mediport placed 10/10/24  - Cycle 1 mFOLFOX given 10/16/24: Fluorouracil 2,400 mg/m2, oxaliplatin 85 mg/m2 with pre meds zofran 16 mg and decadron 12 mg, nothing given on day 3   - She has been having vomiting with eating/drinking, we will try reglan with meals and she will not take compazine due to DDI   - She can continue zofran as needed   - She will monitor for continued diarrhea   - Encouraged her to try Glucerna and message sent to dietician regarding ongoing weight loss   - Labs to be drawn today were missed   - Due to tachycardia she was given 1 liter IVF, HR improved to normal limits   - Due to nausea/vomiting she was given decadron 8 mg  - Felt some improvement before leaving   - Last visit with GYN, Dr. Greene, was on 10/7/24   - She is already scheduled to return on 10/29 to see Dr. 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  Malignant neoplasm of appendix (Multi)  -     Clinic Appointment Request Follow Up; [facility]  Carcinomatosis (Multi)  -     Clinic Appointment Request Follow Up; [facility]  Nausea  -     metoclopramide (Reglan) 10 mg tablet; Take 1 tablet (10 mg) by mouth every 8 hours if needed (with breakfast, lunch, dinner to prevent Nausea/Vomiting).      Hang Menon, APRN-CNP

## 2024-10-21 DIAGNOSIS — C18.1 MALIGNANT NEOPLASM OF APPENDIX (MULTI): ICD-10-CM

## 2024-10-21 DIAGNOSIS — C80.0 CARCINOMATOSIS (MULTI): ICD-10-CM

## 2024-10-23 ENCOUNTER — INFUSION (OUTPATIENT)
Dept: HEMATOLOGY/ONCOLOGY | Facility: CLINIC | Age: 69
End: 2024-10-23
Payer: MEDICARE

## 2024-10-23 ENCOUNTER — APPOINTMENT (OUTPATIENT)
Dept: HEMATOLOGY/ONCOLOGY | Facility: CLINIC | Age: 69
End: 2024-10-23
Payer: MEDICARE

## 2024-10-23 ENCOUNTER — SOCIAL WORK (OUTPATIENT)
Dept: HEMATOLOGY/ONCOLOGY | Facility: CLINIC | Age: 69
End: 2024-10-23

## 2024-10-23 ENCOUNTER — OFFICE VISIT (OUTPATIENT)
Dept: HEMATOLOGY/ONCOLOGY | Facility: CLINIC | Age: 69
End: 2024-10-23
Payer: MEDICARE

## 2024-10-23 VITALS
SYSTOLIC BLOOD PRESSURE: 158 MMHG | BODY MASS INDEX: 26.83 KG/M2 | DIASTOLIC BLOOD PRESSURE: 90 MMHG | TEMPERATURE: 97.9 F | HEART RATE: 110 BPM | OXYGEN SATURATION: 97 % | RESPIRATION RATE: 16 BRPM | WEIGHT: 162.04 LBS

## 2024-10-23 VITALS
HEART RATE: 78 BPM | SYSTOLIC BLOOD PRESSURE: 129 MMHG | TEMPERATURE: 98.1 F | OXYGEN SATURATION: 96 % | DIASTOLIC BLOOD PRESSURE: 79 MMHG | RESPIRATION RATE: 16 BRPM

## 2024-10-23 DIAGNOSIS — R11.0 NAUSEA: ICD-10-CM

## 2024-10-23 DIAGNOSIS — C24.8 MALIGNANT NEOPLASM OF OVERLAPPING SITES OF BILIARY TRACT (MULTI): Primary | ICD-10-CM

## 2024-10-23 DIAGNOSIS — C24.8 MALIGNANT NEOPLASM OF OVERLAPPING SITES OF BILIARY TRACT (MULTI): ICD-10-CM

## 2024-10-23 DIAGNOSIS — C80.0 CARCINOMATOSIS (MULTI): ICD-10-CM

## 2024-10-23 DIAGNOSIS — C18.1 MALIGNANT NEOPLASM OF APPENDIX (MULTI): ICD-10-CM

## 2024-10-23 PROCEDURE — 99214 OFFICE O/P EST MOD 30 MIN: CPT

## 2024-10-23 PROCEDURE — 96361 HYDRATE IV INFUSION ADD-ON: CPT | Mod: INF

## 2024-10-23 PROCEDURE — 2500000004 HC RX 250 GENERAL PHARMACY W/ HCPCS (ALT 636 FOR OP/ED)

## 2024-10-23 PROCEDURE — 1126F AMNT PAIN NOTED NONE PRSNT: CPT

## 2024-10-23 PROCEDURE — 1159F MED LIST DOCD IN RCRD: CPT

## 2024-10-23 PROCEDURE — 3077F SYST BP >= 140 MM HG: CPT

## 2024-10-23 PROCEDURE — 1123F ACP DISCUSS/DSCN MKR DOCD: CPT

## 2024-10-23 PROCEDURE — 1157F ADVNC CARE PLAN IN RCRD: CPT

## 2024-10-23 PROCEDURE — 3080F DIAST BP >= 90 MM HG: CPT

## 2024-10-23 PROCEDURE — 96374 THER/PROPH/DIAG INJ IV PUSH: CPT | Mod: INF

## 2024-10-23 RX ORDER — HEPARIN 100 UNIT/ML
500 SYRINGE INTRAVENOUS AS NEEDED
Status: CANCELLED | OUTPATIENT
Start: 2024-10-23

## 2024-10-23 RX ORDER — HEPARIN SODIUM,PORCINE/PF 10 UNIT/ML
50 SYRINGE (ML) INTRAVENOUS AS NEEDED
Status: CANCELLED | OUTPATIENT
Start: 2024-10-23

## 2024-10-23 RX ORDER — METOCLOPRAMIDE 10 MG/1
10 TABLET ORAL EVERY 8 HOURS PRN
Qty: 90 TABLET | Refills: 2 | Status: SHIPPED | OUTPATIENT
Start: 2024-10-23

## 2024-10-23 ASSESSMENT — PAIN SCALES - GENERAL: PAINLEVEL_OUTOF10: 0-NO PAIN

## 2024-10-23 NOTE — PATIENT INSTRUCTIONS
Please start taking Reglan with meals to prevent vomiting.   You can take Zofran every 8 hours as needed if you have nausea.   - Do not take prochlorperazine (Compazine) at all because of an interaction with Reglan.  Continue to hydrate well with water.    Call if new/worsening symptoms including diarrhea in the meantime.     Call in the meantime for questions or concerns at [phone]    Thank you,   Boni Whaley, ZAYDA-CNP

## 2024-10-23 NOTE — PROGRESS NOTES
Social work continues to follow this patient for ongoing assessment and support. The patient returned to clinic for follow-up today. She stopped me in the lobby and expressed concern that she received a bill for $6500. I explained that I will look into this further, but mentioned that there was a glitch in Epic over the weekend where insurances were not billed as they were supposed to. After reviewing Epic, I learned that the patient was sent a high balance over the weekend, but it looks like it is now being charged to insurance. I explained the same to the patient, who expressed relief and appreciation for the information. No further needs identified currently. Social work will remain available to assist this patient.    Genesis Huggins, FABRICIO, ABENA

## 2024-10-24 ENCOUNTER — LAB (OUTPATIENT)
Dept: LAB | Facility: CLINIC | Age: 69
End: 2024-10-24
Payer: MEDICARE

## 2024-10-24 ENCOUNTER — TELEPHONE (OUTPATIENT)
Dept: HEMATOLOGY/ONCOLOGY | Facility: CLINIC | Age: 69
End: 2024-10-24
Payer: MEDICARE

## 2024-10-24 ENCOUNTER — NUTRITION (OUTPATIENT)
Dept: HEMATOLOGY/ONCOLOGY | Facility: CLINIC | Age: 69
End: 2024-10-24
Payer: MEDICARE

## 2024-10-24 DIAGNOSIS — C18.1 MALIGNANT NEOPLASM OF APPENDIX (MULTI): ICD-10-CM

## 2024-10-24 DIAGNOSIS — C80.0 CARCINOMATOSIS (MULTI): ICD-10-CM

## 2024-10-24 LAB
ALBUMIN SERPL BCP-MCNC: 3.7 G/DL (ref 3.4–5)
ALP SERPL-CCNC: 89 U/L (ref 33–136)
ALT SERPL W P-5'-P-CCNC: 19 U/L (ref 7–45)
ANION GAP SERPL CALC-SCNC: 18 MMOL/L (ref 10–20)
AST SERPL W P-5'-P-CCNC: 17 U/L (ref 9–39)
BILIRUB SERPL-MCNC: 0.8 MG/DL (ref 0–1.2)
BUN SERPL-MCNC: 17 MG/DL (ref 6–23)
CALCIUM SERPL-MCNC: 9.7 MG/DL (ref 8.6–10.3)
CHLORIDE SERPL-SCNC: 104 MMOL/L (ref 98–107)
CO2 SERPL-SCNC: 25 MMOL/L (ref 21–32)
CREAT SERPL-MCNC: 0.58 MG/DL (ref 0.5–1.05)
EGFRCR SERPLBLD CKD-EPI 2021: >90 ML/MIN/1.73M*2
GLUCOSE SERPL-MCNC: 127 MG/DL (ref 74–99)
HOLD SPECIMEN: NORMAL
MAGNESIUM SERPL-MCNC: 1.9 MG/DL (ref 1.6–2.4)
POTASSIUM SERPL-SCNC: 2.9 MMOL/L (ref 3.5–5.3)
PROT SERPL-MCNC: 6.1 G/DL (ref 6.4–8.2)
SODIUM SERPL-SCNC: 144 MMOL/L (ref 136–145)

## 2024-10-24 PROCEDURE — 83735 ASSAY OF MAGNESIUM: CPT

## 2024-10-24 PROCEDURE — 80053 COMPREHEN METABOLIC PANEL: CPT

## 2024-10-24 NOTE — TELEPHONE ENCOUNTER
I spoke with Monica, she states that she will stop by the outpt lab today at [facility] to have her blood work drawn (cmp,mag) and she states that she is also going to  her reglan medication today as well. I did go over the instructions on how to take the reglan. She states that she is feeling better and denies fevers. Her stool is no longer liquid watery and she did have 2 bowel movements today that are starting to firm up. She has taken one dose of imodium today. She still has nausea but no vomiting today. She took a dose of zofran today.     She attempted to eat a bit of a banana and breakfast pastry today, and drank milk and grape juice. I notified her that she could try a more bland diet while not feeling well including the BRAT diet. She is also aware to try smaller more frequent meals. She is also aware of the importance of maintaining hydration with fluids like water, tea, Gatorade, broth, etc.     She is aware that if her symptoms do not continue to improve by noon tomorrow (10/25/24) or if they worsen before then to please call the clinic for further assistance. She gave verbal understanding of all information and was appreciative of the call.

## 2024-10-24 NOTE — PROGRESS NOTES
NUTRITION NOTE:  Consult received from [facility] DUENAS yesterday that patient with significant weight loss, and unable to keep anything down.  Patient was given IVF and decadron.  Boni started patient on Reglan, and pt is to take Zofran every 8 hours for nausea as well.      Called pt today to see how she is feeling.  She reported feeling little better today, had little more energy.  She stated she ate a little today, and was able to keep it down.  She stated she just picked up Reglan, has not taken it yet.  Did not try Glucerna yet.    Encouraged smaller frequent snacks, as discussed last week with patient.  Trial of ONS glucerna, or protein powders mixed into smoothies.     Weights:  07/16/2024: 95.7 kg (211 lbs)  09/18/2024: 83.6 kg (184 lbs)  10/16/2024: 79.5 kg (175 lbs)  10/23/2024: 73.5 kg (162 lbs)  Indicates 49 lb (23%) wt loss in the last 3 months;  27 lbs (12%) wt loss in the last month and 13 lbs (7%) wt loss in the last week.     With patient unable to meet estimated nutritional needs orally and severe weight loss noted over the last 3 months, may consider alternative form of nutrition . Will follow up with patient next week when she is back in for her infusion.

## 2024-10-24 NOTE — TELEPHONE ENCOUNTER
I spoke with Monica, she states that she will be at the clinic tomorrow 10/25/24 at 1015 am for her IV K+ replacement. She was appreciative of the call and all questions were answered at this time.

## 2024-10-24 NOTE — TELEPHONE ENCOUNTER
I left a message for Monica, asking her to please call the clinic when she is able for updates from the team. Call back information was left and we will await her call back.    When she does call back, she will be notified that her K+ was 2.9 and that [facility]i [facility] would like for her to come to the clinic tomorrow for IV replacement. She will be notified that the clinic has availability at 1015 am.

## 2024-10-25 ENCOUNTER — APPOINTMENT (OUTPATIENT)
Dept: HEMATOLOGY/ONCOLOGY | Facility: CLINIC | Age: 69
End: 2024-10-25
Payer: MEDICARE

## 2024-10-25 ENCOUNTER — INFUSION (OUTPATIENT)
Dept: HEMATOLOGY/ONCOLOGY | Facility: CLINIC | Age: 69
End: 2024-10-25
Payer: MEDICARE

## 2024-10-25 VITALS
BODY MASS INDEX: 27.02 KG/M2 | WEIGHT: 163.14 LBS | TEMPERATURE: 95.2 F | DIASTOLIC BLOOD PRESSURE: 83 MMHG | HEART RATE: 93 BPM | RESPIRATION RATE: 18 BRPM | SYSTOLIC BLOOD PRESSURE: 140 MMHG | OXYGEN SATURATION: 98 %

## 2024-10-25 DIAGNOSIS — C18.1 MALIGNANT NEOPLASM OF APPENDIX (MULTI): Primary | ICD-10-CM

## 2024-10-25 DIAGNOSIS — C18.1 MALIGNANT NEOPLASM OF APPENDIX (MULTI): ICD-10-CM

## 2024-10-25 DIAGNOSIS — C80.0 CARCINOMATOSIS (MULTI): Primary | ICD-10-CM

## 2024-10-25 DIAGNOSIS — C80.0 CARCINOMATOSIS (MULTI): ICD-10-CM

## 2024-10-25 LAB
DNA RANGE(S) EXAMINED NAR: NORMAL
GENE DIS ANL INTERP-IMP: NORMAL
GENE DIS ASSESSED: NORMAL
GENE MUT TESTED BLD/T: 1.6 M/MB
REASON FOR STUDY: NORMAL
TEMPUS GENOMIC NOTE: NORMAL
TEMPUS GERMLINE NOTE: NORMAL
TEMPUS LCA: NORMAL
TEMPUS MSI NOTE: NORMAL
TEMPUS PERTINENTNEGATIVES: NORMAL
TEMPUS PORTAL: NORMAL
TEMPUS THERAPY1: NORMAL
TEMPUS THERAPYCOUNT: 1
TEMPUS XR RESULT 1: NORMAL

## 2024-10-25 PROCEDURE — 2500000004 HC RX 250 GENERAL PHARMACY W/ HCPCS (ALT 636 FOR OP/ED)

## 2024-10-25 PROCEDURE — 2500000001 HC RX 250 WO HCPCS SELF ADMINISTERED DRUGS (ALT 637 FOR MEDICARE OP)

## 2024-10-25 PROCEDURE — 96365 THER/PROPH/DIAG IV INF INIT: CPT | Mod: INF

## 2024-10-25 PROCEDURE — 96366 THER/PROPH/DIAG IV INF ADDON: CPT | Mod: INF

## 2024-10-25 RX ORDER — FLUOROURACIL 50 MG/ML
400 INJECTION, SOLUTION INTRAVENOUS ONCE
OUTPATIENT
Start: 2024-10-30

## 2024-10-25 RX ORDER — ONDANSETRON HYDROCHLORIDE 8 MG/1
16 TABLET, FILM COATED ORAL ONCE
OUTPATIENT
Start: 2024-11-13 | End: 2024-11-13

## 2024-10-25 RX ORDER — HEPARIN SODIUM,PORCINE/PF 10 UNIT/ML
50 SYRINGE (ML) INTRAVENOUS AS NEEDED
OUTPATIENT
Start: 2024-10-25

## 2024-10-25 RX ORDER — FLUOROURACIL 50 MG/ML
400 INJECTION, SOLUTION INTRAVENOUS ONCE
OUTPATIENT
Start: 2024-11-13

## 2024-10-25 RX ORDER — LORAZEPAM 2 MG/ML
1 INJECTION INTRAMUSCULAR AS NEEDED
OUTPATIENT
Start: 2024-10-30

## 2024-10-25 RX ORDER — EPINEPHRINE 0.3 MG/.3ML
0.3 INJECTION SUBCUTANEOUS EVERY 5 MIN PRN
OUTPATIENT
Start: 2024-10-30

## 2024-10-25 RX ORDER — POTASSIUM CHLORIDE 29.8 MG/ML
40 INJECTION INTRAVENOUS ONCE
Status: COMPLETED | OUTPATIENT
Start: 2024-10-25 | End: 2024-10-25

## 2024-10-25 RX ORDER — PROCHLORPERAZINE EDISYLATE 5 MG/ML
10 INJECTION INTRAMUSCULAR; INTRAVENOUS EVERY 6 HOURS PRN
OUTPATIENT
Start: 2024-11-13

## 2024-10-25 RX ORDER — PROCHLORPERAZINE MALEATE 10 MG
10 TABLET ORAL EVERY 6 HOURS PRN
OUTPATIENT
Start: 2024-11-13

## 2024-10-25 RX ORDER — FAMOTIDINE 10 MG/ML
20 INJECTION INTRAVENOUS ONCE AS NEEDED
OUTPATIENT
Start: 2024-10-30

## 2024-10-25 RX ORDER — POTASSIUM CHLORIDE 14.9 MG/ML
20 INJECTION INTRAVENOUS ONCE
Status: DISCONTINUED | OUTPATIENT
Start: 2024-10-25 | End: 2024-10-25 | Stop reason: SDUPTHER

## 2024-10-25 RX ORDER — PALONOSETRON 0.05 MG/ML
250 INJECTION, SOLUTION INTRAVENOUS ONCE
OUTPATIENT
Start: 2024-11-15 | End: 2024-11-15

## 2024-10-25 RX ORDER — EPINEPHRINE 0.3 MG/.3ML
0.3 INJECTION SUBCUTANEOUS EVERY 5 MIN PRN
OUTPATIENT
Start: 2024-11-13

## 2024-10-25 RX ORDER — HEPARIN 100 UNIT/ML
500 SYRINGE INTRAVENOUS AS NEEDED
OUTPATIENT
Start: 2024-10-25

## 2024-10-25 RX ORDER — PALONOSETRON 0.05 MG/ML
250 INJECTION, SOLUTION INTRAVENOUS ONCE
OUTPATIENT
Start: 2024-11-01 | End: 2024-11-01

## 2024-10-25 RX ORDER — PROCHLORPERAZINE MALEATE 10 MG
10 TABLET ORAL EVERY 6 HOURS PRN
OUTPATIENT
Start: 2024-10-30

## 2024-10-25 RX ORDER — LORAZEPAM 2 MG/ML
1 INJECTION INTRAMUSCULAR AS NEEDED
OUTPATIENT
Start: 2024-11-13

## 2024-10-25 RX ORDER — DEXAMETHASONE 6 MG/1
12 TABLET ORAL ONCE
OUTPATIENT
Start: 2024-10-30 | End: 2024-10-30

## 2024-10-25 RX ORDER — LOPERAMIDE HYDROCHLORIDE 2 MG/1
2 CAPSULE ORAL ONCE
Status: COMPLETED | OUTPATIENT
Start: 2024-10-25 | End: 2024-10-25

## 2024-10-25 RX ORDER — FAMOTIDINE 10 MG/ML
20 INJECTION INTRAVENOUS ONCE AS NEEDED
OUTPATIENT
Start: 2024-11-13

## 2024-10-25 RX ORDER — DIPHENHYDRAMINE HYDROCHLORIDE 50 MG/ML
50 INJECTION INTRAMUSCULAR; INTRAVENOUS AS NEEDED
OUTPATIENT
Start: 2024-10-30

## 2024-10-25 RX ORDER — ALBUTEROL SULFATE 0.83 MG/ML
3 SOLUTION RESPIRATORY (INHALATION) AS NEEDED
OUTPATIENT
Start: 2024-11-13

## 2024-10-25 RX ORDER — ALBUTEROL SULFATE 0.83 MG/ML
3 SOLUTION RESPIRATORY (INHALATION) AS NEEDED
OUTPATIENT
Start: 2024-10-30

## 2024-10-25 RX ORDER — DEXAMETHASONE 6 MG/1
12 TABLET ORAL ONCE
OUTPATIENT
Start: 2024-11-13 | End: 2024-11-13

## 2024-10-25 RX ORDER — PROCHLORPERAZINE EDISYLATE 5 MG/ML
10 INJECTION INTRAMUSCULAR; INTRAVENOUS EVERY 6 HOURS PRN
OUTPATIENT
Start: 2024-10-30

## 2024-10-25 RX ORDER — ONDANSETRON HYDROCHLORIDE 8 MG/1
16 TABLET, FILM COATED ORAL ONCE
OUTPATIENT
Start: 2024-10-30 | End: 2024-10-30

## 2024-10-25 RX ORDER — DIPHENHYDRAMINE HYDROCHLORIDE 50 MG/ML
50 INJECTION INTRAMUSCULAR; INTRAVENOUS AS NEEDED
OUTPATIENT
Start: 2024-11-13

## 2024-10-25 ASSESSMENT — PAIN SCALES - GENERAL: PAINLEVEL_OUTOF10: 0-NO PAIN

## 2024-10-25 NOTE — PATIENT INSTRUCTIONS
Please continue taking imodium. Two tablets after first loose stool, followed by 1 tablet with each subsequent loose stool (maximum dose: 4 tablets/24 hours). If the imodium doesn't help, please call the clinic.     Please make sure to call with any fever of 100.4 or greater, uncontrolled nausea despite taking prescribed anti-nausea medication, several episodes of vomiting in 24 hours, 3 or more episodes of diarrhea (loose watery stool) within 24 hours, mouth sores that are effecting eating/drinking/swallowing.

## 2024-10-28 ENCOUNTER — NURSE TRIAGE (OUTPATIENT)
Dept: HEMATOLOGY/ONCOLOGY | Facility: CLINIC | Age: 69
End: 2024-10-28
Payer: MEDICARE

## 2024-10-28 LAB — SCAN RESULT: NORMAL

## 2024-10-29 ENCOUNTER — APPOINTMENT (OUTPATIENT)
Dept: HEMATOLOGY/ONCOLOGY | Facility: CLINIC | Age: 69
End: 2024-10-29
Payer: MEDICARE

## 2024-10-29 ENCOUNTER — INFUSION (OUTPATIENT)
Dept: HEMATOLOGY/ONCOLOGY | Facility: CLINIC | Age: 69
End: 2024-10-29
Payer: MEDICARE

## 2024-10-29 ENCOUNTER — OFFICE VISIT (OUTPATIENT)
Dept: HEMATOLOGY/ONCOLOGY | Facility: CLINIC | Age: 69
End: 2024-10-29
Payer: MEDICARE

## 2024-10-29 VITALS
OXYGEN SATURATION: 97 % | RESPIRATION RATE: 14 BRPM | SYSTOLIC BLOOD PRESSURE: 129 MMHG | WEIGHT: 165.7 LBS | BODY MASS INDEX: 27.44 KG/M2 | HEART RATE: 59 BPM | TEMPERATURE: 97.9 F | DIASTOLIC BLOOD PRESSURE: 70 MMHG

## 2024-10-29 DIAGNOSIS — C80.0 CARCINOMATOSIS (MULTI): ICD-10-CM

## 2024-10-29 DIAGNOSIS — T45.1X5A CHEMOTHERAPY INDUCED NAUSEA AND VOMITING: ICD-10-CM

## 2024-10-29 DIAGNOSIS — E87.6 HYPOKALEMIA: Primary | ICD-10-CM

## 2024-10-29 DIAGNOSIS — C24.8 MALIGNANT NEOPLASM OF OVERLAPPING SITES OF BILIARY TRACT (MULTI): ICD-10-CM

## 2024-10-29 DIAGNOSIS — C18.1 MALIGNANT NEOPLASM OF APPENDIX (MULTI): ICD-10-CM

## 2024-10-29 DIAGNOSIS — R11.2 CHEMOTHERAPY INDUCED NAUSEA AND VOMITING: ICD-10-CM

## 2024-10-29 LAB
ALBUMIN SERPL BCP-MCNC: 3.2 G/DL (ref 3.4–5)
ALP SERPL-CCNC: 90 U/L (ref 33–136)
ALT SERPL W P-5'-P-CCNC: 14 U/L (ref 7–45)
ANION GAP SERPL CALC-SCNC: 13 MMOL/L (ref 10–20)
AST SERPL W P-5'-P-CCNC: 11 U/L (ref 9–39)
BASOPHILS # BLD AUTO: 0.03 X10*3/UL (ref 0–0.1)
BASOPHILS NFR BLD AUTO: 0.4 %
BILIRUB SERPL-MCNC: 0.6 MG/DL (ref 0–1.2)
BUN SERPL-MCNC: 16 MG/DL (ref 6–23)
CALCIUM SERPL-MCNC: 8.8 MG/DL (ref 8.6–10.3)
CHLORIDE SERPL-SCNC: 106 MMOL/L (ref 98–107)
CO2 SERPL-SCNC: 25 MMOL/L (ref 21–32)
CREAT SERPL-MCNC: 0.47 MG/DL (ref 0.5–1.05)
EGFRCR SERPLBLD CKD-EPI 2021: >90 ML/MIN/1.73M*2
EOSINOPHIL # BLD AUTO: 0.06 X10*3/UL (ref 0–0.7)
EOSINOPHIL NFR BLD AUTO: 0.8 %
ERYTHROCYTE [DISTWIDTH] IN BLOOD BY AUTOMATED COUNT: 15.5 % (ref 11.5–14.5)
GLUCOSE SERPL-MCNC: 175 MG/DL (ref 74–99)
HCT VFR BLD AUTO: 35.2 % (ref 36–46)
HGB BLD-MCNC: 12.2 G/DL (ref 12–16)
IMM GRANULOCYTES # BLD AUTO: 0.11 X10*3/UL (ref 0–0.7)
IMM GRANULOCYTES NFR BLD AUTO: 1.5 % (ref 0–0.9)
LYMPHOCYTES # BLD AUTO: 1.56 X10*3/UL (ref 1.2–4.8)
LYMPHOCYTES NFR BLD AUTO: 21.9 %
MCH RBC QN AUTO: 30.3 PG (ref 26–34)
MCHC RBC AUTO-ENTMCNC: 34.7 G/DL (ref 32–36)
MCV RBC AUTO: 88 FL (ref 80–100)
MONOCYTES # BLD AUTO: 1.15 X10*3/UL (ref 0.1–1)
MONOCYTES NFR BLD AUTO: 16.1 %
NEUTROPHILS # BLD AUTO: 4.22 X10*3/UL (ref 1.2–7.7)
NEUTROPHILS NFR BLD AUTO: 59.3 %
PLATELET # BLD AUTO: 231 X10*3/UL (ref 150–450)
POTASSIUM SERPL-SCNC: 3 MMOL/L (ref 3.5–5.3)
PROT SERPL-MCNC: 5.4 G/DL (ref 6.4–8.2)
RBC # BLD AUTO: 4.02 X10*6/UL (ref 4–5.2)
SODIUM SERPL-SCNC: 141 MMOL/L (ref 136–145)
WBC # BLD AUTO: 7.1 X10*3/UL (ref 4.4–11.3)

## 2024-10-29 PROCEDURE — 3078F DIAST BP <80 MM HG: CPT | Performed by: INTERNAL MEDICINE

## 2024-10-29 PROCEDURE — 36591 DRAW BLOOD OFF VENOUS DEVICE: CPT

## 2024-10-29 PROCEDURE — 99215 OFFICE O/P EST HI 40 MIN: CPT | Performed by: INTERNAL MEDICINE

## 2024-10-29 PROCEDURE — 1159F MED LIST DOCD IN RCRD: CPT | Performed by: INTERNAL MEDICINE

## 2024-10-29 PROCEDURE — 1123F ACP DISCUSS/DSCN MKR DOCD: CPT | Performed by: INTERNAL MEDICINE

## 2024-10-29 PROCEDURE — 1157F ADVNC CARE PLAN IN RCRD: CPT | Performed by: INTERNAL MEDICINE

## 2024-10-29 PROCEDURE — 85025 COMPLETE CBC W/AUTO DIFF WBC: CPT

## 2024-10-29 PROCEDURE — 3074F SYST BP LT 130 MM HG: CPT | Performed by: INTERNAL MEDICINE

## 2024-10-29 PROCEDURE — 1126F AMNT PAIN NOTED NONE PRSNT: CPT | Performed by: INTERNAL MEDICINE

## 2024-10-29 PROCEDURE — 80053 COMPREHEN METABOLIC PANEL: CPT

## 2024-10-29 PROCEDURE — G2211 COMPLEX E/M VISIT ADD ON: HCPCS | Performed by: INTERNAL MEDICINE

## 2024-10-29 RX ORDER — OLANZAPINE 5 MG/1
5 TABLET ORAL NIGHTLY
Qty: 30 TABLET | Refills: 0 | Status: SHIPPED | OUTPATIENT
Start: 2024-10-29 | End: 2024-10-31 | Stop reason: WASHOUT

## 2024-10-29 RX ORDER — POTASSIUM CHLORIDE 20 MEQ/1
20 TABLET, EXTENDED RELEASE ORAL DAILY
Qty: 30 TABLET | Refills: 0 | Status: SHIPPED | OUTPATIENT
Start: 2024-10-29 | End: 2024-11-28

## 2024-10-29 RX ORDER — HEPARIN 100 UNIT/ML
500 SYRINGE INTRAVENOUS AS NEEDED
Status: CANCELLED | OUTPATIENT
Start: 2024-10-29

## 2024-10-29 RX ORDER — POTASSIUM CHLORIDE 29.8 MG/ML
40 INJECTION INTRAVENOUS ONCE
Status: CANCELLED | OUTPATIENT
Start: 2024-10-30

## 2024-10-29 RX ORDER — HEPARIN SODIUM,PORCINE/PF 10 UNIT/ML
50 SYRINGE (ML) INTRAVENOUS AS NEEDED
Status: CANCELLED | OUTPATIENT
Start: 2024-10-29

## 2024-10-29 ASSESSMENT — PAIN SCALES - GENERAL: PAINLEVEL_OUTOF10: 0-NO PAIN

## 2024-10-30 ENCOUNTER — INFUSION (OUTPATIENT)
Dept: HEMATOLOGY/ONCOLOGY | Facility: CLINIC | Age: 69
End: 2024-10-30
Payer: MEDICARE

## 2024-10-30 VITALS
OXYGEN SATURATION: 98 % | BODY MASS INDEX: 27.78 KG/M2 | HEART RATE: 85 BPM | WEIGHT: 167.77 LBS | DIASTOLIC BLOOD PRESSURE: 66 MMHG | SYSTOLIC BLOOD PRESSURE: 103 MMHG | TEMPERATURE: 97.2 F | RESPIRATION RATE: 16 BRPM

## 2024-10-30 DIAGNOSIS — C18.1 MALIGNANT NEOPLASM OF APPENDIX (MULTI): ICD-10-CM

## 2024-10-30 DIAGNOSIS — C80.0 CARCINOMATOSIS (MULTI): ICD-10-CM

## 2024-10-30 PROCEDURE — 96413 CHEMO IV INFUSION 1 HR: CPT

## 2024-10-30 PROCEDURE — 96411 CHEMO IV PUSH ADDL DRUG: CPT

## 2024-10-30 PROCEDURE — 2500000005 HC RX 250 GENERAL PHARMACY W/O HCPCS: Performed by: INTERNAL MEDICINE

## 2024-10-30 PROCEDURE — 96376 TX/PRO/DX INJ SAME DRUG ADON: CPT

## 2024-10-30 PROCEDURE — 2500000004 HC RX 250 GENERAL PHARMACY W/ HCPCS (ALT 636 FOR OP/ED): Performed by: INTERNAL MEDICINE

## 2024-10-30 PROCEDURE — 96416 CHEMO PROLONG INFUSE W/PUMP: CPT

## 2024-10-30 PROCEDURE — 96366 THER/PROPH/DIAG IV INF ADDON: CPT | Mod: INF

## 2024-10-30 PROCEDURE — 96367 TX/PROPH/DG ADDL SEQ IV INF: CPT

## 2024-10-30 PROCEDURE — 96415 CHEMO IV INFUSION ADDL HR: CPT

## 2024-10-30 RX ORDER — HEPARIN 100 UNIT/ML
500 SYRINGE INTRAVENOUS AS NEEDED
Status: DISCONTINUED | OUTPATIENT
Start: 2024-10-30 | End: 2024-10-30 | Stop reason: HOSPADM

## 2024-10-30 RX ORDER — ONDANSETRON HYDROCHLORIDE 8 MG/1
16 TABLET, FILM COATED ORAL ONCE
Status: COMPLETED | OUTPATIENT
Start: 2024-10-30 | End: 2024-10-30

## 2024-10-30 RX ORDER — PROCHLORPERAZINE MALEATE 10 MG
10 TABLET ORAL EVERY 6 HOURS PRN
Status: DISCONTINUED | OUTPATIENT
Start: 2024-10-30 | End: 2024-10-30 | Stop reason: HOSPADM

## 2024-10-30 RX ORDER — HEPARIN 100 UNIT/ML
500 SYRINGE INTRAVENOUS AS NEEDED
OUTPATIENT
Start: 2024-10-30

## 2024-10-30 RX ORDER — FAMOTIDINE 10 MG/ML
20 INJECTION INTRAVENOUS ONCE AS NEEDED
Status: DISCONTINUED | OUTPATIENT
Start: 2024-10-30 | End: 2024-10-30 | Stop reason: HOSPADM

## 2024-10-30 RX ORDER — ALBUTEROL SULFATE 0.83 MG/ML
3 SOLUTION RESPIRATORY (INHALATION) AS NEEDED
Status: DISCONTINUED | OUTPATIENT
Start: 2024-10-30 | End: 2024-10-30 | Stop reason: HOSPADM

## 2024-10-30 RX ORDER — DIPHENHYDRAMINE HYDROCHLORIDE 50 MG/ML
50 INJECTION INTRAMUSCULAR; INTRAVENOUS AS NEEDED
Status: DISCONTINUED | OUTPATIENT
Start: 2024-10-30 | End: 2024-10-30 | Stop reason: HOSPADM

## 2024-10-30 RX ORDER — HEPARIN SODIUM,PORCINE/PF 10 UNIT/ML
50 SYRINGE (ML) INTRAVENOUS AS NEEDED
OUTPATIENT
Start: 2024-10-30

## 2024-10-30 RX ORDER — EPINEPHRINE 0.3 MG/.3ML
0.3 INJECTION SUBCUTANEOUS EVERY 5 MIN PRN
Status: DISCONTINUED | OUTPATIENT
Start: 2024-10-30 | End: 2024-10-30 | Stop reason: HOSPADM

## 2024-10-30 RX ORDER — POTASSIUM CHLORIDE 29.8 MG/ML
40 INJECTION INTRAVENOUS ONCE
Status: COMPLETED | OUTPATIENT
Start: 2024-10-30 | End: 2024-10-30

## 2024-10-30 RX ORDER — HEPARIN SODIUM,PORCINE/PF 10 UNIT/ML
50 SYRINGE (ML) INTRAVENOUS AS NEEDED
Status: DISCONTINUED | OUTPATIENT
Start: 2024-10-30 | End: 2024-10-30 | Stop reason: HOSPADM

## 2024-10-30 RX ORDER — DEXAMETHASONE 6 MG/1
12 TABLET ORAL ONCE
Status: COMPLETED | OUTPATIENT
Start: 2024-10-30 | End: 2024-10-30

## 2024-10-30 RX ORDER — FLUOROURACIL 50 MG/ML
400 INJECTION, SOLUTION INTRAVENOUS ONCE
Status: COMPLETED | OUTPATIENT
Start: 2024-10-30 | End: 2024-10-30

## 2024-10-30 RX ORDER — PROCHLORPERAZINE EDISYLATE 5 MG/ML
10 INJECTION INTRAMUSCULAR; INTRAVENOUS EVERY 6 HOURS PRN
Status: DISCONTINUED | OUTPATIENT
Start: 2024-10-30 | End: 2024-10-30 | Stop reason: HOSPADM

## 2024-10-30 RX ORDER — LORAZEPAM 2 MG/ML
1 INJECTION INTRAMUSCULAR AS NEEDED
Status: DISCONTINUED | OUTPATIENT
Start: 2024-10-30 | End: 2024-10-30 | Stop reason: HOSPADM

## 2024-10-30 ASSESSMENT — PAIN SCALES - GENERAL: PAINLEVEL_OUTOF10: 7

## 2024-11-01 ENCOUNTER — INFUSION (OUTPATIENT)
Dept: HEMATOLOGY/ONCOLOGY | Facility: CLINIC | Age: 69
End: 2024-11-01
Payer: MEDICARE

## 2024-11-01 VITALS
WEIGHT: 164.02 LBS | HEART RATE: 91 BPM | RESPIRATION RATE: 16 BRPM | OXYGEN SATURATION: 98 % | BODY MASS INDEX: 27.16 KG/M2 | TEMPERATURE: 97.5 F | DIASTOLIC BLOOD PRESSURE: 73 MMHG | SYSTOLIC BLOOD PRESSURE: 118 MMHG

## 2024-11-01 DIAGNOSIS — C18.1 MALIGNANT NEOPLASM OF APPENDIX (MULTI): ICD-10-CM

## 2024-11-01 DIAGNOSIS — C80.0 CARCINOMATOSIS (MULTI): ICD-10-CM

## 2024-11-01 DIAGNOSIS — K21.00 GASTROESOPHAGEAL REFLUX DISEASE WITH ESOPHAGITIS WITHOUT HEMORRHAGE: ICD-10-CM

## 2024-11-01 PROCEDURE — 96374 THER/PROPH/DIAG INJ IV PUSH: CPT | Mod: INF

## 2024-11-01 PROCEDURE — 2500000004 HC RX 250 GENERAL PHARMACY W/ HCPCS (ALT 636 FOR OP/ED): Performed by: INTERNAL MEDICINE

## 2024-11-01 RX ORDER — PANTOPRAZOLE SODIUM 40 MG/1
40 TABLET, DELAYED RELEASE ORAL DAILY
Qty: 90 TABLET | Refills: 1 | Status: SHIPPED | OUTPATIENT
Start: 2024-11-01 | End: 2025-01-30

## 2024-11-01 RX ORDER — PALONOSETRON 0.05 MG/ML
250 INJECTION, SOLUTION INTRAVENOUS ONCE
Status: COMPLETED | OUTPATIENT
Start: 2024-11-01 | End: 2024-11-01

## 2024-11-01 ASSESSMENT — PAIN SCALES - GENERAL: PAINLEVEL_OUTOF10: 0-NO PAIN

## 2024-11-05 ENCOUNTER — OFFICE VISIT (OUTPATIENT)
Dept: HEMATOLOGY/ONCOLOGY | Facility: CLINIC | Age: 69
End: 2024-11-05
Payer: MEDICARE

## 2024-11-05 ENCOUNTER — INFUSION (OUTPATIENT)
Dept: HEMATOLOGY/ONCOLOGY | Facility: CLINIC | Age: 69
End: 2024-11-05
Payer: MEDICARE

## 2024-11-05 VITALS
BODY MASS INDEX: 26.36 KG/M2 | OXYGEN SATURATION: 100 % | RESPIRATION RATE: 16 BRPM | DIASTOLIC BLOOD PRESSURE: 74 MMHG | TEMPERATURE: 98.1 F | HEART RATE: 97 BPM | SYSTOLIC BLOOD PRESSURE: 120 MMHG | WEIGHT: 159.17 LBS

## 2024-11-05 DIAGNOSIS — C18.1 MALIGNANT NEOPLASM OF APPENDIX (MULTI): ICD-10-CM

## 2024-11-05 DIAGNOSIS — C80.0 CARCINOMATOSIS (MULTI): Primary | ICD-10-CM

## 2024-11-05 DIAGNOSIS — C80.0 CARCINOMATOSIS (MULTI): ICD-10-CM

## 2024-11-05 DIAGNOSIS — C18.1 MALIGNANT NEOPLASM OF APPENDIX (MULTI): Primary | ICD-10-CM

## 2024-11-05 DIAGNOSIS — R11.2 CHEMOTHERAPY INDUCED NAUSEA AND VOMITING: ICD-10-CM

## 2024-11-05 DIAGNOSIS — T45.1X5A CHEMOTHERAPY INDUCED NAUSEA AND VOMITING: ICD-10-CM

## 2024-11-05 DIAGNOSIS — E87.6 HYPOKALEMIA: ICD-10-CM

## 2024-11-05 LAB
ALBUMIN SERPL BCP-MCNC: 3.4 G/DL (ref 3.4–5)
ALP SERPL-CCNC: 74 U/L (ref 33–136)
ALT SERPL W P-5'-P-CCNC: 15 U/L (ref 7–45)
ANION GAP SERPL CALC-SCNC: 14 MMOL/L (ref 10–20)
AST SERPL W P-5'-P-CCNC: 12 U/L (ref 9–39)
BILIRUB SERPL-MCNC: 0.3 MG/DL (ref 0–1.2)
BUN SERPL-MCNC: 13 MG/DL (ref 6–23)
CALCIUM SERPL-MCNC: 9 MG/DL (ref 8.6–10.3)
CHLORIDE SERPL-SCNC: 109 MMOL/L (ref 98–107)
CO2 SERPL-SCNC: 25 MMOL/L (ref 21–32)
CREAT SERPL-MCNC: 0.45 MG/DL (ref 0.5–1.05)
EGFRCR SERPLBLD CKD-EPI 2021: >90 ML/MIN/1.73M*2
GLUCOSE SERPL-MCNC: 155 MG/DL (ref 74–99)
MAGNESIUM SERPL-MCNC: 1.8 MG/DL (ref 1.6–2.4)
POTASSIUM SERPL-SCNC: 3.4 MMOL/L (ref 3.5–5.3)
PROT SERPL-MCNC: 5.7 G/DL (ref 6.4–8.2)
SODIUM SERPL-SCNC: 145 MMOL/L (ref 136–145)

## 2024-11-05 PROCEDURE — 1126F AMNT PAIN NOTED NONE PRSNT: CPT

## 2024-11-05 PROCEDURE — 3074F SYST BP LT 130 MM HG: CPT

## 2024-11-05 PROCEDURE — 1159F MED LIST DOCD IN RCRD: CPT

## 2024-11-05 PROCEDURE — 83735 ASSAY OF MAGNESIUM: CPT

## 2024-11-05 PROCEDURE — 1123F ACP DISCUSS/DSCN MKR DOCD: CPT

## 2024-11-05 PROCEDURE — 36591 DRAW BLOOD OFF VENOUS DEVICE: CPT

## 2024-11-05 PROCEDURE — 1157F ADVNC CARE PLAN IN RCRD: CPT

## 2024-11-05 PROCEDURE — 3078F DIAST BP <80 MM HG: CPT

## 2024-11-05 PROCEDURE — 99214 OFFICE O/P EST MOD 30 MIN: CPT

## 2024-11-05 PROCEDURE — 80053 COMPREHEN METABOLIC PANEL: CPT

## 2024-11-05 RX ORDER — POTASSIUM CHLORIDE 750 MG/1
10 TABLET, FILM COATED, EXTENDED RELEASE ORAL 2 TIMES DAILY
Qty: 60 TABLET | Refills: 3 | Status: SHIPPED | OUTPATIENT
Start: 2024-11-05 | End: 2025-03-05

## 2024-11-05 ASSESSMENT — PAIN SCALES - GENERAL: PAINLEVEL_OUTOF10: 0-NO PAIN

## 2024-11-05 NOTE — PROGRESS NOTES
Patient ID: Monica Musa is a 69 y.o. female.  Referring Physician: No referring provider defined for this encounter.  Primary Care Provider: Guerrero Tavarez DO    Oncology History Overview Note   - 7/8/2022 TLH, BSO, SLND combined case with Dr. Lal. 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 There was no lymphovascular invasion. There was a focus of MELF pattern invasion. Tumor board recommendations: Surveillance. MMR testing on prior endometrial biopsy specimen was normal.   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  - CT 8/26/24: Small volume ascites. Left pelvic nodule with some nodularity  along the left paracolic gutter worrisome for peritoneal carcinomatosis.  Admitted with bowel obstruction.  No lesion large enough for biopsy  - 9/2024 diagnostic laparoscopy - biopsy c/w non gyn primary - referred to med onc     Endometrial carcinoma (Multi)   5/22/2023 Initial Diagnosis    Endometrial carcinoma (Multi)     Malignant neoplasm of appendix (Multi)   10/3/2024 Initial Diagnosis    Malignant neoplasm of appendix (Multi)     10/16/2024 -  Chemotherapy    mFOLFOX6 (Fluorouracil Continuous Infusion / Leucovorin / Oxaliplatin), 14 Day Cycles     Carcinomatosis (Multi)   10/3/2024 Initial Diagnosis    Carcinomatosis (Multi)     10/16/2024 -  Chemotherapy    mFOLFOX6 (Fluorouracil Continuous Infusion / Leucovorin / Oxaliplatin), 14 Day Cycles           Subjective    HPI     Monica Musa is a 70 yo F with history of grade 1 endometroid adenocarcinoma MMI, no LVSI, pMMR s/p TLH, BSP, SLND 7/8/22. 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     Patient presents for toxicity check after cycle 2 of treatment. She was started on Zyprexa and reports that she has been feeling well. She has been taking one of her anti nausea medications every 6-8 hours but forgets the name. She takes imodium BID and is having  3-4 soft stools per day.     She denies fevers, chills, SOB, cough, chest pain, palpitations, vomiting, diarrhea, constipation, skin changes, pain, or edema. Says she is doing her best to stay hydrated and eat more calories now that she is tolerating solid foods. She is trying to eat more protein.       Objective   Visit Vitals  OB Status Hysterectomy   Smoking Status Former     Visit Vitals  /74 (BP Location: Right arm, Patient Position: Sitting, BP Cuff Size: Adult)   Pulse 97   Temp 36.7 °C (98.1 °F) (Temporal)   Resp 16   Wt 72.2 kg (159 lb 2.8 oz)   SpO2 100%   BMI 26.36 kg/m²   OB Status Hysterectomy   Smoking Status Former   BSA 1.82 m²       Past Medical History:   Diagnosis Date    Anemia     Body mass index (BMI) 36.0-36.9, adult 09/02/2021    BMI 36.0-36.9,adult    Body mass index (BMI) 37.0-37.9, adult 02/05/2021    BMI 37.0-37.9, adult    Cancer (Multi) 2021    Diabetes mellitus (Multi)     DVT (deep venous thrombosis) (Multi) 2023    LEFT LEG    Encounter for general adult medical examination without abnormal findings     Annual physical exam    Fracture of wrist 2019    History of ovarian cancer     Hyperlipidemia     Hypertension     Morbid (severe) obesity due to excess calories (Multi) 08/26/2021    Class 2 severe obesity due to excess calories with serious comorbidity and body mass index (BMI) of 37.0 to 37.9 in adult    Morbid (severe) obesity due to excess calories (Multi) 06/09/2022    Class 2 severe obesity due to excess calories with serious comorbidity and body mass index (BMI) of 36.0 to 36.9 in adult    Personal history of other diseases of the respiratory system 01/03/2020    History of acute bronchitis    Personal history of other drug therapy 10/11/2019    History of influenza vaccination     Past Surgical History:   Procedure Laterality Date    OTHER SURGICAL HISTORY Right 06/09/2022    Arm surgery    OTHER SURGICAL HISTORY  07/25/2022    Salpingo-oophorectomy bilateral    OTHER  SURGICAL HISTORY  07/25/2022    Laparoscopic hysterectomy    OTHER SURGICAL HISTORY  07/25/2022    Cystoscopy    OTHER SURGICAL HISTORY  07/25/2022    Lymph node surgery     Family History   Problem Relation Name Age of Onset    Diabetes Mother Concha Musa  reports that she has quit smoking. Her smoking use included cigarettes. She has a 5 pack-year smoking history. She has never used smokeless tobacco.  She  reports no history of alcohol use.  She  reports no history of drug use.    Physical Exam  Constitutional:       General: She is not in acute distress.     Appearance: Normal appearance.   HENT:      Head: Normocephalic.      Mouth/Throat:      Mouth: Mucous membranes are moist.      Pharynx: No oropharyngeal exudate or posterior oropharyngeal erythema.   Eyes:      General: No scleral icterus.     Pupils: Pupils are equal, round, and reactive to light.   Cardiovascular:      Rate and Rhythm: Normal rate and regular rhythm.   Pulmonary:      Effort: Pulmonary effort is normal. No respiratory distress.      Breath sounds: Normal breath sounds. No wheezing.   Abdominal:      General: Abdomen is flat. Bowel sounds are normal. There is no distension.      Palpations: Abdomen is soft.      Tenderness: There is no abdominal tenderness.   Musculoskeletal:         General: Normal range of motion.      Cervical back: Normal range of motion and neck supple.   Skin:     General: Skin is warm and dry.   Neurological:      General: No focal deficit present.      Mental Status: She is alert and oriented to person, place, and time. Mental status is at baseline.      Motor: No weakness.      Gait: Gait normal.   Psychiatric:         Mood and Affect: Mood normal.         Behavior: Behavior normal.         Judgment: Judgment normal.       Performance Status:  Asymptomatic    - EGD 10/2/24 shows abnormal mucosa at the GE junction but no clear mass however there was diffuse nodular gastritis which was  biopsied and pathology report states: neg for H Pylori. A. DUODENAL BULB  BIOPSY:   --SMALL BOWEL MUCOSA WITH PRESERVED VILLOUS ARCHITECTURE AND FOVEOLAR METAPLASIA, CONSISTENT WITH PEPTIC INJURY  B. STOMACH ANTRUM BIOPSY:   --CHRONIC INACTIVE GASTRITIS  --NO MORPHOLOGIC EVIDENCE OF HELICOBACTER PYLORI-LIKE ORGANISMS  C. ESOPHAGOGASTRIC JUNCTION BIOPSY:   --ACUTELY INFLAMED SQUAMOCOLUMNAR JUNCTION WITH REFLUX ASSOCIATED CHANGE  --INTESTINAL METAPLASIA, DYSPLASIA OR MALIGNANCY ARE NOT IDENTIFIED  D. 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  There are some features of goblet cells which favor appendiceal primary however pancreaticobiliary origin cannot be excluded.  MSI status intact  - [id number] also completed on blood from 9/30/2024 which was unremarkable  - Tempus on tissue in process  - Colonoscopy on 7/31/2024 was technically difficult due to scattered diverticulosis in the ascending colon and some hemorrhoids  - EGD 10/2/24 shows abnormal mucosa at the GE junction but no clear mass however there was diffuse nodular gastritis which was biopsied and pathology report states: neg for H Pylori.   - No variants detected in the DPYD gene   - Signatera in process  - 10/3/24: CA19.9 <4.00, CEA 1.4   10/3/24  Today we discussed diagnosis of likely advanced appendiceal carcinoma given there is no mass seen on recent imaging 8/26/2024 with no masses seen  -Goblet cells seen on pathology likely appendiceal origin  -Will plan for CA 19-9 and CEA as baseline  -Will await  EGD pathology to confirm no evidence of malignancy in the esophagus  -Discussed neck step is systemic therapy with modified FOLFOX   - could consider HIPEC after 3-4 cycles based on response  - Dr Ontiveros aware of patient  -Refer port placement next week and cycle 1 day 1 on 10/9/2024  -Reviewed side effects of 5-FU, oxaliplatin and consent signed  -Prescription sent for nausea to be used as needed  -We will plan for day 3 Aloxi  -Given limited support at home will likely hold off on disconnect at home for now  10/23/2024:   - Mediport placed 10/10/24  - Cycle 1 mFOLFOX given 10/16/24: Fluorouracil 2,400 mg/m2, oxaliplatin 85 mg/m2 with pre meds zofran 16 mg and decadron 12 mg, nothing given on day 3   - Last visit with GYN, Dr. Greene, was on 10/7/24       10/29/24  - symptoms improved after supportive care and now close to her baseline  - states dysphagia improved after chemo which hopefully is a sign of tx response  -Lower extremity swelling we discussed is likely related to poor p.o. intake, albumin is low and we discussed importance of protein in addition to ambulating and compression  -Overall for nausea which is her largest complaint we discussed starting Zyprexa at bedtime, given she is not taking much of Reglan we will hold off for now  -She will continue Zofran every 8 hours as needed  -Labs reviewed from today no contraindications to proceed with treatment tomorrow  -Plan for supportive care again with Boni the following week    11/5/2024:  - Patient presents for toxicity check after cycle 2  - Reports that she is feeling well and tolerating treatment better   - She does not need IVF or medications today but we will check cmp/mag and notify her if she needs to continue home K+, she prefers to change to smaller dosage   - Eating more solid foods and will continue to try to prevent further weight loss, declines to talk with dietician today   - She is taking Zyprexa nightly   - She is unsure which prn  anti-nausea she is taking but will update me  - Taking imodium BID   - She will RTC on 11/12 for port flush/labs and a visit the same day with Dr. Smart   - She will then RTC the next day for treatment           Diagnoses and all orders for this visit:  Carcinomatosis (Multi)  -     Magnesium; Future  -     Comprehensive Metabolic Panel; Future  Malignant neoplasm of appendix (Multi)  -     Magnesium; Future  -     Comprehensive Metabolic Panel; Future      Boni Whaley, ZAYDA-CNP

## 2024-11-12 ENCOUNTER — OFFICE VISIT (OUTPATIENT)
Dept: HEMATOLOGY/ONCOLOGY | Facility: CLINIC | Age: 69
End: 2024-11-12
Payer: MEDICARE

## 2024-11-12 ENCOUNTER — APPOINTMENT (OUTPATIENT)
Dept: HEMATOLOGY/ONCOLOGY | Facility: CLINIC | Age: 69
End: 2024-11-12
Payer: MEDICARE

## 2024-11-12 ENCOUNTER — DOCUMENTATION (OUTPATIENT)
Dept: HEMATOLOGY/ONCOLOGY | Facility: CLINIC | Age: 69
End: 2024-11-12

## 2024-11-12 VITALS
HEART RATE: 85 BPM | DIASTOLIC BLOOD PRESSURE: 62 MMHG | SYSTOLIC BLOOD PRESSURE: 110 MMHG | OXYGEN SATURATION: 98 % | RESPIRATION RATE: 16 BRPM | WEIGHT: 170.86 LBS | BODY MASS INDEX: 28.3 KG/M2 | TEMPERATURE: 97.5 F

## 2024-11-12 DIAGNOSIS — C18.1 MALIGNANT NEOPLASM OF APPENDIX (MULTI): ICD-10-CM

## 2024-11-12 DIAGNOSIS — C18.1 MALIGNANT NEOPLASM OF APPENDIX (MULTI): Primary | ICD-10-CM

## 2024-11-12 DIAGNOSIS — C80.0 CARCINOMATOSIS (MULTI): ICD-10-CM

## 2024-11-12 LAB
ALBUMIN SERPL BCP-MCNC: 3.5 G/DL (ref 3.4–5)
ALP SERPL-CCNC: 89 U/L (ref 33–136)
ALT SERPL W P-5'-P-CCNC: 16 U/L (ref 7–45)
ANION GAP SERPL CALC-SCNC: 11 MMOL/L (ref 10–20)
AST SERPL W P-5'-P-CCNC: 16 U/L (ref 9–39)
BASOPHILS # BLD AUTO: 0.03 X10*3/UL (ref 0–0.1)
BASOPHILS NFR BLD AUTO: 0.6 %
BILIRUB SERPL-MCNC: 0.4 MG/DL (ref 0–1.2)
BUN SERPL-MCNC: 14 MG/DL (ref 6–23)
CALCIUM SERPL-MCNC: 9.3 MG/DL (ref 8.6–10.3)
CHLORIDE SERPL-SCNC: 106 MMOL/L (ref 98–107)
CO2 SERPL-SCNC: 28 MMOL/L (ref 21–32)
CREAT SERPL-MCNC: 0.45 MG/DL (ref 0.5–1.05)
EGFRCR SERPLBLD CKD-EPI 2021: >90 ML/MIN/1.73M*2
EOSINOPHIL # BLD AUTO: 0.07 X10*3/UL (ref 0–0.7)
EOSINOPHIL NFR BLD AUTO: 1.3 %
ERYTHROCYTE [DISTWIDTH] IN BLOOD BY AUTOMATED COUNT: 16.4 % (ref 11.5–14.5)
GLUCOSE SERPL-MCNC: 170 MG/DL (ref 74–99)
HCT VFR BLD AUTO: 32.6 % (ref 36–46)
HGB BLD-MCNC: 10.8 G/DL (ref 12–16)
IMM GRANULOCYTES # BLD AUTO: 0.01 X10*3/UL (ref 0–0.7)
IMM GRANULOCYTES NFR BLD AUTO: 0.2 % (ref 0–0.9)
LYMPHOCYTES # BLD AUTO: 1.64 X10*3/UL (ref 1.2–4.8)
LYMPHOCYTES NFR BLD AUTO: 31.6 %
MCH RBC QN AUTO: 30.8 PG (ref 26–34)
MCHC RBC AUTO-ENTMCNC: 33.1 G/DL (ref 32–36)
MCV RBC AUTO: 93 FL (ref 80–100)
MONOCYTES # BLD AUTO: 0.52 X10*3/UL (ref 0.1–1)
MONOCYTES NFR BLD AUTO: 10 %
NEUTROPHILS # BLD AUTO: 2.92 X10*3/UL (ref 1.2–7.7)
NEUTROPHILS NFR BLD AUTO: 56.3 %
PLATELET # BLD AUTO: 200 X10*3/UL (ref 150–450)
POTASSIUM SERPL-SCNC: 4.3 MMOL/L (ref 3.5–5.3)
PROT SERPL-MCNC: 5.6 G/DL (ref 6.4–8.2)
RBC # BLD AUTO: 3.51 X10*6/UL (ref 4–5.2)
SODIUM SERPL-SCNC: 141 MMOL/L (ref 136–145)
WBC # BLD AUTO: 5.2 X10*3/UL (ref 4.4–11.3)

## 2024-11-12 PROCEDURE — 80053 COMPREHEN METABOLIC PANEL: CPT

## 2024-11-12 PROCEDURE — 1157F ADVNC CARE PLAN IN RCRD: CPT | Performed by: INTERNAL MEDICINE

## 2024-11-12 PROCEDURE — 36591 DRAW BLOOD OFF VENOUS DEVICE: CPT

## 2024-11-12 PROCEDURE — 1123F ACP DISCUSS/DSCN MKR DOCD: CPT | Performed by: INTERNAL MEDICINE

## 2024-11-12 PROCEDURE — 99214 OFFICE O/P EST MOD 30 MIN: CPT | Performed by: INTERNAL MEDICINE

## 2024-11-12 PROCEDURE — 85025 COMPLETE CBC W/AUTO DIFF WBC: CPT

## 2024-11-12 PROCEDURE — 99417 PROLNG OP E/M EACH 15 MIN: CPT | Performed by: INTERNAL MEDICINE

## 2024-11-12 RX ORDER — DEXAMETHASONE 6 MG/1
12 TABLET ORAL ONCE
OUTPATIENT
Start: 2025-01-14 | End: 2025-01-14

## 2024-11-12 RX ORDER — DIPHENHYDRAMINE HYDROCHLORIDE 50 MG/ML
50 INJECTION INTRAMUSCULAR; INTRAVENOUS AS NEEDED
OUTPATIENT
Start: 2025-01-14

## 2024-11-12 RX ORDER — PROCHLORPERAZINE MALEATE 10 MG
10 TABLET ORAL EVERY 6 HOURS PRN
OUTPATIENT
Start: 2024-11-27

## 2024-11-12 RX ORDER — ONDANSETRON HYDROCHLORIDE 8 MG/1
16 TABLET, FILM COATED ORAL ONCE
OUTPATIENT
Start: 2024-12-11 | End: 2024-12-11

## 2024-11-12 RX ORDER — HEPARIN SODIUM,PORCINE/PF 10 UNIT/ML
50 SYRINGE (ML) INTRAVENOUS AS NEEDED
Status: CANCELLED | OUTPATIENT
Start: 2024-11-12

## 2024-11-12 RX ORDER — LORAZEPAM 2 MG/ML
1 INJECTION INTRAMUSCULAR AS NEEDED
OUTPATIENT
Start: 2024-11-27

## 2024-11-12 RX ORDER — PROCHLORPERAZINE MALEATE 10 MG
10 TABLET ORAL EVERY 6 HOURS PRN
OUTPATIENT
Start: 2024-12-11

## 2024-11-12 RX ORDER — EPINEPHRINE 0.3 MG/.3ML
0.3 INJECTION SUBCUTANEOUS EVERY 5 MIN PRN
OUTPATIENT
Start: 2024-12-31

## 2024-11-12 RX ORDER — DIPHENHYDRAMINE HYDROCHLORIDE 50 MG/ML
50 INJECTION INTRAMUSCULAR; INTRAVENOUS AS NEEDED
OUTPATIENT
Start: 2024-12-11

## 2024-11-12 RX ORDER — FLUOROURACIL 50 MG/ML
400 INJECTION, SOLUTION INTRAVENOUS ONCE
OUTPATIENT
Start: 2024-11-27

## 2024-11-12 RX ORDER — HEPARIN SODIUM,PORCINE/PF 10 UNIT/ML
50 SYRINGE (ML) INTRAVENOUS AS NEEDED
Status: DISCONTINUED | OUTPATIENT
Start: 2024-11-12 | End: 2024-11-12 | Stop reason: HOSPADM

## 2024-11-12 RX ORDER — ALBUTEROL SULFATE 0.83 MG/ML
3 SOLUTION RESPIRATORY (INHALATION) AS NEEDED
OUTPATIENT
Start: 2024-12-11

## 2024-11-12 RX ORDER — EPINEPHRINE 0.3 MG/.3ML
0.3 INJECTION SUBCUTANEOUS EVERY 5 MIN PRN
OUTPATIENT
Start: 2024-11-27

## 2024-11-12 RX ORDER — DEXAMETHASONE 6 MG/1
12 TABLET ORAL ONCE
OUTPATIENT
Start: 2024-11-27 | End: 2024-11-27

## 2024-11-12 RX ORDER — EPINEPHRINE 0.3 MG/.3ML
0.3 INJECTION SUBCUTANEOUS EVERY 5 MIN PRN
OUTPATIENT
Start: 2024-12-11

## 2024-11-12 RX ORDER — LORAZEPAM 2 MG/ML
1 INJECTION INTRAMUSCULAR AS NEEDED
OUTPATIENT
Start: 2024-12-31

## 2024-11-12 RX ORDER — DIPHENHYDRAMINE HYDROCHLORIDE 50 MG/ML
50 INJECTION INTRAMUSCULAR; INTRAVENOUS AS NEEDED
OUTPATIENT
Start: 2024-11-27

## 2024-11-12 RX ORDER — ALBUTEROL SULFATE 0.83 MG/ML
3 SOLUTION RESPIRATORY (INHALATION) AS NEEDED
OUTPATIENT
Start: 2025-01-14

## 2024-11-12 RX ORDER — FAMOTIDINE 10 MG/ML
20 INJECTION INTRAVENOUS ONCE AS NEEDED
OUTPATIENT
Start: 2024-12-11

## 2024-11-12 RX ORDER — ONDANSETRON HYDROCHLORIDE 8 MG/1
16 TABLET, FILM COATED ORAL ONCE
OUTPATIENT
Start: 2025-01-14 | End: 2025-01-14

## 2024-11-12 RX ORDER — PALONOSETRON 0.05 MG/ML
250 INJECTION, SOLUTION INTRAVENOUS ONCE
OUTPATIENT
Start: 2024-12-13 | End: 2024-12-13

## 2024-11-12 RX ORDER — HEPARIN 100 UNIT/ML
500 SYRINGE INTRAVENOUS AS NEEDED
Status: DISCONTINUED | OUTPATIENT
Start: 2024-11-12 | End: 2024-11-12 | Stop reason: HOSPADM

## 2024-11-12 RX ORDER — DEXAMETHASONE 6 MG/1
12 TABLET ORAL ONCE
OUTPATIENT
Start: 2024-12-11 | End: 2024-12-11

## 2024-11-12 RX ORDER — PALONOSETRON 0.05 MG/ML
250 INJECTION, SOLUTION INTRAVENOUS ONCE
OUTPATIENT
Start: 2024-11-29 | End: 2024-11-29

## 2024-11-12 RX ORDER — PROCHLORPERAZINE MALEATE 10 MG
10 TABLET ORAL EVERY 6 HOURS PRN
OUTPATIENT
Start: 2024-12-31

## 2024-11-12 RX ORDER — FAMOTIDINE 10 MG/ML
20 INJECTION INTRAVENOUS ONCE AS NEEDED
OUTPATIENT
Start: 2024-11-27

## 2024-11-12 RX ORDER — DIPHENHYDRAMINE HYDROCHLORIDE 50 MG/ML
50 INJECTION INTRAMUSCULAR; INTRAVENOUS AS NEEDED
OUTPATIENT
Start: 2024-12-31

## 2024-11-12 RX ORDER — HEPARIN 100 UNIT/ML
500 SYRINGE INTRAVENOUS AS NEEDED
Status: CANCELLED | OUTPATIENT
Start: 2024-11-12

## 2024-11-12 RX ORDER — PROCHLORPERAZINE EDISYLATE 5 MG/ML
10 INJECTION INTRAMUSCULAR; INTRAVENOUS EVERY 6 HOURS PRN
OUTPATIENT
Start: 2024-12-11

## 2024-11-12 RX ORDER — FLUOROURACIL 50 MG/ML
400 INJECTION, SOLUTION INTRAVENOUS ONCE
OUTPATIENT
Start: 2024-12-11

## 2024-11-12 RX ORDER — ONDANSETRON HYDROCHLORIDE 8 MG/1
16 TABLET, FILM COATED ORAL ONCE
OUTPATIENT
Start: 2024-11-27 | End: 2024-11-27

## 2024-11-12 RX ORDER — FLUOROURACIL 50 MG/ML
400 INJECTION, SOLUTION INTRAVENOUS ONCE
OUTPATIENT
Start: 2025-01-14

## 2024-11-12 RX ORDER — PROCHLORPERAZINE MALEATE 10 MG
10 TABLET ORAL EVERY 6 HOURS PRN
OUTPATIENT
Start: 2025-01-14

## 2024-11-12 RX ORDER — FLUOROURACIL 50 MG/ML
400 INJECTION, SOLUTION INTRAVENOUS ONCE
OUTPATIENT
Start: 2024-12-31

## 2024-11-12 RX ORDER — PROCHLORPERAZINE EDISYLATE 5 MG/ML
10 INJECTION INTRAMUSCULAR; INTRAVENOUS EVERY 6 HOURS PRN
OUTPATIENT
Start: 2025-01-14

## 2024-11-12 RX ORDER — ONDANSETRON HYDROCHLORIDE 8 MG/1
16 TABLET, FILM COATED ORAL ONCE
OUTPATIENT
Start: 2024-12-31 | End: 2024-12-31

## 2024-11-12 RX ORDER — PALONOSETRON 0.05 MG/ML
250 INJECTION, SOLUTION INTRAVENOUS ONCE
OUTPATIENT
Start: 2025-01-02 | End: 2025-01-02

## 2024-11-12 RX ORDER — PROCHLORPERAZINE EDISYLATE 5 MG/ML
10 INJECTION INTRAMUSCULAR; INTRAVENOUS EVERY 6 HOURS PRN
OUTPATIENT
Start: 2024-12-31

## 2024-11-12 RX ORDER — FAMOTIDINE 10 MG/ML
20 INJECTION INTRAVENOUS ONCE AS NEEDED
OUTPATIENT
Start: 2024-12-31

## 2024-11-12 RX ORDER — EPINEPHRINE 0.3 MG/.3ML
0.3 INJECTION SUBCUTANEOUS EVERY 5 MIN PRN
OUTPATIENT
Start: 2025-01-14

## 2024-11-12 RX ORDER — ALBUTEROL SULFATE 0.83 MG/ML
3 SOLUTION RESPIRATORY (INHALATION) AS NEEDED
OUTPATIENT
Start: 2024-12-31

## 2024-11-12 RX ORDER — DEXAMETHASONE 6 MG/1
12 TABLET ORAL ONCE
OUTPATIENT
Start: 2024-12-31 | End: 2024-12-31

## 2024-11-12 RX ORDER — ALBUTEROL SULFATE 0.83 MG/ML
3 SOLUTION RESPIRATORY (INHALATION) AS NEEDED
OUTPATIENT
Start: 2024-11-27

## 2024-11-12 RX ORDER — PALONOSETRON 0.05 MG/ML
250 INJECTION, SOLUTION INTRAVENOUS ONCE
OUTPATIENT
Start: 2025-01-16 | End: 2025-01-16

## 2024-11-12 RX ORDER — PROCHLORPERAZINE EDISYLATE 5 MG/ML
10 INJECTION INTRAMUSCULAR; INTRAVENOUS EVERY 6 HOURS PRN
OUTPATIENT
Start: 2024-11-27

## 2024-11-12 RX ORDER — LORAZEPAM 2 MG/ML
1 INJECTION INTRAMUSCULAR AS NEEDED
OUTPATIENT
Start: 2024-12-11

## 2024-11-12 RX ORDER — LORAZEPAM 2 MG/ML
1 INJECTION INTRAMUSCULAR AS NEEDED
OUTPATIENT
Start: 2025-01-14

## 2024-11-12 RX ORDER — FAMOTIDINE 10 MG/ML
20 INJECTION INTRAVENOUS ONCE AS NEEDED
OUTPATIENT
Start: 2025-01-14

## 2024-11-12 ASSESSMENT — PAIN SCALES - GENERAL: PAINLEVEL_OUTOF10: 0-NO PAIN

## 2024-11-12 NOTE — PROGRESS NOTES
Patient ID: Monica Musa is a 69 y.o. female.  Oncology History Overview Note   - 7/8/2022 TLH, BSO, SLND combined case with Dr. Lal. 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 There was no lymphovascular invasion. There was a focus of MELF pattern invasion. Tumor board recommendations: Surveillance. MMR testing on prior endometrial biopsy specimen was normal.   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  - CT 8/26/24: Small volume ascites. Left pelvic nodule with some nodularity  along the left paracolic gutter worrisome for peritoneal carcinomatosis.  Admitted with bowel obstruction.  No lesion large enough for biopsy  - 9/2024 diagnostic laparoscopy - biopsy c/w non gyn primary - referred to med onc     Endometrial carcinoma (Multi)   5/22/2023 Initial Diagnosis    Endometrial carcinoma (Multi)     Malignant neoplasm of appendix (Multi)   10/3/2024 Initial Diagnosis    Malignant neoplasm of appendix (Multi)     10/16/2024 -  Chemotherapy    mFOLFOX6 (Fluorouracil Continuous Infusion / Leucovorin / Oxaliplatin), 14 Day Cycles     Carcinomatosis (Multi)   10/3/2024 Initial Diagnosis    Carcinomatosis (Multi)     10/16/2024 -  Chemotherapy    mFOLFOX6 (Fluorouracil Continuous Infusion / Leucovorin / Oxaliplatin), 14 Day Cycles       Subjective    HPI  Monica Musa is a 68 yo F with history of grade 1 endometroid adenocarcinoma MMI, no LVSI, pMMR s/p TLH, BSP, SLND 7/8/22. 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 Here for consideration of cycle 3 tomorrow.    Patient reports cold sensitivity ended after one week. No changes in the mouth. No feelings of twitching or restless legs. No n/v/d. No constipation. Pain is much better. She is able to eat solid foods and has had a 5 lb weight gain. No new chest pain or pressure. No worsening leg edema. No other major complaints at this time.     Patient's  past medical history, surgical history, family history and social history reviewed.    Objective      There were no vitals filed for this visit.    Review of Systems:   Review of Systems:    Positive per HPI, otherwise negative.    Physical Exam  Gen: appears well in clinic, NAD  HEENT: atraumatic head, normocephalic, EOMI, conjunctiva normal  LUNG: no increased WOB, CTAB  CV: No JVD. RRR  GI: soft, NT, ND  LE: no LE edema  Skin: no obvious rashes or lesions on visible skin  Neuro: interactive, no focal deficits noted  Psych: normal mood and affect  Performance Status:  Symptomatic; fully ambulatory    Labs/Imaging/Pathology: personally reviewed reports and images in Epic electronic medical record system. Pertinent results as it related to the plan represented in below in assessment and plan.   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  There are some features of goblet cells which favor appendiceal primary however pancreaticobiliary origin cannot be excluded.  MSI status intact  - [id number] also completed on blood from 9/30/2024 which was unremarkable  - Tempus on tissue in process  - Colonoscopy on 7/31/2024 was technically difficult due to scattered diverticulosis in the ascending colon and some hemorrhoids  - EGD 10/2/24 shows abnormal mucosa at the GE junction but no clear mass however there was diffuse nodular gastritis which was biopsied and pathology report states: neg for H Pylori.   - No variants detected in the DPYD gene   - Signatera in process  - 10/3/24: CA19.9 <4.00, CEA 1.4   10/3/24  Today we discussed  diagnosis of likely advanced appendiceal carcinoma given there is no mass seen on recent imaging 8/26/2024 with no masses seen  -Goblet cells seen on pathology likely appendiceal origin  -Will plan for CA 19-9 and CEA as baseline  -Will await EGD pathology to confirm no evidence of malignancy in the esophagus  -Discussed neck step is systemic therapy with modified FOLFOX   - could consider HIPEC after 3-4 cycles based on response  - Dr Ontiveros aware of patient  -Refer port placement next week and cycle 1 day 1 on 10/9/2024  -Reviewed side effects of 5-FU, oxaliplatin and consent signed  -Prescription sent for nausea to be used as needed  -We will plan for day 3 Aloxi  -Given limited support at home will likely hold off on disconnect at home for now  10/23/2024:   - Mediport placed 10/10/24  - Cycle 1 mFOLFOX given 10/16/24: Fluorouracil 2,400 mg/m2, oxaliplatin 85 mg/m2 with pre meds zofran 16 mg and decadron 12 mg, nothing given on day 3   - Last visit with GYN, Dr. Greene, was on 10/7/24         10/29/24  - symptoms improved after supportive care and now close to her baseline  - states dysphagia improved after chemo which hopefully is a sign of tx response  -Lower extremity swelling we discussed is likely related to poor p.o. intake, albumin is low and we discussed importance of protein in addition to ambulating and compression  -Overall for nausea which is her largest complaint we discussed starting Zyprexa at bedtime, given she is not taking much of Reglan we will hold off for now  -She will continue Zofran every 8 hours as needed  -Labs reviewed from today no contraindications to proceed with treatment tomorrow  -Plan for supportive care again with Boni the following week     11/5/2024:  - Patient presents for toxicity check after cycle 2  - Reports that she is feeling well and tolerating treatment better   - She does not need IVF or medications today but we will check cmp/mag and notify her if she needs to  continue home K+, she prefers to change to smaller dosage   - Eating more solid foods and will continue to try to prevent further weight loss, declines to talk with dietician today   - She is taking Zyprexa nightly   - She is unsure which prn anti-nausea she is taking but will update me  - Taking imodium BID   - She will RTC on 11/12 for port flush/labs and a visit the same day with Dr. Smart   - She will then RTC the next day for treatment     11/12/24:  - Patient continues to tolerate treatment with no major toxicity.   - No dose adjustments at this time.  - Plan to follow-up with Boni in 2 weeks and with me in 4 weeks.  - On days she sees Boni she will have labs done the day before and in 4 weeks she will have labs done in house with me.  - Will plan to have labs done outside of port.   - RTC in 2 weeks with Boni.      Reviewed ongoing medical problems and how they relate to her malignancy, will continue long term monitoring.    RTC in 2 weeks with Boni. This note has been transcribed using a medical scribe and there is a possibility of unintentional typing misprints.     Diagnoses and all orders for this visit:  Malignant neoplasm of appendix (Multi)  -     Clinic Appointment Request  -     Clinic Appointment Request; Future  -     Infusion Appointment Request; Future  -     Oncology Line Draw Appointment Request; Future  -     CBC and Auto Differential; Future  -     Comprehensive metabolic panel; Future  -     Infusion Appointment Request; Future  -     Clinic Appointment Request; Future  -     Infusion Appointment Request; Future  -     Oncology Line Draw Appointment Request; Future  -     CBC and Auto Differential; Future  -     Comprehensive metabolic panel; Future  -     Infusion Appointment Request; Future  -     Clinic Appointment Request; Future  -     Infusion Appointment Request; Future  -     Oncology Line Draw Appointment Request; Future  -     CBC and Auto Differential; Future  -      Comprehensive metabolic panel; Future  -     Infusion Appointment Request; Future  -     Clinic Appointment Request; Future  -     Infusion Appointment Request; Future  -     Oncology Line Draw Appointment Request; Future  -     CBC and Auto Differential; Future  -     Comprehensive metabolic panel; Future  -     Infusion Appointment Request; Future  -     Clinic Appointment Request HANG MENON; Future  -     Clinic Appointment Request HANG MENON; Future  Carcinomatosis (Multi)  -     Clinic Appointment Request  -     CT chest abdomen pelvis w IV contrast; Future  -     Clinic Appointment Request; Future  -     Infusion Appointment Request; Future  -     Oncology Line Draw Appointment Request; Future  -     CBC and Auto Differential; Future  -     Comprehensive metabolic panel; Future  -     Infusion Appointment Request; Future  -     Clinic Appointment Request; Future  -     Infusion Appointment Request; Future  -     Oncology Line Draw Appointment Request; Future  -     CBC and Auto Differential; Future  -     Comprehensive metabolic panel; Future  -     Infusion Appointment Request; Future  -     Clinic Appointment Request; Future  -     Infusion Appointment Request; Future  -     Oncology Line Draw Appointment Request; Future  -     CBC and Auto Differential; Future  -     Comprehensive metabolic panel; Future  -     Infusion Appointment Request; Future  -     Clinic Appointment Request; Future  -     Infusion Appointment Request; Future  -     Oncology Line Draw Appointment Request; Future  -     CBC and Auto Differential; Future  -     Comprehensive metabolic panel; Future  -     Infusion Appointment Request; Future  Other orders  -     ondansetron (Zofran) tablet 16 mg  -     dexAMETHasone (Decadron) tablet 12 mg  -     prochlorperazine (Compazine) tablet 10 mg  -     prochlorperazine (Compazine) injection 10 mg  -     OXALIplatin (Eloxatin) 165 mg in dextrose 5% 533 mL IV  -     fluorouracil  (Adrucil) IV syringe 775 mg  -     fluorouracil (Adrucil) 4,700 mg in sodium chloride 0.9% 138 mL IV via Home Infusion  -     LORazepam (Ativan) injection 1 mg  -     sodium chloride 0.9 % bolus 500 mL  -     dextrose 5 % in water (D5W) bolus 500 mL  -     diphenhydrAMINE (BENADryl) injection 50 mg  -     methylPREDNISolone sod succinate (SOLU-Medrol) 40 mg/mL injection 40 mg  -     famotidine PF (Pepcid) injection 20 mg  -     EPINEPHrine (Epipen) injection syringe 0.3 mg  -     albuterol 2.5 mg /3 mL (0.083 %) nebulizer solution 3 mL  -     palonosetron (Aloxi) injection 250 mcg  -     ondansetron (Zofran) tablet 16 mg  -     dexAMETHasone (Decadron) tablet 12 mg  -     prochlorperazine (Compazine) tablet 10 mg  -     prochlorperazine (Compazine) injection 10 mg  -     OXALIplatin (Eloxatin) 165 mg in dextrose 5% 533 mL IV  -     fluorouracil (Adrucil) IV syringe 775 mg  -     fluorouracil (Adrucil) 4,700 mg in sodium chloride 0.9% 138 mL IV via Home Infusion  -     LORazepam (Ativan) injection 1 mg  -     sodium chloride 0.9 % bolus 500 mL  -     dextrose 5 % in water (D5W) bolus 500 mL  -     diphenhydrAMINE (BENADryl) injection 50 mg  -     methylPREDNISolone sod succinate (SOLU-Medrol) 40 mg/mL injection 40 mg  -     famotidine PF (Pepcid) injection 20 mg  -     EPINEPHrine (Epipen) injection syringe 0.3 mg  -     albuterol 2.5 mg /3 mL (0.083 %) nebulizer solution 3 mL  -     palonosetron (Aloxi) injection 250 mcg  -     ondansetron (Zofran) tablet 16 mg  -     dexAMETHasone (Decadron) tablet 12 mg  -     prochlorperazine (Compazine) tablet 10 mg  -     prochlorperazine (Compazine) injection 10 mg  -     OXALIplatin (Eloxatin) 165 mg in dextrose 5% 533 mL IV  -     fluorouracil (Adrucil) IV syringe 775 mg  -     fluorouracil (Adrucil) 4,700 mg in sodium chloride 0.9% 138 mL IV via Home Infusion  -     LORazepam (Ativan) injection 1 mg  -     sodium chloride 0.9 % bolus 500 mL  -     dextrose 5 % in water  (D5W) bolus 500 mL  -     diphenhydrAMINE (BENADryl) injection 50 mg  -     methylPREDNISolone sod succinate (SOLU-Medrol) 40 mg/mL injection 40 mg  -     famotidine PF (Pepcid) injection 20 mg  -     EPINEPHrine (Epipen) injection syringe 0.3 mg  -     albuterol 2.5 mg /3 mL (0.083 %) nebulizer solution 3 mL  -     palonosetron (Aloxi) injection 250 mcg  -     ondansetron (Zofran) tablet 16 mg  -     dexAMETHasone (Decadron) tablet 12 mg  -     prochlorperazine (Compazine) tablet 10 mg  -     prochlorperazine (Compazine) injection 10 mg  -     OXALIplatin (Eloxatin) 165 mg in dextrose 5% 533 mL IV  -     leucovorin 780 mg in dextrose 5% 139 mL IV  -     fluorouracil (Adrucil) IV syringe 775 mg  -     fluorouracil (Adrucil) 4,700 mg in sodium chloride 0.9% 138 mL IV via Home Infusion  -     LORazepam (Ativan) injection 1 mg  -     sodium chloride 0.9 % bolus 500 mL  -     dextrose 5 % in water (D5W) bolus 500 mL  -     diphenhydrAMINE (BENADryl) injection 50 mg  -     methylPREDNISolone sod succinate (SOLU-Medrol) 40 mg/mL injection 40 mg  -     famotidine PF (Pepcid) injection 20 mg  -     EPINEPHrine (Epipen) injection syringe 0.3 mg  -     albuterol 2.5 mg /3 mL (0.083 %) nebulizer solution 3 mL  -     palonosetron (Aloxi) injection 250 mcg         Margot Smart MD  Hematology/Oncology  [facility]    Radha Attestation  By signing my name below, IColleen Scribe   attest that this documentation has been prepared under the direction and in the presence of Margot Smart MD.     Time Spent  Prep time on day of patient encounter: 5 minutes  Time spent directly with patient, family or caregiver: 16 minutes  Additional Time Spent on Patient Care Activities: 5 minutes  Documentation Time: 5 minutes  Other Time Spent: 0 minutes  Total: 31 minutes

## 2024-11-12 NOTE — PROGRESS NOTES
Due to Copay's Monica will no longer be scheduled for port flush 1-2 days before her treatment. Monica will go to the lab to be peripherally stuck prior to treatments.

## 2024-11-13 ENCOUNTER — INFUSION (OUTPATIENT)
Dept: HEMATOLOGY/ONCOLOGY | Facility: CLINIC | Age: 69
End: 2024-11-13
Payer: MEDICARE

## 2024-11-13 VITALS
OXYGEN SATURATION: 98 % | TEMPERATURE: 97.2 F | SYSTOLIC BLOOD PRESSURE: 119 MMHG | RESPIRATION RATE: 16 BRPM | DIASTOLIC BLOOD PRESSURE: 73 MMHG | HEART RATE: 89 BPM | BODY MASS INDEX: 28.26 KG/M2 | WEIGHT: 170.64 LBS

## 2024-11-13 DIAGNOSIS — C18.1 MALIGNANT NEOPLASM OF APPENDIX (MULTI): ICD-10-CM

## 2024-11-13 DIAGNOSIS — C80.0 CARCINOMATOSIS (MULTI): ICD-10-CM

## 2024-11-13 PROCEDURE — 2500000005 HC RX 250 GENERAL PHARMACY W/O HCPCS: Performed by: INTERNAL MEDICINE

## 2024-11-13 PROCEDURE — 96376 TX/PRO/DX INJ SAME DRUG ADON: CPT

## 2024-11-13 PROCEDURE — 96415 CHEMO IV INFUSION ADDL HR: CPT

## 2024-11-13 PROCEDURE — 2500000004 HC RX 250 GENERAL PHARMACY W/ HCPCS (ALT 636 FOR OP/ED): Performed by: INTERNAL MEDICINE

## 2024-11-13 PROCEDURE — 96416 CHEMO PROLONG INFUSE W/PUMP: CPT

## 2024-11-13 PROCEDURE — 96413 CHEMO IV INFUSION 1 HR: CPT

## 2024-11-13 PROCEDURE — 96411 CHEMO IV PUSH ADDL DRUG: CPT

## 2024-11-13 RX ORDER — LORAZEPAM 2 MG/ML
1 INJECTION INTRAMUSCULAR AS NEEDED
Status: DISCONTINUED | OUTPATIENT
Start: 2024-11-13 | End: 2024-11-13 | Stop reason: HOSPADM

## 2024-11-13 RX ORDER — FLUOROURACIL 50 MG/ML
400 INJECTION, SOLUTION INTRAVENOUS ONCE
Status: COMPLETED | OUTPATIENT
Start: 2024-11-13 | End: 2024-11-13

## 2024-11-13 RX ORDER — HEPARIN 100 UNIT/ML
500 SYRINGE INTRAVENOUS AS NEEDED
Status: DISCONTINUED | OUTPATIENT
Start: 2024-11-13 | End: 2024-11-13 | Stop reason: HOSPADM

## 2024-11-13 RX ORDER — DEXAMETHASONE 6 MG/1
12 TABLET ORAL ONCE
Status: COMPLETED | OUTPATIENT
Start: 2024-11-13 | End: 2024-11-13

## 2024-11-13 RX ORDER — ONDANSETRON HYDROCHLORIDE 8 MG/1
16 TABLET, FILM COATED ORAL ONCE
Status: COMPLETED | OUTPATIENT
Start: 2024-11-13 | End: 2024-11-13

## 2024-11-13 RX ORDER — HEPARIN 100 UNIT/ML
500 SYRINGE INTRAVENOUS AS NEEDED
Status: CANCELLED | OUTPATIENT
Start: 2024-11-13

## 2024-11-13 RX ORDER — PROCHLORPERAZINE MALEATE 10 MG
10 TABLET ORAL EVERY 6 HOURS PRN
Status: DISCONTINUED | OUTPATIENT
Start: 2024-11-13 | End: 2024-11-13 | Stop reason: HOSPADM

## 2024-11-13 RX ORDER — EPINEPHRINE 0.3 MG/.3ML
0.3 INJECTION SUBCUTANEOUS EVERY 5 MIN PRN
Status: DISCONTINUED | OUTPATIENT
Start: 2024-11-13 | End: 2024-11-13 | Stop reason: HOSPADM

## 2024-11-13 RX ORDER — DIPHENHYDRAMINE HYDROCHLORIDE 50 MG/ML
50 INJECTION INTRAMUSCULAR; INTRAVENOUS AS NEEDED
Status: DISCONTINUED | OUTPATIENT
Start: 2024-11-13 | End: 2024-11-13 | Stop reason: HOSPADM

## 2024-11-13 RX ORDER — ALBUTEROL SULFATE 0.83 MG/ML
3 SOLUTION RESPIRATORY (INHALATION) AS NEEDED
Status: DISCONTINUED | OUTPATIENT
Start: 2024-11-13 | End: 2024-11-13 | Stop reason: HOSPADM

## 2024-11-13 RX ORDER — FAMOTIDINE 10 MG/ML
20 INJECTION INTRAVENOUS ONCE AS NEEDED
Status: DISCONTINUED | OUTPATIENT
Start: 2024-11-13 | End: 2024-11-13 | Stop reason: HOSPADM

## 2024-11-13 RX ORDER — HEPARIN SODIUM,PORCINE/PF 10 UNIT/ML
50 SYRINGE (ML) INTRAVENOUS AS NEEDED
Status: CANCELLED | OUTPATIENT
Start: 2024-11-13

## 2024-11-13 RX ORDER — PROCHLORPERAZINE EDISYLATE 5 MG/ML
10 INJECTION INTRAMUSCULAR; INTRAVENOUS EVERY 6 HOURS PRN
Status: DISCONTINUED | OUTPATIENT
Start: 2024-11-13 | End: 2024-11-13 | Stop reason: HOSPADM

## 2024-11-13 RX ORDER — HEPARIN SODIUM,PORCINE/PF 10 UNIT/ML
50 SYRINGE (ML) INTRAVENOUS AS NEEDED
Status: DISCONTINUED | OUTPATIENT
Start: 2024-11-13 | End: 2024-11-13 | Stop reason: HOSPADM

## 2024-11-13 ASSESSMENT — PAIN SCALES - GENERAL: PAINLEVEL_OUTOF10: 0-NO PAIN

## 2024-11-15 ENCOUNTER — INFUSION (OUTPATIENT)
Dept: HEMATOLOGY/ONCOLOGY | Facility: CLINIC | Age: 69
End: 2024-11-15
Payer: MEDICARE

## 2024-11-15 VITALS
TEMPERATURE: 96.8 F | WEIGHT: 170.64 LBS | RESPIRATION RATE: 16 BRPM | HEART RATE: 93 BPM | DIASTOLIC BLOOD PRESSURE: 77 MMHG | OXYGEN SATURATION: 98 % | SYSTOLIC BLOOD PRESSURE: 133 MMHG | BODY MASS INDEX: 28.26 KG/M2

## 2024-11-15 DIAGNOSIS — C80.0 CARCINOMATOSIS (MULTI): ICD-10-CM

## 2024-11-15 DIAGNOSIS — C18.1 MALIGNANT NEOPLASM OF APPENDIX (MULTI): ICD-10-CM

## 2024-11-15 PROCEDURE — 96374 THER/PROPH/DIAG INJ IV PUSH: CPT | Mod: INF

## 2024-11-15 PROCEDURE — 2500000004 HC RX 250 GENERAL PHARMACY W/ HCPCS (ALT 636 FOR OP/ED): Performed by: INTERNAL MEDICINE

## 2024-11-15 RX ORDER — HEPARIN SODIUM,PORCINE/PF 10 UNIT/ML
50 SYRINGE (ML) INTRAVENOUS AS NEEDED
OUTPATIENT
Start: 2024-11-15

## 2024-11-15 RX ORDER — PALONOSETRON 0.05 MG/ML
250 INJECTION, SOLUTION INTRAVENOUS ONCE
Status: COMPLETED | OUTPATIENT
Start: 2024-11-15 | End: 2024-11-15

## 2024-11-15 RX ORDER — HEPARIN 100 UNIT/ML
500 SYRINGE INTRAVENOUS AS NEEDED
OUTPATIENT
Start: 2024-11-15

## 2024-11-15 ASSESSMENT — PAIN SCALES - GENERAL: PAINLEVEL_OUTOF10: 0-NO PAIN

## 2024-11-21 ENCOUNTER — TELEPHONE (OUTPATIENT)
Dept: HEMATOLOGY/ONCOLOGY | Facility: CLINIC | Age: 69
End: 2024-11-21
Payer: MEDICARE

## 2024-11-21 DIAGNOSIS — C54.1 ENDOMETRIAL CARCINOMA (MULTI): ICD-10-CM

## 2024-11-21 RX ORDER — OLANZAPINE 5 MG/1
5 TABLET ORAL NIGHTLY
Qty: 90 TABLET | Refills: 1 | Status: SHIPPED | OUTPATIENT
Start: 2024-11-21

## 2024-11-21 RX ORDER — OLANZAPINE 5 MG/1
5 TABLET ORAL NIGHTLY
COMMUNITY
End: 2024-11-21 | Stop reason: SDUPTHER

## 2024-11-21 NOTE — TELEPHONE ENCOUNTER
Spoke with the patient. [facility] she was told by Boni to take Olanzepine daily. [facility] pharmacy told her the script was cancelled. After review of medications- it was cancelled by office on 10.31.24. Explained I would update the team and then call her back once I receive a response.

## 2024-11-21 NOTE — TELEPHONE ENCOUNTER
Spoke with the patient. Aware refill of medication will be sent in today. Pt verbalized understanding and had no further questions or concerns.    MMED

## 2024-11-22 NOTE — PROGRESS NOTES
"Patient ID: Monica Musa is a 69 y.o. female.    Oncology History Overview Note   - 7/8/2022 TLH, BSO, SLND combined case with Dr. Lal. 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 There was no lymphovascular invasion. There was a focus of MELF pattern invasion. Tumor board recommendations: Surveillance. MMR testing on prior endometrial biopsy specimen was normal.   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  - CT 8/26/24: Small volume ascites. Left pelvic nodule with some nodularity  along the left paracolic gutter worrisome for peritoneal carcinomatosis.  Admitted with bowel obstruction.  No lesion large enough for biopsy  - 9/2024 diagnostic laparoscopy - biopsy c/w non gyn primary - referred to med onc     Endometrial carcinoma (Multi)   5/22/2023 Initial Diagnosis    Endometrial carcinoma (Multi)     Malignant neoplasm of appendix (Multi)   10/3/2024 Initial Diagnosis    Malignant neoplasm of appendix (Multi)     10/16/2024 -  Chemotherapy    mFOLFOX6 (Fluorouracil Continuous Infusion / Leucovorin / Oxaliplatin), 14 Day Cycles     Carcinomatosis (Multi)   10/3/2024 Initial Diagnosis    Carcinomatosis (Multi)     10/16/2024 -  Chemotherapy    mFOLFOX6 (Fluorouracil Continuous Infusion / Leucovorin / Oxaliplatin), 14 Day Cycles       Subjective    HPI  Monica Musa is a 68 yo F with history of grade 1 endometroid adenocarcinoma MMI, no LVSI, pMMR s/p TLH, BSP, SLND 7/8/22. 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 Here for consideration of cycle 4 tomorrow.    Patient presents for follow up visit. Reports doing \"awesome\". Cold sensitivity lasts about 5-7 days. She is eating well and energy is good. She reports new left eyelid swelling/stye and has started using warm compresses, says this happened before and resolved with supportive care. She says she has been enjoying doing crafts with her " grandchildren.     Patient denies chest pain, palpitations, SOB, fevers, chills, nausea, vomiting, diarrhea, constipation, dysphagia, new/worsening pain or other concerns. She says her edema is stable but if it worsens she will use compression stockings and edema improves.     Patient's past medical history, surgical history, family history and social history reviewed.    Objective    Visit Vitals  OB Status Hysterectomy   Smoking Status Former     Vital signs reviewed    Review of Systems:   Review of Systems:    Positive per HPI, otherwise negative.      Physical Exam  Gen: appears well in clinic, NAD  HEENT: atraumatic head, normocephalic, EOMI, conjunctiva normal  LUNG: no increased WOB, CTAB  CV: No JVD. RRR  GI: soft, NT, ND  LE: no LE edema  Skin: no obvious rashes or lesions on visible skin  Neuro: interactive, no focal deficits noted  Psych: normal mood and affect    Performance Status:  Symptomatic; fully ambulatory    Labs/Imaging/Pathology: personally reviewed reports and images in Epic electronic medical record system. Pertinent results as it related to the plan represented in below in assessment and plan.     Labs:  Lab Results   Component Value Date    WBC 5.8 11/26/2024    NEUTROABS 3.13 11/26/2024    IGABSOL 0.04 11/26/2024    LYMPHSABS 1.69 11/26/2024    MONOSABS 0.78 11/26/2024    EOSABS 0.11 11/26/2024    BASOSABS 0.05 11/26/2024    RBC 3.80 (L) 11/26/2024    MCV 96 11/26/2024    MCHC 32.5 11/26/2024    HGB 11.8 (L) 11/26/2024    HCT 36.3 11/26/2024     11/26/2024     Lab Results   Component Value Date    CREATININE 0.48 (L) 11/26/2024    BUN 20 11/26/2024    EGFR >90 11/26/2024     11/26/2024    K 4.2 11/26/2024     11/26/2024    CO2 27 11/26/2024      Lab Results   Component Value Date    ALT 25 11/26/2024    AST 26 11/26/2024    ALKPHOS 109 11/26/2024    BILITOT 0.4 11/26/2024          Assessment/Plan     Adenocarcinoma of GI origin, 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  There are some features of goblet cells which favor appendiceal primary however pancreaticobiliary origin cannot be excluded.  MSI status intact  - [id number] also completed on blood from 9/30/2024 which was unremarkable  - Tempus on tissue in process  - Colonoscopy on 7/31/2024 was technically difficult due to scattered diverticulosis in the ascending colon and some hemorrhoids  - EGD 10/2/24 shows abnormal mucosa at the GE junction but no clear mass however there was diffuse nodular gastritis which was biopsied and pathology report states: neg for H Pylori.   - No variants detected in the DPYD gene   - Signatera in process  - 10/3/24: CA19.9 <4.00, CEA 1.4   10/3/24  Today we discussed diagnosis of likely advanced appendiceal carcinoma given there is no mass seen on recent imaging 8/26/2024 with no masses seen  -Goblet cells seen on pathology likely appendiceal origin  -Will plan for CA 19-9 and CEA as baseline  -Will await EGD pathology to confirm no evidence of malignancy in the esophagus  -Discussed neck step is systemic therapy with modified FOLFOX   - could consider HIPEC after 3-4 cycles based on response  - Dr Ontiveros aware of patient  -Refer port placement next week and cycle 1 day 1 on 10/9/2024  -Reviewed side effects of 5-FU, oxaliplatin and consent signed  -Prescription sent for nausea to be used as needed  -We will plan for day 3 Aloxi  -Given limited support at home will likely hold off on disconnect at home for now  10/23/2024:   - Mediport placed 10/10/24  - Cycle 1 mFOLFOX given 10/16/24: Fluorouracil 2,400 mg/m2, oxaliplatin 85 mg/m2 with pre meds zofran  "16 mg and decadron 12 mg, nothing given on day 3   - Last visit with GYN, Dr. Greene, was on 10/7/24      10/29/24  - symptoms improved after supportive care and now close to her baseline  - states dysphagia improved after chemo which hopefully is a sign of tx response  -Lower extremity swelling we discussed is likely related to poor p.o. intake, albumin is low and we discussed importance of protein in addition to ambulating and compression  -Overall for nausea which is her largest complaint we discussed starting Zyprexa at bedtime, given she is not taking much of Reglan we will hold off for now  -She will continue Zofran every 8 hours as needed  -Labs reviewed from today no contraindications to proceed with treatment tomorrow  -Plan for supportive care again with Boni the following week    11/12/24:  - Patient continues to tolerate treatment with no major toxicity.   - No dose adjustments at this time.  - Plan to follow-up with Boni in 2 weeks and with me in 4 weeks.  - On days she sees Boni she will have labs done the day before and in 4 weeks she will have labs done in house with me.  - Will plan to have labs done outside of port.   - RTC in 2 weeks with Boni.      11/26/20024:  - Patient presents ahead of cycle 4 tomorrow   - Labs today via mediport are at parameter   - She reports feeling \"awesome\" with no concerns or toxicities to treatment   - Since she is feeling well we will not plan on toxicity check next week  - No contraindications to proceed with treatment tomorrow   - She will RTC as already scheduled on 12/9 for port flush/labs and a FUV with Dr. Smart before cycle 5 scheduled for 12/11/24      Diagnoses and all orders for this visit:  Malignant neoplasm of appendix (Multi)  -     Clinic Appointment Request BONI MENON, ZAYDA-CNP     "

## 2024-11-26 ENCOUNTER — OFFICE VISIT (OUTPATIENT)
Dept: HEMATOLOGY/ONCOLOGY | Facility: CLINIC | Age: 69
End: 2024-11-26
Payer: MEDICARE

## 2024-11-26 ENCOUNTER — APPOINTMENT (OUTPATIENT)
Dept: HEMATOLOGY/ONCOLOGY | Facility: CLINIC | Age: 69
End: 2024-11-26
Payer: MEDICARE

## 2024-11-26 VITALS
WEIGHT: 175.71 LBS | OXYGEN SATURATION: 97 % | HEART RATE: 83 BPM | RESPIRATION RATE: 17 BRPM | SYSTOLIC BLOOD PRESSURE: 126 MMHG | TEMPERATURE: 97.3 F | DIASTOLIC BLOOD PRESSURE: 74 MMHG | BODY MASS INDEX: 29.1 KG/M2

## 2024-11-26 DIAGNOSIS — C18.1 MALIGNANT NEOPLASM OF APPENDIX (MULTI): ICD-10-CM

## 2024-11-26 DIAGNOSIS — C80.0 CARCINOMATOSIS (MULTI): ICD-10-CM

## 2024-11-26 LAB
ALBUMIN SERPL BCP-MCNC: 3.9 G/DL (ref 3.4–5)
ALP SERPL-CCNC: 109 U/L (ref 33–136)
ALT SERPL W P-5'-P-CCNC: 25 U/L (ref 7–45)
ANION GAP SERPL CALC-SCNC: 12 MMOL/L (ref 10–20)
AST SERPL W P-5'-P-CCNC: 26 U/L (ref 9–39)
BASOPHILS # BLD AUTO: 0.05 X10*3/UL (ref 0–0.1)
BASOPHILS NFR BLD AUTO: 0.9 %
BILIRUB SERPL-MCNC: 0.4 MG/DL (ref 0–1.2)
BUN SERPL-MCNC: 20 MG/DL (ref 6–23)
CALCIUM SERPL-MCNC: 9.5 MG/DL (ref 8.6–10.3)
CHLORIDE SERPL-SCNC: 107 MMOL/L (ref 98–107)
CO2 SERPL-SCNC: 27 MMOL/L (ref 21–32)
CREAT SERPL-MCNC: 0.48 MG/DL (ref 0.5–1.05)
EGFRCR SERPLBLD CKD-EPI 2021: >90 ML/MIN/1.73M*2
EOSINOPHIL # BLD AUTO: 0.11 X10*3/UL (ref 0–0.7)
EOSINOPHIL NFR BLD AUTO: 1.9 %
ERYTHROCYTE [DISTWIDTH] IN BLOOD BY AUTOMATED COUNT: 18.1 % (ref 11.5–14.5)
GLUCOSE SERPL-MCNC: 140 MG/DL (ref 74–99)
HCT VFR BLD AUTO: 36.3 % (ref 36–46)
HGB BLD-MCNC: 11.8 G/DL (ref 12–16)
IMM GRANULOCYTES # BLD AUTO: 0.04 X10*3/UL (ref 0–0.7)
IMM GRANULOCYTES NFR BLD AUTO: 0.7 % (ref 0–0.9)
LYMPHOCYTES # BLD AUTO: 1.69 X10*3/UL (ref 1.2–4.8)
LYMPHOCYTES NFR BLD AUTO: 29.1 %
MCH RBC QN AUTO: 31.1 PG (ref 26–34)
MCHC RBC AUTO-ENTMCNC: 32.5 G/DL (ref 32–36)
MCV RBC AUTO: 96 FL (ref 80–100)
MONOCYTES # BLD AUTO: 0.78 X10*3/UL (ref 0.1–1)
MONOCYTES NFR BLD AUTO: 13.4 %
NEUTROPHILS # BLD AUTO: 3.13 X10*3/UL (ref 1.2–7.7)
NEUTROPHILS NFR BLD AUTO: 54 %
PLATELET # BLD AUTO: 174 X10*3/UL (ref 150–450)
POTASSIUM SERPL-SCNC: 4.2 MMOL/L (ref 3.5–5.3)
PROT SERPL-MCNC: 6.2 G/DL (ref 6.4–8.2)
RBC # BLD AUTO: 3.8 X10*6/UL (ref 4–5.2)
SODIUM SERPL-SCNC: 142 MMOL/L (ref 136–145)
WBC # BLD AUTO: 5.8 X10*3/UL (ref 4.4–11.3)

## 2024-11-26 PROCEDURE — 99214 OFFICE O/P EST MOD 30 MIN: CPT

## 2024-11-26 PROCEDURE — 1157F ADVNC CARE PLAN IN RCRD: CPT

## 2024-11-26 PROCEDURE — 36591 DRAW BLOOD OFF VENOUS DEVICE: CPT

## 2024-11-26 PROCEDURE — 82565 ASSAY OF CREATININE: CPT

## 2024-11-26 PROCEDURE — 1123F ACP DISCUSS/DSCN MKR DOCD: CPT

## 2024-11-26 PROCEDURE — 85025 COMPLETE CBC W/AUTO DIFF WBC: CPT

## 2024-11-26 RX ORDER — HEPARIN SODIUM,PORCINE/PF 10 UNIT/ML
50 SYRINGE (ML) INTRAVENOUS AS NEEDED
Status: CANCELLED | OUTPATIENT
Start: 2024-11-26

## 2024-11-26 RX ORDER — HEPARIN 100 UNIT/ML
500 SYRINGE INTRAVENOUS AS NEEDED
Status: CANCELLED | OUTPATIENT
Start: 2024-11-26

## 2024-11-26 ASSESSMENT — PAIN SCALES - GENERAL: PAINLEVEL_OUTOF10: 0-NO PAIN

## 2024-11-27 ENCOUNTER — INFUSION (OUTPATIENT)
Dept: HEMATOLOGY/ONCOLOGY | Facility: CLINIC | Age: 69
End: 2024-11-27
Payer: MEDICARE

## 2024-11-27 VITALS
HEART RATE: 80 BPM | WEIGHT: 175.04 LBS | BODY MASS INDEX: 28.99 KG/M2 | OXYGEN SATURATION: 98 % | RESPIRATION RATE: 16 BRPM | SYSTOLIC BLOOD PRESSURE: 131 MMHG | TEMPERATURE: 97.2 F | DIASTOLIC BLOOD PRESSURE: 77 MMHG

## 2024-11-27 DIAGNOSIS — C18.1 MALIGNANT NEOPLASM OF APPENDIX (MULTI): ICD-10-CM

## 2024-11-27 DIAGNOSIS — C80.0 CARCINOMATOSIS (MULTI): ICD-10-CM

## 2024-11-27 PROCEDURE — 96413 CHEMO IV INFUSION 1 HR: CPT

## 2024-11-27 PROCEDURE — 2500000005 HC RX 250 GENERAL PHARMACY W/O HCPCS: Performed by: INTERNAL MEDICINE

## 2024-11-27 PROCEDURE — 96411 CHEMO IV PUSH ADDL DRUG: CPT

## 2024-11-27 PROCEDURE — 96415 CHEMO IV INFUSION ADDL HR: CPT

## 2024-11-27 PROCEDURE — 2500000004 HC RX 250 GENERAL PHARMACY W/ HCPCS (ALT 636 FOR OP/ED): Performed by: INTERNAL MEDICINE

## 2024-11-27 PROCEDURE — 96416 CHEMO PROLONG INFUSE W/PUMP: CPT

## 2024-11-27 RX ORDER — HEPARIN SODIUM,PORCINE/PF 10 UNIT/ML
50 SYRINGE (ML) INTRAVENOUS AS NEEDED
Status: CANCELLED | OUTPATIENT
Start: 2024-11-27

## 2024-11-27 RX ORDER — HEPARIN 100 UNIT/ML
500 SYRINGE INTRAVENOUS AS NEEDED
Status: CANCELLED | OUTPATIENT
Start: 2024-11-27

## 2024-11-27 RX ORDER — EPINEPHRINE 0.3 MG/.3ML
0.3 INJECTION SUBCUTANEOUS EVERY 5 MIN PRN
Status: DISCONTINUED | OUTPATIENT
Start: 2024-11-27 | End: 2024-11-27 | Stop reason: HOSPADM

## 2024-11-27 RX ORDER — PROCHLORPERAZINE EDISYLATE 5 MG/ML
10 INJECTION INTRAMUSCULAR; INTRAVENOUS EVERY 6 HOURS PRN
Status: DISCONTINUED | OUTPATIENT
Start: 2024-11-27 | End: 2024-11-27 | Stop reason: HOSPADM

## 2024-11-27 RX ORDER — LORAZEPAM 2 MG/ML
1 INJECTION INTRAMUSCULAR AS NEEDED
Status: DISCONTINUED | OUTPATIENT
Start: 2024-11-27 | End: 2024-11-27 | Stop reason: HOSPADM

## 2024-11-27 RX ORDER — DIPHENHYDRAMINE HYDROCHLORIDE 50 MG/ML
50 INJECTION INTRAMUSCULAR; INTRAVENOUS AS NEEDED
Status: DISCONTINUED | OUTPATIENT
Start: 2024-11-27 | End: 2024-11-27 | Stop reason: HOSPADM

## 2024-11-27 RX ORDER — DEXAMETHASONE 6 MG/1
12 TABLET ORAL ONCE
Status: COMPLETED | OUTPATIENT
Start: 2024-11-27 | End: 2024-11-27

## 2024-11-27 RX ORDER — PROCHLORPERAZINE MALEATE 10 MG
10 TABLET ORAL EVERY 6 HOURS PRN
Status: DISCONTINUED | OUTPATIENT
Start: 2024-11-27 | End: 2024-11-27 | Stop reason: HOSPADM

## 2024-11-27 RX ORDER — FAMOTIDINE 10 MG/ML
20 INJECTION INTRAVENOUS ONCE AS NEEDED
Status: DISCONTINUED | OUTPATIENT
Start: 2024-11-27 | End: 2024-11-27 | Stop reason: HOSPADM

## 2024-11-27 RX ORDER — FLUOROURACIL 50 MG/ML
400 INJECTION, SOLUTION INTRAVENOUS ONCE
Status: COMPLETED | OUTPATIENT
Start: 2024-11-27 | End: 2024-11-27

## 2024-11-27 RX ORDER — ALBUTEROL SULFATE 0.83 MG/ML
3 SOLUTION RESPIRATORY (INHALATION) AS NEEDED
Status: DISCONTINUED | OUTPATIENT
Start: 2024-11-27 | End: 2024-11-27 | Stop reason: HOSPADM

## 2024-11-27 RX ORDER — ONDANSETRON HYDROCHLORIDE 8 MG/1
16 TABLET, FILM COATED ORAL ONCE
Status: COMPLETED | OUTPATIENT
Start: 2024-11-27 | End: 2024-11-27

## 2024-11-27 ASSESSMENT — PAIN SCALES - GENERAL: PAINLEVEL_OUTOF10: 0-NO PAIN

## 2024-11-27 NOTE — PATIENT INSTRUCTIONS
Please return on Friday 11/29/2024 at 1100 am for your pump disconnect. Dismiss all calls or texts with a different time.

## 2024-11-29 ENCOUNTER — INFUSION (OUTPATIENT)
Dept: HEMATOLOGY/ONCOLOGY | Facility: CLINIC | Age: 69
End: 2024-11-29
Payer: MEDICARE

## 2024-11-29 VITALS
WEIGHT: 176.37 LBS | TEMPERATURE: 96.6 F | RESPIRATION RATE: 16 BRPM | DIASTOLIC BLOOD PRESSURE: 78 MMHG | SYSTOLIC BLOOD PRESSURE: 143 MMHG | HEART RATE: 69 BPM | OXYGEN SATURATION: 99 % | BODY MASS INDEX: 29.21 KG/M2

## 2024-11-29 DIAGNOSIS — C80.0 CARCINOMATOSIS (MULTI): ICD-10-CM

## 2024-11-29 DIAGNOSIS — C18.1 MALIGNANT NEOPLASM OF APPENDIX (MULTI): ICD-10-CM

## 2024-11-29 PROCEDURE — 96374 THER/PROPH/DIAG INJ IV PUSH: CPT | Mod: INF

## 2024-11-29 PROCEDURE — 2500000004 HC RX 250 GENERAL PHARMACY W/ HCPCS (ALT 636 FOR OP/ED): Performed by: INTERNAL MEDICINE

## 2024-11-29 RX ORDER — HEPARIN 100 UNIT/ML
500 SYRINGE INTRAVENOUS AS NEEDED
OUTPATIENT
Start: 2024-11-29

## 2024-11-29 RX ORDER — PALONOSETRON 0.05 MG/ML
250 INJECTION, SOLUTION INTRAVENOUS ONCE
Status: COMPLETED | OUTPATIENT
Start: 2024-11-29 | End: 2024-11-29

## 2024-11-29 RX ORDER — HEPARIN SODIUM,PORCINE/PF 10 UNIT/ML
50 SYRINGE (ML) INTRAVENOUS AS NEEDED
OUTPATIENT
Start: 2024-11-29

## 2024-11-29 ASSESSMENT — PAIN SCALES - GENERAL: PAINLEVEL_OUTOF10: 0-NO PAIN

## 2024-11-29 NOTE — PATIENT INSTRUCTIONS
Avoid taking Ondansetron (Zofran) today 11/29/2024 and Saturday 11/30/2024 you received a similar long acting version, Aloxi, today.

## 2024-12-08 NOTE — PROGRESS NOTES
Patient ID: Monica Musa is a 69 y.o. female.  Oncology History Overview Note   - 7/8/2022 TLH, BSO, SLND combined case with Dr. Lal. 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 There was no lymphovascular invasion. There was a focus of MELF pattern invasion. Tumor board recommendations: Surveillance. MMR testing on prior endometrial biopsy specimen was normal.   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  - CT 8/26/24: Small volume ascites. Left pelvic nodule with some nodularity  along the left paracolic gutter worrisome for peritoneal carcinomatosis.  Admitted with bowel obstruction.  No lesion large enough for biopsy  - 9/2024 diagnostic laparoscopy - biopsy c/w non gyn primary - referred to med onc     Endometrial carcinoma (Multi)   5/22/2023 Initial Diagnosis    Endometrial carcinoma (Multi)     Malignant neoplasm of appendix (Multi)   10/3/2024 Initial Diagnosis    Malignant neoplasm of appendix (Multi)     10/16/2024 -  Chemotherapy    mFOLFOX6 (Fluorouracil Continuous Infusion / Leucovorin / Oxaliplatin), 14 Day Cycles     Carcinomatosis (Multi)   10/3/2024 Initial Diagnosis    Carcinomatosis (Multi)     10/16/2024 -  Chemotherapy    mFOLFOX6 (Fluorouracil Continuous Infusion / Leucovorin / Oxaliplatin), 14 Day Cycles       Subjective    HPI  Monica Musa is a 70 yo F with history of grade 1 endometroid adenocarcinoma MMI, no LVSI, pMMR s/p TLH, BSP, SLND 7/8/22. 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 Here for consideration of cycle 5 for FOLFOX for Wednesday. Blood work from today CBC shows no significant cytopenias. CMP still pending.     he patient reports experiencing cold sensitivity for the past week, along with difficulty lifting small pills, which is a new symptom. There are no other issues, including no new rashes, gastrointestinal problems, nausea, or chest  pressure.    Patient's past medical history, surgical history, family history and social history reviewed.    Review of Systems:   Review of Systems:    Positive per HPI, otherwise negative.     Objective    There were no vitals filed for this visit.      Physical Exam  Gen: appears well in clinic, NAD  HEENT: atraumatic head, normocephalic, EOMI, conjunctiva normal  LUNG: no increased WOB, CTAB  CV: No JVD. RRR  GI: soft, NT, ND  LE: no LE edema  Skin: no obvious rashes or lesions on visible skin  Neuro: interactive, no focal deficits noted  Psych: normal mood and affect  Performance Status:  Symptomatic; fully ambulatory    Labs/Imaging/Pathology: personally reviewed reports and images in Epic electronic medical record system. Pertinent results as it related to the plan represented in below in assessment and plan.   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  There are some features of goblet cells which favor appendiceal primary however pancreaticobiliary origin cannot be excluded.  MSI status intact  - [id number] also completed on blood from 9/30/2024 which was unremarkable  - Tempus on tissue in process  - Colonoscopy on 7/31/2024 was technically difficult due to scattered diverticulosis in the ascending colon and some hemorrhoids  - EGD 10/2/24 shows abnormal mucosa at the GE junction but no clear mass however there was diffuse nodular gastritis which was biopsied and pathology report states: neg for H Pylori.   - No variants detected in the DPYD gene   - Signatera in process  - 10/3/24: CA19.9 <4.00, CEA 1.4   10/3/24  Today  we discussed diagnosis of likely advanced appendiceal carcinoma given there is no mass seen on recent imaging 8/26/2024 with no masses seen  -Goblet cells seen on pathology likely appendiceal origin  -Will plan for CA 19-9 and CEA as baseline  -Will await EGD pathology to confirm no evidence of malignancy in the esophagus  -Discussed neck step is systemic therapy with modified FOLFOX   - could consider HIPEC after 3-4 cycles based on response  - Dr Ontiveros aware of patient  -Refer port placement next week and cycle 1 day 1 on 10/9/2024  -Reviewed side effects of 5-FU, oxaliplatin and consent signed  -Prescription sent for nausea to be used as needed  -We will plan for day 3 Aloxi  -Given limited support at home will likely hold off on disconnect at home for now  10/23/2024:   - Mediport placed 10/10/24  - Cycle 1 mFOLFOX given 10/16/24: Fluorouracil 2,400 mg/m2, oxaliplatin 85 mg/m2 with pre meds zofran 16 mg and decadron 12 mg, nothing given on day 3   - Last visit with GYN, Dr. Greene, was on 10/7/24         10/29/24  - symptoms improved after supportive care and now close to her baseline  - states dysphagia improved after chemo which hopefully is a sign of tx response  -Lower extremity swelling we discussed is likely related to poor p.o. intake, albumin is low and we discussed importance of protein in addition to ambulating and compression  -Overall for nausea which is her largest complaint we discussed starting Zyprexa at bedtime, given she is not taking much of Reglan we will hold off for now  -She will continue Zofran every 8 hours as needed  -Labs reviewed from today no contraindications to proceed with treatment tomorrow  -Plan for supportive care again with Boni the following week     11/5/2024:  - Patient presents for toxicity check after cycle 2  - Reports that she is feeling well and tolerating treatment better   - She does not need IVF or medications today but we will check cmp/mag and notify her if  she needs to continue home K+, she prefers to change to smaller dosage   - Eating more solid foods and will continue to try to prevent further weight loss, declines to talk with dietician today   - She is taking Zyprexa nightly   - She is unsure which prn anti-nausea she is taking but will update me  - Taking imodium BID   - She will RTC on 11/12 for port flush/labs and a visit the same day with Dr. Smart   - She will then RTC the next day for treatment      11/12/24:  - Patient continues to tolerate treatment with no major toxicity.   - No dose adjustments at this time.  - Plan to follow-up with Boni in 2 weeks and with me in 4 weeks.  - On days she sees Boni she will have labs done the day before and in 4 weeks she will have labs done in house with me.  - Will plan to have labs done outside of port.   - RTC in 2 weeks with Boni.       12/9/24:   - Patient continues to tolerate treatment  - No dose adjustments at this time.   - Labs reviewed. No contraindications to proceed with cycle 5 on Wednesday  - Cycle 6 will be delayed to 12/31   - Plan scans the week after and follow-up with me January 14th 2025.   - RTC for treatment only in 3 weeks and with me in 5 weeks.      Reviewed ongoing medical problems and how they relate to her malignancy, will continue long term monitoring.    RTC for treatment only in 3 weeks and with me in 5 weeks. This note has been transcribed using a medical scribe and there is a possibility of unintentional typing misprints.         Diagnoses and all orders for this visit:  Carcinomatosis (Multi)  -     Clinic Appointment Request  Malignant neoplasm of appendix (Multi)  -     Clinic Appointment Request    Margot Smart MD  Hematology/Oncology  [facility]    Scribe Attestation  By signing my name below, DOROTHY Arlette Radha Balderas attest that this documentation has been prepared under the direction and in the presence of Margot Smart MD.  Time Spent  Prep time on  day of patient encounter: 5 minutes  Time spent directly with patient, family or caregiver: 18 minutes  Additional Time Spent on Patient Care Activities: 5 minutes  Documentation Time: 5 minutes  Other Time Spent: 0 minutes  Total: 33 minutes

## 2024-12-09 ENCOUNTER — APPOINTMENT (OUTPATIENT)
Dept: HEMATOLOGY/ONCOLOGY | Facility: CLINIC | Age: 69
End: 2024-12-09
Payer: MEDICARE

## 2024-12-09 ENCOUNTER — OFFICE VISIT (OUTPATIENT)
Dept: HEMATOLOGY/ONCOLOGY | Facility: CLINIC | Age: 69
End: 2024-12-09
Payer: MEDICARE

## 2024-12-09 VITALS
WEIGHT: 179.23 LBS | SYSTOLIC BLOOD PRESSURE: 122 MMHG | BODY MASS INDEX: 29.68 KG/M2 | OXYGEN SATURATION: 97 % | DIASTOLIC BLOOD PRESSURE: 75 MMHG | RESPIRATION RATE: 16 BRPM | HEART RATE: 82 BPM | TEMPERATURE: 97.2 F

## 2024-12-09 DIAGNOSIS — C18.1 MALIGNANT NEOPLASM OF APPENDIX (MULTI): ICD-10-CM

## 2024-12-09 DIAGNOSIS — C80.0 CARCINOMATOSIS (MULTI): ICD-10-CM

## 2024-12-09 LAB
ALBUMIN SERPL BCP-MCNC: 4.2 G/DL (ref 3.4–5)
ALP SERPL-CCNC: 120 U/L (ref 33–136)
ALT SERPL W P-5'-P-CCNC: 19 U/L (ref 7–45)
ANION GAP SERPL CALC-SCNC: 13 MMOL/L (ref 10–20)
AST SERPL W P-5'-P-CCNC: 23 U/L (ref 9–39)
BASOPHILS # BLD AUTO: 0.03 X10*3/UL (ref 0–0.1)
BASOPHILS NFR BLD AUTO: 0.6 %
BILIRUB SERPL-MCNC: 0.4 MG/DL (ref 0–1.2)
BUN SERPL-MCNC: 19 MG/DL (ref 6–23)
CALCIUM SERPL-MCNC: 9.8 MG/DL (ref 8.6–10.3)
CHLORIDE SERPL-SCNC: 107 MMOL/L (ref 98–107)
CO2 SERPL-SCNC: 25 MMOL/L (ref 21–32)
CREAT SERPL-MCNC: 0.6 MG/DL (ref 0.5–1.05)
EGFRCR SERPLBLD CKD-EPI 2021: >90 ML/MIN/1.73M*2
EOSINOPHIL # BLD AUTO: 0.19 X10*3/UL (ref 0–0.7)
EOSINOPHIL NFR BLD AUTO: 3.5 %
ERYTHROCYTE [DISTWIDTH] IN BLOOD BY AUTOMATED COUNT: 18 % (ref 11.5–14.5)
GLUCOSE SERPL-MCNC: 149 MG/DL (ref 74–99)
HCT VFR BLD AUTO: 37.2 % (ref 36–46)
HGB BLD-MCNC: 12.3 G/DL (ref 12–16)
IMM GRANULOCYTES # BLD AUTO: 0.02 X10*3/UL (ref 0–0.7)
IMM GRANULOCYTES NFR BLD AUTO: 0.4 % (ref 0–0.9)
LYMPHOCYTES # BLD AUTO: 1.95 X10*3/UL (ref 1.2–4.8)
LYMPHOCYTES NFR BLD AUTO: 36.2 %
MCH RBC QN AUTO: 31.8 PG (ref 26–34)
MCHC RBC AUTO-ENTMCNC: 33.1 G/DL (ref 32–36)
MCV RBC AUTO: 96 FL (ref 80–100)
MONOCYTES # BLD AUTO: 0.82 X10*3/UL (ref 0.1–1)
MONOCYTES NFR BLD AUTO: 15.2 %
NEUTROPHILS # BLD AUTO: 2.37 X10*3/UL (ref 1.2–7.7)
NEUTROPHILS NFR BLD AUTO: 44.1 %
PLATELET # BLD AUTO: 155 X10*3/UL (ref 150–450)
POTASSIUM SERPL-SCNC: 4.1 MMOL/L (ref 3.5–5.3)
PROT SERPL-MCNC: 6.4 G/DL (ref 6.4–8.2)
RBC # BLD AUTO: 3.87 X10*6/UL (ref 4–5.2)
SODIUM SERPL-SCNC: 141 MMOL/L (ref 136–145)
WBC # BLD AUTO: 5.4 X10*3/UL (ref 4.4–11.3)

## 2024-12-09 PROCEDURE — G2211 COMPLEX E/M VISIT ADD ON: HCPCS | Performed by: INTERNAL MEDICINE

## 2024-12-09 PROCEDURE — 1123F ACP DISCUSS/DSCN MKR DOCD: CPT | Performed by: INTERNAL MEDICINE

## 2024-12-09 PROCEDURE — 85025 COMPLETE CBC W/AUTO DIFF WBC: CPT

## 2024-12-09 PROCEDURE — 99214 OFFICE O/P EST MOD 30 MIN: CPT | Performed by: INTERNAL MEDICINE

## 2024-12-09 PROCEDURE — 80053 COMPREHEN METABOLIC PANEL: CPT

## 2024-12-09 PROCEDURE — 36591 DRAW BLOOD OFF VENOUS DEVICE: CPT

## 2024-12-09 PROCEDURE — 1157F ADVNC CARE PLAN IN RCRD: CPT | Performed by: INTERNAL MEDICINE

## 2024-12-09 RX ORDER — HEPARIN 100 UNIT/ML
500 SYRINGE INTRAVENOUS AS NEEDED
Status: CANCELLED | OUTPATIENT
Start: 2024-12-09

## 2024-12-09 RX ORDER — HEPARIN SODIUM,PORCINE/PF 10 UNIT/ML
50 SYRINGE (ML) INTRAVENOUS AS NEEDED
Status: CANCELLED | OUTPATIENT
Start: 2024-12-09

## 2024-12-09 ASSESSMENT — PAIN SCALES - GENERAL: PAINLEVEL_OUTOF10: 0-NO PAIN

## 2024-12-11 ENCOUNTER — INFUSION (OUTPATIENT)
Dept: HEMATOLOGY/ONCOLOGY | Facility: CLINIC | Age: 69
End: 2024-12-11
Payer: MEDICARE

## 2024-12-11 VITALS
BODY MASS INDEX: 30.12 KG/M2 | OXYGEN SATURATION: 97 % | DIASTOLIC BLOOD PRESSURE: 61 MMHG | SYSTOLIC BLOOD PRESSURE: 157 MMHG | TEMPERATURE: 97.7 F | RESPIRATION RATE: 16 BRPM | HEART RATE: 92 BPM | WEIGHT: 181.88 LBS

## 2024-12-11 DIAGNOSIS — C18.1 MALIGNANT NEOPLASM OF APPENDIX (MULTI): ICD-10-CM

## 2024-12-11 DIAGNOSIS — C80.0 CARCINOMATOSIS (MULTI): ICD-10-CM

## 2024-12-11 PROCEDURE — 2500000004 HC RX 250 GENERAL PHARMACY W/ HCPCS (ALT 636 FOR OP/ED): Performed by: INTERNAL MEDICINE

## 2024-12-11 PROCEDURE — 96415 CHEMO IV INFUSION ADDL HR: CPT

## 2024-12-11 PROCEDURE — 96411 CHEMO IV PUSH ADDL DRUG: CPT

## 2024-12-11 PROCEDURE — 96413 CHEMO IV INFUSION 1 HR: CPT

## 2024-12-11 PROCEDURE — 96416 CHEMO PROLONG INFUSE W/PUMP: CPT

## 2024-12-11 PROCEDURE — 2500000005 HC RX 250 GENERAL PHARMACY W/O HCPCS: Performed by: INTERNAL MEDICINE

## 2024-12-11 RX ORDER — PROCHLORPERAZINE MALEATE 10 MG
10 TABLET ORAL EVERY 6 HOURS PRN
Status: DISCONTINUED | OUTPATIENT
Start: 2024-12-11 | End: 2024-12-11 | Stop reason: HOSPADM

## 2024-12-11 RX ORDER — HEPARIN 100 UNIT/ML
500 SYRINGE INTRAVENOUS AS NEEDED
Status: DISCONTINUED | OUTPATIENT
Start: 2024-12-11 | End: 2024-12-11 | Stop reason: HOSPADM

## 2024-12-11 RX ORDER — FAMOTIDINE 10 MG/ML
20 INJECTION INTRAVENOUS ONCE AS NEEDED
Status: DISCONTINUED | OUTPATIENT
Start: 2024-12-11 | End: 2024-12-11 | Stop reason: HOSPADM

## 2024-12-11 RX ORDER — ONDANSETRON HYDROCHLORIDE 8 MG/1
16 TABLET, FILM COATED ORAL ONCE
Status: COMPLETED | OUTPATIENT
Start: 2024-12-11 | End: 2024-12-11

## 2024-12-11 RX ORDER — HEPARIN SODIUM,PORCINE/PF 10 UNIT/ML
50 SYRINGE (ML) INTRAVENOUS AS NEEDED
OUTPATIENT
Start: 2024-12-11

## 2024-12-11 RX ORDER — ALBUTEROL SULFATE 0.83 MG/ML
3 SOLUTION RESPIRATORY (INHALATION) AS NEEDED
Status: DISCONTINUED | OUTPATIENT
Start: 2024-12-11 | End: 2024-12-11 | Stop reason: HOSPADM

## 2024-12-11 RX ORDER — DEXAMETHASONE 6 MG/1
12 TABLET ORAL ONCE
Status: COMPLETED | OUTPATIENT
Start: 2024-12-11 | End: 2024-12-11

## 2024-12-11 RX ORDER — DIPHENHYDRAMINE HYDROCHLORIDE 50 MG/ML
50 INJECTION INTRAMUSCULAR; INTRAVENOUS AS NEEDED
Status: DISCONTINUED | OUTPATIENT
Start: 2024-12-11 | End: 2024-12-11 | Stop reason: HOSPADM

## 2024-12-11 RX ORDER — HEPARIN 100 UNIT/ML
500 SYRINGE INTRAVENOUS AS NEEDED
OUTPATIENT
Start: 2024-12-11

## 2024-12-11 RX ORDER — PROCHLORPERAZINE EDISYLATE 5 MG/ML
10 INJECTION INTRAMUSCULAR; INTRAVENOUS EVERY 6 HOURS PRN
Status: DISCONTINUED | OUTPATIENT
Start: 2024-12-11 | End: 2024-12-11 | Stop reason: HOSPADM

## 2024-12-11 RX ORDER — HEPARIN SODIUM,PORCINE/PF 10 UNIT/ML
50 SYRINGE (ML) INTRAVENOUS AS NEEDED
Status: DISCONTINUED | OUTPATIENT
Start: 2024-12-11 | End: 2024-12-11 | Stop reason: HOSPADM

## 2024-12-11 RX ORDER — EPINEPHRINE 0.3 MG/.3ML
0.3 INJECTION SUBCUTANEOUS EVERY 5 MIN PRN
Status: DISCONTINUED | OUTPATIENT
Start: 2024-12-11 | End: 2024-12-11 | Stop reason: HOSPADM

## 2024-12-11 RX ORDER — LORAZEPAM 2 MG/ML
1 INJECTION INTRAMUSCULAR AS NEEDED
Status: DISCONTINUED | OUTPATIENT
Start: 2024-12-11 | End: 2024-12-11 | Stop reason: HOSPADM

## 2024-12-11 RX ORDER — FLUOROURACIL 50 MG/ML
400 INJECTION, SOLUTION INTRAVENOUS ONCE
Status: COMPLETED | OUTPATIENT
Start: 2024-12-11 | End: 2024-12-11

## 2024-12-11 ASSESSMENT — PAIN SCALES - GENERAL: PAINLEVEL_OUTOF10: 0-NO PAIN

## 2024-12-13 ENCOUNTER — INFUSION (OUTPATIENT)
Dept: HEMATOLOGY/ONCOLOGY | Facility: CLINIC | Age: 69
End: 2024-12-13
Payer: MEDICARE

## 2024-12-13 VITALS
BODY MASS INDEX: 30.08 KG/M2 | SYSTOLIC BLOOD PRESSURE: 145 MMHG | TEMPERATURE: 97.2 F | DIASTOLIC BLOOD PRESSURE: 79 MMHG | OXYGEN SATURATION: 97 % | RESPIRATION RATE: 16 BRPM | WEIGHT: 181.66 LBS | HEART RATE: 75 BPM

## 2024-12-13 DIAGNOSIS — C18.1 MALIGNANT NEOPLASM OF APPENDIX (MULTI): ICD-10-CM

## 2024-12-13 DIAGNOSIS — C80.0 CARCINOMATOSIS (MULTI): ICD-10-CM

## 2024-12-13 PROCEDURE — 96523 IRRIG DRUG DELIVERY DEVICE: CPT

## 2024-12-13 RX ORDER — PALONOSETRON 0.05 MG/ML
250 INJECTION, SOLUTION INTRAVENOUS ONCE
Status: DISCONTINUED | OUTPATIENT
Start: 2024-12-13 | End: 2024-12-13 | Stop reason: HOSPADM

## 2024-12-13 ASSESSMENT — PAIN SCALES - GENERAL: PAINLEVEL_OUTOF10: 0-NO PAIN

## 2024-12-13 NOTE — PROGRESS NOTES
Pt states she has anti-nausea medications at home.  Pump disconnected.  Port flushed pt requested to go home.

## 2024-12-31 ENCOUNTER — APPOINTMENT (OUTPATIENT)
Dept: HEMATOLOGY/ONCOLOGY | Facility: CLINIC | Age: 69
End: 2024-12-31
Payer: MEDICARE

## 2024-12-31 ENCOUNTER — INFUSION (OUTPATIENT)
Dept: HEMATOLOGY/ONCOLOGY | Facility: CLINIC | Age: 69
End: 2024-12-31
Payer: MEDICARE

## 2024-12-31 VITALS
OXYGEN SATURATION: 97 % | SYSTOLIC BLOOD PRESSURE: 127 MMHG | RESPIRATION RATE: 16 BRPM | HEART RATE: 78 BPM | WEIGHT: 191.8 LBS | BODY MASS INDEX: 31.76 KG/M2 | DIASTOLIC BLOOD PRESSURE: 70 MMHG | TEMPERATURE: 97 F

## 2024-12-31 DIAGNOSIS — C18.1 MALIGNANT NEOPLASM OF APPENDIX (MULTI): ICD-10-CM

## 2024-12-31 DIAGNOSIS — C80.0 CARCINOMATOSIS (MULTI): ICD-10-CM

## 2024-12-31 LAB
ALBUMIN SERPL BCP-MCNC: 4.2 G/DL (ref 3.4–5)
ALP SERPL-CCNC: 108 U/L (ref 33–136)
ALT SERPL W P-5'-P-CCNC: 22 U/L (ref 7–45)
ANION GAP SERPL CALC-SCNC: 15 MMOL/L (ref 10–20)
AST SERPL W P-5'-P-CCNC: 24 U/L (ref 9–39)
BASOPHILS # BLD AUTO: 0.03 X10*3/UL (ref 0–0.1)
BASOPHILS NFR BLD AUTO: 0.7 %
BILIRUB SERPL-MCNC: 0.4 MG/DL (ref 0–1.2)
BUN SERPL-MCNC: 22 MG/DL (ref 6–23)
CALCIUM SERPL-MCNC: 9.5 MG/DL (ref 8.6–10.3)
CHLORIDE SERPL-SCNC: 106 MMOL/L (ref 98–107)
CO2 SERPL-SCNC: 24 MMOL/L (ref 21–32)
CREAT SERPL-MCNC: 0.61 MG/DL (ref 0.5–1.05)
EGFRCR SERPLBLD CKD-EPI 2021: >90 ML/MIN/1.73M*2
EOSINOPHIL # BLD AUTO: 0.14 X10*3/UL (ref 0–0.7)
EOSINOPHIL NFR BLD AUTO: 3.4 %
ERYTHROCYTE [DISTWIDTH] IN BLOOD BY AUTOMATED COUNT: 15.9 % (ref 11.5–14.5)
GLUCOSE SERPL-MCNC: 197 MG/DL (ref 74–99)
HCT VFR BLD AUTO: 39.2 % (ref 36–46)
HGB BLD-MCNC: 12.9 G/DL (ref 12–16)
IMM GRANULOCYTES # BLD AUTO: 0.04 X10*3/UL (ref 0–0.7)
IMM GRANULOCYTES NFR BLD AUTO: 1 % (ref 0–0.9)
LYMPHOCYTES # BLD AUTO: 1.51 X10*3/UL (ref 1.2–4.8)
LYMPHOCYTES NFR BLD AUTO: 36.8 %
MCH RBC QN AUTO: 31.9 PG (ref 26–34)
MCHC RBC AUTO-ENTMCNC: 32.9 G/DL (ref 32–36)
MCV RBC AUTO: 97 FL (ref 80–100)
MONOCYTES # BLD AUTO: 0.68 X10*3/UL (ref 0.1–1)
MONOCYTES NFR BLD AUTO: 16.6 %
NEUTROPHILS # BLD AUTO: 1.7 X10*3/UL (ref 1.2–7.7)
NEUTROPHILS NFR BLD AUTO: 41.5 %
PLATELET # BLD AUTO: 200 X10*3/UL (ref 150–450)
POTASSIUM SERPL-SCNC: 4.1 MMOL/L (ref 3.5–5.3)
PROT SERPL-MCNC: 6.6 G/DL (ref 6.4–8.2)
RBC # BLD AUTO: 4.04 X10*6/UL (ref 4–5.2)
SODIUM SERPL-SCNC: 141 MMOL/L (ref 136–145)
WBC # BLD AUTO: 4.1 X10*3/UL (ref 4.4–11.3)

## 2024-12-31 PROCEDURE — 96411 CHEMO IV PUSH ADDL DRUG: CPT

## 2024-12-31 PROCEDURE — 96415 CHEMO IV INFUSION ADDL HR: CPT

## 2024-12-31 PROCEDURE — 2500000004 HC RX 250 GENERAL PHARMACY W/ HCPCS (ALT 636 FOR OP/ED): Performed by: INTERNAL MEDICINE

## 2024-12-31 PROCEDURE — 96416 CHEMO PROLONG INFUSE W/PUMP: CPT

## 2024-12-31 PROCEDURE — 96413 CHEMO IV INFUSION 1 HR: CPT

## 2024-12-31 PROCEDURE — 85025 COMPLETE CBC W/AUTO DIFF WBC: CPT

## 2024-12-31 PROCEDURE — 80053 COMPREHEN METABOLIC PANEL: CPT

## 2024-12-31 RX ORDER — ALBUTEROL SULFATE 0.83 MG/ML
3 SOLUTION RESPIRATORY (INHALATION) AS NEEDED
Status: DISCONTINUED | OUTPATIENT
Start: 2024-12-31 | End: 2024-12-31 | Stop reason: HOSPADM

## 2024-12-31 RX ORDER — FAMOTIDINE 10 MG/ML
20 INJECTION INTRAVENOUS ONCE AS NEEDED
Status: DISCONTINUED | OUTPATIENT
Start: 2024-12-31 | End: 2024-12-31 | Stop reason: HOSPADM

## 2024-12-31 RX ORDER — EPINEPHRINE 0.3 MG/.3ML
0.3 INJECTION SUBCUTANEOUS EVERY 5 MIN PRN
Status: DISCONTINUED | OUTPATIENT
Start: 2024-12-31 | End: 2024-12-31 | Stop reason: HOSPADM

## 2024-12-31 RX ORDER — PROCHLORPERAZINE EDISYLATE 5 MG/ML
10 INJECTION INTRAMUSCULAR; INTRAVENOUS EVERY 6 HOURS PRN
Status: DISCONTINUED | OUTPATIENT
Start: 2024-12-31 | End: 2024-12-31 | Stop reason: HOSPADM

## 2024-12-31 RX ORDER — DEXAMETHASONE 6 MG/1
12 TABLET ORAL ONCE
Status: COMPLETED | OUTPATIENT
Start: 2024-12-31 | End: 2024-12-31

## 2024-12-31 RX ORDER — PROCHLORPERAZINE MALEATE 10 MG
10 TABLET ORAL EVERY 6 HOURS PRN
Status: DISCONTINUED | OUTPATIENT
Start: 2024-12-31 | End: 2024-12-31 | Stop reason: HOSPADM

## 2024-12-31 RX ORDER — DIPHENHYDRAMINE HYDROCHLORIDE 50 MG/ML
50 INJECTION INTRAMUSCULAR; INTRAVENOUS AS NEEDED
Status: DISCONTINUED | OUTPATIENT
Start: 2024-12-31 | End: 2024-12-31 | Stop reason: HOSPADM

## 2024-12-31 RX ORDER — LORAZEPAM 2 MG/ML
1 INJECTION INTRAMUSCULAR AS NEEDED
Status: DISCONTINUED | OUTPATIENT
Start: 2024-12-31 | End: 2024-12-31 | Stop reason: HOSPADM

## 2024-12-31 RX ORDER — FLUOROURACIL 50 MG/ML
400 INJECTION, SOLUTION INTRAVENOUS ONCE
Status: COMPLETED | OUTPATIENT
Start: 2024-12-31 | End: 2024-12-31

## 2024-12-31 RX ORDER — HEPARIN SODIUM,PORCINE/PF 10 UNIT/ML
50 SYRINGE (ML) INTRAVENOUS AS NEEDED
Status: CANCELLED | OUTPATIENT
Start: 2024-12-31

## 2024-12-31 RX ORDER — HEPARIN 100 UNIT/ML
500 SYRINGE INTRAVENOUS AS NEEDED
Status: DISCONTINUED | OUTPATIENT
Start: 2024-12-31 | End: 2024-12-31 | Stop reason: HOSPADM

## 2024-12-31 RX ORDER — HEPARIN SODIUM,PORCINE/PF 10 UNIT/ML
50 SYRINGE (ML) INTRAVENOUS AS NEEDED
Status: DISCONTINUED | OUTPATIENT
Start: 2024-12-31 | End: 2024-12-31 | Stop reason: HOSPADM

## 2024-12-31 RX ORDER — ONDANSETRON HYDROCHLORIDE 8 MG/1
16 TABLET, FILM COATED ORAL ONCE
Status: COMPLETED | OUTPATIENT
Start: 2024-12-31 | End: 2024-12-31

## 2024-12-31 RX ORDER — HEPARIN 100 UNIT/ML
500 SYRINGE INTRAVENOUS AS NEEDED
Status: CANCELLED | OUTPATIENT
Start: 2024-12-31

## 2024-12-31 RX ADMIN — DEXAMETHASONE 12 MG: 6 TABLET ORAL at 10:36

## 2024-12-31 RX ADMIN — ONDANSETRON HYDROCHLORIDE 16 MG: 8 TABLET, FILM COATED ORAL at 10:36

## 2024-12-31 RX ADMIN — FLUOROURACIL 775 MG: 50 INJECTION, SOLUTION INTRAVENOUS at 13:14

## 2024-12-31 RX ADMIN — OXALIPLATIN 165 MG: 5 INJECTION, SOLUTION INTRAVENOUS at 10:59

## 2024-12-31 RX ADMIN — FLUOROURACIL 4700 MG: 50 INJECTION, SOLUTION INTRAVENOUS at 13:25

## 2024-12-31 ASSESSMENT — PAIN SCALES - GENERAL: PAINLEVEL_OUTOF10: 0-NO PAIN

## 2024-12-31 NOTE — PROGRESS NOTES
5FU pump for 46 hour home infusion connected @ 1330 and pt aware to RTC Thursday @ 1115 for anti emetics and disconnect.

## 2025-01-02 ENCOUNTER — INFUSION (OUTPATIENT)
Dept: HEMATOLOGY/ONCOLOGY | Facility: CLINIC | Age: 70
End: 2025-01-02
Payer: MEDICARE

## 2025-01-02 VITALS
HEART RATE: 72 BPM | BODY MASS INDEX: 31.98 KG/M2 | WEIGHT: 193.12 LBS | OXYGEN SATURATION: 97 % | DIASTOLIC BLOOD PRESSURE: 63 MMHG | TEMPERATURE: 97.2 F | SYSTOLIC BLOOD PRESSURE: 115 MMHG | RESPIRATION RATE: 16 BRPM

## 2025-01-02 DIAGNOSIS — C80.0 CARCINOMATOSIS (MULTI): ICD-10-CM

## 2025-01-02 DIAGNOSIS — C18.1 MALIGNANT NEOPLASM OF APPENDIX (MULTI): ICD-10-CM

## 2025-01-02 PROCEDURE — 96374 THER/PROPH/DIAG INJ IV PUSH: CPT | Mod: INF

## 2025-01-02 PROCEDURE — 2500000004 HC RX 250 GENERAL PHARMACY W/ HCPCS (ALT 636 FOR OP/ED): Performed by: INTERNAL MEDICINE

## 2025-01-02 RX ORDER — HEPARIN SODIUM,PORCINE/PF 10 UNIT/ML
50 SYRINGE (ML) INTRAVENOUS AS NEEDED
Status: DISCONTINUED | OUTPATIENT
Start: 2025-01-02 | End: 2025-01-02 | Stop reason: HOSPADM

## 2025-01-02 RX ORDER — HEPARIN SODIUM,PORCINE/PF 10 UNIT/ML
50 SYRINGE (ML) INTRAVENOUS AS NEEDED
OUTPATIENT
Start: 2025-01-02

## 2025-01-02 RX ORDER — PALONOSETRON 0.05 MG/ML
250 INJECTION, SOLUTION INTRAVENOUS ONCE
Status: COMPLETED | OUTPATIENT
Start: 2025-01-02 | End: 2025-01-02

## 2025-01-02 RX ORDER — HEPARIN 100 UNIT/ML
500 SYRINGE INTRAVENOUS AS NEEDED
OUTPATIENT
Start: 2025-01-02

## 2025-01-02 RX ORDER — HEPARIN 100 UNIT/ML
500 SYRINGE INTRAVENOUS AS NEEDED
Status: DISCONTINUED | OUTPATIENT
Start: 2025-01-02 | End: 2025-01-02 | Stop reason: HOSPADM

## 2025-01-02 RX ADMIN — PALONOSETRON HYDROCHLORIDE 250 MCG: 0.25 INJECTION INTRAVENOUS at 11:31

## 2025-01-02 ASSESSMENT — PAIN SCALES - GENERAL: PAINLEVEL_OUTOF10: 0-NO PAIN

## 2025-01-07 LAB
AP SUMMARY REPORT: NORMAL
SCAN RESULT: NORMAL

## 2025-01-13 ENCOUNTER — HOSPITAL ENCOUNTER (OUTPATIENT)
Dept: RADIOLOGY | Facility: CLINIC | Age: 70
Discharge: HOME | End: 2025-01-13
Payer: MEDICARE

## 2025-01-13 DIAGNOSIS — C80.0 CARCINOMATOSIS (MULTI): ICD-10-CM

## 2025-01-13 PROCEDURE — 74177 CT ABD & PELVIS W/CONTRAST: CPT

## 2025-01-13 PROCEDURE — 2550000001 HC RX 255 CONTRASTS: Performed by: INTERNAL MEDICINE

## 2025-01-13 PROCEDURE — 71260 CT THORAX DX C+: CPT | Performed by: RADIOLOGY

## 2025-01-13 PROCEDURE — 74177 CT ABD & PELVIS W/CONTRAST: CPT | Performed by: RADIOLOGY

## 2025-01-13 RX ADMIN — IOHEXOL 75 ML: 350 INJECTION, SOLUTION INTRAVENOUS at 09:24

## 2025-01-13 NOTE — PROGRESS NOTES
Patient ID: Monica Musa is a 69 y.o. female.  Oncology History Overview Note   - 7/8/2022 TLH, BSO, SLND combined case with Dr. Lal. 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 There was no lymphovascular invasion. There was a focus of MELF pattern invasion. Tumor board recommendations: Surveillance. MMR testing on prior endometrial biopsy specimen was normal.   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  - CT 8/26/24: Small volume ascites. Left pelvic nodule with some nodularity  along the left paracolic gutter worrisome for peritoneal carcinomatosis.  Admitted with bowel obstruction.  No lesion large enough for biopsy  - 9/2024 diagnostic laparoscopy - biopsy c/w non gyn primary - referred to med onc     Endometrial carcinoma (Multi)   5/22/2023 Initial Diagnosis    Endometrial carcinoma (Multi)     Malignant neoplasm of appendix (Multi)   10/3/2024 Initial Diagnosis    Malignant neoplasm of appendix (Multi)     10/16/2024 -  Chemotherapy    mFOLFOX6 (Fluorouracil Continuous Infusion / Leucovorin / Oxaliplatin), 14 Day Cycles     Carcinomatosis (Multi)   10/3/2024 Initial Diagnosis    Carcinomatosis (Multi)     10/16/2024 -  Chemotherapy    mFOLFOX6 (Fluorouracil Continuous Infusion / Leucovorin / Oxaliplatin), 14 Day Cycles       Subjective    HPI  Monica Musa is a 68 yo F with history of grade 1 endometroid adenocarcinoma MMI, no LVSI, pMMR s/p TLH, BSP, SLND 7/8/22. 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     CT scan yesterday shows interval decrease in size of appendiceal mass with resolution of ascites and small bowel obstruction reflecting improvement of nonmeasurable peritoneal carcinomatosis, residual nonmeasurable disease as described above in the left paracolic gutter and no other sites of disease.     Blood work from today is still pending.     Patient reports she is feeling really  good. She denies any palpitations. No n/v/d. No new rash or other skin issues. No swelling of any kind. She does report cold sensitivity for about 5 days after treatment but nothing she can't tolerate. She denies neuropathy. No GI symptoms. No other major complaints at this time.     Patient's past medical history, surgical history, family history and social history reviewed.    Objective      Vitals:    01/14/25 0905   BP: 147/79   Pulse: 76   Resp: 18   Temp: 36 °C (96.8 °F)   SpO2: 95%     Review of Systems:   Review of Systems:    Positive per HPI, otherwise negative.    Physical Exam  Gen: appears well in clinic, NAD  HEENT: atraumatic head, normocephalic, EOMI, conjunctiva normal  LUNG: no increased WOB, CTAB  CV: No JVD. RRR  GI: soft, NT, ND  LE: no LE edema  Skin: no obvious rashes or lesions on visible skin  Neuro: interactive, no focal deficits noted  Psych: normal mood and affect  Performance Status:  Symptomatic; fully ambulatory    Labs/Imaging/Pathology: personally reviewed reports and images in Epic electronic medical record system. Pertinent results as it related to the plan represented in below in assessment and plan.   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  There are some features of goblet cells which favor appendiceal primary however pancreaticobiliary origin cannot be excluded.  MSI status intact  - [id number] also completed on blood from 9/30/2024 which was unremarkable  - Tempus on tissue in process  - Colonoscopy on 7/31/2024 was technically difficult due to scattered diverticulosis in the ascending  colon and some hemorrhoids  - EGD 10/2/24 shows abnormal mucosa at the GE junction but no clear mass however there was diffuse nodular gastritis which was biopsied and pathology report states: neg for H Pylori.   - No variants detected in the DPYD gene   - Signatera in process  - 10/3/24: CA19.9 <4.00, CEA 1.4   10/3/24  Today we discussed diagnosis of likely advanced appendiceal carcinoma given there is no mass seen on recent imaging 8/26/2024 with no masses seen  -Goblet cells seen on pathology likely appendiceal origin  -Will plan for CA 19-9 and CEA as baseline  -Will await EGD pathology to confirm no evidence of malignancy in the esophagus  -Discussed neck step is systemic therapy with modified FOLFOX   - could consider HIPEC after 3-4 cycles based on response  - Dr Ontiveros aware of patient  -Refer port placement next week and cycle 1 day 1 on 10/9/2024  -Reviewed side effects of 5-FU, oxaliplatin and consent signed  -Prescription sent for nausea to be used as needed  -We will plan for day 3 Aloxi  -Given limited support at home will likely hold off on disconnect at home for now  10/23/2024:   - Mediport placed 10/10/24  - Cycle 1 mFOLFOX given 10/16/24: Fluorouracil 2,400 mg/m2, oxaliplatin 85 mg/m2 with pre meds zofran 16 mg and decadron 12 mg, nothing given on day 3   - Last visit with GYN, Dr. Greene, was on 10/7/24         10/29/24  - symptoms improved after supportive care and now close to her baseline  - states dysphagia improved after chemo which hopefully is a sign of tx response  -Lower extremity swelling we discussed is likely related to poor p.o. intake, albumin is low and we discussed importance of protein in addition to ambulating and compression  -Overall for nausea which is her largest complaint we discussed starting Zyprexa at bedtime, given she is not taking much of Reglan we will hold off for now  -She will continue Zofran every 8 hours as needed  -Labs reviewed from today no  contraindications to proceed with treatment tomorrow  -Plan for supportive care again with Boni the following week     11/5/2024:  - Patient presents for toxicity check after cycle 2  - Reports that she is feeling well and tolerating treatment better   - She does not need IVF or medications today but we will check cmp/mag and notify her if she needs to continue home K+, she prefers to change to smaller dosage   - Eating more solid foods and will continue to try to prevent further weight loss, declines to talk with dietician today   - She is taking Zyprexa nightly   - She is unsure which prn anti-nausea she is taking but will update me  - Taking imodium BID   - She will RTC on 11/12 for port flush/labs and a visit the same day with Dr. Smart   - She will then RTC the next day for treatment      11/12/24:  - Patient continues to tolerate treatment with no major toxicity.   - No dose adjustments at this time.  - Plan to follow-up with Boni in 2 weeks and with me in 4 weeks.  - On days she sees Boni she will have labs done the day before and in 4 weeks she will have labs done in house with me.  - Will plan to have labs done outside of port.   - RTC in 2 weeks with Boni.      12/9/24:   - Patient continues to tolerate treatment  - No dose adjustments at this time.   - Labs reviewed. No contraindications to proceed with cycle 5 on Wednesday  - Cycle 6 will be delayed to 12/31   - Plan scans the week after and follow-up with me January 14th 2025.   - RTC for treatment only in 3 weeks and with me in 5 weeks.     1/14/25:  - CT scan from yesterday shows good treatment response and overall patient states she feels better with systemic chemotherapy.  - We discussed no worsening toxicity and overall no significant GI symptoms or neuropathy.   - She does some cold sensitivity for approximately 5 days.  - Will plan to continue with current dose.  - No treatment changes.  - RTC in 2 weeks for treatment only and in 4 weeks with  me.      Reviewed ongoing medical problems and how they relate to her malignancy, will continue long term monitoring.     RTC in 2 weeks for treatment only and in 4 weeks with me.     This note has been transcribed using a medical scribe and there is a possibility of unintentional typing misprints.     {[email or web address]/[email or web address]:[id number]}         Margot Smart MD  Hematology/Oncology  [facility]    Scribe Attestation  By signing my name below, I, Radha Scott   attest that this documentation has been prepared under the direction and in the presence of Margot Smart MD.              15 minutes  Additional Time Spent on Patient Care Activities: 5 minutes  Documentation Time: 5 minutes  Other Time Spent: 0 minutes  Total: 30 minutes

## 2025-01-14 ENCOUNTER — LAB (OUTPATIENT)
Dept: LAB | Facility: CLINIC | Age: 70
End: 2025-01-14
Payer: MEDICARE

## 2025-01-14 ENCOUNTER — SOCIAL WORK (OUTPATIENT)
Dept: HEMATOLOGY/ONCOLOGY | Facility: CLINIC | Age: 70
End: 2025-01-14

## 2025-01-14 ENCOUNTER — OFFICE VISIT (OUTPATIENT)
Dept: HEMATOLOGY/ONCOLOGY | Facility: CLINIC | Age: 70
End: 2025-01-14
Payer: MEDICARE

## 2025-01-14 ENCOUNTER — INFUSION (OUTPATIENT)
Dept: HEMATOLOGY/ONCOLOGY | Facility: CLINIC | Age: 70
End: 2025-01-14
Payer: MEDICARE

## 2025-01-14 VITALS
OXYGEN SATURATION: 95 % | RESPIRATION RATE: 18 BRPM | TEMPERATURE: 96.8 F | HEART RATE: 76 BPM | DIASTOLIC BLOOD PRESSURE: 79 MMHG | BODY MASS INDEX: 31.82 KG/M2 | SYSTOLIC BLOOD PRESSURE: 147 MMHG | WEIGHT: 192.13 LBS

## 2025-01-14 DIAGNOSIS — C18.1 MALIGNANT NEOPLASM OF APPENDIX (MULTI): ICD-10-CM

## 2025-01-14 DIAGNOSIS — C80.0 CARCINOMATOSIS (MULTI): ICD-10-CM

## 2025-01-14 LAB
ALBUMIN SERPL BCP-MCNC: 4.4 G/DL (ref 3.4–5)
ALP SERPL-CCNC: 123 U/L (ref 33–136)
ALT SERPL W P-5'-P-CCNC: 20 U/L (ref 7–45)
ANION GAP SERPL CALC-SCNC: 14 MMOL/L (ref 10–20)
AST SERPL W P-5'-P-CCNC: 24 U/L (ref 9–39)
BASOPHILS # BLD AUTO: 0.02 X10*3/UL (ref 0–0.1)
BASOPHILS NFR BLD AUTO: 0.5 %
BILIRUB SERPL-MCNC: 0.4 MG/DL (ref 0–1.2)
BUN SERPL-MCNC: 19 MG/DL (ref 6–23)
CALCIUM SERPL-MCNC: 9.7 MG/DL (ref 8.6–10.3)
CHLORIDE SERPL-SCNC: 106 MMOL/L (ref 98–107)
CO2 SERPL-SCNC: 26 MMOL/L (ref 21–32)
CREAT SERPL-MCNC: 0.65 MG/DL (ref 0.5–1.05)
EGFRCR SERPLBLD CKD-EPI 2021: >90 ML/MIN/1.73M*2
EOSINOPHIL # BLD AUTO: 0.11 X10*3/UL (ref 0–0.7)
EOSINOPHIL NFR BLD AUTO: 2.7 %
ERYTHROCYTE [DISTWIDTH] IN BLOOD BY AUTOMATED COUNT: 15.4 % (ref 11.5–14.5)
GLUCOSE SERPL-MCNC: 197 MG/DL (ref 74–99)
HCT VFR BLD AUTO: 40.4 % (ref 36–46)
HGB BLD-MCNC: 13.5 G/DL (ref 12–16)
IMM GRANULOCYTES # BLD AUTO: 0.01 X10*3/UL (ref 0–0.7)
IMM GRANULOCYTES NFR BLD AUTO: 0.2 % (ref 0–0.9)
LYMPHOCYTES # BLD AUTO: 1.19 X10*3/UL (ref 1.2–4.8)
LYMPHOCYTES NFR BLD AUTO: 29.6 %
MCH RBC QN AUTO: 31.8 PG (ref 26–34)
MCHC RBC AUTO-ENTMCNC: 33.4 G/DL (ref 32–36)
MCV RBC AUTO: 95 FL (ref 80–100)
MONOCYTES # BLD AUTO: 0.54 X10*3/UL (ref 0.1–1)
MONOCYTES NFR BLD AUTO: 13.4 %
NEUTROPHILS # BLD AUTO: 2.15 X10*3/UL (ref 1.2–7.7)
NEUTROPHILS NFR BLD AUTO: 53.6 %
PLATELET # BLD AUTO: 130 X10*3/UL (ref 150–450)
POTASSIUM SERPL-SCNC: 4.5 MMOL/L (ref 3.5–5.3)
PROT SERPL-MCNC: 7 G/DL (ref 6.4–8.2)
RBC # BLD AUTO: 4.24 X10*6/UL (ref 4–5.2)
SODIUM SERPL-SCNC: 141 MMOL/L (ref 136–145)
WBC # BLD AUTO: 4 X10*3/UL (ref 4.4–11.3)

## 2025-01-14 PROCEDURE — 96415 CHEMO IV INFUSION ADDL HR: CPT

## 2025-01-14 PROCEDURE — 2500000004 HC RX 250 GENERAL PHARMACY W/ HCPCS (ALT 636 FOR OP/ED): Performed by: INTERNAL MEDICINE

## 2025-01-14 PROCEDURE — 1157F ADVNC CARE PLAN IN RCRD: CPT | Performed by: INTERNAL MEDICINE

## 2025-01-14 PROCEDURE — 1159F MED LIST DOCD IN RCRD: CPT | Performed by: INTERNAL MEDICINE

## 2025-01-14 PROCEDURE — 1126F AMNT PAIN NOTED NONE PRSNT: CPT | Performed by: INTERNAL MEDICINE

## 2025-01-14 PROCEDURE — 96413 CHEMO IV INFUSION 1 HR: CPT

## 2025-01-14 PROCEDURE — 84075 ASSAY ALKALINE PHOSPHATASE: CPT

## 2025-01-14 PROCEDURE — 3077F SYST BP >= 140 MM HG: CPT | Performed by: INTERNAL MEDICINE

## 2025-01-14 PROCEDURE — 85025 COMPLETE CBC W/AUTO DIFF WBC: CPT

## 2025-01-14 PROCEDURE — 1123F ACP DISCUSS/DSCN MKR DOCD: CPT | Performed by: INTERNAL MEDICINE

## 2025-01-14 PROCEDURE — 99214 OFFICE O/P EST MOD 30 MIN: CPT | Performed by: INTERNAL MEDICINE

## 2025-01-14 PROCEDURE — 3078F DIAST BP <80 MM HG: CPT | Performed by: INTERNAL MEDICINE

## 2025-01-14 PROCEDURE — 96416 CHEMO PROLONG INFUSE W/PUMP: CPT

## 2025-01-14 PROCEDURE — G2211 COMPLEX E/M VISIT ADD ON: HCPCS | Performed by: INTERNAL MEDICINE

## 2025-01-14 PROCEDURE — 96411 CHEMO IV PUSH ADDL DRUG: CPT

## 2025-01-14 RX ORDER — HEPARIN SODIUM,PORCINE/PF 10 UNIT/ML
50 SYRINGE (ML) INTRAVENOUS AS NEEDED
Status: CANCELLED | OUTPATIENT
Start: 2025-01-14

## 2025-01-14 RX ORDER — DIPHENHYDRAMINE HYDROCHLORIDE 50 MG/ML
50 INJECTION INTRAMUSCULAR; INTRAVENOUS AS NEEDED
Status: DISCONTINUED | OUTPATIENT
Start: 2025-01-14 | End: 2025-01-14 | Stop reason: HOSPADM

## 2025-01-14 RX ORDER — ALBUTEROL SULFATE 0.83 MG/ML
3 SOLUTION RESPIRATORY (INHALATION) AS NEEDED
Status: DISCONTINUED | OUTPATIENT
Start: 2025-01-14 | End: 2025-01-14 | Stop reason: HOSPADM

## 2025-01-14 RX ORDER — FAMOTIDINE 10 MG/ML
20 INJECTION INTRAVENOUS ONCE AS NEEDED
Status: DISCONTINUED | OUTPATIENT
Start: 2025-01-14 | End: 2025-01-14 | Stop reason: HOSPADM

## 2025-01-14 RX ORDER — HEPARIN 100 UNIT/ML
500 SYRINGE INTRAVENOUS AS NEEDED
Status: CANCELLED | OUTPATIENT
Start: 2025-01-14

## 2025-01-14 RX ORDER — HEPARIN 100 UNIT/ML
500 SYRINGE INTRAVENOUS AS NEEDED
Status: DISCONTINUED | OUTPATIENT
Start: 2025-01-14 | End: 2025-01-14 | Stop reason: HOSPADM

## 2025-01-14 RX ORDER — HEPARIN SODIUM,PORCINE/PF 10 UNIT/ML
50 SYRINGE (ML) INTRAVENOUS AS NEEDED
Status: DISCONTINUED | OUTPATIENT
Start: 2025-01-14 | End: 2025-01-14 | Stop reason: HOSPADM

## 2025-01-14 RX ORDER — EPINEPHRINE 0.3 MG/.3ML
0.3 INJECTION SUBCUTANEOUS EVERY 5 MIN PRN
Status: DISCONTINUED | OUTPATIENT
Start: 2025-01-14 | End: 2025-01-14 | Stop reason: HOSPADM

## 2025-01-14 RX ORDER — LORAZEPAM 2 MG/ML
1 INJECTION INTRAMUSCULAR AS NEEDED
Status: DISCONTINUED | OUTPATIENT
Start: 2025-01-14 | End: 2025-01-14 | Stop reason: HOSPADM

## 2025-01-14 RX ORDER — FLUOROURACIL 50 MG/ML
400 INJECTION, SOLUTION INTRAVENOUS ONCE
Status: COMPLETED | OUTPATIENT
Start: 2025-01-14 | End: 2025-01-14

## 2025-01-14 RX ORDER — PROCHLORPERAZINE EDISYLATE 5 MG/ML
10 INJECTION INTRAMUSCULAR; INTRAVENOUS EVERY 6 HOURS PRN
Status: DISCONTINUED | OUTPATIENT
Start: 2025-01-14 | End: 2025-01-14 | Stop reason: HOSPADM

## 2025-01-14 RX ORDER — PROCHLORPERAZINE MALEATE 10 MG
10 TABLET ORAL EVERY 6 HOURS PRN
Status: DISCONTINUED | OUTPATIENT
Start: 2025-01-14 | End: 2025-01-14 | Stop reason: HOSPADM

## 2025-01-14 RX ORDER — ONDANSETRON HYDROCHLORIDE 8 MG/1
16 TABLET, FILM COATED ORAL ONCE
Status: COMPLETED | OUTPATIENT
Start: 2025-01-14 | End: 2025-01-14

## 2025-01-14 RX ORDER — DEXAMETHASONE 6 MG/1
12 TABLET ORAL ONCE
Status: COMPLETED | OUTPATIENT
Start: 2025-01-14 | End: 2025-01-14

## 2025-01-14 RX ADMIN — ONDANSETRON HYDROCHLORIDE 16 MG: 8 TABLET, FILM COATED ORAL at 10:10

## 2025-01-14 RX ADMIN — OXALIPLATIN 165 MG: 5 INJECTION, SOLUTION INTRAVENOUS at 10:44

## 2025-01-14 RX ADMIN — FLUOROURACIL 4700 MG: 50 INJECTION, SOLUTION INTRAVENOUS at 13:07

## 2025-01-14 RX ADMIN — DEXAMETHASONE 12 MG: 6 TABLET ORAL at 10:10

## 2025-01-14 RX ADMIN — FLUOROURACIL 775 MG: 50 INJECTION, SOLUTION INTRAVENOUS at 12:57

## 2025-01-14 ASSESSMENT — PAIN SCALES - GENERAL: PAINLEVEL_OUTOF10: 0-NO PAIN

## 2025-01-14 NOTE — PROGRESS NOTES
Social work continues to follow this patient for ongoing assessment and support. I met briefly with the patient as she was receiving treatment today to offer support and assess for needs. She remains very pleasant and easily engaged in conversation. She presented in good spirits and reports she has been doing very well. She initially had many side effects with treatment, but these have resolved and she has been feeling well. She states she is losing some hair, which she is adjusting to; she obtained a wig from [employer] that she wears sometimes. She states she is more comfortable in hats, which she is wearing today. The patient reports she engages in her regular activities without issue. She denies needs or concerns today and expressed appreciation for the visit. Social work will remain available to assist this patient.    FABRICIO Muñiz, ABENA

## 2025-01-16 ENCOUNTER — INFUSION (OUTPATIENT)
Dept: HEMATOLOGY/ONCOLOGY | Facility: CLINIC | Age: 70
End: 2025-01-16
Payer: MEDICARE

## 2025-01-16 VITALS
TEMPERATURE: 96.8 F | OXYGEN SATURATION: 97 % | WEIGHT: 192.46 LBS | DIASTOLIC BLOOD PRESSURE: 66 MMHG | RESPIRATION RATE: 14 BRPM | HEART RATE: 71 BPM | BODY MASS INDEX: 31.87 KG/M2 | SYSTOLIC BLOOD PRESSURE: 115 MMHG

## 2025-01-16 DIAGNOSIS — C80.0 CARCINOMATOSIS (MULTI): ICD-10-CM

## 2025-01-16 DIAGNOSIS — C18.1 MALIGNANT NEOPLASM OF APPENDIX (MULTI): ICD-10-CM

## 2025-01-16 PROCEDURE — 2500000004 HC RX 250 GENERAL PHARMACY W/ HCPCS (ALT 636 FOR OP/ED): Performed by: INTERNAL MEDICINE

## 2025-01-16 PROCEDURE — 96374 THER/PROPH/DIAG INJ IV PUSH: CPT | Mod: INF

## 2025-01-16 RX ORDER — HEPARIN SODIUM,PORCINE/PF 10 UNIT/ML
50 SYRINGE (ML) INTRAVENOUS AS NEEDED
OUTPATIENT
Start: 2025-01-16

## 2025-01-16 RX ORDER — HEPARIN SODIUM,PORCINE/PF 10 UNIT/ML
50 SYRINGE (ML) INTRAVENOUS AS NEEDED
Status: DISCONTINUED | OUTPATIENT
Start: 2025-01-16 | End: 2025-01-16 | Stop reason: HOSPADM

## 2025-01-16 RX ORDER — HEPARIN 100 UNIT/ML
500 SYRINGE INTRAVENOUS AS NEEDED
OUTPATIENT
Start: 2025-01-16

## 2025-01-16 RX ORDER — HEPARIN 100 UNIT/ML
500 SYRINGE INTRAVENOUS AS NEEDED
Status: DISCONTINUED | OUTPATIENT
Start: 2025-01-16 | End: 2025-01-16 | Stop reason: HOSPADM

## 2025-01-16 RX ORDER — PALONOSETRON 0.05 MG/ML
250 INJECTION, SOLUTION INTRAVENOUS ONCE
Status: COMPLETED | OUTPATIENT
Start: 2025-01-16 | End: 2025-01-16

## 2025-01-16 RX ADMIN — PALONOSETRON HYDROCHLORIDE 250 MCG: 0.25 INJECTION INTRAVENOUS at 11:20

## 2025-01-16 ASSESSMENT — PAIN SCALES - GENERAL: PAINLEVEL_OUTOF10: 0-NO PAIN

## 2025-01-27 RX ORDER — ONDANSETRON HYDROCHLORIDE 8 MG/1
16 TABLET, FILM COATED ORAL ONCE
Status: CANCELLED | OUTPATIENT
Start: 2025-01-28 | End: 2025-01-28

## 2025-01-27 RX ORDER — LORAZEPAM 2 MG/ML
1 INJECTION INTRAMUSCULAR AS NEEDED
Status: CANCELLED | OUTPATIENT
Start: 2025-01-28

## 2025-01-27 RX ORDER — DIPHENHYDRAMINE HYDROCHLORIDE 50 MG/ML
50 INJECTION INTRAMUSCULAR; INTRAVENOUS AS NEEDED
Status: CANCELLED | OUTPATIENT
Start: 2025-01-28

## 2025-01-27 RX ORDER — ALBUTEROL SULFATE 0.83 MG/ML
3 SOLUTION RESPIRATORY (INHALATION) AS NEEDED
Status: CANCELLED | OUTPATIENT
Start: 2025-01-28

## 2025-01-27 RX ORDER — FLUOROURACIL 50 MG/ML
400 INJECTION, SOLUTION INTRAVENOUS ONCE
Status: CANCELLED | OUTPATIENT
Start: 2025-01-28

## 2025-01-27 RX ORDER — EPINEPHRINE 0.3 MG/.3ML
0.3 INJECTION SUBCUTANEOUS EVERY 5 MIN PRN
Status: CANCELLED | OUTPATIENT
Start: 2025-01-28

## 2025-01-27 RX ORDER — FAMOTIDINE 10 MG/ML
20 INJECTION INTRAVENOUS ONCE AS NEEDED
Status: CANCELLED | OUTPATIENT
Start: 2025-01-28

## 2025-01-27 RX ORDER — PALONOSETRON 0.05 MG/ML
250 INJECTION, SOLUTION INTRAVENOUS ONCE
Status: CANCELLED | OUTPATIENT
Start: 2025-01-30 | End: 2025-01-30

## 2025-01-27 RX ORDER — PROCHLORPERAZINE EDISYLATE 5 MG/ML
10 INJECTION INTRAMUSCULAR; INTRAVENOUS EVERY 6 HOURS PRN
Status: CANCELLED | OUTPATIENT
Start: 2025-01-28

## 2025-01-27 RX ORDER — PROCHLORPERAZINE MALEATE 10 MG
10 TABLET ORAL EVERY 6 HOURS PRN
Status: CANCELLED | OUTPATIENT
Start: 2025-01-28

## 2025-01-27 RX ORDER — DEXAMETHASONE 6 MG/1
12 TABLET ORAL ONCE
Status: CANCELLED | OUTPATIENT
Start: 2025-01-28 | End: 2025-01-28

## 2025-01-28 ENCOUNTER — INFUSION (OUTPATIENT)
Dept: HEMATOLOGY/ONCOLOGY | Facility: CLINIC | Age: 70
End: 2025-01-28
Payer: MEDICARE

## 2025-01-28 VITALS
TEMPERATURE: 97 F | DIASTOLIC BLOOD PRESSURE: 74 MMHG | WEIGHT: 194.89 LBS | HEART RATE: 72 BPM | OXYGEN SATURATION: 99 % | RESPIRATION RATE: 16 BRPM | SYSTOLIC BLOOD PRESSURE: 133 MMHG | BODY MASS INDEX: 32.27 KG/M2

## 2025-01-28 DIAGNOSIS — C80.0 CARCINOMATOSIS (MULTI): ICD-10-CM

## 2025-01-28 DIAGNOSIS — C18.1 MALIGNANT NEOPLASM OF APPENDIX (MULTI): ICD-10-CM

## 2025-01-28 LAB
ALBUMIN SERPL BCP-MCNC: 4.3 G/DL (ref 3.4–5)
ALP SERPL-CCNC: 116 U/L (ref 33–136)
ALT SERPL W P-5'-P-CCNC: 26 U/L (ref 7–45)
ANION GAP SERPL CALC-SCNC: 14 MMOL/L (ref 10–20)
AST SERPL W P-5'-P-CCNC: 29 U/L (ref 9–39)
BASOPHILS # BLD AUTO: 0.02 X10*3/UL (ref 0–0.1)
BASOPHILS NFR BLD AUTO: 0.4 %
BILIRUB SERPL-MCNC: 0.4 MG/DL (ref 0–1.2)
BUN SERPL-MCNC: 17 MG/DL (ref 6–23)
CALCIUM SERPL-MCNC: 9.6 MG/DL (ref 8.6–10.3)
CHLORIDE SERPL-SCNC: 105 MMOL/L (ref 98–107)
CO2 SERPL-SCNC: 25 MMOL/L (ref 21–32)
CREAT SERPL-MCNC: 0.67 MG/DL (ref 0.5–1.05)
EGFRCR SERPLBLD CKD-EPI 2021: >90 ML/MIN/1.73M*2
EOSINOPHIL # BLD AUTO: 0.13 X10*3/UL (ref 0–0.7)
EOSINOPHIL NFR BLD AUTO: 2.9 %
ERYTHROCYTE [DISTWIDTH] IN BLOOD BY AUTOMATED COUNT: 15.1 % (ref 11.5–14.5)
GLUCOSE SERPL-MCNC: 177 MG/DL (ref 74–99)
HCT VFR BLD AUTO: 40 % (ref 36–46)
HGB BLD-MCNC: 13.4 G/DL (ref 12–16)
IMM GRANULOCYTES # BLD AUTO: 0.01 X10*3/UL (ref 0–0.7)
IMM GRANULOCYTES NFR BLD AUTO: 0.2 % (ref 0–0.9)
LYMPHOCYTES # BLD AUTO: 1.43 X10*3/UL (ref 1.2–4.8)
LYMPHOCYTES NFR BLD AUTO: 31.9 %
MCH RBC QN AUTO: 32 PG (ref 26–34)
MCHC RBC AUTO-ENTMCNC: 33.5 G/DL (ref 32–36)
MCV RBC AUTO: 96 FL (ref 80–100)
MONOCYTES # BLD AUTO: 0.8 X10*3/UL (ref 0.1–1)
MONOCYTES NFR BLD AUTO: 17.9 %
NEUTROPHILS # BLD AUTO: 2.09 X10*3/UL (ref 1.2–7.7)
NEUTROPHILS NFR BLD AUTO: 46.7 %
PLATELET # BLD AUTO: 120 X10*3/UL (ref 150–450)
POTASSIUM SERPL-SCNC: 4.2 MMOL/L (ref 3.5–5.3)
PROT SERPL-MCNC: 6.9 G/DL (ref 6.4–8.2)
RBC # BLD AUTO: 4.19 X10*6/UL (ref 4–5.2)
SODIUM SERPL-SCNC: 140 MMOL/L (ref 136–145)
WBC # BLD AUTO: 4.5 X10*3/UL (ref 4.4–11.3)

## 2025-01-28 PROCEDURE — 85025 COMPLETE CBC W/AUTO DIFF WBC: CPT

## 2025-01-28 PROCEDURE — 96409 CHEMO IV PUSH SNGL DRUG: CPT

## 2025-01-28 PROCEDURE — 96416 CHEMO PROLONG INFUSE W/PUMP: CPT

## 2025-01-28 PROCEDURE — 2500000004 HC RX 250 GENERAL PHARMACY W/ HCPCS (ALT 636 FOR OP/ED): Performed by: INTERNAL MEDICINE

## 2025-01-28 PROCEDURE — 84075 ASSAY ALKALINE PHOSPHATASE: CPT

## 2025-01-28 PROCEDURE — 96411 CHEMO IV PUSH ADDL DRUG: CPT

## 2025-01-28 PROCEDURE — 96413 CHEMO IV INFUSION 1 HR: CPT

## 2025-01-28 PROCEDURE — 96415 CHEMO IV INFUSION ADDL HR: CPT

## 2025-01-28 RX ORDER — EPINEPHRINE 0.3 MG/.3ML
0.3 INJECTION SUBCUTANEOUS EVERY 5 MIN PRN
Status: DISCONTINUED | OUTPATIENT
Start: 2025-01-28 | End: 2025-01-28 | Stop reason: HOSPADM

## 2025-01-28 RX ORDER — HEPARIN 100 UNIT/ML
500 SYRINGE INTRAVENOUS AS NEEDED
Status: CANCELLED | OUTPATIENT
Start: 2025-01-28

## 2025-01-28 RX ORDER — PROCHLORPERAZINE EDISYLATE 5 MG/ML
10 INJECTION INTRAMUSCULAR; INTRAVENOUS EVERY 6 HOURS PRN
Status: DISCONTINUED | OUTPATIENT
Start: 2025-01-28 | End: 2025-01-28 | Stop reason: HOSPADM

## 2025-01-28 RX ORDER — FAMOTIDINE 10 MG/ML
20 INJECTION INTRAVENOUS ONCE AS NEEDED
Status: DISCONTINUED | OUTPATIENT
Start: 2025-01-28 | End: 2025-01-28 | Stop reason: HOSPADM

## 2025-01-28 RX ORDER — ONDANSETRON HYDROCHLORIDE 8 MG/1
16 TABLET, FILM COATED ORAL ONCE
Status: COMPLETED | OUTPATIENT
Start: 2025-01-28 | End: 2025-01-28

## 2025-01-28 RX ORDER — LORAZEPAM 2 MG/ML
1 INJECTION INTRAMUSCULAR AS NEEDED
Status: DISCONTINUED | OUTPATIENT
Start: 2025-01-28 | End: 2025-01-28 | Stop reason: HOSPADM

## 2025-01-28 RX ORDER — HEPARIN SODIUM,PORCINE/PF 10 UNIT/ML
50 SYRINGE (ML) INTRAVENOUS AS NEEDED
Status: CANCELLED | OUTPATIENT
Start: 2025-01-28

## 2025-01-28 RX ORDER — DEXAMETHASONE 6 MG/1
12 TABLET ORAL ONCE
Status: COMPLETED | OUTPATIENT
Start: 2025-01-28 | End: 2025-01-28

## 2025-01-28 RX ORDER — ALBUTEROL SULFATE 0.83 MG/ML
3 SOLUTION RESPIRATORY (INHALATION) AS NEEDED
Status: DISCONTINUED | OUTPATIENT
Start: 2025-01-28 | End: 2025-01-28 | Stop reason: HOSPADM

## 2025-01-28 RX ORDER — FLUOROURACIL 50 MG/ML
400 INJECTION, SOLUTION INTRAVENOUS ONCE
Status: COMPLETED | OUTPATIENT
Start: 2025-01-28 | End: 2025-01-28

## 2025-01-28 RX ORDER — PROCHLORPERAZINE MALEATE 10 MG
10 TABLET ORAL EVERY 6 HOURS PRN
Status: DISCONTINUED | OUTPATIENT
Start: 2025-01-28 | End: 2025-01-28 | Stop reason: HOSPADM

## 2025-01-28 RX ORDER — DIPHENHYDRAMINE HYDROCHLORIDE 50 MG/ML
50 INJECTION INTRAMUSCULAR; INTRAVENOUS AS NEEDED
Status: DISCONTINUED | OUTPATIENT
Start: 2025-01-28 | End: 2025-01-28 | Stop reason: HOSPADM

## 2025-01-28 RX ADMIN — OXALIPLATIN 165 MG: 5 INJECTION, SOLUTION INTRAVENOUS at 10:42

## 2025-01-28 RX ADMIN — FLUOROURACIL 4700 MG: 50 INJECTION, SOLUTION INTRAVENOUS at 13:07

## 2025-01-28 RX ADMIN — DEXAMETHASONE 12 MG: 6 TABLET ORAL at 10:16

## 2025-01-28 RX ADMIN — ONDANSETRON HYDROCHLORIDE 16 MG: 8 TABLET, FILM COATED ORAL at 10:16

## 2025-01-28 RX ADMIN — FLUOROURACIL 775 MG: 50 INJECTION, SOLUTION INTRAVENOUS at 12:53

## 2025-01-28 ASSESSMENT — PAIN SCALES - GENERAL: PAINLEVEL_OUTOF10: 0-NO PAIN

## 2025-01-30 ENCOUNTER — INFUSION (OUTPATIENT)
Dept: HEMATOLOGY/ONCOLOGY | Facility: CLINIC | Age: 70
End: 2025-01-30
Payer: MEDICARE

## 2025-01-30 VITALS
DIASTOLIC BLOOD PRESSURE: 72 MMHG | TEMPERATURE: 98.1 F | HEART RATE: 74 BPM | RESPIRATION RATE: 17 BRPM | SYSTOLIC BLOOD PRESSURE: 133 MMHG | BODY MASS INDEX: 32.24 KG/M2 | OXYGEN SATURATION: 97 % | WEIGHT: 194.67 LBS

## 2025-01-30 DIAGNOSIS — C80.0 CARCINOMATOSIS (MULTI): ICD-10-CM

## 2025-01-30 DIAGNOSIS — C18.1 MALIGNANT NEOPLASM OF APPENDIX (MULTI): ICD-10-CM

## 2025-01-30 PROCEDURE — 2500000004 HC RX 250 GENERAL PHARMACY W/ HCPCS (ALT 636 FOR OP/ED): Performed by: INTERNAL MEDICINE

## 2025-01-30 PROCEDURE — 96374 THER/PROPH/DIAG INJ IV PUSH: CPT | Mod: INF

## 2025-01-30 RX ORDER — HEPARIN SODIUM,PORCINE/PF 10 UNIT/ML
50 SYRINGE (ML) INTRAVENOUS AS NEEDED
OUTPATIENT
Start: 2025-01-30

## 2025-01-30 RX ORDER — HEPARIN SODIUM,PORCINE/PF 10 UNIT/ML
50 SYRINGE (ML) INTRAVENOUS AS NEEDED
Status: DISCONTINUED | OUTPATIENT
Start: 2025-01-30 | End: 2025-01-30 | Stop reason: HOSPADM

## 2025-01-30 RX ORDER — HEPARIN 100 UNIT/ML
500 SYRINGE INTRAVENOUS AS NEEDED
OUTPATIENT
Start: 2025-01-30

## 2025-01-30 RX ORDER — HEPARIN 100 UNIT/ML
500 SYRINGE INTRAVENOUS AS NEEDED
Status: DISCONTINUED | OUTPATIENT
Start: 2025-01-30 | End: 2025-01-30 | Stop reason: HOSPADM

## 2025-01-30 RX ORDER — PALONOSETRON 0.05 MG/ML
250 INJECTION, SOLUTION INTRAVENOUS ONCE
Status: COMPLETED | OUTPATIENT
Start: 2025-01-30 | End: 2025-01-30

## 2025-01-30 RX ADMIN — PALONOSETRON HYDROCHLORIDE 250 MCG: 0.25 INJECTION INTRAVENOUS at 11:16

## 2025-01-30 ASSESSMENT — PAIN SCALES - GENERAL: PAINLEVEL_OUTOF10: 0-NO PAIN

## 2025-02-10 DIAGNOSIS — E11.9 TYPE 2 DIABETES MELLITUS WITHOUT COMPLICATIONS (MULTI): ICD-10-CM

## 2025-02-11 ENCOUNTER — LAB (OUTPATIENT)
Dept: LAB | Facility: CLINIC | Age: 70
End: 2025-02-11
Payer: MEDICARE

## 2025-02-11 ENCOUNTER — OFFICE VISIT (OUTPATIENT)
Dept: HEMATOLOGY/ONCOLOGY | Facility: CLINIC | Age: 70
End: 2025-02-11
Payer: MEDICARE

## 2025-02-11 ENCOUNTER — INFUSION (OUTPATIENT)
Dept: HEMATOLOGY/ONCOLOGY | Facility: CLINIC | Age: 70
End: 2025-02-11
Payer: MEDICARE

## 2025-02-11 VITALS
BODY MASS INDEX: 32.93 KG/M2 | SYSTOLIC BLOOD PRESSURE: 157 MMHG | TEMPERATURE: 97 F | DIASTOLIC BLOOD PRESSURE: 81 MMHG | WEIGHT: 198.85 LBS | OXYGEN SATURATION: 97 % | RESPIRATION RATE: 19 BRPM | HEART RATE: 89 BPM

## 2025-02-11 DIAGNOSIS — C18.1 MALIGNANT NEOPLASM OF APPENDIX (MULTI): Primary | ICD-10-CM

## 2025-02-11 DIAGNOSIS — K21.00 GASTROESOPHAGEAL REFLUX DISEASE WITH ESOPHAGITIS WITHOUT HEMORRHAGE: ICD-10-CM

## 2025-02-11 DIAGNOSIS — C80.0 CARCINOMATOSIS (MULTI): ICD-10-CM

## 2025-02-11 DIAGNOSIS — D64.9 ANEMIA: Primary | ICD-10-CM

## 2025-02-11 DIAGNOSIS — C18.1 MALIGNANT NEOPLASM OF APPENDIX (MULTI): ICD-10-CM

## 2025-02-11 LAB
ALBUMIN SERPL BCP-MCNC: 4.2 G/DL (ref 3.4–5)
ALP SERPL-CCNC: 115 U/L (ref 33–136)
ALT SERPL W P-5'-P-CCNC: 27 U/L (ref 7–45)
ANION GAP SERPL CALC-SCNC: 13 MMOL/L (ref 10–20)
AST SERPL W P-5'-P-CCNC: 30 U/L (ref 9–39)
BASOPHILS # BLD AUTO: 0.02 X10*3/UL (ref 0–0.1)
BASOPHILS NFR BLD AUTO: 0.5 %
BILIRUB SERPL-MCNC: 0.4 MG/DL (ref 0–1.2)
BUN SERPL-MCNC: 18 MG/DL (ref 6–23)
CALCIUM SERPL-MCNC: 9.6 MG/DL (ref 8.6–10.3)
CHLORIDE SERPL-SCNC: 104 MMOL/L (ref 98–107)
CO2 SERPL-SCNC: 25 MMOL/L (ref 21–32)
CREAT SERPL-MCNC: 0.69 MG/DL (ref 0.5–1.05)
EGFRCR SERPLBLD CKD-EPI 2021: >90 ML/MIN/1.73M*2
EOSINOPHIL # BLD AUTO: 0.11 X10*3/UL (ref 0–0.7)
EOSINOPHIL NFR BLD AUTO: 3 %
ERYTHROCYTE [DISTWIDTH] IN BLOOD BY AUTOMATED COUNT: 15.4 % (ref 11.5–14.5)
GLUCOSE SERPL-MCNC: 205 MG/DL (ref 74–99)
HCT VFR BLD AUTO: 40.8 % (ref 36–46)
HGB BLD-MCNC: 13.5 G/DL (ref 12–16)
HOLD SPECIMEN: NORMAL
IMM GRANULOCYTES # BLD AUTO: 0.01 X10*3/UL (ref 0–0.7)
IMM GRANULOCYTES NFR BLD AUTO: 0.3 % (ref 0–0.9)
LYMPHOCYTES # BLD AUTO: 1.2 X10*3/UL (ref 1.2–4.8)
LYMPHOCYTES NFR BLD AUTO: 32.3 %
MCH RBC QN AUTO: 31.3 PG (ref 26–34)
MCHC RBC AUTO-ENTMCNC: 33.1 G/DL (ref 32–36)
MCV RBC AUTO: 94 FL (ref 80–100)
MONOCYTES # BLD AUTO: 0.58 X10*3/UL (ref 0.1–1)
MONOCYTES NFR BLD AUTO: 15.6 %
NEUTROPHILS # BLD AUTO: 1.8 X10*3/UL (ref 1.2–7.7)
NEUTROPHILS NFR BLD AUTO: 48.3 %
PLATELET # BLD AUTO: 86 X10*3/UL (ref 150–450)
POTASSIUM SERPL-SCNC: 3.6 MMOL/L (ref 3.5–5.3)
PROT SERPL-MCNC: 6.8 G/DL (ref 6.4–8.2)
RBC # BLD AUTO: 4.32 X10*6/UL (ref 4–5.2)
SODIUM SERPL-SCNC: 138 MMOL/L (ref 136–145)
WBC # BLD AUTO: 3.7 X10*3/UL (ref 4.4–11.3)

## 2025-02-11 PROCEDURE — 80053 COMPREHEN METABOLIC PANEL: CPT

## 2025-02-11 PROCEDURE — 2500000004 HC RX 250 GENERAL PHARMACY W/ HCPCS (ALT 636 FOR OP/ED): Performed by: INTERNAL MEDICINE

## 2025-02-11 PROCEDURE — 99214 OFFICE O/P EST MOD 30 MIN: CPT | Mod: 25 | Performed by: INTERNAL MEDICINE

## 2025-02-11 PROCEDURE — 96411 CHEMO IV PUSH ADDL DRUG: CPT

## 2025-02-11 PROCEDURE — 96413 CHEMO IV INFUSION 1 HR: CPT

## 2025-02-11 PROCEDURE — 3079F DIAST BP 80-89 MM HG: CPT | Performed by: INTERNAL MEDICINE

## 2025-02-11 PROCEDURE — 96415 CHEMO IV INFUSION ADDL HR: CPT

## 2025-02-11 PROCEDURE — 1157F ADVNC CARE PLAN IN RCRD: CPT | Performed by: INTERNAL MEDICINE

## 2025-02-11 PROCEDURE — 96416 CHEMO PROLONG INFUSE W/PUMP: CPT

## 2025-02-11 PROCEDURE — 3077F SYST BP >= 140 MM HG: CPT | Performed by: INTERNAL MEDICINE

## 2025-02-11 PROCEDURE — 1123F ACP DISCUSS/DSCN MKR DOCD: CPT | Performed by: INTERNAL MEDICINE

## 2025-02-11 PROCEDURE — 1159F MED LIST DOCD IN RCRD: CPT | Performed by: INTERNAL MEDICINE

## 2025-02-11 PROCEDURE — 85025 COMPLETE CBC W/AUTO DIFF WBC: CPT

## 2025-02-11 PROCEDURE — G2211 COMPLEX E/M VISIT ADD ON: HCPCS | Performed by: INTERNAL MEDICINE

## 2025-02-11 PROCEDURE — 96376 TX/PRO/DX INJ SAME DRUG ADON: CPT

## 2025-02-11 PROCEDURE — 99214 OFFICE O/P EST MOD 30 MIN: CPT | Performed by: INTERNAL MEDICINE

## 2025-02-11 PROCEDURE — 1126F AMNT PAIN NOTED NONE PRSNT: CPT | Performed by: INTERNAL MEDICINE

## 2025-02-11 RX ORDER — ALBUTEROL SULFATE 0.83 MG/ML
3 SOLUTION RESPIRATORY (INHALATION) AS NEEDED
OUTPATIENT
Start: 2025-02-25

## 2025-02-11 RX ORDER — LORAZEPAM 2 MG/ML
1 INJECTION INTRAMUSCULAR AS NEEDED
Status: CANCELLED | OUTPATIENT
Start: 2025-02-11

## 2025-02-11 RX ORDER — PROCHLORPERAZINE EDISYLATE 5 MG/ML
10 INJECTION INTRAMUSCULAR; INTRAVENOUS EVERY 6 HOURS PRN
Status: DISCONTINUED | OUTPATIENT
Start: 2025-02-11 | End: 2025-02-11 | Stop reason: HOSPADM

## 2025-02-11 RX ORDER — FAMOTIDINE 10 MG/ML
20 INJECTION INTRAVENOUS ONCE AS NEEDED
Status: CANCELLED | OUTPATIENT
Start: 2025-02-11

## 2025-02-11 RX ORDER — FLUOROURACIL 50 MG/ML
400 INJECTION, SOLUTION INTRAVENOUS ONCE
Status: COMPLETED | OUTPATIENT
Start: 2025-02-11 | End: 2025-02-11

## 2025-02-11 RX ORDER — PROCHLORPERAZINE MALEATE 10 MG
10 TABLET ORAL EVERY 6 HOURS PRN
Status: CANCELLED | OUTPATIENT
Start: 2025-02-11

## 2025-02-11 RX ORDER — DIPHENHYDRAMINE HYDROCHLORIDE 50 MG/ML
50 INJECTION INTRAMUSCULAR; INTRAVENOUS AS NEEDED
OUTPATIENT
Start: 2025-02-25

## 2025-02-11 RX ORDER — PROCHLORPERAZINE MALEATE 10 MG
10 TABLET ORAL EVERY 6 HOURS PRN
Status: DISCONTINUED | OUTPATIENT
Start: 2025-02-11 | End: 2025-02-11 | Stop reason: HOSPADM

## 2025-02-11 RX ORDER — PANTOPRAZOLE SODIUM 20 MG/1
20 TABLET, DELAYED RELEASE ORAL DAILY
Qty: 90 TABLET | Refills: 3 | Status: SHIPPED | OUTPATIENT
Start: 2025-02-11

## 2025-02-11 RX ORDER — DEXAMETHASONE 6 MG/1
12 TABLET ORAL ONCE
OUTPATIENT
Start: 2025-02-25 | End: 2025-02-25

## 2025-02-11 RX ORDER — PROCHLORPERAZINE EDISYLATE 5 MG/ML
10 INJECTION INTRAMUSCULAR; INTRAVENOUS EVERY 6 HOURS PRN
OUTPATIENT
Start: 2025-02-25

## 2025-02-11 RX ORDER — EPINEPHRINE 0.3 MG/.3ML
0.3 INJECTION SUBCUTANEOUS EVERY 5 MIN PRN
Status: CANCELLED | OUTPATIENT
Start: 2025-02-11

## 2025-02-11 RX ORDER — HEPARIN SODIUM,PORCINE/PF 10 UNIT/ML
50 SYRINGE (ML) INTRAVENOUS AS NEEDED
Status: CANCELLED | OUTPATIENT
Start: 2025-02-11

## 2025-02-11 RX ORDER — PROCHLORPERAZINE MALEATE 10 MG
10 TABLET ORAL EVERY 6 HOURS PRN
OUTPATIENT
Start: 2025-02-25

## 2025-02-11 RX ORDER — EPINEPHRINE 0.3 MG/.3ML
0.3 INJECTION SUBCUTANEOUS EVERY 5 MIN PRN
Status: DISCONTINUED | OUTPATIENT
Start: 2025-02-11 | End: 2025-02-11 | Stop reason: HOSPADM

## 2025-02-11 RX ORDER — HEPARIN 100 UNIT/ML
500 SYRINGE INTRAVENOUS AS NEEDED
Status: CANCELLED | OUTPATIENT
Start: 2025-02-11

## 2025-02-11 RX ORDER — LORAZEPAM 2 MG/ML
1 INJECTION INTRAMUSCULAR AS NEEDED
OUTPATIENT
Start: 2025-02-25

## 2025-02-11 RX ORDER — DIPHENHYDRAMINE HYDROCHLORIDE 50 MG/ML
50 INJECTION INTRAMUSCULAR; INTRAVENOUS AS NEEDED
Status: DISCONTINUED | OUTPATIENT
Start: 2025-02-11 | End: 2025-02-11 | Stop reason: HOSPADM

## 2025-02-11 RX ORDER — FLUOROURACIL 50 MG/ML
400 INJECTION, SOLUTION INTRAVENOUS ONCE
Status: CANCELLED | OUTPATIENT
Start: 2025-02-11

## 2025-02-11 RX ORDER — FLUOROURACIL 50 MG/ML
400 INJECTION, SOLUTION INTRAVENOUS ONCE
OUTPATIENT
Start: 2025-02-25

## 2025-02-11 RX ORDER — FAMOTIDINE 10 MG/ML
20 INJECTION INTRAVENOUS ONCE AS NEEDED
Status: DISCONTINUED | OUTPATIENT
Start: 2025-02-11 | End: 2025-02-11 | Stop reason: HOSPADM

## 2025-02-11 RX ORDER — ONDANSETRON HYDROCHLORIDE 8 MG/1
16 TABLET, FILM COATED ORAL ONCE
OUTPATIENT
Start: 2025-02-25 | End: 2025-02-25

## 2025-02-11 RX ORDER — HEPARIN 100 UNIT/ML
500 SYRINGE INTRAVENOUS AS NEEDED
Status: DISCONTINUED | OUTPATIENT
Start: 2025-02-11 | End: 2025-02-11 | Stop reason: HOSPADM

## 2025-02-11 RX ORDER — ONDANSETRON HYDROCHLORIDE 8 MG/1
16 TABLET, FILM COATED ORAL ONCE
Status: CANCELLED | OUTPATIENT
Start: 2025-02-11 | End: 2025-02-11

## 2025-02-11 RX ORDER — PALONOSETRON 0.05 MG/ML
250 INJECTION, SOLUTION INTRAVENOUS ONCE
OUTPATIENT
Start: 2025-02-27 | End: 2025-02-27

## 2025-02-11 RX ORDER — DEXAMETHASONE 6 MG/1
12 TABLET ORAL ONCE
Status: COMPLETED | OUTPATIENT
Start: 2025-02-11 | End: 2025-02-11

## 2025-02-11 RX ORDER — HEPARIN SODIUM,PORCINE/PF 10 UNIT/ML
50 SYRINGE (ML) INTRAVENOUS AS NEEDED
Status: DISCONTINUED | OUTPATIENT
Start: 2025-02-11 | End: 2025-02-11 | Stop reason: HOSPADM

## 2025-02-11 RX ORDER — LORAZEPAM 2 MG/ML
1 INJECTION INTRAMUSCULAR AS NEEDED
Status: DISCONTINUED | OUTPATIENT
Start: 2025-02-11 | End: 2025-02-11 | Stop reason: HOSPADM

## 2025-02-11 RX ORDER — FAMOTIDINE 10 MG/ML
20 INJECTION INTRAVENOUS ONCE AS NEEDED
OUTPATIENT
Start: 2025-02-25

## 2025-02-11 RX ORDER — DIPHENHYDRAMINE HYDROCHLORIDE 50 MG/ML
50 INJECTION INTRAMUSCULAR; INTRAVENOUS AS NEEDED
Status: CANCELLED | OUTPATIENT
Start: 2025-02-11

## 2025-02-11 RX ORDER — ALBUTEROL SULFATE 0.83 MG/ML
3 SOLUTION RESPIRATORY (INHALATION) AS NEEDED
Status: DISCONTINUED | OUTPATIENT
Start: 2025-02-11 | End: 2025-02-11 | Stop reason: HOSPADM

## 2025-02-11 RX ORDER — ALBUTEROL SULFATE 0.83 MG/ML
3 SOLUTION RESPIRATORY (INHALATION) AS NEEDED
Status: CANCELLED | OUTPATIENT
Start: 2025-02-11

## 2025-02-11 RX ORDER — ONDANSETRON HYDROCHLORIDE 8 MG/1
16 TABLET, FILM COATED ORAL ONCE
Status: COMPLETED | OUTPATIENT
Start: 2025-02-11 | End: 2025-02-11

## 2025-02-11 RX ORDER — PALONOSETRON 0.05 MG/ML
250 INJECTION, SOLUTION INTRAVENOUS ONCE
Status: CANCELLED | OUTPATIENT
Start: 2025-02-13 | End: 2025-02-13

## 2025-02-11 RX ORDER — DEXAMETHASONE 6 MG/1
12 TABLET ORAL ONCE
Status: CANCELLED | OUTPATIENT
Start: 2025-02-11 | End: 2025-02-11

## 2025-02-11 RX ORDER — PROCHLORPERAZINE EDISYLATE 5 MG/ML
10 INJECTION INTRAMUSCULAR; INTRAVENOUS EVERY 6 HOURS PRN
Status: CANCELLED | OUTPATIENT
Start: 2025-02-11

## 2025-02-11 RX ORDER — EPINEPHRINE 0.3 MG/.3ML
0.3 INJECTION SUBCUTANEOUS EVERY 5 MIN PRN
OUTPATIENT
Start: 2025-02-25

## 2025-02-11 RX ADMIN — OXALIPLATIN 165 MG: 5 INJECTION, SOLUTION INTRAVENOUS at 11:41

## 2025-02-11 RX ADMIN — DEXAMETHASONE 12 MG: 6 TABLET ORAL at 11:11

## 2025-02-11 RX ADMIN — FLUOROURACIL 4700 MG: 50 INJECTION, SOLUTION INTRAVENOUS at 14:23

## 2025-02-11 RX ADMIN — FLUOROURACIL 775 MG: 50 INJECTION, SOLUTION INTRAVENOUS at 14:01

## 2025-02-11 RX ADMIN — ONDANSETRON HYDROCHLORIDE 16 MG: 8 TABLET, FILM COATED ORAL at 11:10

## 2025-02-11 ASSESSMENT — PAIN SCALES - GENERAL: PAINLEVEL_OUTOF10: 0-NO PAIN

## 2025-02-11 NOTE — PROGRESS NOTES
Patient ID: Monica Musa is a 69 y.o. female.  Oncology History Overview Note   - 7/8/2022 TLH, BSO, SLND combined case with Dr. Lal. 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 There was no lymphovascular invasion. There was a focus of MELF pattern invasion. Tumor board recommendations: Surveillance. MMR testing on prior endometrial biopsy specimen was normal.   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  - CT 8/26/24: Small volume ascites. Left pelvic nodule with some nodularity  along the left paracolic gutter worrisome for peritoneal carcinomatosis.  Admitted with bowel obstruction.  No lesion large enough for biopsy  - 9/2024 diagnostic laparoscopy - biopsy c/w non gyn primary - referred to med onc     Endometrial carcinoma (Multi)   5/22/2023 Initial Diagnosis    Endometrial carcinoma (Multi)     Malignant neoplasm of appendix (Multi)   10/3/2024 Initial Diagnosis    Malignant neoplasm of appendix (Multi)     10/16/2024 -  Chemotherapy    mFOLFOX6 (Fluorouracil Continuous Infusion / Leucovorin / Oxaliplatin), 14 Day Cycles     Carcinomatosis (Multi)   10/3/2024 Initial Diagnosis    Carcinomatosis (Multi)     10/16/2024 -  Chemotherapy    mFOLFOX6 (Fluorouracil Continuous Infusion / Leucovorin / Oxaliplatin), 14 Day Cycles       Subjective    HPI  Monica Musa is a 70 yo F with history of grade 1 endometroid adenocarcinoma MMI, no LVSI, pMMR s/p TLH, BSP, SLND 7/8/22. 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     Labs from today are pending     She reports being overall well. She reports having good appetite and energy. Denies having any new pain or acute concerns. No new rashes, fever or chills. Continues to have neuropathy for approx five days but manageable. She denies any palpitations. She denies any palpitations. Denies any cold sensitivity. No other major complaints at this time.       Patient's past medical history, surgical history, family history and social history reviewed.    Objective      Vitals:    02/11/25 0925   BP: 157/81   Pulse: 89   Resp: 19   Temp: 36.1 °C (97 °F)   SpO2: 97%       Review of Systems:   Review of Systems:    Positive per HPI, otherwise negative.    Physical Exam  Gen: appears well in clinic, NAD  HEENT: atraumatic head, normocephalic, EOMI, conjunctiva normal  LUNG: no increased WOB, CTAB  CV: No JVD. RRR  GI: soft, NT, ND  LE: no LE edema  Skin: no obvious rashes or lesions on visible skin  Neuro: interactive, no focal deficits noted  Psych: normal mood and affect  Performance Status:  Symptomatic; fully ambulatory    Labs/Imaging/Pathology: personally reviewed reports and images in Epic electronic medical record system. Pertinent results as it related to the plan represented in below in assessment and plan.   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  There are some features of goblet cells which favor appendiceal primary however pancreaticobiliary origin cannot be excluded.  MSI status intact  - [id number] also completed on blood from 9/30/2024 which was unremarkable  - Tempus on tissue in process  - Colonoscopy on 7/31/2024 was technically difficult due to scattered diverticulosis in the ascending colon and some hemorrhoids  - EGD 10/2/24 shows abnormal mucosa at the GE junction but no clear mass however there was diffuse nodular gastritis which was biopsied and pathology report states: neg for H Pylori.   - No variants detected in the DPYD gene   - Signatera in process  -  10/3/24: CA19.9 <4.00, CEA 1.4   10/3/24  Today we discussed diagnosis of likely advanced appendiceal carcinoma given there is no mass seen on recent imaging 8/26/2024 with no masses seen  -Goblet cells seen on pathology likely appendiceal origin  -Will plan for CA 19-9 and CEA as baseline  -Will await EGD pathology to confirm no evidence of malignancy in the esophagus  -Discussed neck step is systemic therapy with modified FOLFOX   - could consider HIPEC after 3-4 cycles based on response  - Dr Ontiveros aware of patient  -Refer port placement next week and cycle 1 day 1 on 10/9/2024  -Reviewed side effects of 5-FU, oxaliplatin and consent signed  -Prescription sent for nausea to be used as needed  -We will plan for day 3 Aloxi  -Given limited support at home will likely hold off on disconnect at home for now  10/23/2024:   - Mediport placed 10/10/24  - Cycle 1 mFOLFOX given 10/16/24: Fluorouracil 2,400 mg/m2, oxaliplatin 85 mg/m2 with pre meds zofran 16 mg and decadron 12 mg, nothing given on day 3   - Last visit with GYN, Dr. Greene, was on 10/7/24         10/29/24  - symptoms improved after supportive care and now close to her baseline  - states dysphagia improved after chemo which hopefully is a sign of tx response  -Lower extremity swelling we discussed is likely related to poor p.o. intake, albumin is low and we discussed importance of protein in addition to ambulating and compression  -Overall for nausea which is her largest complaint we discussed starting Zyprexa at bedtime, given she is not taking much of Reglan we will hold off for now  -She will continue Zofran every 8 hours as needed  -Labs reviewed from today no contraindications to proceed with treatment tomorrow  -Plan for supportive care again with Boni the following week     11/5/2024:  - Patient presents for toxicity check after cycle 2  - Reports that she is feeling well and tolerating treatment better   - She does not need IVF or medications  today but we will check cmp/mag and notify her if she needs to continue home K+, she prefers to change to smaller dosage   - Eating more solid foods and will continue to try to prevent further weight loss, declines to talk with dietician today   - She is taking Zyprexa nightly   - She is unsure which prn anti-nausea she is taking but will update me  - Taking imodium BID   - She will RTC on 11/12 for port flush/labs and a visit the same day with Dr. Smart   - She will then RTC the next day for treatment      11/12/24:  - Patient continues to tolerate treatment with no major toxicity.   - No dose adjustments at this time.  - Plan to follow-up with Boni in 2 weeks and with me in 4 weeks.  - On days she sees Boni she will have labs done the day before and in 4 weeks she will have labs done in house with me.  - Will plan to have labs done outside of port.   - RTC in 2 weeks with Boni.      12/9/24:   - Patient continues to tolerate treatment  - No dose adjustments at this time.   - Labs reviewed. No contraindications to proceed with cycle 5 on Wednesday  - Cycle 6 will be delayed to 12/31   - Plan scans the week after and follow-up with me January 14th 2025.   - RTC for treatment only in 3 weeks and with me in 5 weeks.     1/14/25:  - CT scan from yesterday shows good treatment response and overall patient states she feels better with systemic chemotherapy.  - We discussed no worsening toxicity and overall no significant GI symptoms or neuropathy.   - She does some cold sensitivity for approximately 5 days.  - Will plan to continue with current dose.  - No treatment changes.  - RTC in 2 weeks for treatment only and in 4 weeks with me.      2/11/25   - No new complaints   - Continues to have neuropathy for approx five days but manageable    - No GI issues or concerns   - Refill provided for pantoprazole, would try to decrease to 20 mg daily  - No other change in dose  - RTC in 2 weeks for treatment and in 4 weeks with  Boni, 6th week for treatment and 8 weeks with me and we will plan for a scan prior which can be ordered in future.  - She should have a CTAP chest before her visit with me in 8 weeks.    Reviewed ongoing medical problems and how they relate to her malignancy, will continue long term monitoring.      This note has been transcribed using a medical scribe and there is a possibility of unintentional typing misprints.     Monica was seen today for follow-up.  Diagnoses and all orders for this visit:  Malignant neoplasm of appendix (Multi) (Primary)  -     Clinic Appointment Request  -     Clinic Appointment Request; Future  -     Infusion Appointment Request; Future  -     Oncology Line Draw Appointment Request; Future  -     CBC and Auto Differential; Future  -     Comprehensive metabolic panel; Future  -     Infusion Appointment Request; Future  Carcinomatosis (Multi)  -     Clinic Appointment Request  -     Clinic Appointment Request; Future  -     Infusion Appointment Request; Future  -     Oncology Line Draw Appointment Request; Future  -     CBC and Auto Differential; Future  -     Comprehensive metabolic panel; Future  -     Infusion Appointment Request; Future  Gastroesophageal reflux disease with esophagitis without hemorrhage  -     pantoprazole (ProtoNix) 20 mg EC tablet; Take 1 tablet (20 mg) by mouth once daily. ( Please take in am)  Other orders  -     ondansetron (Zofran) tablet 16 mg  -     dexAMETHasone (Decadron) tablet 12 mg  -     prochlorperazine (Compazine) tablet 10 mg  -     prochlorperazine (Compazine) injection 10 mg  -     OXALIplatin (Eloxatin) 165 mg in dextrose 5% 533 mL IV  -     fluorouracil (Adrucil) IV syringe 775 mg  -     fluorouracil (Adrucil) 4,700 mg in sodium chloride 0.9% 138 mL IV via Home Infusion  -     LORazepam (Ativan) injection 1 mg  -     sodium chloride 0.9 % bolus 500 mL  -     dextrose 5 % in water (D5W) bolus 500 mL  -     diphenhydrAMINE (BENADryl) injection 50  mg  -     methylPREDNISolone sod succinate (SOLU-Medrol) 40 mg/mL injection 40 mg  -     famotidine PF (Pepcid) injection 20 mg  -     EPINEPHrine (Epipen) injection syringe 0.3 mg  -     albuterol 2.5 mg /3 mL (0.083 %) nebulizer solution 3 mL  -     palonosetron (Aloxi) injection 250 mcg  -     ondansetron (Zofran) tablet 16 mg  -     dexAMETHasone (Decadron) tablet 12 mg  -     prochlorperazine (Compazine) tablet 10 mg  -     prochlorperazine (Compazine) injection 10 mg  -     OXALIplatin (Eloxatin) 165 mg in dextrose 5% 533 mL IV  -     fluorouracil (Adrucil) IV syringe 775 mg  -     fluorouracil (Adrucil) 4,700 mg in sodium chloride 0.9% 138 mL IV via Home Infusion  -     LORazepam (Ativan) injection 1 mg  -     sodium chloride 0.9 % bolus 500 mL  -     dextrose 5 % in water (D5W) bolus 500 mL  -     diphenhydrAMINE (BENADryl) injection 50 mg  -     methylPREDNISolone sod succinate (SOLU-Medrol) 40 mg/mL injection 40 mg  -     famotidine PF (Pepcid) injection 20 mg  -     EPINEPHrine (Epipen) injection syringe 0.3 mg  -     albuterol 2.5 mg /3 mL (0.083 %) nebulizer solution 3 mL  -     palonosetron (Aloxi) injection 250 mcg           Margot Smart MD  Hematology/Oncology  [facility]    Radha Attestation  By signing my name below, Laura DE LA CRUZ Scribe   attest that this documentation has been prepared under the direction and in the presence of Margot Smart MD.         Time Spent  Prep time on day of patient encounter: 5 minutes  Time spent directly with patient, family or caregiver: 15 minutes  Additional Time Spent on Patient Care Activities: 5 minutes  Documentation Time: 5 minutes  Other Time Spent: 0 minutes  Total: 30 minutes

## 2025-02-12 RX ORDER — EMPAGLIFLOZIN 10 MG/1
10 TABLET, FILM COATED ORAL DAILY
Qty: 30 TABLET | Refills: 0 | Status: SHIPPED | OUTPATIENT
Start: 2025-02-12

## 2025-02-13 ENCOUNTER — INFUSION (OUTPATIENT)
Dept: HEMATOLOGY/ONCOLOGY | Facility: CLINIC | Age: 70
End: 2025-02-13
Payer: MEDICARE

## 2025-02-13 VITALS
HEART RATE: 86 BPM | TEMPERATURE: 97.5 F | SYSTOLIC BLOOD PRESSURE: 124 MMHG | WEIGHT: 197.97 LBS | RESPIRATION RATE: 16 BRPM | OXYGEN SATURATION: 95 % | DIASTOLIC BLOOD PRESSURE: 66 MMHG | BODY MASS INDEX: 32.79 KG/M2

## 2025-02-13 DIAGNOSIS — C18.1 MALIGNANT NEOPLASM OF APPENDIX (MULTI): ICD-10-CM

## 2025-02-13 DIAGNOSIS — C80.0 CARCINOMATOSIS (MULTI): ICD-10-CM

## 2025-02-13 PROCEDURE — 2500000004 HC RX 250 GENERAL PHARMACY W/ HCPCS (ALT 636 FOR OP/ED): Performed by: INTERNAL MEDICINE

## 2025-02-13 PROCEDURE — 96374 THER/PROPH/DIAG INJ IV PUSH: CPT | Mod: INF

## 2025-02-13 RX ORDER — PALONOSETRON 0.05 MG/ML
250 INJECTION, SOLUTION INTRAVENOUS ONCE
Status: COMPLETED | OUTPATIENT
Start: 2025-02-13 | End: 2025-02-13

## 2025-02-13 RX ORDER — HEPARIN 100 UNIT/ML
500 SYRINGE INTRAVENOUS AS NEEDED
OUTPATIENT
Start: 2025-02-13

## 2025-02-13 RX ORDER — HEPARIN SODIUM,PORCINE/PF 10 UNIT/ML
50 SYRINGE (ML) INTRAVENOUS AS NEEDED
OUTPATIENT
Start: 2025-02-13

## 2025-02-13 RX ADMIN — PALONOSETRON HYDROCHLORIDE 250 MCG: 0.25 INJECTION INTRAVENOUS at 12:34

## 2025-02-13 ASSESSMENT — PAIN SCALES - GENERAL: PAINLEVEL_OUTOF10: 0-NO PAIN

## 2025-02-14 DIAGNOSIS — E87.6 HYPOKALEMIA: ICD-10-CM

## 2025-02-14 DIAGNOSIS — C18.1 MALIGNANT NEOPLASM OF APPENDIX (MULTI): ICD-10-CM

## 2025-02-14 RX ORDER — POTASSIUM CHLORIDE 750 MG/1
10 TABLET, FILM COATED, EXTENDED RELEASE ORAL 2 TIMES DAILY
Qty: 60 TABLET | Refills: 3 | Status: SHIPPED | OUTPATIENT
Start: 2025-02-14 | End: 2025-06-14

## 2025-02-25 ENCOUNTER — NUTRITION (OUTPATIENT)
Dept: HEMATOLOGY/ONCOLOGY | Facility: CLINIC | Age: 70
End: 2025-02-25
Payer: MEDICARE

## 2025-02-25 ENCOUNTER — INFUSION (OUTPATIENT)
Dept: HEMATOLOGY/ONCOLOGY | Facility: CLINIC | Age: 70
End: 2025-02-25
Payer: MEDICARE

## 2025-02-25 VITALS
RESPIRATION RATE: 16 BRPM | WEIGHT: 197.53 LBS | TEMPERATURE: 97.9 F | SYSTOLIC BLOOD PRESSURE: 133 MMHG | DIASTOLIC BLOOD PRESSURE: 76 MMHG | OXYGEN SATURATION: 92 % | HEART RATE: 84 BPM | BODY MASS INDEX: 32.71 KG/M2

## 2025-02-25 VITALS — WEIGHT: 197.53 LBS | HEIGHT: 65 IN | BODY MASS INDEX: 32.91 KG/M2

## 2025-02-25 DIAGNOSIS — C80.0 CARCINOMATOSIS (MULTI): ICD-10-CM

## 2025-02-25 DIAGNOSIS — C18.1 MALIGNANT NEOPLASM OF APPENDIX (MULTI): ICD-10-CM

## 2025-02-25 LAB
ALBUMIN SERPL BCP-MCNC: 4.4 G/DL (ref 3.4–5)
ALP SERPL-CCNC: 117 U/L (ref 33–136)
ALT SERPL W P-5'-P-CCNC: 31 U/L (ref 7–45)
ANION GAP SERPL CALC-SCNC: 15 MMOL/L (ref 10–20)
AST SERPL W P-5'-P-CCNC: 32 U/L (ref 9–39)
BASOPHILS # BLD AUTO: 0.03 X10*3/UL (ref 0–0.1)
BASOPHILS NFR BLD AUTO: 0.7 %
BILIRUB SERPL-MCNC: 0.6 MG/DL (ref 0–1.2)
BUN SERPL-MCNC: 19 MG/DL (ref 6–23)
CALCIUM SERPL-MCNC: 9.7 MG/DL (ref 8.6–10.3)
CHLORIDE SERPL-SCNC: 107 MMOL/L (ref 98–107)
CO2 SERPL-SCNC: 22 MMOL/L (ref 21–32)
CREAT SERPL-MCNC: 0.67 MG/DL (ref 0.5–1.05)
EGFRCR SERPLBLD CKD-EPI 2021: >90 ML/MIN/1.73M*2
EOSINOPHIL # BLD AUTO: 0.09 X10*3/UL (ref 0–0.7)
EOSINOPHIL NFR BLD AUTO: 2.2 %
ERYTHROCYTE [DISTWIDTH] IN BLOOD BY AUTOMATED COUNT: 15.6 % (ref 11.5–14.5)
GLUCOSE SERPL-MCNC: 224 MG/DL (ref 74–99)
HCT VFR BLD AUTO: 41.1 % (ref 36–46)
HGB BLD-MCNC: 13.5 G/DL (ref 12–16)
IMM GRANULOCYTES # BLD AUTO: 0.01 X10*3/UL (ref 0–0.7)
IMM GRANULOCYTES NFR BLD AUTO: 0.2 % (ref 0–0.9)
LYMPHOCYTES # BLD AUTO: 1.23 X10*3/UL (ref 1.2–4.8)
LYMPHOCYTES NFR BLD AUTO: 30.2 %
MCH RBC QN AUTO: 30.9 PG (ref 26–34)
MCHC RBC AUTO-ENTMCNC: 32.8 G/DL (ref 32–36)
MCV RBC AUTO: 94 FL (ref 80–100)
MONOCYTES # BLD AUTO: 0.63 X10*3/UL (ref 0.1–1)
MONOCYTES NFR BLD AUTO: 15.5 %
NEUTROPHILS # BLD AUTO: 2.08 X10*3/UL (ref 1.2–7.7)
NEUTROPHILS NFR BLD AUTO: 51.2 %
PLATELET # BLD AUTO: 80 X10*3/UL (ref 150–450)
POTASSIUM SERPL-SCNC: 3.8 MMOL/L (ref 3.5–5.3)
PROT SERPL-MCNC: 7.2 G/DL (ref 6.4–8.2)
RBC # BLD AUTO: 4.37 X10*6/UL (ref 4–5.2)
SODIUM SERPL-SCNC: 140 MMOL/L (ref 136–145)
WBC # BLD AUTO: 4.1 X10*3/UL (ref 4.4–11.3)

## 2025-02-25 PROCEDURE — 85025 COMPLETE CBC W/AUTO DIFF WBC: CPT

## 2025-02-25 PROCEDURE — 2500000004 HC RX 250 GENERAL PHARMACY W/ HCPCS (ALT 636 FOR OP/ED): Performed by: INTERNAL MEDICINE

## 2025-02-25 PROCEDURE — 96415 CHEMO IV INFUSION ADDL HR: CPT

## 2025-02-25 PROCEDURE — 96376 TX/PRO/DX INJ SAME DRUG ADON: CPT

## 2025-02-25 PROCEDURE — 96411 CHEMO IV PUSH ADDL DRUG: CPT

## 2025-02-25 PROCEDURE — 96413 CHEMO IV INFUSION 1 HR: CPT

## 2025-02-25 PROCEDURE — 80053 COMPREHEN METABOLIC PANEL: CPT

## 2025-02-25 RX ORDER — HEPARIN 100 UNIT/ML
500 SYRINGE INTRAVENOUS AS NEEDED
Status: CANCELLED | OUTPATIENT
Start: 2025-02-25

## 2025-02-25 RX ORDER — LORAZEPAM 2 MG/ML
1 INJECTION INTRAMUSCULAR AS NEEDED
Status: DISCONTINUED | OUTPATIENT
Start: 2025-02-25 | End: 2025-02-25 | Stop reason: HOSPADM

## 2025-02-25 RX ORDER — FAMOTIDINE 10 MG/ML
20 INJECTION, SOLUTION INTRAVENOUS ONCE AS NEEDED
Status: DISCONTINUED | OUTPATIENT
Start: 2025-02-25 | End: 2025-02-25 | Stop reason: HOSPADM

## 2025-02-25 RX ORDER — HEPARIN 100 UNIT/ML
500 SYRINGE INTRAVENOUS AS NEEDED
Status: DISCONTINUED | OUTPATIENT
Start: 2025-02-25 | End: 2025-02-25 | Stop reason: HOSPADM

## 2025-02-25 RX ORDER — FLUOROURACIL 50 MG/ML
400 INJECTION, SOLUTION INTRAVENOUS ONCE
Status: COMPLETED | OUTPATIENT
Start: 2025-02-25 | End: 2025-02-25

## 2025-02-25 RX ORDER — DIPHENHYDRAMINE HYDROCHLORIDE 50 MG/ML
50 INJECTION INTRAMUSCULAR; INTRAVENOUS AS NEEDED
Status: DISCONTINUED | OUTPATIENT
Start: 2025-02-25 | End: 2025-02-25 | Stop reason: HOSPADM

## 2025-02-25 RX ORDER — DEXAMETHASONE 6 MG/1
12 TABLET ORAL ONCE
Status: COMPLETED | OUTPATIENT
Start: 2025-02-25 | End: 2025-02-25

## 2025-02-25 RX ORDER — EPINEPHRINE 0.3 MG/.3ML
0.3 INJECTION SUBCUTANEOUS EVERY 5 MIN PRN
Status: DISCONTINUED | OUTPATIENT
Start: 2025-02-25 | End: 2025-02-25 | Stop reason: HOSPADM

## 2025-02-25 RX ORDER — HEPARIN SODIUM,PORCINE/PF 10 UNIT/ML
50 SYRINGE (ML) INTRAVENOUS AS NEEDED
Status: DISCONTINUED | OUTPATIENT
Start: 2025-02-25 | End: 2025-02-25 | Stop reason: HOSPADM

## 2025-02-25 RX ORDER — ONDANSETRON HYDROCHLORIDE 8 MG/1
16 TABLET, FILM COATED ORAL ONCE
Status: COMPLETED | OUTPATIENT
Start: 2025-02-25 | End: 2025-02-25

## 2025-02-25 RX ORDER — PROCHLORPERAZINE MALEATE 10 MG
10 TABLET ORAL EVERY 6 HOURS PRN
Status: DISCONTINUED | OUTPATIENT
Start: 2025-02-25 | End: 2025-02-25 | Stop reason: HOSPADM

## 2025-02-25 RX ORDER — HEPARIN SODIUM,PORCINE/PF 10 UNIT/ML
50 SYRINGE (ML) INTRAVENOUS AS NEEDED
Status: CANCELLED | OUTPATIENT
Start: 2025-02-25

## 2025-02-25 RX ORDER — ALBUTEROL SULFATE 0.83 MG/ML
3 SOLUTION RESPIRATORY (INHALATION) AS NEEDED
Status: DISCONTINUED | OUTPATIENT
Start: 2025-02-25 | End: 2025-02-25 | Stop reason: HOSPADM

## 2025-02-25 RX ORDER — PROCHLORPERAZINE EDISYLATE 5 MG/ML
10 INJECTION INTRAMUSCULAR; INTRAVENOUS EVERY 6 HOURS PRN
Status: DISCONTINUED | OUTPATIENT
Start: 2025-02-25 | End: 2025-02-25 | Stop reason: HOSPADM

## 2025-02-25 RX ADMIN — ONDANSETRON HYDROCHLORIDE 16 MG: 8 TABLET, FILM COATED ORAL at 09:48

## 2025-02-25 RX ADMIN — FLUOROURACIL 4700 MG: 50 INJECTION, SOLUTION INTRAVENOUS at 13:55

## 2025-02-25 RX ADMIN — OXALIPLATIN 165 MG: 5 INJECTION, SOLUTION INTRAVENOUS at 10:19

## 2025-02-25 RX ADMIN — FLUOROURACIL 775 MG: 50 INJECTION, SOLUTION INTRAVENOUS at 13:03

## 2025-02-25 RX ADMIN — DEXAMETHASONE 12 MG: 6 TABLET ORAL at 09:48

## 2025-02-25 ASSESSMENT — PAIN SCALES - GENERAL: PAINLEVEL_OUTOF10: 0-NO PAIN

## 2025-02-25 NOTE — PROGRESS NOTES
Patient ID: Monica Musa is a 69 y.o. female.  Referring Physician: No referring provider defined for this encounter.  Primary Care Provider: Guerrero Tavarez DO    Subjective    HPIObjective    BSA: 2.03 meters squared  /74 (BP Location: Right arm, Patient Position: Sitting, BP Cuff Size: Adult)   Pulse 82   Temp 36.5 °C (97.7 °F) (Temporal)   Resp 16   Wt 89.4 kg (197 lb 1.5 oz)   SpO2 99%   BMI 32.64 kg/m²      Interval:  Patient is a 69 year old female with with stage 1B grade 1 endometrial cancer s/p TLH, BSO, SLND on 7/8/22. Diagnosed with signet ring adenocarcinoma, following [facility], now receiving FOLFOX.  Patient is s/p 10 cycles FOLFOX, has 2 remaining.  Last CT scan showed disease response.  She has lost 80 pounds.  Patient is feeling better, no longer having pain with eating.  Patient having normal bowel movements.  She states she has decreased energy after treatments but it is improving overall.  Patient denies any nausea or vomiting.  No longer having abdominal bloating, nausea or vomiting.  Has cold intolerance after treatments but it resolves.   Mammogram is up to date.  Patient is not currently sexually active. Denies any vaginal bleeding or discharge.        Physical Exam:    Constitutional: Doing well. HÉCTOR  Eyes: PERRL  ENMT: Moist mucus membranes  Head/Neck: Supple. Symmetrical  Cardiovascular: Regular, rate and rhythm. 2+ equal pulses of the extremities  Respiratory/Thorax: CTA. RRR. Chest rise symmetrical.  Gastrointestinal: Non-distended, soft, non-tender  Genitourinary:   Normal external female genitalia. No vulvar lesions noted  Speculum exam: Smooth vaginal walls without lesions or masses. Vaginal cuff visualized without lesions. Surgically absent cervix.  Bimanual exam: Smooth vaginal wall without lesions or masses.  Surgically absent uterus, cervix, and adnexa.  Rectovaginal exam: smooth rectovaginal septum without lesions or masses  Musculoskeletal: ROM intact, no joint  swelling, normal strength  Extremities: No edema  Neurological: Alert and oriented x 3. Pleasant and cooperative.  Lymphatic: No lymphadenopathy. No lymphedema  Psychological: Appropriate mood and behavior  Skin: Warm and dry, no lesions, no rashes    A complete review of systems was performed and all systems were normal except what is noted in the interval history.      Performance Status:  Symptomatic; in bed <50% of the day    Assessment/Plan   Patient is a 69 year old female with with stage 1B grade 1 endometrial cancer s/p TLH, BSO, SLND on 7/8/22. Diagnosed with signet ring adenocarcinoma, following [facility], now receiving FOLFOX.  HÉCTOR 2.5 years from endometrial cancer.  Signet ring adenocarcinoma treatment going well.      Plan:   [facility] managing signet ring adenocarcinoma  F/U in 3-4 months for surveillance or as needed (Going on cruise June, schedule before or after)  Physical examination was within normal limits today.  She is currently HÉCTOR.  We reviewed signs and symptoms of possible recurrence with the patient and she will call our office should she experience any of these.       Oncology History Overview Note   - 7/8/2022 TLH, BSO, SLND combined case with Dr. Lal. 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 There was no lymphovascular invasion. There was a focus of MELF pattern invasion. Tumor board recommendations: Surveillance. MMR testing on prior endometrial biopsy specimen was normal.   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  - CT 8/26/24: Small volume ascites. Left pelvic nodule with some nodularity  along the left paracolic gutter worrisome for peritoneal carcinomatosis.  Admitted with bowel obstruction.  No lesion large enough for biopsy  - 9/2024 diagnostic laparoscopy - biopsy c/w non gyn primary - referred to med onc     Endometrial carcinoma (Multi)   5/22/2023 Initial Diagnosis    Endometrial carcinoma (Multi)      Malignant neoplasm of appendix (Multi)   10/3/2024 Initial Diagnosis    Malignant neoplasm of appendix (Multi)     10/16/2024 -  Chemotherapy    mFOLFOX6 (Fluorouracil Continuous Infusion / Leucovorin / Oxaliplatin), 14 Day Cycles     Carcinomatosis (Multi)   10/3/2024 Initial Diagnosis    Carcinomatosis (Multi)     10/16/2024 -  Chemotherapy    mFOLFOX6 (Fluorouracil Continuous Infusion / Leucovorin / Oxaliplatin), 14 Day Cycles          Problem List Items Addressed This Visit    None      Treatment Plans       Name Type Plan Dates Plan Provider         Active    mFOLFOX6 (Fluorouracil Continuous Infusion / Leucovorin / Oxaliplatin), 14 Day Cycles Oncology Treatment  10/3/2024 - Present Margot Smart MD

## 2025-02-25 NOTE — PROGRESS NOTES
NUTRITION Assessment NOTE    Nutrition Assessment     Reason for Visit:  Monica Musa is a 69 y.o. female with appendiceal cancer and carcinomatosis, currently receiving mFOLFOX6. This service is meeting with the patient to discuss tolerance to treatment and adequacy of PO intake.     Attempted to meet with patient, but unable to do so d/t power outage. Will attempt to meet during next infusion.     This service will continue to follow.     Patient Active Problem List   Diagnosis    Anemia    Endometrial carcinoma (Multi)    Positive colorectal cancer screening using Cologuard test    Hyperlipidemia    Hypertension    Dyslipidemia    Allergic rhinitis    Type 2 diabetes mellitus without complication (Multi)    BMI 35.0-35.9,adult    Medicare annual wellness visit, subsequent    Class 2 severe obesity due to excess calories with serious comorbidity and body mass index (BMI) of 35.0 to 35.9 in adult    Carpal tunnel syndrome, right upper limb    Secondary malignant neoplasm of genital organs (Multi)    Partial bowel obstruction (Multi)    Malignant neoplasm of appendix (Multi)    Carcinomatosis (Multi)       Nutrition Significant Labs:  Lab Results   Component Value Date/Time    GLUCOSE 224 (H) 02/25/2025 0852     02/25/2025 0852    K 3.8 02/25/2025 0852     02/25/2025 0852    CO2 22 02/25/2025 0852    ANIONGAP 15 02/25/2025 0852    BUN 19 02/25/2025 0852    CREATININE 0.67 02/25/2025 0852    EGFR >90 02/25/2025 0852    CALCIUM 9.7 02/25/2025 0852    ALBUMIN 4.4 02/25/2025 0852    ALKPHOS 117 02/25/2025 0852    PROT 7.2 02/25/2025 0852    AST 32 02/25/2025 0852    BILITOT 0.6 02/25/2025 0852    ALT 31 02/25/2025 0852    MG 1.80 11/05/2024 1443       CMP Trend:    Recent Labs     02/25/25  0852 02/11/25  0931 01/28/25  0926   GLUCOSE 224* 205* 177*    138 140   K 3.8 3.6 4.2    104 105   CO2 22 25 25   ANIONGAP 15 13 14   BUN 19 18 17   CREATININE 0.67 0.69 0.67   EGFR >90 >90 >90  Problem: Falls - Risk of:  Goal: Will remain free from falls  Description: Will remain free from falls  7/9/2021 1550 by Padmini Tello RN  Outcome: Ongoing  Note: Patient will remain free from falls throughout shift. Ambulating x1 SBA with gait belt and walker. Denies any dizziness or lightheadedness. Fall precautions in place. Bed locked in lowest position with alarm on and 2/4 side rails up. Bedside table, belongings, and call light within reach. Patient calling out appropriately when needing assistance. Hourly rounding in anticipation of patient needs. Floor clean and free from clutter. Room door open. Will continue to monitor. Problem: Pain:  Goal: Control of acute pain  Description: Control of acute pain  Outcome: Ongoing  Note: Patient denies having any pain at this time. Using numerical pain rating scale appropriately. Educated on available pain medications, side effects, and verbalized understanding. Able to reposition self in bed for comfort without assistance. Notified to let RN know if pain manifests and medication is wanted. "  CALCIUM 9.7 9.6 9.6   ALBUMIN 4.4 4.2 4.3   ALKPHOS 117 115 116   PROT 7.2 6.8 6.9   AST 32 30 29   BILITOT 0.6 0.4 0.4   ALT 31 27 26     Recent Labs     06/14/24  0826 12/20/23  0827 06/09/22  0904   HGBA1C 7.4* 7.4* 7.7*       Anthropometrics:  Height: 165.5 cm (5' 5.16\")   Weight: 89.6 kg (197 lb 8.5 oz)   BMI (Calculated): 32.71    IBW/kg (Dietitian Calculated): 56.81 kg Percent of IBW: 157.72 %     Adjusted Body Weight (kg): 65.01 kg    Weight History:   Daily Weight  02/25/25 : 89.6 kg (197 lb 8.5 oz)  02/25/25 : 89.6 kg (197 lb 8.5 oz)  02/13/25 : 89.8 kg (197 lb 15.6 oz)  02/11/25 : 90.2 kg (198 lb 13.7 oz)  01/30/25 : 88.3 kg (194 lb 10.7 oz)  01/28/25 : 88.4 kg (194 lb 14.2 oz)  01/16/25 : 87.3 kg (192 lb 7.4 oz)  01/14/25 : 87.1 kg (192 lb 2.1 oz)  01/02/25 : 87.6 kg (193 lb 2 oz)  12/31/24 : 87 kg (191 lb 12.8 oz)    Weight Change %:       Nutrition History:  Food & Nutrition History:    Food Allergies:    Food Intolerances:    Vitamin/mineral intake:       Herbal supplements:    Medication and Complementary/Alternative Medicine Use:    Dentition:    Sleep Habits:      Diet Recall:  Meal 1:    Snack 1:    Meal 2:    Snack 2:    Meal 3:    Snack 3:    Food Variety:    Oral Nutrition Supplement Use:          Fluid Intake:    Energy Intake:      Food Preparation:  Cooking:    Grocery Shopping:    Dining Out:      Medications:  Current Outpatient Medications   Medication Instructions    aspirin 81 mg, Daily    docosahexaenoic acid/epa (FISH OIL ORAL) 1 tablet, Daily    Jardiance 10 mg, oral, Daily    metFORMIN XR (GLUCOPHAGE-XR) 500 mg, oral, 2 times daily    multivitamin tablet 1 tablet, Daily    OLANZapine (ZYPREXA) 5 mg, oral, Nightly    omeprazole OTC (PRILOSEC OTC) 20 mg, Daily PRN    ondansetron (ZOFRAN) 8 mg, oral, Every 8 hours PRN    pantoprazole (PROTONIX) 20 mg, oral, Daily, ( Please take in am)    potassium chloride CR (Klor-Con) 10 mEq ER tablet 10 mEq, oral, 2 times daily, Do not crush, " chew, or split. (Do not take other potassium prescription)    traMADol (ULTRAM) 50 mg, oral, Every 6 hours PRN       Nutrition Focused Physical Exam Findings:    Subcutaneous Fat Loss:        Muscle Wasting:       Physical Findings:          Estimated Needs:       Total Energy Estimated Needs in 24 hours (kCal): 1950.3 kCal  Method for Estimating Needs: 30 kcal/kg ABW  Total Protein Estimated Needs in 24 Hours (g): 78.01 g  Method for Estimating 24 Hour Protein Needs: 1.2g/kg ABW  Total Fluid Estimated Needs in 24 Hours (mL): 1950 mL  Method for Estimating 24 Hour Fluid Needs: 1ml/kcal             Nutrition Diagnosis                Nutrition Interventions/Recommendations   Nutrition Prescription:        Nutrition Interventions:   Food and Nutrient Delivery:       Coordination of Care:       Nutrition Education:   Nutrition Education Content:                      Nutrition Monitoring and Evaluation                            Follow Up:

## 2025-02-26 ENCOUNTER — OFFICE VISIT (OUTPATIENT)
Dept: GYNECOLOGIC ONCOLOGY | Facility: CLINIC | Age: 70
End: 2025-02-26
Payer: MEDICARE

## 2025-02-26 VITALS
HEART RATE: 82 BPM | TEMPERATURE: 97.7 F | WEIGHT: 197.09 LBS | BODY MASS INDEX: 32.64 KG/M2 | RESPIRATION RATE: 16 BRPM | SYSTOLIC BLOOD PRESSURE: 133 MMHG | OXYGEN SATURATION: 99 % | DIASTOLIC BLOOD PRESSURE: 74 MMHG

## 2025-02-26 DIAGNOSIS — C54.1 ENDOMETRIAL CANCER (MULTI): Primary | ICD-10-CM

## 2025-02-26 PROCEDURE — 99213 OFFICE O/P EST LOW 20 MIN: CPT | Performed by: NURSE PRACTITIONER

## 2025-02-26 PROCEDURE — 1157F ADVNC CARE PLAN IN RCRD: CPT | Performed by: NURSE PRACTITIONER

## 2025-02-26 PROCEDURE — 1126F AMNT PAIN NOTED NONE PRSNT: CPT | Performed by: NURSE PRACTITIONER

## 2025-02-26 PROCEDURE — 1036F TOBACCO NON-USER: CPT | Performed by: NURSE PRACTITIONER

## 2025-02-26 PROCEDURE — 3075F SYST BP GE 130 - 139MM HG: CPT | Performed by: NURSE PRACTITIONER

## 2025-02-26 PROCEDURE — 1123F ACP DISCUSS/DSCN MKR DOCD: CPT | Performed by: NURSE PRACTITIONER

## 2025-02-26 PROCEDURE — 1159F MED LIST DOCD IN RCRD: CPT | Performed by: NURSE PRACTITIONER

## 2025-02-26 PROCEDURE — 3078F DIAST BP <80 MM HG: CPT | Performed by: NURSE PRACTITIONER

## 2025-02-26 ASSESSMENT — PAIN SCALES - GENERAL: PAINLEVEL_OUTOF10: 0-NO PAIN

## 2025-02-27 ENCOUNTER — INFUSION (OUTPATIENT)
Dept: HEMATOLOGY/ONCOLOGY | Facility: CLINIC | Age: 70
End: 2025-02-27
Payer: MEDICARE

## 2025-02-27 VITALS
WEIGHT: 195.55 LBS | OXYGEN SATURATION: 94 % | TEMPERATURE: 97.9 F | DIASTOLIC BLOOD PRESSURE: 82 MMHG | RESPIRATION RATE: 16 BRPM | BODY MASS INDEX: 32.38 KG/M2 | HEART RATE: 88 BPM | SYSTOLIC BLOOD PRESSURE: 141 MMHG

## 2025-02-27 DIAGNOSIS — C80.0 CARCINOMATOSIS (MULTI): ICD-10-CM

## 2025-02-27 DIAGNOSIS — C18.1 MALIGNANT NEOPLASM OF APPENDIX (MULTI): ICD-10-CM

## 2025-02-27 PROCEDURE — 96374 THER/PROPH/DIAG INJ IV PUSH: CPT | Mod: INF

## 2025-02-27 PROCEDURE — 2500000004 HC RX 250 GENERAL PHARMACY W/ HCPCS (ALT 636 FOR OP/ED): Performed by: INTERNAL MEDICINE

## 2025-02-27 RX ORDER — HEPARIN SODIUM,PORCINE/PF 10 UNIT/ML
50 SYRINGE (ML) INTRAVENOUS AS NEEDED
OUTPATIENT
Start: 2025-02-27

## 2025-02-27 RX ORDER — HEPARIN 100 UNIT/ML
500 SYRINGE INTRAVENOUS AS NEEDED
OUTPATIENT
Start: 2025-02-27

## 2025-02-27 RX ORDER — PALONOSETRON 0.05 MG/ML
250 INJECTION, SOLUTION INTRAVENOUS ONCE
Status: COMPLETED | OUTPATIENT
Start: 2025-02-27 | End: 2025-02-27

## 2025-02-27 RX ORDER — HEPARIN SODIUM,PORCINE/PF 10 UNIT/ML
50 SYRINGE (ML) INTRAVENOUS AS NEEDED
Status: DISCONTINUED | OUTPATIENT
Start: 2025-02-27 | End: 2025-02-27 | Stop reason: HOSPADM

## 2025-02-27 RX ORDER — HEPARIN 100 UNIT/ML
500 SYRINGE INTRAVENOUS AS NEEDED
Status: DISCONTINUED | OUTPATIENT
Start: 2025-02-27 | End: 2025-02-27 | Stop reason: HOSPADM

## 2025-02-27 RX ADMIN — PALONOSETRON 250 MCG: 0.05 INJECTION, SOLUTION INTRAVENOUS at 12:14

## 2025-02-27 ASSESSMENT — PAIN SCALES - GENERAL: PAINLEVEL_OUTOF10: 0-NO PAIN

## 2025-03-02 DIAGNOSIS — E11.9 TYPE 2 DIABETES MELLITUS WITHOUT COMPLICATION, WITHOUT LONG-TERM CURRENT USE OF INSULIN (MULTI): ICD-10-CM

## 2025-03-03 RX ORDER — METFORMIN HYDROCHLORIDE 500 MG/1
500 TABLET, EXTENDED RELEASE ORAL 2 TIMES DAILY
Qty: 180 TABLET | Refills: 0 | Status: SHIPPED | OUTPATIENT
Start: 2025-03-03

## 2025-03-11 ENCOUNTER — INFUSION (OUTPATIENT)
Dept: HEMATOLOGY/ONCOLOGY | Facility: CLINIC | Age: 70
End: 2025-03-11
Payer: MEDICARE

## 2025-03-11 ENCOUNTER — APPOINTMENT (OUTPATIENT)
Dept: HEMATOLOGY/ONCOLOGY | Facility: CLINIC | Age: 70
End: 2025-03-11
Payer: MEDICARE

## 2025-03-11 VITALS
DIASTOLIC BLOOD PRESSURE: 71 MMHG | BODY MASS INDEX: 32.93 KG/M2 | TEMPERATURE: 97.7 F | WEIGHT: 198.85 LBS | OXYGEN SATURATION: 95 % | SYSTOLIC BLOOD PRESSURE: 152 MMHG | HEART RATE: 93 BPM | RESPIRATION RATE: 16 BRPM

## 2025-03-11 DIAGNOSIS — C80.0 CARCINOMATOSIS (MULTI): ICD-10-CM

## 2025-03-11 DIAGNOSIS — C18.1 MALIGNANT NEOPLASM OF APPENDIX (MULTI): Primary | ICD-10-CM

## 2025-03-11 DIAGNOSIS — C18.1 MALIGNANT NEOPLASM OF APPENDIX (MULTI): ICD-10-CM

## 2025-03-11 LAB
ALBUMIN SERPL BCP-MCNC: 4.3 G/DL (ref 3.4–5)
ALP SERPL-CCNC: 114 U/L (ref 33–136)
ALT SERPL W P-5'-P-CCNC: 32 U/L (ref 7–45)
ANION GAP SERPL CALC-SCNC: 16 MMOL/L (ref 10–20)
AST SERPL W P-5'-P-CCNC: 31 U/L (ref 9–39)
BASOPHILS # BLD AUTO: 0.02 X10*3/UL (ref 0–0.1)
BASOPHILS NFR BLD AUTO: 0.6 %
BILIRUB SERPL-MCNC: 0.5 MG/DL (ref 0–1.2)
BUN SERPL-MCNC: 16 MG/DL (ref 6–23)
CALCIUM SERPL-MCNC: 10.1 MG/DL (ref 8.6–10.3)
CHLORIDE SERPL-SCNC: 105 MMOL/L (ref 98–107)
CO2 SERPL-SCNC: 24 MMOL/L (ref 21–32)
CREAT SERPL-MCNC: 0.65 MG/DL (ref 0.5–1.05)
EGFRCR SERPLBLD CKD-EPI 2021: >90 ML/MIN/1.73M*2
EOSINOPHIL # BLD AUTO: 0.06 X10*3/UL (ref 0–0.7)
EOSINOPHIL NFR BLD AUTO: 1.7 %
ERYTHROCYTE [DISTWIDTH] IN BLOOD BY AUTOMATED COUNT: 15.9 % (ref 11.5–14.5)
GLUCOSE SERPL-MCNC: 224 MG/DL (ref 74–99)
HCT VFR BLD AUTO: 40.9 % (ref 36–46)
HGB BLD-MCNC: 13.4 G/DL (ref 12–16)
IMM GRANULOCYTES # BLD AUTO: 0.01 X10*3/UL (ref 0–0.7)
IMM GRANULOCYTES NFR BLD AUTO: 0.3 % (ref 0–0.9)
LYMPHOCYTES # BLD AUTO: 1.12 X10*3/UL (ref 1.2–4.8)
LYMPHOCYTES NFR BLD AUTO: 31.6 %
MCH RBC QN AUTO: 31 PG (ref 26–34)
MCHC RBC AUTO-ENTMCNC: 32.8 G/DL (ref 32–36)
MCV RBC AUTO: 95 FL (ref 80–100)
MONOCYTES # BLD AUTO: 0.66 X10*3/UL (ref 0.1–1)
MONOCYTES NFR BLD AUTO: 18.6 %
NEUTROPHILS # BLD AUTO: 1.67 X10*3/UL (ref 1.2–7.7)
NEUTROPHILS NFR BLD AUTO: 47.2 %
PLATELET # BLD AUTO: 74 X10*3/UL (ref 150–450)
POTASSIUM SERPL-SCNC: 4 MMOL/L (ref 3.5–5.3)
PROT SERPL-MCNC: 7 G/DL (ref 6.4–8.2)
RBC # BLD AUTO: 4.32 X10*6/UL (ref 4–5.2)
SODIUM SERPL-SCNC: 141 MMOL/L (ref 136–145)
WBC # BLD AUTO: 3.5 X10*3/UL (ref 4.4–11.3)

## 2025-03-11 PROCEDURE — 96411 CHEMO IV PUSH ADDL DRUG: CPT

## 2025-03-11 PROCEDURE — 1123F ACP DISCUSS/DSCN MKR DOCD: CPT

## 2025-03-11 PROCEDURE — 2500000004 HC RX 250 GENERAL PHARMACY W/ HCPCS (ALT 636 FOR OP/ED)

## 2025-03-11 PROCEDURE — 85025 COMPLETE CBC W/AUTO DIFF WBC: CPT

## 2025-03-11 PROCEDURE — 1126F AMNT PAIN NOTED NONE PRSNT: CPT

## 2025-03-11 PROCEDURE — 1157F ADVNC CARE PLAN IN RCRD: CPT

## 2025-03-11 PROCEDURE — 99214 OFFICE O/P EST MOD 30 MIN: CPT

## 2025-03-11 PROCEDURE — 3078F DIAST BP <80 MM HG: CPT

## 2025-03-11 PROCEDURE — 1159F MED LIST DOCD IN RCRD: CPT

## 2025-03-11 PROCEDURE — 96415 CHEMO IV INFUSION ADDL HR: CPT

## 2025-03-11 PROCEDURE — 3077F SYST BP >= 140 MM HG: CPT

## 2025-03-11 PROCEDURE — 84075 ASSAY ALKALINE PHOSPHATASE: CPT

## 2025-03-11 PROCEDURE — 96413 CHEMO IV INFUSION 1 HR: CPT

## 2025-03-11 PROCEDURE — 96416 CHEMO PROLONG INFUSE W/PUMP: CPT

## 2025-03-11 RX ORDER — ALBUTEROL SULFATE 0.83 MG/ML
3 SOLUTION RESPIRATORY (INHALATION) AS NEEDED
Status: CANCELLED | OUTPATIENT
Start: 2025-03-11

## 2025-03-11 RX ORDER — DEXAMETHASONE 6 MG/1
12 TABLET ORAL ONCE
Status: COMPLETED | OUTPATIENT
Start: 2025-03-11 | End: 2025-03-11

## 2025-03-11 RX ORDER — PROCHLORPERAZINE EDISYLATE 5 MG/ML
10 INJECTION INTRAMUSCULAR; INTRAVENOUS EVERY 6 HOURS PRN
Status: CANCELLED | OUTPATIENT
Start: 2025-03-11

## 2025-03-11 RX ORDER — FLUOROURACIL 50 MG/ML
400 INJECTION, SOLUTION INTRAVENOUS ONCE
Status: COMPLETED | OUTPATIENT
Start: 2025-03-11 | End: 2025-03-11

## 2025-03-11 RX ORDER — ONDANSETRON HYDROCHLORIDE 8 MG/1
16 TABLET, FILM COATED ORAL ONCE
Status: CANCELLED | OUTPATIENT
Start: 2025-03-11 | End: 2025-03-11

## 2025-03-11 RX ORDER — PROCHLORPERAZINE MALEATE 10 MG
10 TABLET ORAL EVERY 6 HOURS PRN
Status: DISCONTINUED | OUTPATIENT
Start: 2025-03-11 | End: 2025-03-11 | Stop reason: HOSPADM

## 2025-03-11 RX ORDER — PROCHLORPERAZINE MALEATE 10 MG
10 TABLET ORAL EVERY 6 HOURS PRN
Status: CANCELLED | OUTPATIENT
Start: 2025-03-11

## 2025-03-11 RX ORDER — HEPARIN SODIUM,PORCINE/PF 10 UNIT/ML
50 SYRINGE (ML) INTRAVENOUS AS NEEDED
Status: CANCELLED | OUTPATIENT
Start: 2025-03-11

## 2025-03-11 RX ORDER — LORAZEPAM 2 MG/ML
1 INJECTION INTRAMUSCULAR AS NEEDED
Status: DISCONTINUED | OUTPATIENT
Start: 2025-03-11 | End: 2025-03-11 | Stop reason: HOSPADM

## 2025-03-11 RX ORDER — DIPHENHYDRAMINE HYDROCHLORIDE 50 MG/ML
50 INJECTION INTRAMUSCULAR; INTRAVENOUS AS NEEDED
Status: CANCELLED | OUTPATIENT
Start: 2025-03-11

## 2025-03-11 RX ORDER — FAMOTIDINE 10 MG/ML
20 INJECTION, SOLUTION INTRAVENOUS ONCE AS NEEDED
Status: DISCONTINUED | OUTPATIENT
Start: 2025-03-11 | End: 2025-03-11 | Stop reason: HOSPADM

## 2025-03-11 RX ORDER — EPINEPHRINE 0.3 MG/.3ML
0.3 INJECTION SUBCUTANEOUS EVERY 5 MIN PRN
Status: CANCELLED | OUTPATIENT
Start: 2025-03-11

## 2025-03-11 RX ORDER — FAMOTIDINE 10 MG/ML
20 INJECTION, SOLUTION INTRAVENOUS ONCE AS NEEDED
Status: CANCELLED | OUTPATIENT
Start: 2025-03-11

## 2025-03-11 RX ORDER — DIPHENHYDRAMINE HYDROCHLORIDE 50 MG/ML
50 INJECTION INTRAMUSCULAR; INTRAVENOUS AS NEEDED
Status: DISCONTINUED | OUTPATIENT
Start: 2025-03-11 | End: 2025-03-11 | Stop reason: HOSPADM

## 2025-03-11 RX ORDER — PROCHLORPERAZINE EDISYLATE 5 MG/ML
10 INJECTION INTRAMUSCULAR; INTRAVENOUS EVERY 6 HOURS PRN
Status: DISCONTINUED | OUTPATIENT
Start: 2025-03-11 | End: 2025-03-11 | Stop reason: HOSPADM

## 2025-03-11 RX ORDER — ALBUTEROL SULFATE 0.83 MG/ML
3 SOLUTION RESPIRATORY (INHALATION) AS NEEDED
Status: DISCONTINUED | OUTPATIENT
Start: 2025-03-11 | End: 2025-03-11 | Stop reason: HOSPADM

## 2025-03-11 RX ORDER — ONDANSETRON HYDROCHLORIDE 8 MG/1
16 TABLET, FILM COATED ORAL ONCE
Status: COMPLETED | OUTPATIENT
Start: 2025-03-11 | End: 2025-03-11

## 2025-03-11 RX ORDER — LORAZEPAM 2 MG/ML
1 INJECTION INTRAMUSCULAR AS NEEDED
Status: CANCELLED | OUTPATIENT
Start: 2025-03-11

## 2025-03-11 RX ORDER — FLUOROURACIL 50 MG/ML
400 INJECTION, SOLUTION INTRAVENOUS ONCE
Status: CANCELLED | OUTPATIENT
Start: 2025-03-11

## 2025-03-11 RX ORDER — PALONOSETRON 0.05 MG/ML
250 INJECTION, SOLUTION INTRAVENOUS ONCE
Status: CANCELLED | OUTPATIENT
Start: 2025-03-13 | End: 2025-03-13

## 2025-03-11 RX ORDER — EPINEPHRINE 0.3 MG/.3ML
0.3 INJECTION SUBCUTANEOUS EVERY 5 MIN PRN
Status: DISCONTINUED | OUTPATIENT
Start: 2025-03-11 | End: 2025-03-11 | Stop reason: HOSPADM

## 2025-03-11 RX ORDER — DEXAMETHASONE 6 MG/1
12 TABLET ORAL ONCE
Status: CANCELLED | OUTPATIENT
Start: 2025-03-11 | End: 2025-03-11

## 2025-03-11 RX ORDER — HEPARIN 100 UNIT/ML
500 SYRINGE INTRAVENOUS AS NEEDED
Status: CANCELLED | OUTPATIENT
Start: 2025-03-11

## 2025-03-11 RX ADMIN — OXALIPLATIN 125 MG: 5 INJECTION, SOLUTION INTRAVENOUS at 10:41

## 2025-03-11 RX ADMIN — DEXAMETHASONE 12 MG: 6 TABLET ORAL at 10:05

## 2025-03-11 RX ADMIN — FLUOROURACIL 775 MG: 50 INJECTION, SOLUTION INTRAVENOUS at 12:53

## 2025-03-11 RX ADMIN — FLUOROURACIL 4700 MG: 50 INJECTION, SOLUTION INTRAVENOUS at 13:03

## 2025-03-11 RX ADMIN — ONDANSETRON HYDROCHLORIDE 16 MG: 8 TABLET, FILM COATED ORAL at 10:05

## 2025-03-11 ASSESSMENT — PAIN SCALES - GENERAL: PAINLEVEL_OUTOF10: 0-NO PAIN

## 2025-03-11 NOTE — PROGRESS NOTES
Patient ID: Monica Musa is a 69 y.o. female.  Oncology History Overview Note   - 7/8/2022 TLH, BSO, SLND combined case with Dr. Lal. 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 There was no lymphovascular invasion. There was a focus of MELF pattern invasion. Tumor board recommendations: Surveillance. MMR testing on prior endometrial biopsy specimen was normal.   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  - CT 8/26/24: Small volume ascites. Left pelvic nodule with some nodularity  along the left paracolic gutter worrisome for peritoneal carcinomatosis.  Admitted with bowel obstruction.  No lesion large enough for biopsy  - 9/2024 diagnostic laparoscopy - biopsy c/w non gyn primary - referred to med onc     Endometrial carcinoma (Multi)   5/22/2023 Initial Diagnosis    Endometrial carcinoma (Multi)     Malignant neoplasm of appendix (Multi)   10/3/2024 Initial Diagnosis    Malignant neoplasm of appendix (Multi)     10/16/2024 -  Chemotherapy    mFOLFOX6 (Fluorouracil Continuous Infusion / Leucovorin / Oxaliplatin), 14 Day Cycles     Carcinomatosis (Multi)   10/3/2024 Initial Diagnosis    Carcinomatosis (Multi)     10/16/2024 -  Chemotherapy    mFOLFOX6 (Fluorouracil Continuous Infusion / Leucovorin / Oxaliplatin), 14 Day Cycles       Subjective    HPI  Monica Musa is a 70 yo F with history of grade 1 endometroid adenocarcinoma MMI, no LVSI, pMMR s/p TLH, BSP, SLND 7/8/22. 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     Patient presents for cycle 11 mFOLFOX. She reports doing well overall with no concerns. Her cold sensitivity has started lasting 2 weeks as of a few cycles ago she reports. She is not having persistent neuropathy. She says she has had some taste changes but appetite and weight remain stable. She takes anti nausea medication at night. She has fatigue the day of treatment. Her bowels are  moving well.     Patient denies chest pain, palpitations, SOB, fevers, cough, nausea, vomiting, diarrhea, constipation, edema, falls, rashes, peripheral neuropathy, signs of bleeding other than bloody nasal mucous at times or other concerns.       Patient's past medical history, surgical history, family history and social history reviewed.    Objective    Visit Vitals  /71 (BP Location: Right arm, Patient Position: Sitting, BP Cuff Size: Adult)   Pulse 93   Temp 36.5 °C (97.7 °F) (Temporal)   Resp 16   Wt 90.2 kg (198 lb 13.7 oz)   SpO2 95%   BMI 32.93 kg/m²   OB Status Hysterectomy   Smoking Status Former   BSA 2.04 m²       Review of Systems:   Review of Systems:    Positive per HPI, otherwise negative.        Physical Exam  Gen: appears well in clinic, NAD  HEENT: atraumatic head, normocephalic, EOMI, conjunctiva normal  LUNG: no increased WOB, CTAB  CV: No JVD. RRR  GI: soft, NT, ND  LE: no LE edema  Skin: no obvious rashes or lesions on visible skin  Neuro: interactive, no focal deficits noted  Psych: normal mood and affect      Performance Status:  Symptomatic; fully ambulatory    Labs/Imaging/Pathology: personally reviewed reports and images in Epic electronic medical record system. Pertinent results as it related to the plan represented in below in assessment and plan.     Labs:  Lab Results   Component Value Date    WBC 3.5 (L) 03/11/2025    NEUTROABS 1.67 03/11/2025    IGABSOL 0.01 03/11/2025    LYMPHSABS 1.12 (L) 03/11/2025    MONOSABS 0.66 03/11/2025    EOSABS 0.06 03/11/2025    BASOSABS 0.02 03/11/2025    RBC 4.32 03/11/2025    MCV 95 03/11/2025    MCHC 32.8 03/11/2025    HGB 13.4 03/11/2025    HCT 40.9 03/11/2025    PLT 74 (L) 03/11/2025     Lab Results   Component Value Date    CREATININE 0.65 03/11/2025    BUN 16 03/11/2025    EGFR >90 03/11/2025     03/11/2025    K 4.0 03/11/2025     03/11/2025    CO2 24 03/11/2025      Lab Results   Component Value Date    ALT 32 03/11/2025    AST  31 03/11/2025    ALKPHOS 114 03/11/2025    BILITOT 0.5 03/11/2025          Assessment/Plan   Adenocarcinoma of GI origin, 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  There are some features of goblet cells which favor appendiceal primary however pancreaticobiliary origin cannot be excluded.  MSI status intact  - [id number] also completed on blood from 9/30/2024 which was unremarkable  - Tempus on tissue in process  - Colonoscopy on 7/31/2024 was technically difficult due to scattered diverticulosis in the ascending colon and some hemorrhoids  - EGD 10/2/24 shows abnormal mucosa at the GE junction but no clear mass however there was diffuse nodular gastritis which was biopsied and pathology report states: neg for H Pylori.   - No variants detected in the DPYD gene   - Signatera in process  - 10/3/24: CA19.9 <4.00, CEA 1.4   10/3/24  Today we discussed diagnosis of likely advanced appendiceal carcinoma given there is no mass seen on recent imaging 8/26/2024 with no masses seen  -Goblet cells seen on pathology likely appendiceal origin  -Will plan for CA 19-9 and CEA as baseline  -Will await EGD pathology to confirm no evidence of malignancy in the esophagus  -Discussed neck step is systemic therapy with modified FOLFOX   - could consider HIPEC after 3-4 cycles based on response  - Dr Ontiveros aware of patient  -Refer port placement next week and cycle 1 day 1 on 10/9/2024  -Reviewed side effects of 5-FU, oxaliplatin and consent signed  -Prescription sent for nausea to be used as needed  -We will plan for day 3 Aloxi  -Given limited support at home will likely hold off on disconnect  at home for now  10/23/2024:   - Mediport placed 10/10/24  - Cycle 1 mFOLFOX given 10/16/24: Fluorouracil 2,400 mg/m2, oxaliplatin 85 mg/m2 with pre meds zofran 16 mg and decadron 12 mg, nothing given on day 3   - Last visit with GYN, Dr. Greene, was on 10/7/24         10/29/24  - symptoms improved after supportive care and now close to her baseline  - states dysphagia improved after chemo which hopefully is a sign of tx response  -Lower extremity swelling we discussed is likely related to poor p.o. intake, albumin is low and we discussed importance of protein in addition to ambulating and compression  -Overall for nausea which is her largest complaint we discussed starting Zyprexa at bedtime, given she is not taking much of Reglan we will hold off for now  -She will continue Zofran every 8 hours as needed  -Labs reviewed from today no contraindications to proceed with treatment tomorrow  -Plan for supportive care again with Boni the following week     11/5/2024:  - Patient presents for toxicity check after cycle 2  - Reports that she is feeling well and tolerating treatment better   - She does not need IVF or medications today but we will check cmp/mag and notify her if she needs to continue home K+, she prefers to change to smaller dosage   - Eating more solid foods and will continue to try to prevent further weight loss, declines to talk with dietician today   - She is taking Zyprexa nightly   - She is unsure which prn anti-nausea she is taking but will update me  - Taking imodium BID   - She will RTC on 11/12 for port flush/labs and a visit the same day with Dr. Smart   - She will then RTC the next day for treatment      11/12/24:  - Patient continues to tolerate treatment with no major toxicity.   - No dose adjustments at this time.  - Plan to follow-up with Boni in 2 weeks and with me in 4 weeks.  - On days she sees Boni she will have labs done the day before and in 4 weeks she will have labs done in  house with me.  - Will plan to have labs done outside of port.   - RTC in 2 weeks with Boni.      12/9/24:   - Patient continues to tolerate treatment  - No dose adjustments at this time.   - Labs reviewed. No contraindications to proceed with cycle 5 on Wednesday  - Cycle 6 will be delayed to 12/31   - Plan scans the week after and follow-up with me January 14th 2025.   - RTC for treatment only in 3 weeks and with me in 5 weeks.     1/14/25:  - CT scan from yesterday shows good treatment response and overall patient states she feels better with systemic chemotherapy.  - We discussed no worsening toxicity and overall no significant GI symptoms or neuropathy.   - She does some cold sensitivity for approximately 5 days.  - Will plan to continue with current dose.  - No treatment changes.  - RTC in 2 weeks for treatment only and in 4 weeks with me.      2/11/25   - No new complaints   - Continues to have neuropathy for approx five days but manageable    - No GI issues or concerns   - Refill provided for pantoprazole, would try to decrease to 20 mg daily  - No other change in dose  - RTC in 2 weeks for treatment and in 4 weeks with Boni, 6th week for treatment and 8 weeks with me and we will plan for a scan prior which can be ordered in future.  - She should have a CTAP chest before her visit with me in 8 weeks.    3/11/2025:   - Presents for cycle 11 mFOLFOX   - She is tolerating treatment well   - Due to prolonged cold sensivity we will dose reduce Oxaliplatin to 65 mg/m2   - Labs reviewed: WBC 3.5, hgb 13.4, pl 74,000, cr 0.65, ALT 32, AST 31   - We will proceed with tx with platelets just below parameter, she is aware to monitor for easy bruising/bleeding   - She will RTC in 2 weeks with me for C12   - RTC in 4 weeks with Dr. Smart and possibly C13 to review CT c/a/p we will have done the week before     Reviewed ongoing medical problems and how they relate to her malignancy, will continue long term  monitoring.      This note has been transcribed using a medical scribe and there is a possibility of unintentional typing misprints.     Diagnoses and all orders for this visit:  Malignant neoplasm of appendix (Multi)  -     Clinic Appointment Request [facility]ANG MENON  -     CT chest abdomen pelvis w IV contrast; Future  -     Clinic Appointment Request; Future  -     Infusion Appointment Request; Future  -     Oncology Line Draw Appointment Request; Future  -     CBC and Auto Differential; Future  -     Comprehensive metabolic panel; Future  -     Infusion Appointment Request; Future  Carcinomatosis (Multi)  -     Clinic Appointment Request HANG MENON  -     CT chest abdomen pelvis w IV contrast; Future  -     Clinic Appointment Request; Future  -     Infusion Appointment Request; Future  -     Oncology Line Draw Appointment Request; Future  -     CBC and Auto Differential; Future  -     Comprehensive metabolic panel; Future  -     Infusion Appointment Request; Future  Other orders  -     palonosetron (Aloxi) injection 250 mcg  -     Treatment Conditions - OK to Treat      ZAYDA Cavanaugh-CNP

## 2025-03-12 DIAGNOSIS — E11.9 TYPE 2 DIABETES MELLITUS WITHOUT COMPLICATIONS (MULTI): ICD-10-CM

## 2025-03-12 RX ORDER — EMPAGLIFLOZIN 10 MG/1
10 TABLET, FILM COATED ORAL DAILY
Qty: 30 TABLET | Refills: 0 | Status: SHIPPED | OUTPATIENT
Start: 2025-03-12

## 2025-03-13 ENCOUNTER — INFUSION (OUTPATIENT)
Dept: HEMATOLOGY/ONCOLOGY | Facility: CLINIC | Age: 70
End: 2025-03-13
Payer: MEDICARE

## 2025-03-13 VITALS
RESPIRATION RATE: 17 BRPM | HEART RATE: 84 BPM | SYSTOLIC BLOOD PRESSURE: 114 MMHG | DIASTOLIC BLOOD PRESSURE: 69 MMHG | BODY MASS INDEX: 33.33 KG/M2 | OXYGEN SATURATION: 96 % | TEMPERATURE: 97.3 F | WEIGHT: 201.28 LBS

## 2025-03-13 DIAGNOSIS — C18.1 MALIGNANT NEOPLASM OF APPENDIX (MULTI): ICD-10-CM

## 2025-03-13 DIAGNOSIS — C80.0 CARCINOMATOSIS (MULTI): ICD-10-CM

## 2025-03-13 PROCEDURE — 96374 THER/PROPH/DIAG INJ IV PUSH: CPT | Mod: INF

## 2025-03-13 PROCEDURE — 2500000004 HC RX 250 GENERAL PHARMACY W/ HCPCS (ALT 636 FOR OP/ED)

## 2025-03-13 RX ORDER — PALONOSETRON 0.05 MG/ML
250 INJECTION, SOLUTION INTRAVENOUS ONCE
Status: COMPLETED | OUTPATIENT
Start: 2025-03-13 | End: 2025-03-13

## 2025-03-13 RX ORDER — HEPARIN SODIUM,PORCINE/PF 10 UNIT/ML
50 SYRINGE (ML) INTRAVENOUS AS NEEDED
OUTPATIENT
Start: 2025-03-13

## 2025-03-13 RX ORDER — HEPARIN 100 UNIT/ML
500 SYRINGE INTRAVENOUS AS NEEDED
OUTPATIENT
Start: 2025-03-13

## 2025-03-13 RX ORDER — HEPARIN SODIUM,PORCINE/PF 10 UNIT/ML
50 SYRINGE (ML) INTRAVENOUS AS NEEDED
Status: DISCONTINUED | OUTPATIENT
Start: 2025-03-13 | End: 2025-03-13 | Stop reason: HOSPADM

## 2025-03-13 RX ORDER — HEPARIN 100 UNIT/ML
500 SYRINGE INTRAVENOUS AS NEEDED
Status: DISCONTINUED | OUTPATIENT
Start: 2025-03-13 | End: 2025-03-13 | Stop reason: HOSPADM

## 2025-03-13 RX ADMIN — PALONOSETRON 250 MCG: 0.05 INJECTION, SOLUTION INTRAVENOUS at 11:10

## 2025-03-13 ASSESSMENT — PAIN SCALES - GENERAL: PAINLEVEL_OUTOF10: 0-NO PAIN

## 2025-03-19 NOTE — PROGRESS NOTES
Subjective   Patient ID: Monica Musa is a 69 y.o. female who presents for Medicare Annual Wellness Visit Subsequent, Hypertension, Hyperlipidemia, and Diabetes.  HPI    Patient presents today for AWV, HTN, DM and HLD follow up. Does try eat a low sodium, low sugar, low cholesterol diet. Does exercise. She does not check his BP at home. Does not check his sugars at home. Last eye exam was 5 months ago. Vision is fine. Last dental exam was dental. Is not fasting for BW today. Taking multivitamins every day.     Patient admits to having cold sensitivity and states that she experiences aches in fingers and toes during cold weather. She states she has not felt the need to take Tylenol for over a year now. States she has over-achieved following her fusion.     Mammogram up-to-date in 6/2024.    Medication list was reviewed with the patient, and refills provided.      Review of systems  ; Patient seen today for exam denies any problems with headaches or vision, denies any shortness of breath chest pain nausea or vomiting, no black stool no blood in the stool no heartburn type symptoms denies any problems with constipation or diarrhea, and no dysuria-type symptoms    The patient's allergies medications were reviewed with them today    The patient's social family and surgical history or also reviewed here today, along with her past medical history.     Objective     Alert and active in  no acute distress  HEENT TMs clear oropharynx negative nares clear no drainage noted neck supple  With no adenopathy   Heart regular rate and rhythm without murmur and no carotid bruits  Lungs- clear to auscultation bilaterally, no wheeze or rhonchi noted  Thyroid -negative masses or nodularity  Abdomen- soft times four quadrants , bowel sounds positive no masses or organomegaly, negative tenderness guarding or rebound  Neurological exam unremarkable- DTRs in upper and lower extremities within normal limits.   skin -no lesions  "noted      /62 (BP Location: Right arm, Patient Position: Sitting, BP Cuff Size: Adult)   Pulse 68   Temp 36.6 °C (97.8 °F) (Temporal)   Ht 1.651 m (5' 5\")   Wt 91.2 kg (201 lb)   SpO2 96%   BMI 33.45 kg/m²     Allergies   Allergen Reactions    Penicillins Anaphylaxis and Swelling    Bee Venom Protein (Honey Bee) Swelling       Assessment/Plan   Problem List Items Addressed This Visit       Hypertension    Type 2 diabetes mellitus without complication (Multi)    Relevant Medications    metFORMIN  mg 24 hr tablet    Other Relevant Orders    POCT glycosylated hemoglobin (Hb A1C) manually resulted    Medicare annual wellness visit, subsequent - Primary    Class 2 severe obesity due to excess calories with serious comorbidity and body mass index (BMI) of 35.0 to 35.9 in adult    Secondary malignant neoplasm of genital organs (Multi)    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 Advanced Directives reviewed today, Importance of having Advance care planing discussed. Patient advised to provide the office with a copy if has not already done so. No problems with activities of daily living. Falls risks reviewed.     Refilled Jardiance, Metformin.   Continue all the medications at the prescribed dose.    Prescribed Zithromax 250 mg today for sinus issues. If symptoms get worse by the weekend, she will start Zithromax as prescribed, otherwise hold for now.   Encouraged to wash " hand frequently to avoid viral symptoms.  Drink plenty of fluid, water, Gatorade, warm tea with honey.    Bilateral diagnostic mammogram ordered.     If anything worsens or changes please call us at once, follow up in the office as planned,       Scribe Attestation  By signing my name below, IMarlena, Scribnicolette   attest that this documentation has been prepared under the direction and in the presence of Guerrero Tavarez DO.

## 2025-03-20 ENCOUNTER — APPOINTMENT (OUTPATIENT)
Dept: PRIMARY CARE | Facility: CLINIC | Age: 70
End: 2025-03-20
Payer: MEDICARE

## 2025-03-20 VITALS
HEART RATE: 68 BPM | WEIGHT: 201 LBS | HEIGHT: 65 IN | OXYGEN SATURATION: 96 % | SYSTOLIC BLOOD PRESSURE: 138 MMHG | DIASTOLIC BLOOD PRESSURE: 62 MMHG | TEMPERATURE: 97.8 F | BODY MASS INDEX: 33.49 KG/M2

## 2025-03-20 DIAGNOSIS — J01.11 ACUTE RECURRENT FRONTAL SINUSITIS: ICD-10-CM

## 2025-03-20 DIAGNOSIS — C79.82 SECONDARY MALIGNANT NEOPLASM OF GENITAL ORGANS (MULTI): ICD-10-CM

## 2025-03-20 DIAGNOSIS — E11.9 TYPE 2 DIABETES MELLITUS WITHOUT COMPLICATIONS (MULTI): ICD-10-CM

## 2025-03-20 DIAGNOSIS — E11.9 TYPE 2 DIABETES MELLITUS WITHOUT COMPLICATION, WITHOUT LONG-TERM CURRENT USE OF INSULIN (MULTI): ICD-10-CM

## 2025-03-20 DIAGNOSIS — Z00.00 MEDICARE ANNUAL WELLNESS VISIT, SUBSEQUENT: Primary | ICD-10-CM

## 2025-03-20 DIAGNOSIS — I10 HYPERTENSION, UNSPECIFIED TYPE: ICD-10-CM

## 2025-03-20 DIAGNOSIS — E66.01 CLASS 2 SEVERE OBESITY DUE TO EXCESS CALORIES WITH SERIOUS COMORBIDITY AND BODY MASS INDEX (BMI) OF 35.0 TO 35.9 IN ADULT: ICD-10-CM

## 2025-03-20 DIAGNOSIS — Z12.31 BREAST CANCER SCREENING BY MAMMOGRAM: ICD-10-CM

## 2025-03-20 DIAGNOSIS — E11.9 TYPE 2 DIABETES MELLITUS WITHOUT COMPLICATION, UNSPECIFIED WHETHER LONG TERM INSULIN USE (MULTI): ICD-10-CM

## 2025-03-20 DIAGNOSIS — E66.812 CLASS 2 SEVERE OBESITY DUE TO EXCESS CALORIES WITH SERIOUS COMORBIDITY AND BODY MASS INDEX (BMI) OF 35.0 TO 35.9 IN ADULT: ICD-10-CM

## 2025-03-20 DIAGNOSIS — C78.6 SECONDARY MALIGNANT NEOPLASM OF RETROPERITONEUM AND PERITONEUM (MULTI): ICD-10-CM

## 2025-03-20 LAB — POC HEMOGLOBIN A1C: 6.7 % (ref 4.2–6.5)

## 2025-03-20 PROCEDURE — 83036 HEMOGLOBIN GLYCOSYLATED A1C: CPT | Performed by: FAMILY MEDICINE

## 2025-03-20 PROCEDURE — 1123F ACP DISCUSS/DSCN MKR DOCD: CPT | Performed by: FAMILY MEDICINE

## 2025-03-20 PROCEDURE — G0439 PPPS, SUBSEQ VISIT: HCPCS | Performed by: FAMILY MEDICINE

## 2025-03-20 PROCEDURE — 1157F ADVNC CARE PLAN IN RCRD: CPT | Performed by: FAMILY MEDICINE

## 2025-03-20 PROCEDURE — 1170F FXNL STATUS ASSESSED: CPT | Performed by: FAMILY MEDICINE

## 2025-03-20 PROCEDURE — 3008F BODY MASS INDEX DOCD: CPT | Performed by: FAMILY MEDICINE

## 2025-03-20 PROCEDURE — 99214 OFFICE O/P EST MOD 30 MIN: CPT | Performed by: FAMILY MEDICINE

## 2025-03-20 PROCEDURE — 3075F SYST BP GE 130 - 139MM HG: CPT | Performed by: FAMILY MEDICINE

## 2025-03-20 PROCEDURE — 1159F MED LIST DOCD IN RCRD: CPT | Performed by: FAMILY MEDICINE

## 2025-03-20 PROCEDURE — 3078F DIAST BP <80 MM HG: CPT | Performed by: FAMILY MEDICINE

## 2025-03-20 PROCEDURE — 1036F TOBACCO NON-USER: CPT | Performed by: FAMILY MEDICINE

## 2025-03-20 PROCEDURE — 1160F RVW MEDS BY RX/DR IN RCRD: CPT | Performed by: FAMILY MEDICINE

## 2025-03-20 RX ORDER — METFORMIN HYDROCHLORIDE 500 MG/1
500 TABLET, EXTENDED RELEASE ORAL 2 TIMES DAILY
Qty: 180 TABLET | Refills: 1 | Status: SHIPPED | OUTPATIENT
Start: 2025-03-20

## 2025-03-20 RX ORDER — AZITHROMYCIN 250 MG/1
TABLET, FILM COATED ORAL
Qty: 6 TABLET | Refills: 0 | Status: SHIPPED | OUTPATIENT
Start: 2025-03-20

## 2025-03-20 ASSESSMENT — ACTIVITIES OF DAILY LIVING (ADL)
GROCERY_SHOPPING: INDEPENDENT
TAKING_MEDICATION: INDEPENDENT
DOING_HOUSEWORK: INDEPENDENT
MANAGING_FINANCES: INDEPENDENT
DRESSING: INDEPENDENT
BATHING: INDEPENDENT

## 2025-03-20 ASSESSMENT — PATIENT HEALTH QUESTIONNAIRE - PHQ9
1. LITTLE INTEREST OR PLEASURE IN DOING THINGS: NOT AT ALL
2. FEELING DOWN, DEPRESSED OR HOPELESS: NOT AT ALL
SUM OF ALL RESPONSES TO PHQ9 QUESTIONS 1 AND 2: 0

## 2025-03-24 ENCOUNTER — TELEPHONE (OUTPATIENT)
Dept: PRIMARY CARE | Facility: CLINIC | Age: 70
End: 2025-03-24
Payer: MEDICARE

## 2025-03-24 DIAGNOSIS — T75.3XXA MOTION SICKNESS, INITIAL ENCOUNTER: Primary | ICD-10-CM

## 2025-03-24 RX ORDER — SCOPOLAMINE 1 MG/3D
1 PATCH, EXTENDED RELEASE TRANSDERMAL
Qty: 10 PATCH | Refills: 0 | Status: SHIPPED | OUTPATIENT
Start: 2025-03-24 | End: 2025-05-23

## 2025-03-24 NOTE — TELEPHONE ENCOUNTER
I assume you mean patches as she used them before, she should use them 24 hours before she gets on behind her ear and change them every 3 days         Called patient and she said yes, she would like the patches.  Please send them to her pharmacy

## 2025-03-24 NOTE — TELEPHONE ENCOUNTER
Patient is calling because she saw you on 3/20/25 for her wellness visit and she forgot to ask about getting a prescription. She's going to be going on a cruise in June and wanted to know if you could send in a RX for sea sickness or motion sickness to her pharmacy.    Please advise  Thanks      Preferred pharmacy [facility]    Patient's # [phone]

## 2025-03-25 ENCOUNTER — OFFICE VISIT (OUTPATIENT)
Dept: HEMATOLOGY/ONCOLOGY | Facility: CLINIC | Age: 70
End: 2025-03-25
Payer: MEDICARE

## 2025-03-25 ENCOUNTER — INFUSION (OUTPATIENT)
Dept: HEMATOLOGY/ONCOLOGY | Facility: CLINIC | Age: 70
End: 2025-03-25
Payer: MEDICARE

## 2025-03-25 VITALS
OXYGEN SATURATION: 96 % | WEIGHT: 200.84 LBS | DIASTOLIC BLOOD PRESSURE: 83 MMHG | TEMPERATURE: 98.4 F | RESPIRATION RATE: 16 BRPM | SYSTOLIC BLOOD PRESSURE: 147 MMHG | HEART RATE: 84 BPM | BODY MASS INDEX: 33.42 KG/M2

## 2025-03-25 DIAGNOSIS — C18.1 MALIGNANT NEOPLASM OF APPENDIX (MULTI): ICD-10-CM

## 2025-03-25 DIAGNOSIS — C80.0 CARCINOMATOSIS (MULTI): ICD-10-CM

## 2025-03-25 LAB
ALBUMIN SERPL BCP-MCNC: 4.3 G/DL (ref 3.4–5)
ALP SERPL-CCNC: 121 U/L (ref 33–136)
ALT SERPL W P-5'-P-CCNC: 30 U/L (ref 7–45)
ANION GAP SERPL CALC-SCNC: 15 MMOL/L (ref 10–20)
AST SERPL W P-5'-P-CCNC: 32 U/L (ref 9–39)
BASOPHILS # BLD AUTO: 0.04 X10*3/UL (ref 0–0.1)
BASOPHILS NFR BLD AUTO: 0.5 %
BILIRUB SERPL-MCNC: 0.6 MG/DL (ref 0–1.2)
BUN SERPL-MCNC: 16 MG/DL (ref 6–23)
CALCIUM SERPL-MCNC: 9.8 MG/DL (ref 8.6–10.3)
CHLORIDE SERPL-SCNC: 104 MMOL/L (ref 98–107)
CO2 SERPL-SCNC: 24 MMOL/L (ref 21–32)
CREAT SERPL-MCNC: 0.65 MG/DL (ref 0.5–1.05)
EGFRCR SERPLBLD CKD-EPI 2021: >90 ML/MIN/1.73M*2
EOSINOPHIL # BLD AUTO: 0.1 X10*3/UL (ref 0–0.7)
EOSINOPHIL NFR BLD AUTO: 1.2 %
ERYTHROCYTE [DISTWIDTH] IN BLOOD BY AUTOMATED COUNT: 16 % (ref 11.5–14.5)
GLUCOSE SERPL-MCNC: 159 MG/DL (ref 74–99)
HCT VFR BLD AUTO: 41.1 % (ref 36–46)
HGB BLD-MCNC: 13.6 G/DL (ref 12–16)
IMM GRANULOCYTES # BLD AUTO: 0.03 X10*3/UL (ref 0–0.7)
IMM GRANULOCYTES NFR BLD AUTO: 0.4 % (ref 0–0.9)
LYMPHOCYTES # BLD AUTO: 1.3 X10*3/UL (ref 1.2–4.8)
LYMPHOCYTES NFR BLD AUTO: 15.6 %
MCH RBC QN AUTO: 31.1 PG (ref 26–34)
MCHC RBC AUTO-ENTMCNC: 33.1 G/DL (ref 32–36)
MCV RBC AUTO: 94 FL (ref 80–100)
MONOCYTES # BLD AUTO: 0.99 X10*3/UL (ref 0.1–1)
MONOCYTES NFR BLD AUTO: 11.9 %
NEUTROPHILS # BLD AUTO: 5.88 X10*3/UL (ref 1.2–7.7)
NEUTROPHILS NFR BLD AUTO: 70.4 %
NRBC BLD-RTO: ABNORMAL /100{WBCS}
OVALOCYTES BLD QL SMEAR: NORMAL
PLATELET # BLD AUTO: 88 X10*3/UL (ref 150–450)
POLYCHROMASIA BLD QL SMEAR: NORMAL
POTASSIUM SERPL-SCNC: 4.2 MMOL/L (ref 3.5–5.3)
PROT SERPL-MCNC: 7.2 G/DL (ref 6.4–8.2)
RBC # BLD AUTO: 4.37 X10*6/UL (ref 4–5.2)
RBC MORPH BLD: NORMAL
SODIUM SERPL-SCNC: 139 MMOL/L (ref 136–145)
WBC # BLD AUTO: 8.3 X10*3/UL (ref 4.4–11.3)

## 2025-03-25 PROCEDURE — 99214 OFFICE O/P EST MOD 30 MIN: CPT | Mod: 25

## 2025-03-25 PROCEDURE — 2500000004 HC RX 250 GENERAL PHARMACY W/ HCPCS (ALT 636 FOR OP/ED)

## 2025-03-25 PROCEDURE — 96413 CHEMO IV INFUSION 1 HR: CPT

## 2025-03-25 PROCEDURE — 89240 UNLISTED MISC PATH TEST: CPT

## 2025-03-25 PROCEDURE — 3077F SYST BP >= 140 MM HG: CPT

## 2025-03-25 PROCEDURE — 84075 ASSAY ALKALINE PHOSPHATASE: CPT

## 2025-03-25 PROCEDURE — 1036F TOBACCO NON-USER: CPT

## 2025-03-25 PROCEDURE — 1157F ADVNC CARE PLAN IN RCRD: CPT

## 2025-03-25 PROCEDURE — 96375 TX/PRO/DX INJ NEW DRUG ADDON: CPT | Mod: INF

## 2025-03-25 PROCEDURE — 1126F AMNT PAIN NOTED NONE PRSNT: CPT

## 2025-03-25 PROCEDURE — 99214 OFFICE O/P EST MOD 30 MIN: CPT

## 2025-03-25 PROCEDURE — 3079F DIAST BP 80-89 MM HG: CPT

## 2025-03-25 PROCEDURE — 96415 CHEMO IV INFUSION ADDL HR: CPT

## 2025-03-25 PROCEDURE — 96411 CHEMO IV PUSH ADDL DRUG: CPT

## 2025-03-25 PROCEDURE — 1159F MED LIST DOCD IN RCRD: CPT

## 2025-03-25 PROCEDURE — 96416 CHEMO PROLONG INFUSE W/PUMP: CPT

## 2025-03-25 PROCEDURE — 1123F ACP DISCUSS/DSCN MKR DOCD: CPT

## 2025-03-25 PROCEDURE — 85025 COMPLETE CBC W/AUTO DIFF WBC: CPT

## 2025-03-25 RX ORDER — FLUOROURACIL 50 MG/ML
400 INJECTION, SOLUTION INTRAVENOUS ONCE
Status: CANCELLED | OUTPATIENT
Start: 2025-03-25

## 2025-03-25 RX ORDER — FLUOROURACIL 50 MG/ML
400 INJECTION, SOLUTION INTRAVENOUS ONCE
Status: COMPLETED | OUTPATIENT
Start: 2025-03-25 | End: 2025-03-25

## 2025-03-25 RX ORDER — ONDANSETRON HYDROCHLORIDE 8 MG/1
16 TABLET, FILM COATED ORAL ONCE
Status: CANCELLED | OUTPATIENT
Start: 2025-03-25 | End: 2025-03-25

## 2025-03-25 RX ORDER — EPINEPHRINE 0.3 MG/.3ML
0.3 INJECTION SUBCUTANEOUS EVERY 5 MIN PRN
Status: DISCONTINUED | OUTPATIENT
Start: 2025-03-25 | End: 2025-03-25 | Stop reason: HOSPADM

## 2025-03-25 RX ORDER — PALONOSETRON 0.05 MG/ML
250 INJECTION, SOLUTION INTRAVENOUS ONCE
Status: CANCELLED | OUTPATIENT
Start: 2025-03-27 | End: 2025-03-27

## 2025-03-25 RX ORDER — FAMOTIDINE 10 MG/ML
20 INJECTION, SOLUTION INTRAVENOUS ONCE AS NEEDED
Status: CANCELLED | OUTPATIENT
Start: 2025-03-25

## 2025-03-25 RX ORDER — ONDANSETRON HYDROCHLORIDE 8 MG/1
16 TABLET, FILM COATED ORAL ONCE
Status: COMPLETED | OUTPATIENT
Start: 2025-03-25 | End: 2025-03-25

## 2025-03-25 RX ORDER — DIPHENHYDRAMINE HYDROCHLORIDE 50 MG/ML
50 INJECTION, SOLUTION INTRAMUSCULAR; INTRAVENOUS AS NEEDED
Status: DISCONTINUED | OUTPATIENT
Start: 2025-03-25 | End: 2025-03-25 | Stop reason: HOSPADM

## 2025-03-25 RX ORDER — HEPARIN 100 UNIT/ML
500 SYRINGE INTRAVENOUS AS NEEDED
Status: CANCELLED | OUTPATIENT
Start: 2025-03-25

## 2025-03-25 RX ORDER — PROCHLORPERAZINE MALEATE 10 MG
10 TABLET ORAL EVERY 6 HOURS PRN
Status: DISCONTINUED | OUTPATIENT
Start: 2025-03-25 | End: 2025-03-25 | Stop reason: HOSPADM

## 2025-03-25 RX ORDER — DIPHENHYDRAMINE HYDROCHLORIDE 50 MG/ML
50 INJECTION, SOLUTION INTRAMUSCULAR; INTRAVENOUS AS NEEDED
Status: CANCELLED | OUTPATIENT
Start: 2025-03-25

## 2025-03-25 RX ORDER — FAMOTIDINE 10 MG/ML
20 INJECTION, SOLUTION INTRAVENOUS EVERY 12 HOURS SCHEDULED
Status: DISCONTINUED | OUTPATIENT
Start: 2025-03-25 | End: 2025-03-25 | Stop reason: HOSPADM

## 2025-03-25 RX ORDER — DEXAMETHASONE 6 MG/1
12 TABLET ORAL ONCE
Status: COMPLETED | OUTPATIENT
Start: 2025-03-25 | End: 2025-03-25

## 2025-03-25 RX ORDER — FAMOTIDINE 10 MG/ML
20 INJECTION, SOLUTION INTRAVENOUS EVERY 12 HOURS SCHEDULED
Status: CANCELLED | OUTPATIENT
Start: 2025-03-25

## 2025-03-25 RX ORDER — FAMOTIDINE 10 MG/ML
20 INJECTION, SOLUTION INTRAVENOUS ONCE AS NEEDED
Status: DISCONTINUED | OUTPATIENT
Start: 2025-03-25 | End: 2025-03-25 | Stop reason: HOSPADM

## 2025-03-25 RX ORDER — HEPARIN 100 UNIT/ML
500 SYRINGE INTRAVENOUS AS NEEDED
Status: DISCONTINUED | OUTPATIENT
Start: 2025-03-25 | End: 2025-03-25 | Stop reason: HOSPADM

## 2025-03-25 RX ORDER — LORAZEPAM 2 MG/ML
1 INJECTION INTRAMUSCULAR AS NEEDED
Status: CANCELLED | OUTPATIENT
Start: 2025-03-25

## 2025-03-25 RX ORDER — PROCHLORPERAZINE EDISYLATE 5 MG/ML
10 INJECTION INTRAMUSCULAR; INTRAVENOUS EVERY 6 HOURS PRN
Status: CANCELLED | OUTPATIENT
Start: 2025-03-25

## 2025-03-25 RX ORDER — ALBUTEROL SULFATE 0.83 MG/ML
3 SOLUTION RESPIRATORY (INHALATION) AS NEEDED
Status: DISCONTINUED | OUTPATIENT
Start: 2025-03-25 | End: 2025-03-25 | Stop reason: HOSPADM

## 2025-03-25 RX ORDER — DEXAMETHASONE 6 MG/1
12 TABLET ORAL ONCE
Status: CANCELLED | OUTPATIENT
Start: 2025-03-25 | End: 2025-03-25

## 2025-03-25 RX ORDER — LORAZEPAM 2 MG/ML
1 INJECTION INTRAMUSCULAR AS NEEDED
Status: DISCONTINUED | OUTPATIENT
Start: 2025-03-25 | End: 2025-03-25 | Stop reason: HOSPADM

## 2025-03-25 RX ORDER — ALBUTEROL SULFATE 0.83 MG/ML
3 SOLUTION RESPIRATORY (INHALATION) AS NEEDED
Status: CANCELLED | OUTPATIENT
Start: 2025-03-25

## 2025-03-25 RX ORDER — HEPARIN SODIUM,PORCINE/PF 10 UNIT/ML
50 SYRINGE (ML) INTRAVENOUS AS NEEDED
Status: DISCONTINUED | OUTPATIENT
Start: 2025-03-25 | End: 2025-03-25 | Stop reason: HOSPADM

## 2025-03-25 RX ORDER — PROCHLORPERAZINE EDISYLATE 5 MG/ML
10 INJECTION INTRAMUSCULAR; INTRAVENOUS EVERY 6 HOURS PRN
Status: DISCONTINUED | OUTPATIENT
Start: 2025-03-25 | End: 2025-03-25 | Stop reason: HOSPADM

## 2025-03-25 RX ORDER — EPINEPHRINE 0.3 MG/.3ML
0.3 INJECTION SUBCUTANEOUS EVERY 5 MIN PRN
Status: CANCELLED | OUTPATIENT
Start: 2025-03-25

## 2025-03-25 RX ORDER — HEPARIN SODIUM,PORCINE/PF 10 UNIT/ML
50 SYRINGE (ML) INTRAVENOUS AS NEEDED
Status: CANCELLED | OUTPATIENT
Start: 2025-03-25

## 2025-03-25 RX ORDER — PROCHLORPERAZINE MALEATE 10 MG
10 TABLET ORAL EVERY 6 HOURS PRN
Status: CANCELLED | OUTPATIENT
Start: 2025-03-25

## 2025-03-25 RX ADMIN — OXALIPLATIN 125 MG: 5 INJECTION, SOLUTION, CONCENTRATE INTRAVENOUS at 11:29

## 2025-03-25 RX ADMIN — FLUOROURACIL 4700 MG: 50 INJECTION, SOLUTION INTRAVENOUS at 13:48

## 2025-03-25 RX ADMIN — ONDANSETRON HYDROCHLORIDE 16 MG: 8 TABLET, FILM COATED ORAL at 11:07

## 2025-03-25 RX ADMIN — FAMOTIDINE 20 MG: 10 INJECTION INTRAVENOUS at 11:20

## 2025-03-25 RX ADMIN — FLUOROURACIL 775 MG: 50 INJECTION, SOLUTION INTRAVENOUS at 13:35

## 2025-03-25 RX ADMIN — DEXAMETHASONE 12 MG: 6 TABLET ORAL at 11:08

## 2025-03-25 ASSESSMENT — PAIN SCALES - GENERAL: PAINLEVEL_OUTOF10: 0-NO PAIN

## 2025-03-25 NOTE — PROGRESS NOTES
Patient ID: Monica Musa is a 69 y.o. female.    Oncology History Overview Note   - 7/8/2022 TLH, BSO, SLND combined case with Dr. Lal. 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 There was no lymphovascular invasion. There was a focus of MELF pattern invasion. Tumor board recommendations: Surveillance. MMR testing on prior endometrial biopsy specimen was normal.   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  - CT 8/26/24: Small volume ascites. Left pelvic nodule with some nodularity  along the left paracolic gutter worrisome for peritoneal carcinomatosis.  Admitted with bowel obstruction.  No lesion large enough for biopsy  - 9/2024 diagnostic laparoscopy - biopsy c/w non gyn primary - referred to med onc     Endometrial carcinoma (Multi)   5/22/2023 Initial Diagnosis    Endometrial carcinoma (Multi)     Malignant neoplasm of appendix (Multi)   10/3/2024 Initial Diagnosis    Malignant neoplasm of appendix (Multi)     10/16/2024 -  Chemotherapy    mFOLFOX6 (Fluorouracil Continuous Infusion / Leucovorin / Oxaliplatin), 14 Day Cycles     Carcinomatosis (Multi)   10/3/2024 Initial Diagnosis    Carcinomatosis (Multi)     10/16/2024 -  Chemotherapy    mFOLFOX6 (Fluorouracil Continuous Infusion / Leucovorin / Oxaliplatin), 14 Day Cycles       Subjective    HPI  Monica Musa is a 70 yo F with history of grade 1 endometroid adenocarcinoma MMI, no LVSI, pMMR s/p TLH, BSP, SLND 7/8/22. 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     Patient presents for follow-up visit and consideration of cycle 12 mFOLFOX.  Her oxaliplatin was dose reduced last cycle due to thrombocytopenia and cold sensitivity lasting for 2 weeks.  She has not yet had labs for today's treatment.  She states that last cycle she did have more joint pain but it was tolerable.  She also felt her cold sensitivity was more intense however she is  able to take in cold foods and beverages and going to her fridge at this time, she still does have some tingling however.  She does not have any persistent neuropathy symptoms.  Patient states that she has fatigue during her week of treatment.  She says her appetite is doing well.    Patient denies fevers, chills, shortness of breath, chest pain, cough, palpitations, nausea, vomiting, constipation or diarrhea problems, changes in urination, pain, difficulty swallowing foods or liquids, issues with indigestion, skin changes or other concerns. Denies signs of bleeding or blood loss.     Patient's past medical history, surgical history, family history and social history reviewed.    Objective    Visit Vitals  OB Status Hysterectomy   Smoking Status Former     Visit Vitals  /83 (BP Location: Right arm, Patient Position: Sitting, BP Cuff Size: Adult)   Pulse 84   Temp 36.9 °C (98.4 °F) (Temporal)   Resp 16   Wt 91.1 kg (200 lb 13.4 oz)   SpO2 96%   BMI 33.42 kg/m²   OB Status Hysterectomy   Smoking Status Former   BSA 2.04 m²       Review of Systems:   Review of Systems:    Positive per HPI, otherwise negative.        Physical Exam  Gen: appears well in clinic, NAD  HEENT: atraumatic head, normocephalic, EOMI, conjunctiva normal  LUNG: no increased WOB, CTAB  CV: No JVD. RRR  GI: soft, NT, ND  LE: no LE edema  Skin: no obvious rashes or lesions on visible skin  Neuro: interactive, no focal deficits noted  Psych: normal mood and affect      Performance Status:  Symptomatic; fully ambulatory    Labs/Imaging/Pathology: personally reviewed reports and images in Epic electronic medical record system. Pertinent results as it related to the plan represented in below in assessment and plan.       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  There are some features of goblet cells which favor appendiceal primary however pancreaticobiliary origin cannot be excluded.  MSI status intact  - [id number] also completed on blood from 9/30/2024 which was unremarkable  - Tempus on tissue in process  - Colonoscopy on 7/31/2024 was technically difficult due to scattered diverticulosis in the ascending colon and some hemorrhoids  - EGD 10/2/24 shows abnormal mucosa at the GE junction but no clear mass however there was diffuse nodular gastritis which was biopsied and pathology report states: neg for H Pylori.   - No variants detected in the DPYD gene   - Signatera in process  - 10/3/24: CA19.9 <4.00, CEA 1.4   10/3/24  Today we discussed diagnosis of likely advanced appendiceal carcinoma given there is no mass seen on recent imaging 8/26/2024 with no masses seen  -Goblet cells seen on pathology likely appendiceal origin  -Will plan for CA 19-9 and CEA as baseline  -Will await EGD pathology to confirm no evidence of malignancy in the esophagus  -Discussed neck step is systemic therapy with modified FOLFOX   - could consider HIPEC after 3-4 cycles based on response  - Dr Ontiveros aware of patient  -Refer port placement next week and cycle 1 day 1 on 10/9/2024  -Reviewed side effects of 5-FU, oxaliplatin and consent signed  -Prescription sent for nausea to be used as needed  -We will plan for day 3 Aloxi  -Given limited support at home will likely hold off on disconnect at home for now  10/23/2024:   - Mediport placed 10/10/24  - Cycle 1 mFOLFOX given 10/16/24: Fluorouracil 2,400 mg/m2, oxaliplatin 85 mg/m2 with pre meds zofran 16 mg and decadron 12 mg, nothing given on day 3   - Last visit with GYN, Dr. Greene, was on 10/7/24         10/29/24  - symptoms improved  after supportive care and now close to her baseline  - states dysphagia improved after chemo which hopefully is a sign of tx response  -Lower extremity swelling we discussed is likely related to poor p.o. intake, albumin is low and we discussed importance of protein in addition to ambulating and compression  -Overall for nausea which is her largest complaint we discussed starting Zyprexa at bedtime, given she is not taking much of Reglan we will hold off for now  -She will continue Zofran every 8 hours as needed  -Labs reviewed from today no contraindications to proceed with treatment tomorrow  -Plan for supportive care again with Boni the following week     11/5/2024:  - Patient presents for toxicity check after cycle 2  - Reports that she is feeling well and tolerating treatment better   - She does not need IVF or medications today but we will check cmp/mag and notify her if she needs to continue home K+, she prefers to change to smaller dosage   - Eating more solid foods and will continue to try to prevent further weight loss, declines to talk with dietician today   - She is taking Zyprexa nightly   - She is unsure which prn anti-nausea she is taking but will update me  - Taking imodium BID   - She will RTC on 11/12 for port flush/labs and a visit the same day with Dr. Smart   - She will then RTC the next day for treatment      11/12/24:  - Patient continues to tolerate treatment with no major toxicity.   - No dose adjustments at this time.  - Plan to follow-up with Boni in 2 weeks and with me in 4 weeks.  - On days she sees Boni she will have labs done the day before and in 4 weeks she will have labs done in house with me.  - Will plan to have labs done outside of port.   - RTC in 2 weeks with Boni.      12/9/24:   - Patient continues to tolerate treatment  - No dose adjustments at this time.   - Labs reviewed. No contraindications to proceed with cycle 5 on Wednesday  - Cycle 6 will be delayed to 12/31   -  Plan scans the week after and follow-up with me January 14th 2025.   - RTC for treatment only in 3 weeks and with me in 5 weeks.     1/14/25:  - CT scan from yesterday shows good treatment response and overall patient states she feels better with systemic chemotherapy.  - We discussed no worsening toxicity and overall no significant GI symptoms or neuropathy.   - She does some cold sensitivity for approximately 5 days.  - Will plan to continue with current dose.  - No treatment changes.  - RTC in 2 weeks for treatment only and in 4 weeks with me.      2/11/25   - No new complaints   - Continues to have neuropathy for approx five days but manageable    - No GI issues or concerns   - Refill provided for pantoprazole, would try to decrease to 20 mg daily  - No other change in dose  - RTC in 2 weeks for treatment and in 4 weeks with Boni, 6th week for treatment and 8 weeks with me and we will plan for a scan prior which can be ordered in future.  - She should have a CTAP chest before her visit with me in 8 weeks.    3/25/2025:  - Presents for cycle 12 mFOLFOX  - Oxaliplatin was dose reduced to 65 mg/m² last cycle due to prolonged cold sensitivity  - Patient's platelets for cycle 12 were 74,000  - Her labs today will be done when she goes back to treatment  - She does have some residual cold sensitivity and felt that cold sensitivity was more intense this cycle despite dose reduction, discussed further dose reduction or stopping oxaliplatin but patient prefers to push through today before her next set of scans next week  - She is not having any persistent neuropathy, we dose reduced last cycle and so we will go ahead and keep dosages the same as last cycle   - She took an antiallergy medication at home this morning, but I will add Pepcid to her premeds since this is her 12th cycle of oxaliplatin, she has not had any sensitivity reactions  - She will return in 2 weeks with Dr. Smart to review imaging she will have done  next week, patient aware that Dr. Smart will provide her recommendations regarding treatment plan at that visit once she has scan results and patient verbalized understanding. She is scheduled for cycle 13 but aware it can be cancelled if she does not need treatment that day.       Reviewed ongoing medical problems and how they relate to her malignancy, will continue long term monitoring.          Boni Whaley, APRN-CNP

## 2025-03-27 ENCOUNTER — INFUSION (OUTPATIENT)
Dept: HEMATOLOGY/ONCOLOGY | Facility: CLINIC | Age: 70
End: 2025-03-27
Payer: MEDICARE

## 2025-03-27 VITALS
TEMPERATURE: 98.2 F | OXYGEN SATURATION: 98 % | RESPIRATION RATE: 17 BRPM | BODY MASS INDEX: 33.38 KG/M2 | HEART RATE: 75 BPM | WEIGHT: 200.62 LBS | SYSTOLIC BLOOD PRESSURE: 133 MMHG | DIASTOLIC BLOOD PRESSURE: 79 MMHG

## 2025-03-27 DIAGNOSIS — C18.1 MALIGNANT NEOPLASM OF APPENDIX (MULTI): ICD-10-CM

## 2025-03-27 DIAGNOSIS — C80.0 CARCINOMATOSIS (MULTI): ICD-10-CM

## 2025-03-27 PROCEDURE — 96374 THER/PROPH/DIAG INJ IV PUSH: CPT | Mod: INF

## 2025-03-27 PROCEDURE — 2500000004 HC RX 250 GENERAL PHARMACY W/ HCPCS (ALT 636 FOR OP/ED)

## 2025-03-27 RX ORDER — HEPARIN SODIUM,PORCINE/PF 10 UNIT/ML
50 SYRINGE (ML) INTRAVENOUS AS NEEDED
OUTPATIENT
Start: 2025-03-27

## 2025-03-27 RX ORDER — HEPARIN 100 UNIT/ML
500 SYRINGE INTRAVENOUS AS NEEDED
OUTPATIENT
Start: 2025-03-27

## 2025-03-27 RX ORDER — HEPARIN 100 UNIT/ML
500 SYRINGE INTRAVENOUS AS NEEDED
Status: DISCONTINUED | OUTPATIENT
Start: 2025-03-27 | End: 2025-03-27 | Stop reason: HOSPADM

## 2025-03-27 RX ORDER — HEPARIN SODIUM,PORCINE/PF 10 UNIT/ML
50 SYRINGE (ML) INTRAVENOUS AS NEEDED
Status: DISCONTINUED | OUTPATIENT
Start: 2025-03-27 | End: 2025-03-27 | Stop reason: HOSPADM

## 2025-03-27 RX ORDER — PALONOSETRON 0.05 MG/ML
250 INJECTION, SOLUTION INTRAVENOUS ONCE
Status: COMPLETED | OUTPATIENT
Start: 2025-03-27 | End: 2025-03-27

## 2025-03-27 RX ADMIN — PALONOSETRON 250 MCG: 0.05 INJECTION, SOLUTION INTRAVENOUS at 11:56

## 2025-03-27 ASSESSMENT — PAIN SCALES - GENERAL: PAINLEVEL_OUTOF10: 0-NO PAIN

## 2025-04-03 ENCOUNTER — HOSPITAL ENCOUNTER (OUTPATIENT)
Dept: RADIOLOGY | Facility: CLINIC | Age: 70
Discharge: HOME | End: 2025-04-03
Payer: MEDICARE

## 2025-04-03 DIAGNOSIS — C80.0 CARCINOMATOSIS (MULTI): ICD-10-CM

## 2025-04-03 DIAGNOSIS — C18.1 MALIGNANT NEOPLASM OF APPENDIX (MULTI): ICD-10-CM

## 2025-04-03 PROCEDURE — 74177 CT ABD & PELVIS W/CONTRAST: CPT

## 2025-04-03 PROCEDURE — 2550000001 HC RX 255 CONTRASTS

## 2025-04-03 RX ADMIN — IOHEXOL 75 ML: 350 INJECTION, SOLUTION INTRAVENOUS at 09:51

## 2025-04-06 NOTE — PROGRESS NOTES
Patient ID: Monica Musa is a 69 y.o. female.    Oncology History Overview Note   - 7/8/2022 TLH, BSO, SLND combined case with Dr. Lal. 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 There was no lymphovascular invasion. There was a focus of MELF pattern invasion. Tumor board recommendations: Surveillance. MMR testing on prior endometrial biopsy specimen was normal.   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  - CT 8/26/24: Small volume ascites. Left pelvic nodule with some nodularity  along the left paracolic gutter worrisome for peritoneal carcinomatosis.  Admitted with bowel obstruction.  No lesion large enough for biopsy  - 9/2024 diagnostic laparoscopy - biopsy c/w non gyn primary - referred to med onc     Endometrial carcinoma   5/22/2023 Initial Diagnosis    Endometrial carcinoma (Multi)     Malignant neoplasm of appendix (Multi)   10/3/2024 Initial Diagnosis    Malignant neoplasm of appendix (Multi)     10/16/2024 -  Chemotherapy    mFOLFOX6 (Fluorouracil Continuous Infusion / Leucovorin / Oxaliplatin), 14 Day Cycles     Carcinomatosis (Multi)   10/3/2024 Initial Diagnosis    Carcinomatosis (Multi)     10/16/2024 -  Chemotherapy    mFOLFOX6 (Fluorouracil Continuous Infusion / Leucovorin / Oxaliplatin), 14 Day Cycles         Subjective    HPI  Monica Musa is a 69 y.o. female with history of grade 1 endometroid adenocarcinoma MMI, no LVSI, pMMR s/p TLH, BSP, SLND 7/8/22. 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     Patient presents for follow-up visit     [id number]    Patient's past medical history, surgical history, family history and social history reviewed.    Review of Systems:    Positive per HPI, otherwise negative.       Objective    Visit Vitals  OB Status Hysterectomy   Smoking Status Former        Physical Exam  Gen: appears well in clinic, NAD  HEENT: atraumatic head,  normocephalic, EOMI, conjunctiva normal  LUNG: no increased WOB, CTAB  CV: No JVD. RRR  GI: soft, NT, ND  LE: no LE edema  Skin: no obvious rashes or lesions on visible skin  Neuro: interactive, no focal deficits noted  Psych: normal mood and affect      Performance Status:  Symptomatic; fully ambulatory    Labs/Imaging/Pathology: personally reviewed reports and images in Epic electronic medical record system. Pertinent results as it related to the plan represented in below in assessment and plan.       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  There are some features of goblet cells which favor appendiceal primary however pancreaticobiliary origin cannot be excluded.  MSI status intact  - [id number] also completed on blood from 9/30/2024 which was unremarkable  - Tempus on tissue in process  - Colonoscopy on 7/31/2024 was technically difficult due to scattered diverticulosis in the ascending colon and some hemorrhoids  - EGD 10/2/24 shows abnormal mucosa at the GE junction but no clear mass however there was diffuse nodular gastritis which was biopsied and pathology report states: neg for H Pylori.   - No variants detected in the DPYD gene   - Signatera in process  - 10/3/24: CA19.9 <4.00, CEA 1.4   10/3/24  Today we discussed diagnosis of likely advanced appendiceal carcinoma given there is no mass seen on recent imaging 8/26/2024 with no masses seen  -Goblet cells seen on pathology likely appendiceal origin  -Will plan for CA 19-9 and CEA as baseline  -Will await EGD pathology to confirm no evidence of malignancy in the  esophagus  -Discussed neck step is systemic therapy with modified FOLFOX   - could consider HIPEC after 3-4 cycles based on response  - Dr Ontiveros aware of patient  -Refer port placement next week and cycle 1 day 1 on 10/9/2024  -Reviewed side effects of 5-FU, oxaliplatin and consent signed  -Prescription sent for nausea to be used as needed  -We will plan for day 3 Aloxi  -Given limited support at home will likely hold off on disconnect at home for now  10/23/2024:   - Mediport placed 10/10/24  - Cycle 1 mFOLFOX given 10/16/24: Fluorouracil 2,400 mg/m2, oxaliplatin 85 mg/m2 with pre meds zofran 16 mg and decadron 12 mg, nothing given on day 3   - Last visit with GYN, Dr. Greene, was on 10/7/24         10/29/24  - symptoms improved after supportive care and now close to her baseline  - states dysphagia improved after chemo which hopefully is a sign of tx response  -Lower extremity swelling we discussed is likely related to poor p.o. intake, albumin is low and we discussed importance of protein in addition to ambulating and compression  -Overall for nausea which is her largest complaint we discussed starting Zyprexa at bedtime, given she is not taking much of Reglan we will hold off for now  -She will continue Zofran every 8 hours as needed  -Labs reviewed from today no contraindications to proceed with treatment tomorrow  -Plan for supportive care again with Boni the following week     11/5/2024:  - Patient presents for toxicity check after cycle 2  - Reports that she is feeling well and tolerating treatment better   - She does not need IVF or medications today but we will check cmp/mag and notify her if she needs to continue home K+, she prefers to change to smaller dosage   - Eating more solid foods and will continue to try to prevent further weight loss, declines to talk with dietician today   - She is taking Zyprexa nightly   - She is unsure which prn anti-nausea she is taking but will update me  - Taking  imodium BID   - She will RTC on 11/12 for port flush/labs and a visit the same day with Dr. Smart   - She will then RTC the next day for treatment      11/12/24:  - Patient continues to tolerate treatment with no major toxicity.   - No dose adjustments at this time.  - Plan to follow-up with Boni in 2 weeks and with me in 4 weeks.  - On days she sees Boni she will have labs done the day before and in 4 weeks she will have labs done in house with me.  - Will plan to have labs done outside of port.   - RTC in 2 weeks with Boni.      12/9/24:   - Patient continues to tolerate treatment  - No dose adjustments at this time.   - Labs reviewed. No contraindications to proceed with cycle 5 on Wednesday  - Cycle 6 will be delayed to 12/31   - Plan scans the week after and follow-up with me January 14th 2025.   - RTC for treatment only in 3 weeks and with me in 5 weeks.     1/14/25:  - CT scan from yesterday shows good treatment response and overall patient states she feels better with systemic chemotherapy.  - We discussed no worsening toxicity and overall no significant GI symptoms or neuropathy.   - She does some cold sensitivity for approximately 5 days.  - Will plan to continue with current dose.  - No treatment changes.  - RTC in 2 weeks for treatment only and in 4 weeks with me.      2/11/25   - No new complaints   - Continues to have neuropathy for approx five days but manageable    - No GI issues or concerns   - Refill provided for pantoprazole, would try to decrease to 20 mg daily  - No other change in dose  - RTC in 2 weeks for treatment and in 4 weeks with Boni, 6th week for treatment and 8 weeks with me and we will plan for a scan prior which can be ordered in future.  - She should have a CTAP chest before her visit with me in 8 weeks.    3/25/2025:  - Presents for cycle 12 mFOLFOX  - Oxaliplatin was dose reduced to 65 mg/m² last cycle due to prolonged cold sensitivity  - Patient's platelets for cycle 12 were  74,000  - Her labs today will be done when she goes back to treatment  - She does have some residual cold sensitivity and felt that cold sensitivity was more intense this cycle despite dose reduction, discussed further dose reduction or stopping oxaliplatin but patient prefers to push through today before her next set of scans next week  - She is not having any persistent neuropathy, we dose reduced last cycle and so we will go ahead and keep dosages the same as last cycle   - She took an antiallergy medication at home this morning, but I will add Pepcid to her premeds since this is her 12th cycle of oxaliplatin, she has not had any sensitivity reactions  - She will return in 2 weeks with Dr. Smart to review imaging she will have done next week, patient aware that Dr. Smart will provide her recommendations regarding treatment plan at that visit once she has scan results and patient verbalized understanding. She is scheduled for cycle 13 but aware it can be cancelled if she does not need treatment that day.     4/8/25:  ***    Reviewed ongoing medical problems and how they relate to her malignancy, will continue long term monitoring.    RTC in ***  This note has been transcribed using a medical scribe and there is a possibility of unintentional typing misprints    {Assess/[email or web address]:[id number]}      Margot Smart MD  Hematology/Oncology  [facility] at [facility]      Scribe Attestation  By signing my name below, I, Radha Sal, attest that this documentation has been prepared under the direction and in the presence of Margot Smart MD.       will plan for surgery likely after her vacation to [place]   - RTC with me in 6 weeks for port flush and labs    - We will ensure there is a plan moving forward   - Will plan for treatment holiday from systemic therapy at this time   - RTC in 6 weeks with me       Reviewed ongoing medical problems and how they relate to her malignancy, will continue long term monitoring.    RTC in 6 weeks with me   This note has been transcribed using a medical scribe and there is a possibility of unintentional typing misprints    Diagnoses and all orders for this visit:  Carcinomatosis (Multi)  -     Clinic Appointment Request  -     gabapentin (Neurontin) 100 mg capsule; Take 3 capsules (300 mg) by mouth once daily at bedtime.  Malignant neoplasm of appendix (Multi)  -     Clinic Appointment Request  -     gabapentin (Neurontin) 100 mg capsule; Take 3 capsules (300 mg) by mouth once daily at bedtime.  -     OLANZapine (ZyPREXA) 2.5 mg tablet; Take 1 tablet (2.5 mg) by mouth once daily at bedtime.  -     Clinic Appointment Request; Future  -     CBC and Auto Differential; Future  -     Comprehensive metabolic panel; Future  -     Infusion Appointment Request (port flush and labs); Future      Margot Smart MD  Hematology/Oncology  [facility] at [facility]      Scribe Attestation  By signing my name below, I, Radha Sal, attest that this documentation has been prepared under the direction and in the presence of Margot Smart MD.

## 2025-04-08 ENCOUNTER — INFUSION (OUTPATIENT)
Dept: HEMATOLOGY/ONCOLOGY | Facility: CLINIC | Age: 70
End: 2025-04-08
Payer: MEDICARE

## 2025-04-08 ENCOUNTER — APPOINTMENT (OUTPATIENT)
Dept: HEMATOLOGY/ONCOLOGY | Facility: CLINIC | Age: 70
End: 2025-04-08
Payer: MEDICARE

## 2025-04-08 ENCOUNTER — LAB (OUTPATIENT)
Dept: LAB | Facility: CLINIC | Age: 70
End: 2025-04-08
Payer: MEDICARE

## 2025-04-08 VITALS
BODY MASS INDEX: 33.27 KG/M2 | WEIGHT: 199.96 LBS | SYSTOLIC BLOOD PRESSURE: 142 MMHG | DIASTOLIC BLOOD PRESSURE: 86 MMHG | HEART RATE: 86 BPM | RESPIRATION RATE: 16 BRPM | OXYGEN SATURATION: 98 % | TEMPERATURE: 96.8 F

## 2025-04-08 DIAGNOSIS — C18.1 MALIGNANT NEOPLASM OF APPENDIX (MULTI): ICD-10-CM

## 2025-04-08 DIAGNOSIS — C80.0 CARCINOMATOSIS (MULTI): ICD-10-CM

## 2025-04-08 DIAGNOSIS — T75.3XXA MOTION SICKNESS, INITIAL ENCOUNTER: ICD-10-CM

## 2025-04-08 LAB
ALBUMIN SERPL BCP-MCNC: 4.4 G/DL (ref 3.4–5)
ALP SERPL-CCNC: 131 U/L (ref 33–136)
ALT SERPL W P-5'-P-CCNC: 38 U/L (ref 7–45)
ANION GAP SERPL CALC-SCNC: 13 MMOL/L (ref 10–20)
AST SERPL W P-5'-P-CCNC: 42 U/L (ref 9–39)
BASOPHILS # BLD AUTO: 0.03 X10*3/UL (ref 0–0.1)
BASOPHILS NFR BLD AUTO: 0.7 %
BILIRUB SERPL-MCNC: 0.6 MG/DL (ref 0–1.2)
BUN SERPL-MCNC: 17 MG/DL (ref 6–23)
CALCIUM SERPL-MCNC: 9.8 MG/DL (ref 8.6–10.3)
CHLORIDE SERPL-SCNC: 105 MMOL/L (ref 98–107)
CO2 SERPL-SCNC: 25 MMOL/L (ref 21–32)
CREAT SERPL-MCNC: 0.71 MG/DL (ref 0.5–1.05)
EGFRCR SERPLBLD CKD-EPI 2021: >90 ML/MIN/1.73M*2
EOSINOPHIL # BLD AUTO: 0.08 X10*3/UL (ref 0–0.7)
EOSINOPHIL NFR BLD AUTO: 1.9 %
ERYTHROCYTE [DISTWIDTH] IN BLOOD BY AUTOMATED COUNT: 16.1 % (ref 11.5–14.5)
GLUCOSE SERPL-MCNC: 206 MG/DL (ref 74–99)
HCT VFR BLD AUTO: 43.1 % (ref 36–46)
HGB BLD-MCNC: 13.9 G/DL (ref 12–16)
IMM GRANULOCYTES # BLD AUTO: 0.02 X10*3/UL (ref 0–0.7)
IMM GRANULOCYTES NFR BLD AUTO: 0.5 % (ref 0–0.9)
LYMPHOCYTES # BLD AUTO: 1.24 X10*3/UL (ref 1.2–4.8)
LYMPHOCYTES NFR BLD AUTO: 29.9 %
MCH RBC QN AUTO: 30.8 PG (ref 26–34)
MCHC RBC AUTO-ENTMCNC: 32.3 G/DL (ref 32–36)
MCV RBC AUTO: 95 FL (ref 80–100)
MONOCYTES # BLD AUTO: 0.65 X10*3/UL (ref 0.1–1)
MONOCYTES NFR BLD AUTO: 15.7 %
NEUTROPHILS # BLD AUTO: 2.13 X10*3/UL (ref 1.2–7.7)
NEUTROPHILS NFR BLD AUTO: 51.3 %
PLATELET # BLD AUTO: 80 X10*3/UL (ref 150–450)
POTASSIUM SERPL-SCNC: 4 MMOL/L (ref 3.5–5.3)
PROT SERPL-MCNC: 7.4 G/DL (ref 6.4–8.2)
RBC # BLD AUTO: 4.52 X10*6/UL (ref 4–5.2)
SODIUM SERPL-SCNC: 139 MMOL/L (ref 136–145)
WBC # BLD AUTO: 4.2 X10*3/UL (ref 4.4–11.3)

## 2025-04-08 PROCEDURE — 3044F HG A1C LEVEL LT 7.0%: CPT | Performed by: INTERNAL MEDICINE

## 2025-04-08 PROCEDURE — 1123F ACP DISCUSS/DSCN MKR DOCD: CPT | Performed by: INTERNAL MEDICINE

## 2025-04-08 PROCEDURE — 99215 OFFICE O/P EST HI 40 MIN: CPT | Performed by: INTERNAL MEDICINE

## 2025-04-08 PROCEDURE — 1157F ADVNC CARE PLAN IN RCRD: CPT | Performed by: INTERNAL MEDICINE

## 2025-04-08 PROCEDURE — 3077F SYST BP >= 140 MM HG: CPT | Performed by: INTERNAL MEDICINE

## 2025-04-08 PROCEDURE — 1159F MED LIST DOCD IN RCRD: CPT | Performed by: INTERNAL MEDICINE

## 2025-04-08 PROCEDURE — 1126F AMNT PAIN NOTED NONE PRSNT: CPT | Performed by: INTERNAL MEDICINE

## 2025-04-08 PROCEDURE — 3079F DIAST BP 80-89 MM HG: CPT | Performed by: INTERNAL MEDICINE

## 2025-04-08 PROCEDURE — 85025 COMPLETE CBC W/AUTO DIFF WBC: CPT

## 2025-04-08 PROCEDURE — G2211 COMPLEX E/M VISIT ADD ON: HCPCS | Performed by: INTERNAL MEDICINE

## 2025-04-08 PROCEDURE — 84075 ASSAY ALKALINE PHOSPHATASE: CPT

## 2025-04-08 RX ORDER — SCOPOLAMINE 1 MG/3D
1 PATCH, EXTENDED RELEASE TRANSDERMAL
Qty: 10 PATCH | Refills: 0 | Status: SHIPPED | OUTPATIENT
Start: 2025-04-08 | End: 2025-06-07

## 2025-04-08 RX ORDER — OLANZAPINE 2.5 MG/1
2.5 TABLET, FILM COATED ORAL NIGHTLY
Qty: 7 TABLET | Refills: 0 | Status: SHIPPED | OUTPATIENT
Start: 2025-04-08 | End: 2025-05-08

## 2025-04-08 RX ORDER — GABAPENTIN 100 MG/1
300 CAPSULE ORAL NIGHTLY
Qty: 90 CAPSULE | Refills: 11 | Status: SHIPPED | OUTPATIENT
Start: 2025-04-08 | End: 2026-04-08

## 2025-04-08 ASSESSMENT — PAIN SCALES - GENERAL: PAINLEVEL_OUTOF10: 0-NO PAIN

## 2025-04-10 ENCOUNTER — APPOINTMENT (OUTPATIENT)
Dept: HEMATOLOGY/ONCOLOGY | Facility: CLINIC | Age: 70
End: 2025-04-10
Payer: MEDICARE

## 2025-04-14 NOTE — PROGRESS NOTES
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  She has done well with 12 cycles of FOLFOX and most recent CT scan shows a slight improvement in the perihepatic and pelvic ascites compared to her August scan.  There is still a small mass in the area of her appendix which I believe represents her primary.  I am concerned about the extent of disease that Dr. Greene found on her laparoscopy in September but I think it is reasonable to perform another laparoscopy now to assess her burden of disease and response to chemotherapy.  After considering the risk and benefits of diagnostic laparoscopy to evaluate for potential CRS HIPEC the patient elected to proceed.    I spent 60 minutes in the professional and overall care of this patient.    Rosas Ontiveros MD  Assistant Professor of Surgery  Division of Surgical Oncology  [phone]  [email or web address]      History Of Present Illness  Monica Musa is a 69 y.o. female referred by Margot Smart for carcinomatosis.    7/8/2022-TLH, BS P, SLN MD for endometrioid adenocarcinoma  8/26/2024-CT scan showed small bowel obstruction with transition point in a loop of pelvic ileum, small volume ascites, left pelvic nodule with nodularity on the left paracolic gutter.    9/18/2024-diagnostic laparoscopy showed peritoneal carcinomatosis with scattered white lesions on the small bowel loops, anterior abdominal wall and diaphragm, falciform, and liver surface.  [place] of lesions along thickened peritoneum in the lower midline of the anterior abdominal wall. Peritoneal nodule biopsy showed invasive adenocarcinoma with signet ring cell features, favor gastrointestinal origin  7/31/2024-colonoscopy was negative but technically difficult due to a fixed sigmoid and diverticulosis  10/2/24-EGD showed no cancer  10/3/2024-CA 19-9<4, CEA 1.4  10/16/2024-started FOLFOX  3/25/2025-cycle  12 FOLFOX    All other systems have been reviewed and are negative except as noted in the HPI.    I personally reviewed all necessary laboratory results, pathology reports, and radiologic images for this patient.    Past Medical History  She has a past medical history of Anemia, Body mass index (BMI) 36.0-36.9, adult (09/02/2021), Body mass index (BMI) 37.0-37.9, adult (02/05/2021), Cancer (Multi) (2021), Diabetes mellitus (Multi), DVT (deep venous thrombosis) (Multi) (2023), Encounter for general adult medical examination without abnormal findings, Fracture of wrist (2019), History of ovarian cancer, Hyperlipidemia, Hypertension, Morbid (severe) obesity due to excess calories (Multi) (08/26/2021), Morbid (severe) obesity due to excess calories (Multi) (06/09/2022), Personal history of other diseases of the respiratory system (01/03/2020), and Personal history of other drug therapy (10/11/2019).    Surgical History  She has a past surgical history that includes Other surgical history (Right, 06/09/2022); Other surgical history (07/25/2022); Other surgical history (07/25/2022); Other surgical history (07/25/2022); and Other surgical history (07/25/2022).     Social History  She reports that she has quit smoking. Her smoking use included cigarettes. She has a 5 pack-year smoking history. She has never used smokeless tobacco. She reports that she does not drink alcohol and does not use drugs.    Family History  Family History   Problem Relation Name Age of Onset    Diabetes Mother Concha Maher         Allergies  Penicillins and Bee venom protein (honey bee)     Last Recorded Vitals  There were no vitals taken for this visit.    Physical Exam  General: no acute distress, well-nourished  Eyes: intact EOM, no scleral icterus  ENT: hearing intact, no drainage  Respiratory: symmetric chest rise, no cough  Cardiovascular: intact distal pulses, no pitting edema  Abdominal: soft, nontender, nondistended  Musculoskeletal:  no deformities, intact strength  Integumentary: warm, dry, no lymphadenopathy  Neuro: no focal deficits, sensation intact  Psych: normal mood and affect       Relevant Results  Interpreted By: Aline Roca,  STUDY:  CT CHEST ABDOMEN PELVIS W IV CONTRAST; 4/3/2025 9:49 am    INDICATION:  Signs/Symptoms:stage IV appendiceal cancer completing 12 cycles  mFOLFOX    ,C80.0 Disseminated malignant neoplasm, unspecified (Multi),C18.1  Malignant neoplasm of appendix (Multi)    COMPARISON:  01/13/2025    ACCESSION NUMBER(S):  [id number]    ORDERING CLINICIAN:  HANG MENON    TECHNIQUE:  CT of the chest, abdomen, and pelvis was performed.  Contiguous axial images were obtained at 5 mm slice thickness  through the chest, and at 3 mm through the abdomen and pelvis.  Coronal and sagittal reconstructions at 3 mm slice thickness were  performed. 75 ML of Omnipaque 350 was administered intravenously  without immediate complication.    FINDINGS:  CHEST:    LUNG/PLEURA/LARGE AIRWAYS:  No air space opacity, focal consolidation, pleural  effusion/hemothorax, or pneumothorax are appreciated. There are no  discrete pulmonary nodules.    VESSELS:  The thoracic aorta is of normal course and caliber. Main pulmonary  artery and its branches are normal in caliber. Mild coronary artery  calcifications are seen.The study is not optimized for evaluation of  coronary arteries.    HEART:  The heart is normal in size. There is no pericardial effusion.    MEDIASTINUM AND FLY:  No pneumomediastinum, abnormal mediastinal fluid collection or  mediastinal hematoma are appreciated. No mediastinal, hilar or  biaxillary adenopathy is present. The esophagus is normal in course  and caliber.    CHEST WALL AND LOWER NECK:  Right-sided MediPort in place. No suspicious osseous lesions are  identified. Subcutaneous low-density lesion involving the right  posterolateral chest wall adjacent to the tip of the right scapula  measuring approximately 1.7 x 1.5  cm in size. This is slightly larger  when compared to previous exam, however, comparison is limited to  difference in slice positioning. Similar cutaneous thickening on the  left, sagittal image 128/151 measuring up to 1.3 cm in size. No  additional chest wall lesions are seen..    ABDOMEN:    LIVER:  No focal perfusion abnormality of the liver is appreciated to suggest  contusion or laceration. There is no subcapsular hematoma, no  perihepatic fluid collection.    GALLBLADDER:  Few small gallbladder calculi. No pericholecystic free fluid or fat  stranding.    BILE DUCTS:  The intahepatic and extrahepatic bile ducts are not dilated.    PANCREAS:  The pancreas appears unremarkable.    SPLEEN:  No parenchymal perfusion deficit of the spleen is appreciated to  suggest contusion or laceration. There is no subcapsular hematoma, no  perisplenic fluid collection.    ADRENAL GLANDS:  There is a 1.4 cm right adrenal gland nodule stable since prior. No  other abnormalities bilaterally.    KIDNEYS AND URETERS:  Low-attenuation lesion with interpolar cortex of the left kidney  posteriorly suggestive of a small cyst. This is too small to fully  characterize however. It measures approximately 10 mm in size,  stable. No hydroureteronephrosis or nephroureterolithiasis is present.    PELVIS:    BLADDER:  The urinary bladder appears within normal limits.    REPRODUCTIVE ORGANS:  The uterus is not seen and may have been surgically removed. No free  fluid or masses in the pelvis.    BOWEL:  The stomach is unremarkable. Large amount retained fecal material  throughout the colon. Numerous colonic diverticula but no CT evidence  for acute diverticulitis. Soft tissue thickening adjacent to the  cecal pole similar to previous exam measuring up to 10 mm in  thickness, stable. No additional masses are seen.    VESSELS:  The aorta and IVC are within normal limits. The principal  vasculature of the abdomen and pelvis is  patent.    PERITONEUM/RETROPERITONEUM/LYMPH NODES:  There is no evidence of intra- or retroperitoneal hematoma. There is  no free or loculated fluid collection, no free intraperitoneal air.  No abdominopelvic lymphadenopathy is present.    BONES AND ABDOMINAL WALL:  No evidence of acute fracture or dislocation of the included osseous  structures. No suspicious osseous lesions are identified. The  abdominal wall soft tissues appear normal.    IMPRESSION:  CHEST  1. Posterior chest wall cutaneous based lesions, nonspecific in  etiology. Further evaluation with targeted ultrasound or PET-CT  recommended.  2. No other significant intrathoracic abnormality to suggest  metastatic disease.  3. Additional detailed findings as above    ABDOMEN - PELVIS  1. Limited study without oral contrast.  2. There is a 1 cm soft tissue thickening near the cecal pole,  nonspecific in etiology and may represent previously mentioned  appendiceal mass. Further evaluation with PET/CT can be performed as  clinically warranted for better assessment.  3. Colonic diverticulosis but no CT evidence for acute diverticulitis.  4. Constipation.  5. Additional detailed findings as above.

## 2025-04-14 NOTE — H&P (VIEW-ONLY)
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  She has done well with 12 cycles of FOLFOX and most recent CT scan shows a slight improvement in the perihepatic and pelvic ascites compared to her August scan.  There is still a small mass in the area of her appendix which I believe represents her primary.  I am concerned about the extent of disease that Dr. Greene found on her laparoscopy in September but I think it is reasonable to perform another laparoscopy now to assess her burden of disease and response to chemotherapy.  After considering the risk and benefits of diagnostic laparoscopy to evaluate for potential CRS HIPEC the patient elected to proceed.    I spent 60 minutes in the professional and overall care of this patient.    Rosas Ontiveros MD  Assistant Professor of Surgery  Division of Surgical Oncology  [phone]  [email or web address]      History Of Present Illness  Monica Musa is a 69 y.o. female referred by Margot Smart for carcinomatosis.    7/8/2022-[facility], BS P, SLN MD for endometrioid adenocarcinoma  8/26/2024-CT scan showed small bowel obstruction with transition point in a loop of pelvic ileum, small volume ascites, left pelvic nodule with nodularity on the left paracolic gutter.    9/18/2024-diagnostic laparoscopy showed peritoneal carcinomatosis with scattered white lesions on the small bowel loops, anterior abdominal wall and diaphragm, falciform, and liver surface.  [place] of lesions along thickened peritoneum in the lower midline of the anterior abdominal wall. Peritoneal nodule biopsy showed invasive adenocarcinoma with signet ring cell features, favor gastrointestinal origin  7/31/2024-colonoscopy was negative but technically difficult due to a fixed sigmoid and diverticulosis  10/2/24-EGD showed no cancer  10/3/2024-CA 19-9<4, CEA 1.4  10/16/2024-started FOLFOX  3/25/2025-cycle  12 FOLFOX    All other systems have been reviewed and are negative except as noted in the HPI.    I personally reviewed all necessary laboratory results, pathology reports, and radiologic images for this patient.    Past Medical History  She has a past medical history of Anemia, Body mass index (BMI) 36.0-36.9, adult (09/02/2021), Body mass index (BMI) 37.0-37.9, adult (02/05/2021), Cancer (Multi) (2021), Diabetes mellitus (Multi), DVT (deep venous thrombosis) (Multi) (2023), Encounter for general adult medical examination without abnormal findings, Fracture of wrist (2019), History of ovarian cancer, Hyperlipidemia, Hypertension, Morbid (severe) obesity due to excess calories (Multi) (08/26/2021), Morbid (severe) obesity due to excess calories (Multi) (06/09/2022), Personal history of other diseases of the respiratory system (01/03/2020), and Personal history of other drug therapy (10/11/2019).    Surgical History  She has a past surgical history that includes Other surgical history (Right, 06/09/2022); Other surgical history (07/25/2022); Other surgical history (07/25/2022); Other surgical history (07/25/2022); and Other surgical history (07/25/2022).       Current Outpatient Medications:     aspirin 81 mg EC tablet, Take 1 tablet (81 mg) by mouth once daily., Disp: , Rfl:     docosahexaenoic acid/epa (FISH OIL ORAL), Take 1 tablet by mouth once daily., Disp: , Rfl:     empagliflozin (Jardiance) 10 mg, Take 1 tablet (10 mg) by mouth once daily., Disp: 90 tablet, Rfl: 1    gabapentin (Neurontin) 100 mg capsule, Take 3 capsules (300 mg) by mouth once daily at bedtime., Disp: 90 capsule, Rfl: 11    metFORMIN  mg 24 hr tablet, Take 1 tablet (500 mg) by mouth 2 times a day., Disp: 180 tablet, Rfl: 1    multivitamin tablet, Take 1 tablet by mouth once daily., Disp: , Rfl:     OLANZapine (ZyPREXA) 2.5 mg tablet, Take 1 tablet (2.5 mg) by mouth once daily at bedtime., Disp: 7 tablet, Rfl: 0    omeprazole OTC  (PriLOSEC OTC) 20 mg EC tablet, Take 1 tablet (20 mg) by mouth once daily as needed (acid reflux). Take before breakfast. Do not crush, chew, or split., Disp: , Rfl:     ondansetron (Zofran) 8 mg tablet, Take 1 tablet (8 mg) by mouth every 8 hours if needed for nausea or vomiting., Disp: 30 tablet, Rfl: 5    pantoprazole (ProtoNix) 20 mg EC tablet, Take 1 tablet (20 mg) by mouth once daily. ( Please take in am) (Patient not taking: Reported on 4/16/2025), Disp: 90 tablet, Rfl: 3    scopolamine (Transderm-Scop) 1 mg over 3 days patch 3 day, PLACE 1 PATCH OVER 72 HOURS ON THE SKIN EVERY 3RD DAY IF NEEDED (NAUSEA)., Disp: 10 patch, Rfl: 0    traMADol (Ultram) 50 mg tablet, Take 1 tablet (50 mg) by mouth every 6 hours if needed for severe pain (7 - 10). (Patient not taking: Reported on 4/16/2025), Disp: 12 tablet, Rfl: 0      Social History  She reports that she has quit smoking. Her smoking use included cigarettes. She has a 5 pack-year smoking history. She has never used smokeless tobacco. She reports that she does not drink alcohol and does not use drugs.    Family History  Family History   Problem Relation Name Age of Onset    Diabetes Mother Concha Maher         Allergies  Penicillins and Bee venom protein (honey bee)     Last Recorded Vitals  There were no vitals taken for this visit.    Physical Exam  General: no acute distress, well-nourished  Eyes: intact EOM, no scleral icterus  ENT: hearing intact, no drainage  Respiratory: symmetric chest rise, no cough  Cardiovascular: intact distal pulses, no pitting edema  Abdominal: soft, nontender, nondistended  Musculoskeletal: no deformities, intact strength  Integumentary: warm, dry, no lymphadenopathy  Neuro: no focal deficits, sensation intact  Psych: normal mood and affect       Relevant Results  Interpreted By: Aline Roca,  STUDY:  CT CHEST ABDOMEN PELVIS W IV CONTRAST; 4/3/2025 9:49 am    INDICATION:  Signs/Symptoms:stage IV appendiceal cancer completing  12 cycles  mFOLFOX    ,C80.0 Disseminated malignant neoplasm, unspecified (Multi),C18.1  Malignant neoplasm of appendix (Multi)    COMPARISON:  01/13/2025    ACCESSION NUMBER(S):  [id number]    ORDERING CLINICIAN:  HANG MENON    TECHNIQUE:  CT of the chest, abdomen, and pelvis was performed.  Contiguous axial images were obtained at 5 mm slice thickness  through the chest, and at 3 mm through the abdomen and pelvis.  Coronal and sagittal reconstructions at 3 mm slice thickness were  performed. 75 ML of Omnipaque 350 was administered intravenously  without immediate complication.    FINDINGS:  CHEST:    LUNG/PLEURA/LARGE AIRWAYS:  No air space opacity, focal consolidation, pleural  effusion/hemothorax, or pneumothorax are appreciated. There are no  discrete pulmonary nodules.    VESSELS:  The thoracic aorta is of normal course and caliber. Main pulmonary  artery and its branches are normal in caliber. Mild coronary artery  calcifications are seen.The study is not optimized for evaluation of  coronary arteries.    HEART:  The heart is normal in size. There is no pericardial effusion.    MEDIASTINUM AND FLY:  No pneumomediastinum, abnormal mediastinal fluid collection or  mediastinal hematoma are appreciated. No mediastinal, hilar or  biaxillary adenopathy is present. The esophagus is normal in course  and caliber.    CHEST WALL AND LOWER NECK:  Right-sided MediPort in place. No suspicious osseous lesions are  identified. Subcutaneous low-density lesion involving the right  posterolateral chest wall adjacent to the tip of the right scapula  measuring approximately 1.7 x 1.5 cm in size. This is slightly larger  when compared to previous exam, however, comparison is limited to  difference in slice positioning. Similar cutaneous thickening on the  left, sagittal image 128/151 measuring up to 1.3 cm in size. No  additional chest wall lesions are seen..    ABDOMEN:    LIVER:  No focal perfusion abnormality of the  liver is appreciated to suggest  contusion or laceration. There is no subcapsular hematoma, no  perihepatic fluid collection.    GALLBLADDER:  Few small gallbladder calculi. No pericholecystic free fluid or fat  stranding.    BILE DUCTS:  The intahepatic and extrahepatic bile ducts are not dilated.    PANCREAS:  The pancreas appears unremarkable.    SPLEEN:  No parenchymal perfusion deficit of the spleen is appreciated to  suggest contusion or laceration. There is no subcapsular hematoma, no  perisplenic fluid collection.    ADRENAL GLANDS:  There is a 1.4 cm right adrenal gland nodule stable since prior. No  other abnormalities bilaterally.    KIDNEYS AND URETERS:  Low-attenuation lesion with interpolar cortex of the left kidney  posteriorly suggestive of a small cyst. This is too small to fully  characterize however. It measures approximately 10 mm in size,  stable. No hydroureteronephrosis or nephroureterolithiasis is present.    PELVIS:    BLADDER:  The urinary bladder appears within normal limits.    REPRODUCTIVE ORGANS:  The uterus is not seen and may have been surgically removed. No free  fluid or masses in the pelvis.    BOWEL:  The stomach is unremarkable. Large amount retained fecal material  throughout the colon. Numerous colonic diverticula but no CT evidence  for acute diverticulitis. Soft tissue thickening adjacent to the  cecal pole similar to previous exam measuring up to 10 mm in  thickness, stable. No additional masses are seen.    VESSELS:  The aorta and IVC are within normal limits. The principal  vasculature of the abdomen and pelvis is patent.    PERITONEUM/RETROPERITONEUM/LYMPH NODES:  There is no evidence of intra- or retroperitoneal hematoma. There is  no free or loculated fluid collection, no free intraperitoneal air.  No abdominopelvic lymphadenopathy is present.    BONES AND ABDOMINAL WALL:  No evidence of acute fracture or dislocation of the included osseous  structures. No suspicious  osseous lesions are identified. The  abdominal wall soft tissues appear normal.    IMPRESSION:  CHEST  1. Posterior chest wall cutaneous based lesions, nonspecific in  etiology. Further evaluation with targeted ultrasound or PET-CT  recommended.  2. No other significant intrathoracic abnormality to suggest  metastatic disease.  3. Additional detailed findings as above    ABDOMEN - PELVIS  1. Limited study without oral contrast.  2. There is a 1 cm soft tissue thickening near the cecal pole,  nonspecific in etiology and may represent previously mentioned  appendiceal mass. Further evaluation with PET/CT can be performed as  clinically warranted for better assessment.  3. Colonic diverticulosis but no CT evidence for acute diverticulitis.  4. Constipation.  5. Additional detailed findings as above.

## 2025-04-15 ENCOUNTER — OFFICE VISIT (OUTPATIENT)
Dept: SURGICAL ONCOLOGY | Facility: CLINIC | Age: 70
End: 2025-04-15
Payer: MEDICARE

## 2025-04-15 VITALS
WEIGHT: 200.4 LBS | DIASTOLIC BLOOD PRESSURE: 81 MMHG | OXYGEN SATURATION: 97 % | BODY MASS INDEX: 33.35 KG/M2 | TEMPERATURE: 97 F | HEART RATE: 83 BPM | SYSTOLIC BLOOD PRESSURE: 142 MMHG | RESPIRATION RATE: 18 BRPM

## 2025-04-15 DIAGNOSIS — C76.2 ABDOMINAL CARCINOMATOSIS (MULTI): Primary | ICD-10-CM

## 2025-04-15 DIAGNOSIS — C80.0 CARCINOMATOSIS (MULTI): ICD-10-CM

## 2025-04-15 PROCEDURE — 3077F SYST BP >= 140 MM HG: CPT | Performed by: STUDENT IN AN ORGANIZED HEALTH CARE EDUCATION/TRAINING PROGRAM

## 2025-04-15 PROCEDURE — 3044F HG A1C LEVEL LT 7.0%: CPT | Performed by: STUDENT IN AN ORGANIZED HEALTH CARE EDUCATION/TRAINING PROGRAM

## 2025-04-15 PROCEDURE — 99214 OFFICE O/P EST MOD 30 MIN: CPT | Mod: 57 | Performed by: STUDENT IN AN ORGANIZED HEALTH CARE EDUCATION/TRAINING PROGRAM

## 2025-04-15 PROCEDURE — 3079F DIAST BP 80-89 MM HG: CPT | Performed by: STUDENT IN AN ORGANIZED HEALTH CARE EDUCATION/TRAINING PROGRAM

## 2025-04-15 PROCEDURE — 1036F TOBACCO NON-USER: CPT | Performed by: STUDENT IN AN ORGANIZED HEALTH CARE EDUCATION/TRAINING PROGRAM

## 2025-04-15 PROCEDURE — 1157F ADVNC CARE PLAN IN RCRD: CPT | Performed by: STUDENT IN AN ORGANIZED HEALTH CARE EDUCATION/TRAINING PROGRAM

## 2025-04-15 PROCEDURE — 1159F MED LIST DOCD IN RCRD: CPT | Performed by: STUDENT IN AN ORGANIZED HEALTH CARE EDUCATION/TRAINING PROGRAM

## 2025-04-15 PROCEDURE — 99204 OFFICE O/P NEW MOD 45 MIN: CPT | Performed by: STUDENT IN AN ORGANIZED HEALTH CARE EDUCATION/TRAINING PROGRAM

## 2025-04-15 PROCEDURE — 1123F ACP DISCUSS/DSCN MKR DOCD: CPT | Performed by: STUDENT IN AN ORGANIZED HEALTH CARE EDUCATION/TRAINING PROGRAM

## 2025-04-15 PROCEDURE — 1126F AMNT PAIN NOTED NONE PRSNT: CPT | Performed by: STUDENT IN AN ORGANIZED HEALTH CARE EDUCATION/TRAINING PROGRAM

## 2025-04-15 ASSESSMENT — COLUMBIA-SUICIDE SEVERITY RATING SCALE - C-SSRS
2. HAVE YOU ACTUALLY HAD ANY THOUGHTS OF KILLING YOURSELF?: NO
1. IN THE PAST MONTH, HAVE YOU WISHED YOU WERE DEAD OR WISHED YOU COULD GO TO SLEEP AND NOT WAKE UP?: NO
6. HAVE YOU EVER DONE ANYTHING, STARTED TO DO ANYTHING, OR PREPARED TO DO ANYTHING TO END YOUR LIFE?: NO

## 2025-04-15 ASSESSMENT — PAIN SCALES - GENERAL: PAINLEVEL_OUTOF10: 0-NO PAIN

## 2025-04-15 NOTE — LETTER
April 15, 2025     Margot Smart MD  [address]  [facility] [address]    Patient: Monica Musa   YOB: 1955   Date of Visit: 4/15/2025       Dear Dr. Margot Smart MD:    Thank you for referring Monica Musa to me for evaluation. Below are my notes for this consultation.  If you have questions, please do not hesitate to call me. I look forward to following your patient along with you.       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  She has done well with 12 cycles of FOLFOX and most recent CT scan shows a slight improvement in the perihepatic and pelvic ascites compared to her August scan.  There is still a small mass in the area of her appendix which I believe represents her primary.  I am concerned about the extent of disease that Dr. Greene found on her laparoscopy in September but I think it is reasonable to perform another laparoscopy now to assess her burden of disease and response to chemotherapy.  After considering the risk and benefits of diagnostic laparoscopy to evaluate for potential CRS HIPEC the patient elected to proceed.    I spent 60 minutes in the professional and overall care of this patient.    Rosas Ontiveros MD  Assistant Professor of Surgery  Division of Surgical Oncology  [phone]  [email or web address]      History Of Present Illness  Monica Musa is a 69 y.o. female referred by Margot Smart for carcinomatosis.    7/8/2022-[facility], BS P, SLN MD for endometrioid adenocarcinoma  8/26/2024-CT scan showed small bowel obstruction with transition point in a loop of pelvic ileum, small volume ascites, left pelvic nodule with nodularity on the left paracolic gutter.    9/18/2024-diagnostic laparoscopy  showed peritoneal carcinomatosis with scattered white lesions on the small bowel loops, anterior abdominal wall and diaphragm, falciform, and liver surface.  [place] of lesions along thickened peritoneum in the lower midline of the anterior abdominal wall. Peritoneal nodule biopsy showed invasive adenocarcinoma with signet ring cell features, favor gastrointestinal origin  7/31/2024-colonoscopy was negative but technically difficult due to a fixed sigmoid and diverticulosis  10/2/24-EGD showed no cancer  10/3/2024-CA 19-9<4, CEA 1.4  10/16/2024-started FOLFOX  3/25/2025-cycle 12 FOLFOX    All other systems have been reviewed and are negative except as noted in the HPI.    I personally reviewed all necessary laboratory results, pathology reports, and radiologic images for this patient.    Past Medical History  She has a past medical history of Anemia, Body mass index (BMI) 36.0-36.9, adult (09/02/2021), Body mass index (BMI) 37.0-37.9, adult (02/05/2021), Cancer (Multi) (2021), Diabetes mellitus (Multi), DVT (deep venous thrombosis) (Multi) (2023), Encounter for general adult medical examination without abnormal findings, Fracture of wrist (2019), History of ovarian cancer, Hyperlipidemia, Hypertension, Morbid (severe) obesity due to excess calories (Multi) (08/26/2021), Morbid (severe) obesity due to excess calories (Multi) (06/09/2022), Personal history of other diseases of the respiratory system (01/03/2020), and Personal history of other drug therapy (10/11/2019).    Surgical History  She has a past surgical history that includes Other surgical history (Right, 06/09/2022); Other surgical history (07/25/2022); Other surgical history (07/25/2022); Other surgical history (07/25/2022); and Other surgical history (07/25/2022).     Social History  She reports that she has quit smoking. Her smoking use included cigarettes. She has a 5 pack-year smoking history. She has never used smokeless tobacco. She reports that  she does not drink alcohol and does not use drugs.    Family History  Family History   Problem Relation Name Age of Onset   • Diabetes Mother Concha Maher         Allergies  Penicillins and Bee venom protein (honey bee)     Last Recorded Vitals  There were no vitals taken for this visit.    Physical Exam  General: no acute distress, well-nourished  Eyes: intact EOM, no scleral icterus  ENT: hearing intact, no drainage  Respiratory: symmetric chest rise, no cough  Cardiovascular: intact distal pulses, no pitting edema  Abdominal: soft, nontender, nondistended  Musculoskeletal: no deformities, intact strength  Integumentary: warm, dry, no lymphadenopathy  Neuro: no focal deficits, sensation intact  Psych: normal mood and affect       Relevant Results  Interpreted By: Aline Roca,  STUDY:  CT CHEST ABDOMEN PELVIS W IV CONTRAST; 4/3/2025 9:49 am    INDICATION:  Signs/Symptoms:stage IV appendiceal cancer completing 12 cycles  mFOLFOX    ,C80.0 Disseminated malignant neoplasm, unspecified (Multi),C18.1  Malignant neoplasm of appendix (Multi)    COMPARISON:  01/13/2025    ACCESSION NUMBER(S):  [id number]    ORDERING CLINICIAN:  HANG MENON    TECHNIQUE:  CT of the chest, abdomen, and pelvis was performed.  Contiguous axial images were obtained at 5 mm slice thickness  through the chest, and at 3 mm through the abdomen and pelvis.  Coronal and sagittal reconstructions at 3 mm slice thickness were  performed. 75 ML of Omnipaque 350 was administered intravenously  without immediate complication.    FINDINGS:  CHEST:    LUNG/PLEURA/LARGE AIRWAYS:  No air space opacity, focal consolidation, pleural  effusion/hemothorax, or pneumothorax are appreciated. There are no  discrete pulmonary nodules.    VESSELS:  The thoracic aorta is of normal course and caliber. Main pulmonary  artery and its branches are normal in caliber. Mild coronary artery  calcifications are seen.The study is not optimized for evaluation  of  coronary arteries.    HEART:  The heart is normal in size. There is no pericardial effusion.    MEDIASTINUM AND FLY:  No pneumomediastinum, abnormal mediastinal fluid collection or  mediastinal hematoma are appreciated. No mediastinal, hilar or  biaxillary adenopathy is present. The esophagus is normal in course  and caliber.    CHEST WALL AND LOWER NECK:  Right-sided MediPort in place. No suspicious osseous lesions are  identified. Subcutaneous low-density lesion involving the right  posterolateral chest wall adjacent to the tip of the right scapula  measuring approximately 1.7 x 1.5 cm in size. This is slightly larger  when compared to previous exam, however, comparison is limited to  difference in slice positioning. Similar cutaneous thickening on the  left, sagittal image 128/151 measuring up to 1.3 cm in size. No  additional chest wall lesions are seen..    ABDOMEN:    LIVER:  No focal perfusion abnormality of the liver is appreciated to suggest  contusion or laceration. There is no subcapsular hematoma, no  perihepatic fluid collection.    GALLBLADDER:  Few small gallbladder calculi. No pericholecystic free fluid or fat  stranding.    BILE DUCTS:  The intahepatic and extrahepatic bile ducts are not dilated.    PANCREAS:  The pancreas appears unremarkable.    SPLEEN:  No parenchymal perfusion deficit of the spleen is appreciated to  suggest contusion or laceration. There is no subcapsular hematoma, no  perisplenic fluid collection.    ADRENAL GLANDS:  There is a 1.4 cm right adrenal gland nodule stable since prior. No  other abnormalities bilaterally.    KIDNEYS AND URETERS:  Low-attenuation lesion with interpolar cortex of the left kidney  posteriorly suggestive of a small cyst. This is too small to fully  characterize however. It measures approximately 10 mm in size,  stable. No hydroureteronephrosis or nephroureterolithiasis is present.    PELVIS:    BLADDER:  The urinary bladder appears within normal  limits.    REPRODUCTIVE ORGANS:  The uterus is not seen and may have been surgically removed. No free  fluid or masses in the pelvis.    BOWEL:  The stomach is unremarkable. Large amount retained fecal material  throughout the colon. Numerous colonic diverticula but no CT evidence  for acute diverticulitis. Soft tissue thickening adjacent to the  cecal pole similar to previous exam measuring up to 10 mm in  thickness, stable. No additional masses are seen.    VESSELS:  The aorta and IVC are within normal limits. The principal  vasculature of the abdomen and pelvis is patent.    PERITONEUM/RETROPERITONEUM/LYMPH NODES:  There is no evidence of intra- or retroperitoneal hematoma. There is  no free or loculated fluid collection, no free intraperitoneal air.  No abdominopelvic lymphadenopathy is present.    BONES AND ABDOMINAL WALL:  No evidence of acute fracture or dislocation of the included osseous  structures. No suspicious osseous lesions are identified. The  abdominal wall soft tissues appear normal.    IMPRESSION:  CHEST  1. Posterior chest wall cutaneous based lesions, nonspecific in  etiology. Further evaluation with targeted ultrasound or PET-CT  recommended.  2. No other significant intrathoracic abnormality to suggest  metastatic disease.  3. Additional detailed findings as above    ABDOMEN - PELVIS  1. Limited study without oral contrast.  2. There is a 1 cm soft tissue thickening near the cecal pole,  nonspecific in etiology and may represent previously mentioned  appendiceal mass. Further evaluation with PET/CT can be performed as  clinically warranted for better assessment.  3. Colonic diverticulosis but no CT evidence for acute diverticulitis.  4. Constipation.  5. Additional detailed findings as above.

## 2025-04-16 NOTE — PREPROCEDURE INSTRUCTIONS

## 2025-04-27 ENCOUNTER — ANESTHESIA EVENT (OUTPATIENT)
Dept: OPERATING ROOM | Facility: HOSPITAL | Age: 70
End: 2025-04-27
Payer: MEDICARE

## 2025-04-27 PROBLEM — N20.0 NEPHROLITHIASIS: Status: ACTIVE | Noted: 2025-04-27

## 2025-04-27 PROBLEM — D69.6 THROMBOCYTOPENIA (CMS-HCC): Status: ACTIVE | Noted: 2025-04-27

## 2025-04-27 NOTE — ANESTHESIA PREPROCEDURE EVALUATION
Patient: Monica Musa    Procedure Information       Date/Time: 04/28/25 0800    Procedure: Diagnostic laparoscopy - DIABETIC, MEDIPORT    Location: [facility] OR  / [facility] OR    Surgeons: Rosas Ontiveros MD            Relevant Problems   Anesthesia (within normal limits)      Cardiac   (+) Hyperlipidemia   (+) Hypertension      Neuro   (+) Carpal tunnel syndrome, right upper limb      GI   (+) Malignant neoplasm of appendix (Multi)      /Renal   (+) Nephrolithiasis      Liver   (+) Malignant neoplasm of appendix (Multi)      Endocrine   (+) Class 2 severe obesity due to excess calories with serious comorbidity and body mass index (BMI) of 35.0 to 35.9 in adult   (+) Type 2 diabetes mellitus without complication      Hematology   (+) Anemia   (+) DVT (deep venous thrombosis) (Multi)   (+) Thrombocytopenia (CMS-HCC)      Musculoskeletal   (+) Carpal tunnel syndrome, right upper limb      GYN   (+) Endometrial carcinoma      Digestive   (+) Abdominal carcinomatosis (Multi)   (+) 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 : N  Date of Last Liquid: 04/27/25  Time of Last Liquid: 2330  Date of Last Solid: 04/27/25  Time of Last Solid: 1800  Last Intake Type: Clear fluids  Time of Last Void: 0430         Physical Exam    Airway  Mallampati: III  TM distance: >3 FB  Neck ROM: full  Mouth opening: 3 or more finger widths     Cardiovascular   Rhythm: regular  Rate: normal     Dental - normal exam     Pulmonary - normal exam   Abdominal - normal exam           Anesthesia Plan    History of general anesthesia?: yes  History of complications of general anesthesia?: no    ASA 3     general     The patient is not a current smoker.    Anesthetic plan and risks discussed with patient.    Plan discussed with  CRNA.

## 2025-04-28 ENCOUNTER — ANESTHESIA (OUTPATIENT)
Dept: OPERATING ROOM | Facility: HOSPITAL | Age: 70
End: 2025-04-28
Payer: MEDICARE

## 2025-04-28 ENCOUNTER — HOSPITAL ENCOUNTER (OUTPATIENT)
Facility: HOSPITAL | Age: 70
Setting detail: OUTPATIENT SURGERY
Discharge: HOME | End: 2025-04-28
Attending: STUDENT IN AN ORGANIZED HEALTH CARE EDUCATION/TRAINING PROGRAM | Admitting: STUDENT IN AN ORGANIZED HEALTH CARE EDUCATION/TRAINING PROGRAM
Payer: MEDICARE

## 2025-04-28 VITALS
TEMPERATURE: 97.5 F | BODY MASS INDEX: 33.24 KG/M2 | HEIGHT: 65 IN | RESPIRATION RATE: 16 BRPM | HEART RATE: 77 BPM | SYSTOLIC BLOOD PRESSURE: 125 MMHG | DIASTOLIC BLOOD PRESSURE: 58 MMHG | OXYGEN SATURATION: 95 % | WEIGHT: 199.52 LBS

## 2025-04-28 DIAGNOSIS — C80.0 CARCINOMATOSIS (MULTI): Primary | ICD-10-CM

## 2025-04-28 DIAGNOSIS — C18.1 MALIGNANT NEOPLASM OF APPENDIX (MULTI): ICD-10-CM

## 2025-04-28 DIAGNOSIS — C76.2 ABDOMINAL CARCINOMATOSIS (MULTI): Primary | ICD-10-CM

## 2025-04-28 LAB
GLUCOSE BLD MANUAL STRIP-MCNC: 164 MG/DL (ref 74–99)
GLUCOSE BLD MANUAL STRIP-MCNC: 174 MG/DL (ref 74–99)

## 2025-04-28 PROCEDURE — 2720000007 HC OR 272 NO HCPCS: Performed by: STUDENT IN AN ORGANIZED HEALTH CARE EDUCATION/TRAINING PROGRAM

## 2025-04-28 PROCEDURE — 3700000002 HC GENERAL ANESTHESIA TIME - EACH INCREMENTAL 1 MINUTE: Performed by: STUDENT IN AN ORGANIZED HEALTH CARE EDUCATION/TRAINING PROGRAM

## 2025-04-28 PROCEDURE — 3600000008 HC OR TIME - EACH INCREMENTAL 1 MINUTE - PROCEDURE LEVEL THREE: Performed by: STUDENT IN AN ORGANIZED HEALTH CARE EDUCATION/TRAINING PROGRAM

## 2025-04-28 PROCEDURE — 82947 ASSAY GLUCOSE BLOOD QUANT: CPT

## 2025-04-28 PROCEDURE — 49321 LAPAROSCOPY BIOPSY: CPT | Performed by: STUDENT IN AN ORGANIZED HEALTH CARE EDUCATION/TRAINING PROGRAM

## 2025-04-28 PROCEDURE — 2500000005 HC RX 250 GENERAL PHARMACY W/O HCPCS: Mod: JZ | Performed by: STUDENT IN AN ORGANIZED HEALTH CARE EDUCATION/TRAINING PROGRAM

## 2025-04-28 PROCEDURE — 7100000002 HC RECOVERY ROOM TIME - EACH INCREMENTAL 1 MINUTE: Performed by: STUDENT IN AN ORGANIZED HEALTH CARE EDUCATION/TRAINING PROGRAM

## 2025-04-28 PROCEDURE — 7100000009 HC PHASE TWO TIME - INITIAL BASE CHARGE: Performed by: STUDENT IN AN ORGANIZED HEALTH CARE EDUCATION/TRAINING PROGRAM

## 2025-04-28 PROCEDURE — 7100000010 HC PHASE TWO TIME - EACH INCREMENTAL 1 MINUTE: Performed by: STUDENT IN AN ORGANIZED HEALTH CARE EDUCATION/TRAINING PROGRAM

## 2025-04-28 PROCEDURE — 7100000001 HC RECOVERY ROOM TIME - INITIAL BASE CHARGE: Performed by: STUDENT IN AN ORGANIZED HEALTH CARE EDUCATION/TRAINING PROGRAM

## 2025-04-28 PROCEDURE — 2500000004 HC RX 250 GENERAL PHARMACY W/ HCPCS (ALT 636 FOR OP/ED): Mod: JZ | Performed by: NURSE ANESTHETIST, CERTIFIED REGISTERED

## 2025-04-28 PROCEDURE — 3600000003 HC OR TIME - INITIAL BASE CHARGE - PROCEDURE LEVEL THREE: Performed by: STUDENT IN AN ORGANIZED HEALTH CARE EDUCATION/TRAINING PROGRAM

## 2025-04-28 PROCEDURE — 3700000001 HC GENERAL ANESTHESIA TIME - INITIAL BASE CHARGE: Performed by: STUDENT IN AN ORGANIZED HEALTH CARE EDUCATION/TRAINING PROGRAM

## 2025-04-28 PROCEDURE — 2500000004 HC RX 250 GENERAL PHARMACY W/ HCPCS (ALT 636 FOR OP/ED): Mod: JZ | Performed by: STUDENT IN AN ORGANIZED HEALTH CARE EDUCATION/TRAINING PROGRAM

## 2025-04-28 RX ORDER — PROPOFOL 10 MG/ML
INJECTION, EMULSION INTRAVENOUS AS NEEDED
Status: DISCONTINUED | OUTPATIENT
Start: 2025-04-28 | End: 2025-04-28

## 2025-04-28 RX ORDER — ALBUTEROL SULFATE 0.83 MG/ML
3 SOLUTION RESPIRATORY (INHALATION) AS NEEDED
OUTPATIENT
Start: 2025-05-12

## 2025-04-28 RX ORDER — PROCHLORPERAZINE MALEATE 10 MG
10 TABLET ORAL EVERY 6 HOURS PRN
OUTPATIENT
Start: 2025-05-12

## 2025-04-28 RX ORDER — ACETAMINOPHEN 325 MG/1
650 TABLET ORAL EVERY 4 HOURS PRN
Status: DISCONTINUED | OUTPATIENT
Start: 2025-04-28 | End: 2025-04-28 | Stop reason: HOSPADM

## 2025-04-28 RX ORDER — FAMOTIDINE 10 MG/ML
20 INJECTION, SOLUTION INTRAVENOUS ONCE AS NEEDED
OUTPATIENT
Start: 2025-05-12

## 2025-04-28 RX ORDER — SODIUM CHLORIDE 0.9 G/100ML
INJECTION, SOLUTION IRRIGATION AS NEEDED
Status: DISCONTINUED | OUTPATIENT
Start: 2025-04-28 | End: 2025-04-28 | Stop reason: HOSPADM

## 2025-04-28 RX ORDER — PHENYLEPHRINE HCL IN 0.9% NACL 1 MG/10 ML
SYRINGE (ML) INTRAVENOUS AS NEEDED
Status: DISCONTINUED | OUTPATIENT
Start: 2025-04-28 | End: 2025-04-28

## 2025-04-28 RX ORDER — METOCLOPRAMIDE HYDROCHLORIDE 5 MG/ML
10 INJECTION INTRAMUSCULAR; INTRAVENOUS ONCE AS NEEDED
Status: DISCONTINUED | OUTPATIENT
Start: 2025-04-28 | End: 2025-04-28 | Stop reason: HOSPADM

## 2025-04-28 RX ORDER — LIDOCAINE HYDROCHLORIDE 20 MG/ML
INJECTION, SOLUTION INFILTRATION; PERINEURAL AS NEEDED
Status: DISCONTINUED | OUTPATIENT
Start: 2025-04-28 | End: 2025-04-28

## 2025-04-28 RX ORDER — ROCURONIUM BROMIDE 10 MG/ML
INJECTION, SOLUTION INTRAVENOUS AS NEEDED
Status: DISCONTINUED | OUTPATIENT
Start: 2025-04-28 | End: 2025-04-28

## 2025-04-28 RX ORDER — FENTANYL CITRATE 50 UG/ML
12.5 INJECTION, SOLUTION INTRAMUSCULAR; INTRAVENOUS EVERY 5 MIN PRN
Status: DISCONTINUED | OUTPATIENT
Start: 2025-04-28 | End: 2025-04-28 | Stop reason: HOSPADM

## 2025-04-28 RX ORDER — ALBUTEROL SULFATE 0.83 MG/ML
2.5 SOLUTION RESPIRATORY (INHALATION) ONCE AS NEEDED
Status: DISCONTINUED | OUTPATIENT
Start: 2025-04-28 | End: 2025-04-28 | Stop reason: HOSPADM

## 2025-04-28 RX ORDER — MIDAZOLAM HYDROCHLORIDE 1 MG/ML
INJECTION, SOLUTION INTRAMUSCULAR; INTRAVENOUS AS NEEDED
Status: DISCONTINUED | OUTPATIENT
Start: 2025-04-28 | End: 2025-04-28

## 2025-04-28 RX ORDER — LABETALOL HYDROCHLORIDE 5 MG/ML
5 INJECTION, SOLUTION INTRAVENOUS ONCE AS NEEDED
Status: DISCONTINUED | OUTPATIENT
Start: 2025-04-28 | End: 2025-04-28 | Stop reason: HOSPADM

## 2025-04-28 RX ORDER — ONDANSETRON HYDROCHLORIDE 2 MG/ML
INJECTION, SOLUTION INTRAVENOUS AS NEEDED
Status: DISCONTINUED | OUTPATIENT
Start: 2025-04-28 | End: 2025-04-28

## 2025-04-28 RX ORDER — OXYCODONE HYDROCHLORIDE 5 MG/1
5 TABLET ORAL EVERY 4 HOURS PRN
Status: DISCONTINUED | OUTPATIENT
Start: 2025-04-28 | End: 2025-04-28 | Stop reason: HOSPADM

## 2025-04-28 RX ORDER — EPINEPHRINE 0.3 MG/.3ML
0.3 INJECTION SUBCUTANEOUS EVERY 5 MIN PRN
OUTPATIENT
Start: 2025-05-12

## 2025-04-28 RX ORDER — OXYCODONE HYDROCHLORIDE 5 MG/1
10 TABLET ORAL EVERY 4 HOURS PRN
Status: DISCONTINUED | OUTPATIENT
Start: 2025-04-28 | End: 2025-04-28 | Stop reason: HOSPADM

## 2025-04-28 RX ORDER — ONDANSETRON HYDROCHLORIDE 2 MG/ML
4 INJECTION, SOLUTION INTRAVENOUS ONCE AS NEEDED
Status: DISCONTINUED | OUTPATIENT
Start: 2025-04-28 | End: 2025-04-28 | Stop reason: HOSPADM

## 2025-04-28 RX ORDER — ONDANSETRON HYDROCHLORIDE 2 MG/ML
8 INJECTION, SOLUTION INTRAVENOUS ONCE
OUTPATIENT
Start: 2025-05-12

## 2025-04-28 RX ORDER — SODIUM CHLORIDE, SODIUM LACTATE, POTASSIUM CHLORIDE, CALCIUM CHLORIDE 600; 310; 30; 20 MG/100ML; MG/100ML; MG/100ML; MG/100ML
50 INJECTION, SOLUTION INTRAVENOUS CONTINUOUS
Status: DISCONTINUED | OUTPATIENT
Start: 2025-04-28 | End: 2025-04-28 | Stop reason: HOSPADM

## 2025-04-28 RX ORDER — DIPHENHYDRAMINE HYDROCHLORIDE 50 MG/ML
50 INJECTION, SOLUTION INTRAMUSCULAR; INTRAVENOUS AS NEEDED
OUTPATIENT
Start: 2025-05-12

## 2025-04-28 RX ORDER — FLUOROURACIL 50 MG/ML
400 INJECTION, SOLUTION INTRAVENOUS ONCE
OUTPATIENT
Start: 2025-05-12

## 2025-04-28 RX ORDER — FENTANYL CITRATE 50 UG/ML
INJECTION, SOLUTION INTRAMUSCULAR; INTRAVENOUS AS NEEDED
Status: DISCONTINUED | OUTPATIENT
Start: 2025-04-28 | End: 2025-04-28

## 2025-04-28 RX ORDER — HYDRALAZINE HYDROCHLORIDE 20 MG/ML
5 INJECTION INTRAMUSCULAR; INTRAVENOUS EVERY 30 MIN PRN
Status: DISCONTINUED | OUTPATIENT
Start: 2025-04-28 | End: 2025-04-28 | Stop reason: HOSPADM

## 2025-04-28 RX ORDER — LIDOCAINE HYDROCHLORIDE 10 MG/ML
0.1 INJECTION, SOLUTION EPIDURAL; INFILTRATION; INTRACAUDAL; PERINEURAL ONCE
Status: DISCONTINUED | OUTPATIENT
Start: 2025-04-28 | End: 2025-04-28 | Stop reason: HOSPADM

## 2025-04-28 RX ORDER — FENTANYL CITRATE 50 UG/ML
25 INJECTION, SOLUTION INTRAMUSCULAR; INTRAVENOUS EVERY 5 MIN PRN
Status: DISCONTINUED | OUTPATIENT
Start: 2025-04-28 | End: 2025-04-28 | Stop reason: HOSPADM

## 2025-04-28 RX ORDER — VANCOMYCIN/0.9 % SOD CHLORIDE 1.5G/250ML
1500 PLASTIC BAG, INJECTION (ML) INTRAVENOUS ONCE
Status: COMPLETED | OUTPATIENT
Start: 2025-04-28 | End: 2025-04-28

## 2025-04-28 RX ORDER — SODIUM CHLORIDE, SODIUM LACTATE, POTASSIUM CHLORIDE, CALCIUM CHLORIDE 600; 310; 30; 20 MG/100ML; MG/100ML; MG/100ML; MG/100ML
100 INJECTION, SOLUTION INTRAVENOUS CONTINUOUS
Status: DISCONTINUED | OUTPATIENT
Start: 2025-04-28 | End: 2025-04-28 | Stop reason: HOSPADM

## 2025-04-28 RX ORDER — PROCHLORPERAZINE EDISYLATE 5 MG/ML
10 INJECTION INTRAMUSCULAR; INTRAVENOUS EVERY 6 HOURS PRN
OUTPATIENT
Start: 2025-05-12

## 2025-04-28 RX ADMIN — PROPOFOL 200 MG: 10 INJECTION, EMULSION INTRAVENOUS at 08:19

## 2025-04-28 RX ADMIN — Medication 100 MCG: at 08:33

## 2025-04-28 RX ADMIN — SUGAMMADEX 200 MG: 100 INJECTION, SOLUTION INTRAVENOUS at 08:53

## 2025-04-28 RX ADMIN — DEXAMETHASONE SODIUM PHOSPHATE 4 MG: 4 INJECTION, SOLUTION INTRAMUSCULAR; INTRAVENOUS at 08:32

## 2025-04-28 RX ADMIN — FENTANYL CITRATE 50 MCG: 50 INJECTION, SOLUTION INTRAMUSCULAR; INTRAVENOUS at 08:42

## 2025-04-28 RX ADMIN — FENTANYL CITRATE 50 MCG: 50 INJECTION, SOLUTION INTRAMUSCULAR; INTRAVENOUS at 08:19

## 2025-04-28 RX ADMIN — LIDOCAINE HYDROCHLORIDE 100 MG: 20 INJECTION, SOLUTION INFILTRATION; PERINEURAL at 08:19

## 2025-04-28 RX ADMIN — Medication 1500 MG: at 08:02

## 2025-04-28 RX ADMIN — ONDANSETRON 4 MG: 2 INJECTION, SOLUTION INTRAMUSCULAR; INTRAVENOUS at 08:47

## 2025-04-28 RX ADMIN — SODIUM CHLORIDE, SODIUM LACTATE, POTASSIUM CHLORIDE, AND CALCIUM CHLORIDE 50 ML/HR: .6; .31; .03; .02 INJECTION, SOLUTION INTRAVENOUS at 06:58

## 2025-04-28 RX ADMIN — ROCURONIUM BROMIDE 60 MG: 10 SOLUTION INTRAVENOUS at 08:19

## 2025-04-28 RX ADMIN — MIDAZOLAM 2 MG: 1 INJECTION INTRAMUSCULAR; INTRAVENOUS at 08:11

## 2025-04-28 SDOH — HEALTH STABILITY: MENTAL HEALTH: CURRENT SMOKER: 0

## 2025-04-28 ASSESSMENT — PAIN - FUNCTIONAL ASSESSMENT
PAIN_FUNCTIONAL_ASSESSMENT: UNABLE TO SELF-REPORT
PAIN_FUNCTIONAL_ASSESSMENT: 0-10

## 2025-04-28 ASSESSMENT — PAIN SCALES - GENERAL
PAINLEVEL_OUTOF10: 0 - NO PAIN
PAIN_LEVEL: 0
PAINLEVEL_OUTOF10: 0 - NO PAIN

## 2025-04-28 NOTE — ANESTHESIA PROCEDURE NOTES
Airway  Date/Time: 4/28/2025 8:22 AM  Reason: elective    Airway not difficult    Staffing  Performed: CRNA   Authorized by: Mariah Medina MD    Performed by: ZAYDA Gutierres-MAN  Patient location during procedure: OR    Patient Condition  Indications for airway management: anesthesia and airway protection  Patient position: sniffing  MILS maintained throughout  Planned trial extubation  Sedation level: deep     Final Airway Details   Preoxygenated: yes  Final airway type: endotracheal airway  Successful airway: ETT  Cuffed: yes   Successful intubation technique: video laryngoscopy  Adjuncts used in placement: intubating stylet  Endotracheal tube insertion site: oral  Blade: Claudia  Blade size: #3  ETT size (mm): 7.0  Cormack-Lehane Classification: grade I - full view of glottis  Placement verified by: chest auscultation and capnometry   Cuff volume (mL): 7  Measured from: gums  ETT to gums (cm): 22  Number of attempts at approach: 1    Additional Comments  atraumatic

## 2025-04-28 NOTE — OP NOTE
Diagnostic laparoscopy Operative Note     Date: 2025  OR Location: [facility]    Name: Monica Musa, : 1955, Age: 69 y.o., MRN: [id number], Sex: female    Diagnosis  Pre-op Diagnosis      * Abdominal carcinomatosis (Multi) [C76.2] Post-op Diagnosis     * Abdominal carcinomatosis (Multi) [C76.2]     Procedures  Diagnostic laparoscopy  81264 - [place] LAPAROSCOPY SURG W/BX SINGLE/MULTIPLE      Surgeons      * Rosas Ontiveros - Primary    Resident/Fellow/Other Assistant:  Surgeons and Role:  * No surgeons found with a matching role *    Staff:   Circulator: Sindi Deleon Person: Janey Deleon Person: Meagan    Anesthesia Staff: Anesthesiologist: Mariah Medina MD  CRNA: ZAYDA Gutierres-CRNA  SRNA: Roseann Silva RN    Procedure Summary  Anesthesia: Monitor Anesthesia Care  ASA: III  Estimated Blood Loss: 1mL  Intra-op Medications:   Administrations occurring from 0800 to 0915 on 25:   Medication Name Total Dose   sodium chloride 0.9 % irrigation solution 1,000 mL   BUPivacaine HCl (Marcaine) 0.5 % (5 mg/mL) 27 mL, lidocaine-epinephrine (Xylocaine W/EPI) 1 %-1:100,000 27 mL syringe 3 mL   vancomycin 1,500 mg in NS  mL 405.52 mL   dexAMETHasone (Decadron) 4 mg/mL 4 mg   fentaNYL PF 0.05 mg/mL 100 mcg   lidocaine (Xylocaine) injection 2 % 100 mg   midazolam (Versed) 1 mg/1 mL 2 mg   ondansetron 2 mg/mL 4 mg   phenylephrine 100 mcg/mL syringe 10 mL (prefilled) 100 mcg   propofol (Diprivan) injection 10 mg/mL 200 mg   rocuronium (ZeMuron) 50 mg/5 mL injection 60 mg   sugammadex (Bridion) 200 mg/2 mL injection 200 mg              Anesthesia Record               Intraprocedure I/O Totals       None           Findings: Diffuse carcinomatosis, PCI 19, nearly confluent tumor on the right diaphragm, pelvic peritoneum, and mesentery of the distal small bowel.  Significant peritoneal tumors on all of the small bowel mesentery including the mesenteric border of the small bowel serosa.  No  sparing of the small bowel.    Indications for surgery: Monica Musa is a 69 y.o. year old female who was diagnosed with appendix cancer with peritoneal carcinomatosis and has been undergoing chemotherapy.  We discussed diagnostic laparoscopy to assess her treatment response and potential for cytoreductive surgery with HIPEC.  After considering the risks and benefits she elected to proceed.    Details of procedure: The patient arrived to the preoperative area at [facility] for the aforementioned procedure. History and physical was performed, consent was verified, questions were answered, and she was moved into the operating room. A timeout was performed and she was induced under general anesthesia. The abdomen was prepped and draped in the usual sterile fashion. Next, the abdomen was entered laparoscopically in the left upper quadrant using the direct Optiview technique.  The abdomen was insufflated and a second 5 mm port was placed in the left abdomen.  The peritoneal cavity was inspected with findings described above.  There were no adhesions and there was no blood loss during the surgery.  After identifying all of these findings we desufflated the abdomen, remove the ports, and closed the skin with buried Vicryl suture and glue.    All sponge and needle counts were correct at the end of the case. The patient was awoken from the procedure and moved to the PACU for recovery. I was present and scrubbed and directed all operative decision making.    Rosas Ontiveros MD  Assistant Professor of Surgery  Division of Surgical Oncology  [phone]  [email or web address]

## 2025-04-28 NOTE — ANESTHESIA POSTPROCEDURE EVALUATION
Patient: Monica Musa    Procedure Summary       Date: 04/28/25 Room / Location: [facility] 05 / [facility]    Anesthesia Start: 0814 Anesthesia Stop: 0900    Procedure: Diagnostic laparoscopy Diagnosis:       Abdominal carcinomatosis (Multi)      (Abdominal carcinomatosis (Multi) [C76.2])    Surgeons: Rosas Ontiveros MD Responsible Provider: Mariah Medina MD    Anesthesia Type: MAC ASA Status: 3            Anesthesia Type: GETA    Vitals Value Taken Time   /65 04/28/25 09:00   Temp 36 04/28/25 09:00   Pulse 70 04/28/25 09:00   Resp 12 04/28/25 09:00   SpO2 99 04/28/25 09:00       Anesthesia Post Evaluation    Patient location during evaluation: bedside  Patient participation: complete - patient participated  Level of consciousness: awake and alert  Pain score: 0  Pain management: adequate  Airway patency: patent  Cardiovascular status: acceptable and stable  Respiratory status: acceptable and face mask  Hydration status: stable  Postoperative Nausea and Vomiting: none        No notable events documented.

## 2025-04-28 NOTE — DISCHARGE INSTRUCTIONS
Surgery Discharge Instructions    Diet  No diet restrictions    Pain control  For baseline pain control: Tylenol (acetaminophen) 1,000 mg every 8 hours for one week  For increased pain: ibuprofen 600 mg every 8 hours with food as needed    Activity  No specific lifting or activity restrictions  In general, listen to your body and do not overly stress your surgical sites    Incisions  Your incisions are covered with skin glue. This is a sterile dressing placed in the operating room. Do not pick or peel it off. This will fall off in 2-3 weeks.  No dressing changes or wound care is needed. Do not use any ointments or cleaning agents.  You have multiple layers of sutures under the skin that will dissolve.   You may shower the day after surgery. Let soap and water wash over the incision and pat it dry.   No going underwater (bath, pool, lake, ocean, etc.) for 2 weeks.  If you notice any of the following, please call Francisca Holden ([phone]) or Dr. nOtiveros's office ([phone]).  Swelling under the incision like a golf ball or larger.  Redness of the skin that is spreading away from the incision.  Drainage from the incision.  Fevers, chills, or any other concerning symptoms.    Follow-up  Call Archana Coles at [phone] to arrange a postop visit if needed    General Anesthesia Discharge Instructions    About this topic  You may need general anesthesia if you need to be asleep during a procedure. Your doctor will use drugs to block the signals that go from your nerves to your brain. Doctors give general anesthesia during a surgery or procedure to:  Allow you to sleep  Help your body be still  Relax your muscles  Help you to relax and be pain free  Keep you from remembering the surgery  Let the doctor manage your airway, breathing, and blood flow  The doctor or nurse anesthetist gives general anesthesia by a shot into your vein. Sometimes, you may breathe in a gas through a mask placed over your face.  What care is  needed at home?  Ask your doctor what you need to do when you go home. Make sure you ask questions if you do not understand what the doctor says.  Your doctor may give you drugs to prevent or treat an upset stomach from the anesthetic. Take them as ordered.  If your throat is sore, suck on ice chips or popsicles to ease throat pain.  Put 2 to 3 pillows under your head and back when you lie down to help you breathe easier.  For the first 24 to 48 hours:  Do not operate heavy or dangerous machinery.  Do not make major decisions or sign important papers. You may not be able to think clearly.  Avoid beer, wine, or mixed drinks.  You are at a higher risk of falling for at least 24 hours after general anesthesia.  Take extra care when you get up.  Do not change positions quickly.  Do not rush when you need to go to the bathroom or to answer the phone.  Ask for help if you feel unsteady when you try to walk.  Wear shoes with non-slip soles and low heels.  What follow-up care is needed?  Your doctor may ask you to come back to the office to check on your progress. Be sure to keep these visits.  If you have stitches that do not dissolve or staples, you will need to have them removed. Your doctor will want to do this in 1 to 2 weeks. If the doctor used skin glue, the glue will fall off on its own.  What drugs may be needed?  The doctor may order drugs to:  Help with pain  Treat an upset stomach or throwing up  Will physical activity be limited?  You will not be allowed to drive right away after the procedure. Ask a family member or a friend to drive you home.  Avoid trying to get out of bed without help until you are sure of your balance.  You may have to limit your activity. Talk to your doctor about if you need to limit how much you lift or limit exercise after your procedure.  What changes to diet are needed?  Start with a light diet when you are fully awake. This includes things that are easy to swallow like soups,  pudding, jello, toast, and eggs. Slowly progress to your normal diet.  What problems could happen?  Low blood pressure  Breathing problems  Upset stomach or throwing up  Dizziness  Blood clots  Infection  When do I need to call the doctor?  Trouble breathing  Upset stomach or throwing up more than 3 times in the next 2 days  Dizziness  Teach Back: Helping You Understand  The Teach Back Method helps you understand the information we are giving you. After you talk with the staff, tell them in your own words what you learned. This helps to make sure the staff has described each thing clearly. It also helps to explain things that may have been confusing. Before going home, make sure you can do these:  I can tell you about my procedure.  I can tell you if I need to follow up with my doctor.  I can tell you what is good for me to eat and drink the next day.  I can tell you what I would do if I have trouble breathing, an upset stomach, or dizziness.  Where can I learn more?  [employer]  https://www.nigms.nih.gov/education/pages/factsheet_Anesthesia.aspx  [facility] Choices  http://www.nhs.uk/conditions/Anaesthetic-general/Pages/Definition.aspx  Last Reviewed Date  2020-04-22

## 2025-04-29 ENCOUNTER — TELEPHONE (OUTPATIENT)
Dept: HEMATOLOGY/ONCOLOGY | Facility: CLINIC | Age: 70
End: 2025-04-29
Payer: MEDICARE

## 2025-04-29 NOTE — TELEPHONE ENCOUNTER
Reason for Conversation  Change to upcoming appointment     Background   I called and spoke with Julisa about scheduling her to see Dr. Smart with possible treatment to follow. Julisa was agreeable to come in the week of 05/12., She knows that this appointment will appear in her MyChart.She knows to call us if any date or time does not work. Julisa knows that labs will be needed and she will go to the lab on 05/07 to have them completed. She was very appreciative and knows to call us if she needs anything prior to this appointment or if she has any questions.     Disposition   No disposition on file.

## 2025-05-08 ENCOUNTER — LAB (OUTPATIENT)
Dept: LAB | Facility: CLINIC | Age: 70
End: 2025-05-08
Payer: MEDICARE

## 2025-05-08 DIAGNOSIS — C18.1 MALIGNANT NEOPLASM OF APPENDIX (MULTI): ICD-10-CM

## 2025-05-08 DIAGNOSIS — C80.0 CARCINOMATOSIS (MULTI): ICD-10-CM

## 2025-05-08 LAB
ALBUMIN SERPL BCP-MCNC: 4.7 G/DL (ref 3.4–5)
ALP SERPL-CCNC: 111 U/L (ref 33–136)
ALT SERPL W P-5'-P-CCNC: 34 U/L (ref 7–45)
ANION GAP SERPL CALC-SCNC: 16 MMOL/L (ref 10–20)
APPEARANCE UR: CLEAR
AST SERPL W P-5'-P-CCNC: 33 U/L (ref 9–39)
BASOPHILS # BLD AUTO: 0.04 X10*3/UL (ref 0–0.1)
BASOPHILS NFR BLD AUTO: 0.8 %
BILIRUB SERPL-MCNC: 0.6 MG/DL (ref 0–1.2)
BILIRUB UR STRIP.AUTO-MCNC: NEGATIVE MG/DL
BUN SERPL-MCNC: 16 MG/DL (ref 6–23)
CALCIUM SERPL-MCNC: 9.9 MG/DL (ref 8.6–10.3)
CHLORIDE SERPL-SCNC: 104 MMOL/L (ref 98–107)
CO2 SERPL-SCNC: 24 MMOL/L (ref 21–32)
COLOR UR: ABNORMAL
CREAT SERPL-MCNC: 0.7 MG/DL (ref 0.5–1.05)
EGFRCR SERPLBLD CKD-EPI 2021: >90 ML/MIN/1.73M*2
EOSINOPHIL # BLD AUTO: 0.19 X10*3/UL (ref 0–0.7)
EOSINOPHIL NFR BLD AUTO: 3.7 %
ERYTHROCYTE [DISTWIDTH] IN BLOOD BY AUTOMATED COUNT: 14.4 % (ref 11.5–14.5)
GLUCOSE SERPL-MCNC: 177 MG/DL (ref 74–99)
GLUCOSE UR STRIP.AUTO-MCNC: ABNORMAL MG/DL
HCT VFR BLD AUTO: 43.9 % (ref 36–46)
HGB BLD-MCNC: 14.6 G/DL (ref 12–16)
IMM GRANULOCYTES # BLD AUTO: 0.04 X10*3/UL (ref 0–0.7)
IMM GRANULOCYTES NFR BLD AUTO: 0.8 % (ref 0–0.9)
KETONES UR STRIP.AUTO-MCNC: NEGATIVE MG/DL
LEUKOCYTE ESTERASE UR QL STRIP.AUTO: NEGATIVE
LYMPHOCYTES # BLD AUTO: 1.46 X10*3/UL (ref 1.2–4.8)
LYMPHOCYTES NFR BLD AUTO: 28.7 %
MCH RBC QN AUTO: 31.3 PG (ref 26–34)
MCHC RBC AUTO-ENTMCNC: 33.3 G/DL (ref 32–36)
MCV RBC AUTO: 94 FL (ref 80–100)
MONOCYTES # BLD AUTO: 0.55 X10*3/UL (ref 0.1–1)
MONOCYTES NFR BLD AUTO: 10.8 %
NEUTROPHILS # BLD AUTO: 2.8 X10*3/UL (ref 1.2–7.7)
NEUTROPHILS NFR BLD AUTO: 55.2 %
NITRITE UR QL STRIP.AUTO: NEGATIVE
PH UR STRIP.AUTO: 5 [PH]
PLATELET # BLD AUTO: 166 X10*3/UL (ref 150–450)
POTASSIUM SERPL-SCNC: 4.4 MMOL/L (ref 3.5–5.3)
PROT SERPL-MCNC: 7.7 G/DL (ref 6.4–8.2)
PROT UR STRIP.AUTO-MCNC: NEGATIVE MG/DL
RBC # BLD AUTO: 4.67 X10*6/UL (ref 4–5.2)
RBC # UR STRIP.AUTO: NEGATIVE MG/DL
SODIUM SERPL-SCNC: 140 MMOL/L (ref 136–145)
SP GR UR STRIP.AUTO: 1.02
UROBILINOGEN UR STRIP.AUTO-MCNC: NORMAL MG/DL
WBC # BLD AUTO: 5.1 X10*3/UL (ref 4.4–11.3)

## 2025-05-08 PROCEDURE — 85025 COMPLETE CBC W/AUTO DIFF WBC: CPT

## 2025-05-08 PROCEDURE — 84075 ASSAY ALKALINE PHOSPHATASE: CPT

## 2025-05-08 PROCEDURE — 81003 URINALYSIS AUTO W/O SCOPE: CPT

## 2025-05-08 NOTE — PROGRESS NOTES
Patient ID: Monica Musa is a 69 y.o. female.    Oncology History Overview Note   - 7/8/2022 TLH, BSO, SLND combined case with Dr. Lal. 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 There was no lymphovascular invasion. There was a focus of MELF pattern invasion. Tumor board recommendations: Surveillance. MMR testing on prior endometrial biopsy specimen was normal.   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  - CT 8/26/24: Small volume ascites. Left pelvic nodule with some nodularity  along the left paracolic gutter worrisome for peritoneal carcinomatosis.  Admitted with bowel obstruction.  No lesion large enough for biopsy  - 9/2024 diagnostic laparoscopy - biopsy c/w non gyn primary - referred to med onc     Endometrial carcinoma   5/22/2023 Initial Diagnosis    Endometrial carcinoma (Multi)     Malignant neoplasm of appendix (Multi)   10/3/2024 Initial Diagnosis    Malignant neoplasm of appendix (Multi)     10/16/2024 - 3/27/2025 Chemotherapy    mFOLFOX6 (Fluorouracil Continuous Infusion / Leucovorin / Oxaliplatin), 14 Day Cycles     5/12/2025 -  Chemotherapy    Bevacizumab + Simplified Biweekly Infusional Fluorouracil / Leucovorin, 14 Day Cycles     Carcinomatosis (Multi)   10/3/2024 Initial Diagnosis    Carcinomatosis (Multi)     10/16/2024 - 3/27/2025 Chemotherapy    mFOLFOX6 (Fluorouracil Continuous Infusion / Leucovorin / Oxaliplatin), 14 Day Cycles     5/12/2025 -  Chemotherapy    Bevacizumab + Simplified Biweekly Infusional Fluorouracil / Leucovorin, 14 Day Cycles         Subjective    HPI  Monica Musa is a 69 y.o. female with carcinomatosis. Since last visit, she was seen by Dr. Ontiveros on 4/28/25 and unfortunately she has too much peritoneal disease to be considered for HIPEC. She presents today to continue systemic therapy.     Today, she describes her neuropathy persists; describes minimal feeling in her finger tips. She  denies difficulty breathing.     Her grandfather had a stroke. Mother  of MI, age 70 years, Father  of MI in his 50's.     Patient's past medical history, surgical history, family history and social history reviewed.    Review of Systems:    Positive per HPI, otherwise negative.       Objective    Visit Vitals  /85 (BP Location: Right arm, Patient Position: Sitting, BP Cuff Size: Adult)   Pulse 80   Temp 36.4 °C (97.5 °F) (Temporal)   Resp 16   Wt 94.4 kg (208 lb 1.8 oz) Comment: no coat,shoes   SpO2 98%   BMI 34.63 kg/m²   OB Status Hysterectomy   Smoking Status Former   BSA 2.08 m²        Physical Exam  Gen: appears well in clinic, NAD  HEENT: atraumatic head, normocephalic, EOMI, conjunctiva normal  LUNG: no increased WOB, CTAB  CV: No JVD. RRR  GI: soft, NT, ND  LE: no LE edema  Skin: no obvious rashes or lesions on visible skin  Neuro: interactive, no focal deficits noted  Psych: normal mood and affect      Performance Status:  Symptomatic; fully ambulatory    Labs/Imaging/Pathology: personally reviewed reports and images in Epic electronic medical record system. Pertinent results as it related to the plan represented in below in assessment and plan.       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  There are some features of goblet cells which favor appendiceal primary however pancreaticobiliary origin cannot be excluded.  MSI status intact  - [id number] also completed on blood from 2024 which was unremarkable  - Tempus on tissue in process  - Colonoscopy on 2024 was technically difficult due to  scattered diverticulosis in the ascending colon and some hemorrhoids  - EGD 10/2/24 shows abnormal mucosa at the GE junction but no clear mass however there was diffuse nodular gastritis which was biopsied and pathology report states: neg for H Pylori.   - No variants detected in the DPYD gene   - Signatera in process  - 10/3/24: CA19.9 <4.00, CEA 1.4   10/3/24  Today we discussed diagnosis of likely advanced appendiceal carcinoma given there is no mass seen on recent imaging 8/26/2024 with no masses seen  -Goblet cells seen on pathology likely appendiceal origin  -Will plan for CA 19-9 and CEA as baseline  -Will await EGD pathology to confirm no evidence of malignancy in the esophagus  -Discussed neck step is systemic therapy with modified FOLFOX   - could consider HIPEC after 3-4 cycles based on response  - Dr Ontiveros aware of patient  -Refer port placement next week and cycle 1 day 1 on 10/9/2024  -Reviewed side effects of 5-FU, oxaliplatin and consent signed  -Prescription sent for nausea to be used as needed  -We will plan for day 3 Aloxi  -Given limited support at home will likely hold off on disconnect at home for now  10/23/2024:   - Mediport placed 10/10/24  - Cycle 1 mFOLFOX given 10/16/24: Fluorouracil 2,400 mg/m2, oxaliplatin 85 mg/m2 with pre meds zofran 16 mg and decadron 12 mg, nothing given on day 3   - Last visit with GYN, Dr. Greene, was on 10/7/24         10/29/24  - symptoms improved after supportive care and now close to her baseline  - states dysphagia improved after chemo which hopefully is a sign of tx response  -Lower extremity swelling we discussed is likely related to poor p.o. intake, albumin is low and we discussed importance of protein in addition to ambulating and compression  -Overall for nausea which is her largest complaint we discussed starting Zyprexa at bedtime, given she is not taking much of Reglan we will hold off for now  -She will continue Zofran every 8 hours as  needed  -Labs reviewed from today no contraindications to proceed with treatment tomorrow  -Plan for supportive care again with Boni the following week     11/5/2024:  - Patient presents for toxicity check after cycle 2  - Reports that she is feeling well and tolerating treatment better   - She does not need IVF or medications today but we will check cmp/mag and notify her if she needs to continue home K+, she prefers to change to smaller dosage   - Eating more solid foods and will continue to try to prevent further weight loss, declines to talk with dietician today   - She is taking Zyprexa nightly   - She is unsure which prn anti-nausea she is taking but will update me  - Taking imodium BID   - She will RTC on 11/12 for port flush/labs and a visit the same day with Dr. Smart   - She will then RTC the next day for treatment      11/12/24:  - Patient continues to tolerate treatment with no major toxicity.   - No dose adjustments at this time.  - Plan to follow-up with Boni in 2 weeks and with me in 4 weeks.  - On days she sees Boni she will have labs done the day before and in 4 weeks she will have labs done in house with me.  - Will plan to have labs done outside of port.   - RTC in 2 weeks with Boni.      12/9/24:   - Patient continues to tolerate treatment  - No dose adjustments at this time.   - Labs reviewed. No contraindications to proceed with cycle 5 on Wednesday  - Cycle 6 will be delayed to 12/31   - Plan scans the week after and follow-up with me January 14th 2025.   - RTC for treatment only in 3 weeks and with me in 5 weeks.     1/14/25:  - CT scan from yesterday shows good treatment response and overall patient states she feels better with systemic chemotherapy.  - We discussed no worsening toxicity and overall no significant GI symptoms or neuropathy.   - She does some cold sensitivity for approximately 5 days.  - Will plan to continue with current dose.  - No treatment changes.  - RTC in 2 weeks  for treatment only and in 4 weeks with me.      2/11/25   - No new complaints   - Continues to have neuropathy for approx five days but manageable    - No GI issues or concerns   - Refill provided for pantoprazole, would try to decrease to 20 mg daily  - No other change in dose  - RTC in 2 weeks for treatment and in 4 weeks with Boni, 6th week for treatment and 8 weeks with me and we will plan for a scan prior which can be ordered in future.  - She should have a CTAP chest before her visit with me in 8 weeks.    3/25/2025:  - Presents for cycle 12 mFOLFOX  - Oxaliplatin was dose reduced to 65 mg/m² last cycle due to prolonged cold sensitivity  - Patient's platelets for cycle 12 were 74,000  - Her labs today will be done when she goes back to treatment  - She does have some residual cold sensitivity and felt that cold sensitivity was more intense this cycle despite dose reduction, discussed further dose reduction or stopping oxaliplatin but patient prefers to push through today before her next set of scans next week  - She is not having any persistent neuropathy, we dose reduced last cycle and so we will go ahead and keep dosages the same as last cycle   - She took an antiallergy medication at home this morning, but I will add Pepcid to her premeds since this is her 12th cycle of oxaliplatin, she has not had any sensitivity reactions  - She will return in 2 weeks with Dr. Smart to review imaging she will have done next week, patient aware that Dr. Smart will provide her recommendations regarding treatment plan at that visit once she has scan results and patient verbalized understanding. She is scheduled for cycle 13 but aware it can be cancelled if she does not need treatment that day.     4/8/25:   - Patient is now status post 6 months of systemic therapy   - She feels well with no new side effects, still with some neuropathy that she describes as mild   - We discussed that we will plan to have her be evaluated by  Dr Ontiveros for consideration of HIPEC and cytoreductive surgery. She is agreeable and his team with reach out to her today for next steps.    - Will likely plan for diagnostic laparotomy and will plan for surgery likely after her vacation to [place]   - RTC with me in 6 weeks for port flush and labs    - We will ensure there is a plan moving forward   - Will plan for treatment holiday from systemic therapy at this time   - RTC in 6 weeks with me     5/13/25:   - Today we reviewed that during her evaluation with Dr. Ontiveros, there was too much peritoneal disease to be considered for HIPEC or cytoreductive surgery   - We discussed the overall goal of treatment would be for palliation and to prevent or control further systemic disease   - We will plan to continue 5Fu and add Avastin   - Reviewed side effects of Avastin and consent signed   - We discussed at progression, we would conider Irinotecan as the next step   - RTC in 2 weeks for treatment, with Boni   - Will delay next cycle due to travel to [place]   - RTC with me 6/16/25      Reviewed ongoing medical problems and how they relate to her malignancy, will continue long term monitoring.    RTC in 2 weeks for treatment, with Boni, with me 6/16/25  This note has been transcribed using a medical scribe and there is a possibility of unintentional typing misprints    Diagnoses and all orders for this visit:  Malignant neoplasm of appendix (Multi)  -     Clinic Appointment Request  -     Clinic Appointment Request; Future  -     Infusion Appointment Request; Future  -     CBC and Auto Differential; Future  -     Comprehensive metabolic panel; Future  -     Infusion Appointment Request; Future  -     Clinic Appointment Request; Future  -     Infusion Appointment Request; Future  -     CBC and Auto Differential; Future  -     Comprehensive metabolic panel; Future  -     Urinalysis with Reflex Microscopic; Future  -     Infusion Appointment Request; Future  -     guardant  360; Other-indicate in comment; unknown - Miscellaneous Test; Future  -     Clinic Appointment Request JUAN CARLOS GOLDBERG; Future  -     Infusion Appointment Request; Future  -     Infusion Appointment Request; Future  -     Infusion Appointment Request; Future  -     Clinic Appointment Request; Future  -     Infusion Appointment Request; Future  Carcinomatosis (Multi)  -     Clinic Appointment Request  -     Clinic Appointment Request; Future  -     Infusion Appointment Request; Future  -     CBC and Auto Differential; Future  -     Comprehensive metabolic panel; Future  -     Infusion Appointment Request; Future  -     Clinic Appointment Request; Future  -     Infusion Appointment Request; Future  -     CBC and Auto Differential; Future  -     Comprehensive metabolic panel; Future  -     Urinalysis with Reflex Microscopic; Future  -     Infusion Appointment Request; Future  -     guardant 360; Other-indicate in comment; unknown - Miscellaneous Test; Future  Other orders  -     ondansetron (Zofran) injection 8 mg  -     prochlorperazine (Compazine) tablet 10 mg  -     prochlorperazine (Compazine) injection 10 mg  -     bevacizumab-awwb (Mvasi) 450 mg in sodium chloride 0.9% 118 mL IV  -     leucovorin 820 mg in dextrose 5% 141 mL IV  -     fluorouracil (Adrucil) IV syringe 825 mg  -     fluorouracil (Adrucil) 4,900 mg in sodium chloride 0.9% 138 mL IV via Home Infusion  -     sodium chloride 0.9 % bolus 500 mL  -     dextrose 5 % in water (D5W) bolus 500 mL  -     diphenhydrAMINE (BENADryl) injection 50 mg  -     methylPREDNISolone sod succinate (SOLU-Medrol) 40 mg/mL injection 40 mg  -     famotidine PF (Pepcid) injection 20 mg  -     EPINEPHrine (Epipen) injection syringe 0.3 mg  -     albuterol 2.5 mg /3 mL (0.083 %) nebulizer solution 3 mL  -     ondansetron (Zofran) injection 8 mg  -     prochlorperazine (Compazine) tablet 10 mg  -     prochlorperazine (Compazine) injection 10 mg  -     bevacizumab-awwb  (Mvasi) 450 mg in sodium chloride 0.9% 118 mL IV  -     leucovorin 820 mg in dextrose 5% 141 mL IV  -     fluorouracil (Adrucil) IV syringe 825 mg  -     fluorouracil (Adrucil) 4,900 mg in sodium chloride 0.9% 138 mL IV via Home Infusion  -     sodium chloride 0.9 % bolus 500 mL  -     dextrose 5 % in water (D5W) bolus 500 mL  -     diphenhydrAMINE (BENADryl) injection 50 mg  -     methylPREDNISolone sod succinate (SOLU-Medrol) 40 mg/mL injection 40 mg  -     famotidine PF (Pepcid) injection 20 mg  -     EPINEPHrine (Epipen) injection syringe 0.3 mg  -     albuterol 2.5 mg /3 mL (0.083 %) nebulizer solution 3 mL      Margot Smart MD  Hematology/Oncology  [facility]      Scribe Attestation  By signing my name below, I, Radha Sal, attest that this documentation has been prepared under the direction and in the presence of Margot Smart MD.    Time Spent  Prep time on day of patient encounter: 5 minutes  Time spent directly with patient, family or caregiver: 25 minutes  Additional Time Spent on Patient Care Activities: 5 minutes  Documentation Time: 5 minutes  Other Time Spent: 0 minutes  Total: 40 minutes

## 2025-05-13 ENCOUNTER — INFUSION (OUTPATIENT)
Dept: HEMATOLOGY/ONCOLOGY | Facility: CLINIC | Age: 70
End: 2025-05-13
Payer: MEDICARE

## 2025-05-13 ENCOUNTER — OFFICE VISIT (OUTPATIENT)
Dept: HEMATOLOGY/ONCOLOGY | Facility: CLINIC | Age: 70
End: 2025-05-13
Payer: MEDICARE

## 2025-05-13 VITALS
OXYGEN SATURATION: 98 % | WEIGHT: 208.11 LBS | SYSTOLIC BLOOD PRESSURE: 150 MMHG | DIASTOLIC BLOOD PRESSURE: 85 MMHG | BODY MASS INDEX: 34.63 KG/M2 | HEART RATE: 80 BPM | RESPIRATION RATE: 16 BRPM | TEMPERATURE: 97.5 F

## 2025-05-13 VITALS — HEIGHT: 65 IN | BODY MASS INDEX: 34.47 KG/M2

## 2025-05-13 DIAGNOSIS — C80.0 CARCINOMATOSIS (MULTI): ICD-10-CM

## 2025-05-13 DIAGNOSIS — C18.1 MALIGNANT NEOPLASM OF APPENDIX (MULTI): Primary | ICD-10-CM

## 2025-05-13 DIAGNOSIS — C18.1 MALIGNANT NEOPLASM OF APPENDIX (MULTI): ICD-10-CM

## 2025-05-13 PROCEDURE — 1126F AMNT PAIN NOTED NONE PRSNT: CPT | Performed by: INTERNAL MEDICINE

## 2025-05-13 PROCEDURE — 96409 CHEMO IV PUSH SNGL DRUG: CPT

## 2025-05-13 PROCEDURE — 99215 OFFICE O/P EST HI 40 MIN: CPT | Performed by: INTERNAL MEDICINE

## 2025-05-13 PROCEDURE — 2500000004 HC RX 250 GENERAL PHARMACY W/ HCPCS (ALT 636 FOR OP/ED): Performed by: INTERNAL MEDICINE

## 2025-05-13 PROCEDURE — 96375 TX/PRO/DX INJ NEW DRUG ADDON: CPT | Mod: INF

## 2025-05-13 PROCEDURE — 1159F MED LIST DOCD IN RCRD: CPT | Performed by: INTERNAL MEDICINE

## 2025-05-13 PROCEDURE — 96367 TX/PROPH/DG ADDL SEQ IV INF: CPT

## 2025-05-13 PROCEDURE — 96366 THER/PROPH/DIAG IV INF ADDON: CPT | Mod: INF

## 2025-05-13 PROCEDURE — G2211 COMPLEX E/M VISIT ADD ON: HCPCS | Performed by: INTERNAL MEDICINE

## 2025-05-13 PROCEDURE — 89240 UNLISTED MISC PATH TEST: CPT

## 2025-05-13 PROCEDURE — 3077F SYST BP >= 140 MM HG: CPT | Performed by: INTERNAL MEDICINE

## 2025-05-13 PROCEDURE — 96376 TX/PRO/DX INJ SAME DRUG ADON: CPT

## 2025-05-13 PROCEDURE — 3079F DIAST BP 80-89 MM HG: CPT | Performed by: INTERNAL MEDICINE

## 2025-05-13 PROCEDURE — 3044F HG A1C LEVEL LT 7.0%: CPT | Performed by: INTERNAL MEDICINE

## 2025-05-13 PROCEDURE — 96416 CHEMO PROLONG INFUSE W/PUMP: CPT

## 2025-05-13 PROCEDURE — 2500000004 HC RX 250 GENERAL PHARMACY W/ HCPCS (ALT 636 FOR OP/ED): Mod: JZ | Performed by: INTERNAL MEDICINE

## 2025-05-13 PROCEDURE — 96411 CHEMO IV PUSH ADDL DRUG: CPT

## 2025-05-13 RX ORDER — HEPARIN SODIUM,PORCINE/PF 10 UNIT/ML
50 SYRINGE (ML) INTRAVENOUS AS NEEDED
Status: DISCONTINUED | OUTPATIENT
Start: 2025-05-13 | End: 2025-05-13 | Stop reason: HOSPADM

## 2025-05-13 RX ORDER — ONDANSETRON HYDROCHLORIDE 2 MG/ML
8 INJECTION, SOLUTION INTRAVENOUS ONCE
Status: COMPLETED | OUTPATIENT
Start: 2025-05-13 | End: 2025-05-13

## 2025-05-13 RX ORDER — FAMOTIDINE 10 MG/ML
20 INJECTION, SOLUTION INTRAVENOUS ONCE AS NEEDED
OUTPATIENT
Start: 2025-05-26

## 2025-05-13 RX ORDER — ALBUTEROL SULFATE 0.83 MG/ML
3 SOLUTION RESPIRATORY (INHALATION) AS NEEDED
OUTPATIENT
Start: 2025-06-16

## 2025-05-13 RX ORDER — ALBUTEROL SULFATE 0.83 MG/ML
3 SOLUTION RESPIRATORY (INHALATION) AS NEEDED
Status: DISCONTINUED | OUTPATIENT
Start: 2025-05-13 | End: 2025-05-13 | Stop reason: HOSPADM

## 2025-05-13 RX ORDER — FAMOTIDINE 10 MG/ML
20 INJECTION, SOLUTION INTRAVENOUS ONCE AS NEEDED
OUTPATIENT
Start: 2025-06-16

## 2025-05-13 RX ORDER — PROCHLORPERAZINE EDISYLATE 5 MG/ML
10 INJECTION INTRAMUSCULAR; INTRAVENOUS EVERY 6 HOURS PRN
Status: DISCONTINUED | OUTPATIENT
Start: 2025-05-13 | End: 2025-05-13 | Stop reason: HOSPADM

## 2025-05-13 RX ORDER — FLUOROURACIL 50 MG/ML
400 INJECTION, SOLUTION INTRAVENOUS ONCE
OUTPATIENT
Start: 2025-05-26

## 2025-05-13 RX ORDER — HEPARIN 100 UNIT/ML
500 SYRINGE INTRAVENOUS AS NEEDED
Status: DISCONTINUED | OUTPATIENT
Start: 2025-05-13 | End: 2025-05-13 | Stop reason: HOSPADM

## 2025-05-13 RX ORDER — FLUOROURACIL 50 MG/ML
400 INJECTION, SOLUTION INTRAVENOUS ONCE
OUTPATIENT
Start: 2025-06-16

## 2025-05-13 RX ORDER — PROCHLORPERAZINE MALEATE 10 MG
10 TABLET ORAL EVERY 6 HOURS PRN
Status: DISCONTINUED | OUTPATIENT
Start: 2025-05-13 | End: 2025-05-13 | Stop reason: HOSPADM

## 2025-05-13 RX ORDER — PROCHLORPERAZINE EDISYLATE 5 MG/ML
10 INJECTION INTRAMUSCULAR; INTRAVENOUS EVERY 6 HOURS PRN
OUTPATIENT
Start: 2025-05-26

## 2025-05-13 RX ORDER — FLUOROURACIL 50 MG/ML
400 INJECTION, SOLUTION INTRAVENOUS ONCE
Status: COMPLETED | OUTPATIENT
Start: 2025-05-13 | End: 2025-05-13

## 2025-05-13 RX ORDER — DIPHENHYDRAMINE HYDROCHLORIDE 50 MG/ML
50 INJECTION, SOLUTION INTRAMUSCULAR; INTRAVENOUS AS NEEDED
Status: DISCONTINUED | OUTPATIENT
Start: 2025-05-13 | End: 2025-05-13 | Stop reason: HOSPADM

## 2025-05-13 RX ORDER — DIPHENHYDRAMINE HYDROCHLORIDE 50 MG/ML
50 INJECTION, SOLUTION INTRAMUSCULAR; INTRAVENOUS AS NEEDED
OUTPATIENT
Start: 2025-05-26

## 2025-05-13 RX ORDER — EPINEPHRINE 0.3 MG/.3ML
0.3 INJECTION SUBCUTANEOUS EVERY 5 MIN PRN
Status: DISCONTINUED | OUTPATIENT
Start: 2025-05-13 | End: 2025-05-13 | Stop reason: HOSPADM

## 2025-05-13 RX ORDER — ALBUTEROL SULFATE 0.83 MG/ML
3 SOLUTION RESPIRATORY (INHALATION) AS NEEDED
OUTPATIENT
Start: 2025-05-26

## 2025-05-13 RX ORDER — EPINEPHRINE 0.3 MG/.3ML
0.3 INJECTION SUBCUTANEOUS EVERY 5 MIN PRN
OUTPATIENT
Start: 2025-06-16

## 2025-05-13 RX ORDER — HEPARIN SODIUM,PORCINE/PF 10 UNIT/ML
50 SYRINGE (ML) INTRAVENOUS AS NEEDED
OUTPATIENT
Start: 2025-05-13

## 2025-05-13 RX ORDER — PROCHLORPERAZINE MALEATE 10 MG
10 TABLET ORAL EVERY 6 HOURS PRN
OUTPATIENT
Start: 2025-06-16

## 2025-05-13 RX ORDER — DIPHENHYDRAMINE HYDROCHLORIDE 50 MG/ML
50 INJECTION, SOLUTION INTRAMUSCULAR; INTRAVENOUS AS NEEDED
OUTPATIENT
Start: 2025-06-16

## 2025-05-13 RX ORDER — PROCHLORPERAZINE EDISYLATE 5 MG/ML
10 INJECTION INTRAMUSCULAR; INTRAVENOUS EVERY 6 HOURS PRN
OUTPATIENT
Start: 2025-06-16

## 2025-05-13 RX ORDER — HEPARIN 100 UNIT/ML
500 SYRINGE INTRAVENOUS AS NEEDED
OUTPATIENT
Start: 2025-05-13

## 2025-05-13 RX ORDER — EPINEPHRINE 0.3 MG/.3ML
0.3 INJECTION SUBCUTANEOUS EVERY 5 MIN PRN
OUTPATIENT
Start: 2025-05-26

## 2025-05-13 RX ORDER — ONDANSETRON HYDROCHLORIDE 2 MG/ML
8 INJECTION, SOLUTION INTRAVENOUS ONCE
OUTPATIENT
Start: 2025-05-26

## 2025-05-13 RX ORDER — FAMOTIDINE 10 MG/ML
20 INJECTION, SOLUTION INTRAVENOUS ONCE AS NEEDED
Status: DISCONTINUED | OUTPATIENT
Start: 2025-05-13 | End: 2025-05-13 | Stop reason: HOSPADM

## 2025-05-13 RX ORDER — ONDANSETRON HYDROCHLORIDE 2 MG/ML
8 INJECTION, SOLUTION INTRAVENOUS ONCE
OUTPATIENT
Start: 2025-06-16

## 2025-05-13 RX ORDER — PROCHLORPERAZINE MALEATE 10 MG
10 TABLET ORAL EVERY 6 HOURS PRN
OUTPATIENT
Start: 2025-05-26

## 2025-05-13 RX ADMIN — FLUOROURACIL 825 MG: 50 INJECTION, SOLUTION INTRAVENOUS at 13:10

## 2025-05-13 RX ADMIN — ONDANSETRON 8 MG: 2 INJECTION INTRAMUSCULAR; INTRAVENOUS at 10:35

## 2025-05-13 RX ADMIN — BEVACIZUMAB-AWWB 450 MG: 400 INJECTION, SOLUTION INTRAVENOUS at 10:52

## 2025-05-13 RX ADMIN — LEUCOVORIN CALCIUM 820 MG: 350 INJECTION, POWDER, LYOPHILIZED, FOR SOLUTION INTRAMUSCULAR; INTRAVENOUS at 11:07

## 2025-05-13 RX ADMIN — FLUOROURACIL 4900 MG: 50 INJECTION, SOLUTION INTRAVENOUS at 13:22

## 2025-05-13 ASSESSMENT — PAIN SCALES - GENERAL: PAINLEVEL_OUTOF10: 0-NO PAIN

## 2025-05-15 ENCOUNTER — INFUSION (OUTPATIENT)
Dept: HEMATOLOGY/ONCOLOGY | Facility: CLINIC | Age: 70
End: 2025-05-15
Payer: MEDICARE

## 2025-05-15 VITALS
OXYGEN SATURATION: 95 % | TEMPERATURE: 98.1 F | SYSTOLIC BLOOD PRESSURE: 131 MMHG | RESPIRATION RATE: 16 BRPM | DIASTOLIC BLOOD PRESSURE: 75 MMHG | BODY MASS INDEX: 34.17 KG/M2 | HEART RATE: 83 BPM | WEIGHT: 206.35 LBS

## 2025-05-15 DIAGNOSIS — C80.0 CARCINOMATOSIS (MULTI): ICD-10-CM

## 2025-05-15 DIAGNOSIS — C18.1 MALIGNANT NEOPLASM OF APPENDIX (MULTI): ICD-10-CM

## 2025-05-15 PROCEDURE — 96523 IRRIG DRUG DELIVERY DEVICE: CPT

## 2025-05-15 ASSESSMENT — PAIN SCALES - GENERAL: PAINLEVEL_OUTOF10: 0-NO PAIN

## 2025-05-20 ENCOUNTER — APPOINTMENT (OUTPATIENT)
Dept: HEMATOLOGY/ONCOLOGY | Facility: CLINIC | Age: 70
End: 2025-05-20
Payer: MEDICARE

## 2025-05-20 LAB — SCAN RESULT: NORMAL

## 2025-05-23 NOTE — PROGRESS NOTES
Patient ID: Monica Musa is a 69 y.o. female.  Diagnosis:  GI adenocarcinoma   MedOnc: Dr. Smart    Primary Care Provider: Guerrero Tavarez DO  Referring Provider: Margot Smart MD  [address] [place] [id number]  Visit Type: Follow Up    Date of Service: 05/28/25  Location: [facility]     Patient Care Team:  Guerrero Tavarze DO as PCP - General  Guerrero Tavarez DO as PCP - t Medicare Advantage PCP  Connie Greene MD as Obstetrician (Obstetrics and Gynecology)  Margot Smart MD as Consulting Physician (Hematology and Oncology)  ABENA Muñiz as  (Oncology)    Current Therapy: Beva + 5FU    ONCOLOGIC HISTORY     No matching staging information was found for the patient.    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 Some features of goblet cells which favor appendiceal primary however pancreaticobiliary origin cannot be excluded. MSI status intact.  9/30/2024: Guardant 360 unremarkable   10/2/2024: EGD - abnormal mucosa at the GE junction but no clear mass however there was diffuse nodular gastritis which was biopsied and pathology report states: neg for H Pylori.   10/3/2024: CA19.9 <4.00, CEA 1.4   10/10/2024: Port placed   10/16/2024: C1 FOLFOX   1/13/2025: CT with response   3/11/2025: C 11 FOLFOX Oxaliplatin reduced to 65 mg/m2 for cold sensitivity   3/25/2025: C12 FOLFOX - complete   4/15/2025: Saw Dr. Ontiveros for consideration of HIPEC and cytoreductive surgery; too much peritoneal disease to be considered for HIPEC or cytoreductive surgery  5/13/2025: Cycle 1 5FU +  Avastin palliative therapy   5/28/2025: Cycle 2 FU + Avastin     Hx of endometrial cancer  7/8/2022: endometrioid adenocarcinoma MMI, no LVSI, pMMR s/p TLH, BSP, SLND    Oncology History Overview Note   - 7/8/2022 TLH, BSO, SLND combined case with Dr. Lal. 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 There was no lymphovascular invasion. There was a focus of MELF pattern invasion. Tumor board recommendations: Surveillance. MMR testing on prior endometrial biopsy specimen was normal.   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  - CT 8/26/24: Small volume ascites. Left pelvic nodule with some nodularity  along the left paracolic gutter worrisome for peritoneal carcinomatosis.  Admitted with bowel obstruction.  No lesion large enough for biopsy  - 9/2024 diagnostic laparoscopy - biopsy c/w non gyn primary - referred to med onc     Endometrial carcinoma   5/22/2023 Initial Diagnosis    Endometrial carcinoma (Multi)     Malignant neoplasm of appendix (Multi)   10/3/2024 Initial Diagnosis    Malignant neoplasm of appendix (Multi)     10/16/2024 - 3/27/2025 Chemotherapy    mFOLFOX6 (Fluorouracil Continuous Infusion / Leucovorin / Oxaliplatin), 14 Day Cycles     5/13/2025 -  Chemotherapy    Bevacizumab + Simplified Biweekly Infusional Fluorouracil / Leucovorin, 14 Day Cycles     Carcinomatosis (Multi)   10/3/2024 Initial Diagnosis    Carcinomatosis (Multi)     10/16/2024 - 3/27/2025 Chemotherapy    mFOLFOX6 (Fluorouracil Continuous Infusion / Leucovorin / Oxaliplatin), 14 Day Cycles     5/13/2025 -  Chemotherapy    Bevacizumab + Simplified Biweekly Infusional Fluorouracil / Leucovorin, 14 Day Cycles        Other Contributing History      Subjective      INTERVAL HISTORY     Monica Musa is a 69 y.o. female who presents today for follow up of appendical adenocarcinoma. Patient of Margot Smart MD currently on 5FU + Avastin. She is doing well  and tolerated cycle 1 well. Continues to have neuropathy. She its using CBD lotion and gabapentin. No issues with appetite. No n/v/c/d. She is drinking well. She had one mild nosebleed. BPs are good. She denies S&S of infection. Denies pain. Energy level is good.     Review of Systems   Constitutional:  Negative for appetite change, chills, fatigue, fever and unexpected weight change.   HENT:  Negative.     Eyes: Negative.    Respiratory:  Negative for cough and shortness of breath.    Cardiovascular: Negative.  Negative for chest pain.   Gastrointestinal:  Negative for constipation, diarrhea, nausea and vomiting.   Genitourinary: Negative.     Skin: Negative.    Neurological:  Positive for numbness.   Hematological: Negative.    Psychiatric/Behavioral: Negative.         Objective      /83 (BP Location: Left arm, Patient Position: Sitting, BP Cuff Size: Adult)   Pulse 74   Temp 36.2 °C (97.2 °F) (Temporal)   Resp 16   Wt 95 kg (209 lb 7 oz) Comment: no shoes,coat  SpO2 96%   BMI 34.68 kg/m²   BSA: 2.09 meters squared    Wt Readings from Last 5 Encounters:   05/28/25 95 kg (209 lb 7 oz)   05/15/25 93.6 kg (206 lb 5.6 oz)   05/13/25 94.4 kg (208 lb 1.8 oz)   04/28/25 90.5 kg (199 lb 8.3 oz)   04/15/25 90.9 kg (200 lb 6.4 oz)     Performance Status:  ECOG Score: 0- Fully active, able to carry on all pre-disease performance w/o restriction.  Karnofsky Score: 90 - Able to carry on normal activity; minor signs or symptoms of disease     PHYSICAL EXAM   Physical Exam  Constitutional:       General: She is not in acute distress.     Appearance: Normal appearance. She is not toxic-appearing.   HENT:      Head: Normocephalic and atraumatic.   Eyes:      Pupils: Pupils are equal, round, and reactive to light.   Pulmonary:      Effort: Pulmonary effort is normal.   Musculoskeletal:         General: Normal range of motion.      Cervical back: Normal range of motion.   Neurological:      General: No focal deficit  present.      Mental Status: She is alert and oriented to person, place, and time.      Motor: No weakness.   Psychiatric:         Mood and Affect: Mood normal.         Behavior: Behavior normal.         Thought Content: Thought content normal.         Judgment: Judgment normal.       Allergies  RX Allergies[1]   Medications  Current Outpatient Medications   Medication Instructions    aspirin 81 mg, Daily    docosahexaenoic acid/epa (FISH OIL ORAL) 1 tablet, Daily    empagliflozin (JARDIANCE) 10 mg, oral, Daily    gabapentin (NEURONTIN) 300 mg, oral, Nightly    metFORMIN XR (GLUCOPHAGE-XR) 500 mg, oral, 2 times daily    multivitamin tablet 1 tablet, Daily    OLANZapine (ZYPREXA) 2.5 mg, oral, Nightly    omeprazole OTC (PRILOSEC OTC) 20 mg, Daily PRN    pantoprazole (PROTONIX) 20 mg, oral, Daily, ( Please take in am)    scopolamine (Transderm-Scop) 1 mg over 3 days patch 3 day 1 patch, transdermal, Every 72 hours PRN    traMADol (ULTRAM) 50 mg, oral, Every 6 hours PRN        Diagnostic Results   RESULTS   No results found for this or any previous visit (from the past 96 hours).  Labs are pending    Recent Imaging     Assessment/Plan   ASSESSMENT     Monica Musa is a 69 y.o. female with a history of endometrial cancer s/p TLH, BSP, SLND in July 2022, now with adenocarcinoma (stage IV), likely appendical origin, diagnosed after a SBO in August 2024 s/p 12 cycles of FOLFOX from October 2024 to March 2025 with response. She is not a surgical or HIPEC candidate d/t residual peritoneal disease, she will now receive palliative treatment with 5FU + Avastin every 2 weeks which started 5/13/2025. Goal is to prevent/control further systemic disease and upon progression we would consider Irinotecan. She presents in follow up today for evaluation prior to Cycle 2.     Tolerated cycle 1 well. Will increase her gabapentin for neuropathy. Next cycle is delayed by 1 week for an [employer] cruise. Labs are pending.       PLAN      # Appendical Adenocarcinoma   - Proceed with cycle 2 5FU + Avastin today  - Cycles repeated every 2 weeks  - Next cycle delayed x 1 week for a trip     # Neuropathy  - Increase gabapentin to 100 mg in AM and 300 mg in PM  - OK for CBD lotion     Follow up in 3 weeks with Dr. Smart and next cycle.    Monica was seen today for follow-up.  Diagnoses and all orders for this visit:  Malignant neoplasm of appendix (Multi)  -     Clinic Appointment Request JUAN CARLOS GOLDBERG    Venous Access Orders  Bevacizumab + Simplified Biweekly Infusional Fluorouracil / Leucovorin, 14 Day Cycles    Patient verbalizes understanding of above plan. Time provided for patient's questions. All questions answered to patient's satisfaction in office. Patient instructed to reach out for any new concerning issues at [phone].    Juan Carlos Goldberg MSN, APRN, A-GNP-C, AOCNP  [facility]  Division of Hematology & Medical Oncology   [address] [place] [address]  Phone: [phone]  Available via [employer] Secure Chat  [email or web address]       [1]   Allergies  Allergen Reactions    Penicillins Anaphylaxis and Swelling    Bee Venom Protein (Honey Bee) Swelling

## 2025-05-25 DIAGNOSIS — T75.3XXA MOTION SICKNESS, INITIAL ENCOUNTER: ICD-10-CM

## 2025-05-27 LAB — SCAN RESULT: NORMAL

## 2025-05-28 ENCOUNTER — INFUSION (OUTPATIENT)
Dept: HEMATOLOGY/ONCOLOGY | Facility: CLINIC | Age: 70
End: 2025-05-28
Payer: MEDICARE

## 2025-05-28 ENCOUNTER — OFFICE VISIT (OUTPATIENT)
Dept: HEMATOLOGY/ONCOLOGY | Facility: CLINIC | Age: 70
End: 2025-05-28
Payer: MEDICARE

## 2025-05-28 VITALS
SYSTOLIC BLOOD PRESSURE: 138 MMHG | OXYGEN SATURATION: 96 % | WEIGHT: 209.44 LBS | HEART RATE: 74 BPM | BODY MASS INDEX: 34.68 KG/M2 | RESPIRATION RATE: 16 BRPM | DIASTOLIC BLOOD PRESSURE: 83 MMHG | TEMPERATURE: 97.2 F

## 2025-05-28 DIAGNOSIS — C18.1 MALIGNANT NEOPLASM OF APPENDIX (MULTI): ICD-10-CM

## 2025-05-28 DIAGNOSIS — C80.0 CARCINOMATOSIS (MULTI): Primary | ICD-10-CM

## 2025-05-28 DIAGNOSIS — C80.0 CARCINOMATOSIS (MULTI): ICD-10-CM

## 2025-05-28 LAB
ALBUMIN SERPL BCP-MCNC: 4.7 G/DL (ref 3.4–5)
ALP SERPL-CCNC: 100 U/L (ref 33–136)
ALT SERPL W P-5'-P-CCNC: 35 U/L (ref 7–45)
ANION GAP SERPL CALC-SCNC: 15 MMOL/L (ref 10–20)
AST SERPL W P-5'-P-CCNC: 31 U/L (ref 9–39)
BASOPHILS # BLD AUTO: 0.03 X10*3/UL (ref 0–0.1)
BASOPHILS NFR BLD AUTO: 0.6 %
BILIRUB SERPL-MCNC: 0.5 MG/DL (ref 0–1.2)
BUN SERPL-MCNC: 23 MG/DL (ref 6–23)
CALCIUM SERPL-MCNC: 10.2 MG/DL (ref 8.6–10.3)
CHLORIDE SERPL-SCNC: 104 MMOL/L (ref 98–107)
CO2 SERPL-SCNC: 26 MMOL/L (ref 21–32)
CREAT SERPL-MCNC: 0.73 MG/DL (ref 0.5–1.05)
EGFRCR SERPLBLD CKD-EPI 2021: 90 ML/MIN/1.73M*2
EOSINOPHIL # BLD AUTO: 0.09 X10*3/UL (ref 0–0.7)
EOSINOPHIL NFR BLD AUTO: 1.8 %
ERYTHROCYTE [DISTWIDTH] IN BLOOD BY AUTOMATED COUNT: 14.5 % (ref 11.5–14.5)
GLUCOSE SERPL-MCNC: 153 MG/DL (ref 74–99)
HCT VFR BLD AUTO: 41.1 % (ref 36–46)
HGB BLD-MCNC: 14 G/DL (ref 12–16)
IMM GRANULOCYTES # BLD AUTO: 0.04 X10*3/UL (ref 0–0.7)
IMM GRANULOCYTES NFR BLD AUTO: 0.8 % (ref 0–0.9)
LYMPHOCYTES # BLD AUTO: 1.47 X10*3/UL (ref 1.2–4.8)
LYMPHOCYTES NFR BLD AUTO: 28.6 %
MCH RBC QN AUTO: 31.4 PG (ref 26–34)
MCHC RBC AUTO-ENTMCNC: 34.1 G/DL (ref 32–36)
MCV RBC AUTO: 92 FL (ref 80–100)
MONOCYTES # BLD AUTO: 0.64 X10*3/UL (ref 0.1–1)
MONOCYTES NFR BLD AUTO: 12.5 %
NEUTROPHILS # BLD AUTO: 2.87 X10*3/UL (ref 1.2–7.7)
NEUTROPHILS NFR BLD AUTO: 55.7 %
NRBC BLD-RTO: 0 /100 WBCS (ref 0–0)
PLATELET # BLD AUTO: 136 X10*3/UL (ref 150–450)
POTASSIUM SERPL-SCNC: 4.3 MMOL/L (ref 3.5–5.3)
PROT SERPL-MCNC: 7.4 G/DL (ref 6.4–8.2)
RBC # BLD AUTO: 4.46 X10*6/UL (ref 4–5.2)
SODIUM SERPL-SCNC: 141 MMOL/L (ref 136–145)
WBC # BLD AUTO: 5.1 X10*3/UL (ref 4.4–11.3)

## 2025-05-28 PROCEDURE — 2500000004 HC RX 250 GENERAL PHARMACY W/ HCPCS (ALT 636 FOR OP/ED): Mod: JZ | Performed by: INTERNAL MEDICINE

## 2025-05-28 PROCEDURE — 1125F AMNT PAIN NOTED PAIN PRSNT: CPT

## 2025-05-28 PROCEDURE — 96366 THER/PROPH/DIAG IV INF ADDON: CPT | Mod: INF

## 2025-05-28 PROCEDURE — 99214 OFFICE O/P EST MOD 30 MIN: CPT

## 2025-05-28 PROCEDURE — 96416 CHEMO PROLONG INFUSE W/PUMP: CPT

## 2025-05-28 PROCEDURE — 3044F HG A1C LEVEL LT 7.0%: CPT

## 2025-05-28 PROCEDURE — 1159F MED LIST DOCD IN RCRD: CPT

## 2025-05-28 PROCEDURE — 85025 COMPLETE CBC W/AUTO DIFF WBC: CPT

## 2025-05-28 PROCEDURE — 96409 CHEMO IV PUSH SNGL DRUG: CPT

## 2025-05-28 PROCEDURE — 3079F DIAST BP 80-89 MM HG: CPT

## 2025-05-28 PROCEDURE — 96411 CHEMO IV PUSH ADDL DRUG: CPT

## 2025-05-28 PROCEDURE — 80053 COMPREHEN METABOLIC PANEL: CPT

## 2025-05-28 PROCEDURE — 96367 TX/PROPH/DG ADDL SEQ IV INF: CPT

## 2025-05-28 PROCEDURE — 2500000004 HC RX 250 GENERAL PHARMACY W/ HCPCS (ALT 636 FOR OP/ED): Performed by: INTERNAL MEDICINE

## 2025-05-28 PROCEDURE — 1160F RVW MEDS BY RX/DR IN RCRD: CPT

## 2025-05-28 PROCEDURE — 99214 OFFICE O/P EST MOD 30 MIN: CPT | Mod: 25

## 2025-05-28 PROCEDURE — 96376 TX/PRO/DX INJ SAME DRUG ADON: CPT

## 2025-05-28 PROCEDURE — 3075F SYST BP GE 130 - 139MM HG: CPT

## 2025-05-28 PROCEDURE — 96375 TX/PRO/DX INJ NEW DRUG ADDON: CPT | Mod: INF

## 2025-05-28 RX ORDER — SCOPOLAMINE 1 MG/3D
1 PATCH, EXTENDED RELEASE TRANSDERMAL
Qty: 10 PATCH | Refills: 1 | Status: SHIPPED | OUTPATIENT
Start: 2025-05-28 | End: 2025-07-27

## 2025-05-28 RX ORDER — GABAPENTIN 100 MG/1
CAPSULE ORAL
Qty: 120 CAPSULE | Refills: 3 | Status: SHIPPED | OUTPATIENT
Start: 2025-05-28

## 2025-05-28 RX ORDER — PROCHLORPERAZINE EDISYLATE 5 MG/ML
10 INJECTION INTRAMUSCULAR; INTRAVENOUS EVERY 6 HOURS PRN
Status: DISCONTINUED | OUTPATIENT
Start: 2025-05-28 | End: 2025-05-28 | Stop reason: HOSPADM

## 2025-05-28 RX ORDER — ALBUTEROL SULFATE 0.83 MG/ML
3 SOLUTION RESPIRATORY (INHALATION) AS NEEDED
Status: DISCONTINUED | OUTPATIENT
Start: 2025-05-28 | End: 2025-05-28 | Stop reason: HOSPADM

## 2025-05-28 RX ORDER — HEPARIN 100 UNIT/ML
500 SYRINGE INTRAVENOUS AS NEEDED
Status: CANCELLED | OUTPATIENT
Start: 2025-05-28

## 2025-05-28 RX ORDER — DIPHENHYDRAMINE HYDROCHLORIDE 50 MG/ML
50 INJECTION, SOLUTION INTRAMUSCULAR; INTRAVENOUS AS NEEDED
Status: DISCONTINUED | OUTPATIENT
Start: 2025-05-28 | End: 2025-05-28 | Stop reason: HOSPADM

## 2025-05-28 RX ORDER — FLUOROURACIL 50 MG/ML
400 INJECTION, SOLUTION INTRAVENOUS ONCE
Status: COMPLETED | OUTPATIENT
Start: 2025-05-28 | End: 2025-05-28

## 2025-05-28 RX ORDER — FAMOTIDINE 10 MG/ML
20 INJECTION, SOLUTION INTRAVENOUS ONCE AS NEEDED
Status: DISCONTINUED | OUTPATIENT
Start: 2025-05-28 | End: 2025-05-28 | Stop reason: HOSPADM

## 2025-05-28 RX ORDER — HEPARIN SODIUM,PORCINE/PF 10 UNIT/ML
50 SYRINGE (ML) INTRAVENOUS AS NEEDED
Status: DISCONTINUED | OUTPATIENT
Start: 2025-05-28 | End: 2025-05-28 | Stop reason: HOSPADM

## 2025-05-28 RX ORDER — ONDANSETRON HYDROCHLORIDE 2 MG/ML
8 INJECTION, SOLUTION INTRAVENOUS ONCE
Status: COMPLETED | OUTPATIENT
Start: 2025-05-28 | End: 2025-05-28

## 2025-05-28 RX ORDER — HEPARIN 100 UNIT/ML
500 SYRINGE INTRAVENOUS AS NEEDED
Status: DISCONTINUED | OUTPATIENT
Start: 2025-05-28 | End: 2025-05-28 | Stop reason: HOSPADM

## 2025-05-28 RX ORDER — PROCHLORPERAZINE MALEATE 10 MG
10 TABLET ORAL EVERY 6 HOURS PRN
Status: DISCONTINUED | OUTPATIENT
Start: 2025-05-28 | End: 2025-05-28 | Stop reason: HOSPADM

## 2025-05-28 RX ORDER — EPINEPHRINE 0.3 MG/.3ML
0.3 INJECTION SUBCUTANEOUS EVERY 5 MIN PRN
Status: DISCONTINUED | OUTPATIENT
Start: 2025-05-28 | End: 2025-05-28 | Stop reason: HOSPADM

## 2025-05-28 RX ORDER — HEPARIN SODIUM,PORCINE/PF 10 UNIT/ML
50 SYRINGE (ML) INTRAVENOUS AS NEEDED
Status: CANCELLED | OUTPATIENT
Start: 2025-05-28

## 2025-05-28 RX ADMIN — BEVACIZUMAB-AWWB 450 MG: 400 INJECTION, SOLUTION INTRAVENOUS at 15:00

## 2025-05-28 RX ADMIN — FLUOROURACIL 4900 MG: 50 INJECTION, SOLUTION INTRAVENOUS at 17:14

## 2025-05-28 RX ADMIN — LEUCOVORIN CALCIUM 820 MG: 350 INJECTION, POWDER, LYOPHILIZED, FOR SOLUTION INTRAMUSCULAR; INTRAVENOUS at 15:16

## 2025-05-28 RX ADMIN — FLUOROURACIL 825 MG: 50 INJECTION, SOLUTION INTRAVENOUS at 17:01

## 2025-05-28 RX ADMIN — ONDANSETRON 8 MG: 2 INJECTION INTRAMUSCULAR; INTRAVENOUS at 14:52

## 2025-05-28 ASSESSMENT — ENCOUNTER SYMPTOMS
FEVER: 0
EYES NEGATIVE: 1
SHORTNESS OF BREATH: 0
CHILLS: 0
VOMITING: 0
APPETITE CHANGE: 0
DIARRHEA: 0
HEMATOLOGIC/LYMPHATIC NEGATIVE: 1
UNEXPECTED WEIGHT CHANGE: 0
CONSTIPATION: 0
NUMBNESS: 1
NAUSEA: 0
COUGH: 0
FATIGUE: 0
PSYCHIATRIC NEGATIVE: 1
CARDIOVASCULAR NEGATIVE: 1

## 2025-05-28 ASSESSMENT — PAIN SCALES - GENERAL: PAINLEVEL_OUTOF10: 6

## 2025-05-29 LAB — HOLD SPECIMEN: NORMAL

## 2025-05-30 ENCOUNTER — INFUSION (OUTPATIENT)
Dept: HEMATOLOGY/ONCOLOGY | Facility: CLINIC | Age: 70
End: 2025-05-30
Payer: MEDICARE

## 2025-05-30 VITALS
DIASTOLIC BLOOD PRESSURE: 78 MMHG | WEIGHT: 210.76 LBS | BODY MASS INDEX: 34.9 KG/M2 | RESPIRATION RATE: 17 BRPM | HEART RATE: 67 BPM | OXYGEN SATURATION: 97 % | TEMPERATURE: 97.3 F | SYSTOLIC BLOOD PRESSURE: 152 MMHG

## 2025-05-30 DIAGNOSIS — C18.1 MALIGNANT NEOPLASM OF APPENDIX (MULTI): ICD-10-CM

## 2025-05-30 PROCEDURE — 96523 IRRIG DRUG DELIVERY DEVICE: CPT

## 2025-05-30 RX ORDER — HEPARIN 100 UNIT/ML
500 SYRINGE INTRAVENOUS AS NEEDED
OUTPATIENT
Start: 2025-05-30

## 2025-05-30 RX ORDER — HEPARIN SODIUM,PORCINE/PF 10 UNIT/ML
50 SYRINGE (ML) INTRAVENOUS AS NEEDED
Status: DISCONTINUED | OUTPATIENT
Start: 2025-05-30 | End: 2025-05-30 | Stop reason: HOSPADM

## 2025-05-30 RX ORDER — HEPARIN 100 UNIT/ML
500 SYRINGE INTRAVENOUS AS NEEDED
Status: DISCONTINUED | OUTPATIENT
Start: 2025-05-30 | End: 2025-05-30 | Stop reason: HOSPADM

## 2025-05-30 RX ORDER — HEPARIN SODIUM,PORCINE/PF 10 UNIT/ML
50 SYRINGE (ML) INTRAVENOUS AS NEEDED
OUTPATIENT
Start: 2025-05-30

## 2025-05-30 ASSESSMENT — PAIN SCALES - GENERAL: PAINLEVEL_OUTOF10: 6

## 2025-06-16 NOTE — PROGRESS NOTES
Patient ID: Monica Musa is a 70 y.o. female.    Oncology History Overview Note   - 7/8/2022 TLH, BSO, SLND combined case with Dr. Lal. 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 There was no lymphovascular invasion. There was a focus of MELF pattern invasion. Tumor board recommendations: Surveillance. MMR testing on prior endometrial biopsy specimen was normal.   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  - CT 8/26/24: Small volume ascites. Left pelvic nodule with some nodularity  along the left paracolic gutter worrisome for peritoneal carcinomatosis.  Admitted with bowel obstruction.  No lesion large enough for biopsy  - 9/2024 diagnostic laparoscopy - biopsy c/w non gyn primary - referred to med onc     Endometrial carcinoma   5/22/2023 Initial Diagnosis    Endometrial carcinoma (Multi)     Malignant neoplasm of appendix (Multi)   10/3/2024 Initial Diagnosis    Malignant neoplasm of appendix (Multi)     10/16/2024 - 3/27/2025 Chemotherapy    mFOLFOX6 (Fluorouracil Continuous Infusion / Leucovorin / Oxaliplatin), 14 Day Cycles     5/13/2025 -  Chemotherapy    Bevacizumab + Simplified Biweekly Infusional Fluorouracil / Leucovorin, 14 Day Cycles     Carcinomatosis (Multi)   10/3/2024 Initial Diagnosis    Carcinomatosis (Multi)     10/16/2024 - 3/27/2025 Chemotherapy    mFOLFOX6 (Fluorouracil Continuous Infusion / Leucovorin / Oxaliplatin), 14 Day Cycles     5/13/2025 -  Chemotherapy    Bevacizumab + Simplified Biweekly Infusional Fluorouracil / Leucovorin, 14 Day Cycles         Subjective    HPI  Monica Musa is a 70 y.o. female with carcinomatosis. Blood work from today unremarkable, no new cytopenias, chemistry panel with mild elevation in AST.    Today she notes her neuropathy remains unchanged. She denies new nausea, vomiting, diarrhea, constipation, skin rashes, chest pain, palpitations, mouth sores, fevers.    Patient's  past medical history, surgical history, family history and social history reviewed.    Review of Systems:    Positive per HPI, otherwise negative.       Objective    Visit Vitals  /72   Pulse 71   Temp 36.5 °C (97.7 °F)   Resp 18   Wt 93.1 kg (205 lb 2.2 oz)   SpO2 93%   BMI 33.97 kg/m²   OB Status Hysterectomy   Smoking Status Former   BSA 2.07 m²        Physical Exam  Gen: appears well in clinic, NAD  HEENT: atraumatic head, normocephalic, EOMI, conjunctiva normal  LUNG: no increased WOB, CTAB  CV: No JVD. RRR  GI: soft, NT, ND  LE: no LE edema  Skin: no obvious rashes or lesions on visible skin  Neuro: interactive, no focal deficits noted  Psych: normal mood and affect      Performance Status:  Symptomatic; fully ambulatory    Labs/Imaging/Pathology: personally reviewed reports and images in Epic electronic medical record system. Pertinent results as it related to the plan represented in below in assessment and plan.       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  There are some features of goblet cells which favor appendiceal primary however pancreaticobiliary origin cannot be excluded.  MSI status intact  - [id number] also completed on blood from 9/30/2024 which was unremarkable  - Tempus on tissue in process  - Colonoscopy on 7/31/2024 was technically difficult due to scattered diverticulosis in the ascending colon and some hemorrhoids  - EGD 10/2/24 shows abnormal mucosa at the GE junction but no clear mass however there was diffuse nodular gastritis which was biopsied and pathology report states: neg for H Pylori.    - No variants detected in the DPYD gene   - Signatera in process  - 10/3/24: CA19.9 <4.00, CEA 1.4   10/3/24  Today we discussed diagnosis of likely advanced appendiceal carcinoma given there is no mass seen on recent imaging 8/26/2024 with no masses seen  -Goblet cells seen on pathology likely appendiceal origin  -Will plan for CA 19-9 and CEA as baseline  -Will await EGD pathology to confirm no evidence of malignancy in the esophagus  -Discussed neck step is systemic therapy with modified FOLFOX   - could consider HIPEC after 3-4 cycles based on response  - Dr Ontiveros aware of patient  -Refer port placement next week and cycle 1 day 1 on 10/9/2024  -Reviewed side effects of 5-FU, oxaliplatin and consent signed  -Prescription sent for nausea to be used as needed  -We will plan for day 3 Aloxi  -Given limited support at home will likely hold off on disconnect at home for now  10/23/2024:   - Mediport placed 10/10/24  - Cycle 1 mFOLFOX given 10/16/24: Fluorouracil 2,400 mg/m2, oxaliplatin 85 mg/m2 with pre meds zofran 16 mg and decadron 12 mg, nothing given on day 3   - Last visit with GYN, Dr. Greene, was on 10/7/24         10/29/24  - symptoms improved after supportive care and now close to her baseline  - states dysphagia improved after chemo which hopefully is a sign of tx response  -Lower extremity swelling we discussed is likely related to poor p.o. intake, albumin is low and we discussed importance of protein in addition to ambulating and compression  -Overall for nausea which is her largest complaint we discussed starting Zyprexa at bedtime, given she is not taking much of Reglan we will hold off for now  -She will continue Zofran every 8 hours as needed  -Labs reviewed from today no contraindications to proceed with treatment tomorrow  -Plan for supportive care again with Boni the following week     11/5/2024:  - Patient presents for toxicity check after cycle 2  - Reports that she is feeling well and  tolerating treatment better   - She does not need IVF or medications today but we will check cmp/mag and notify her if she needs to continue home K+, she prefers to change to smaller dosage   - Eating more solid foods and will continue to try to prevent further weight loss, declines to talk with dietician today   - She is taking Zyprexa nightly   - She is unsure which prn anti-nausea she is taking but will update me  - Taking imodium BID   - She will RTC on 11/12 for port flush/labs and a visit the same day with Dr. Smart   - She will then RTC the next day for treatment      11/12/24:  - Patient continues to tolerate treatment with no major toxicity.   - No dose adjustments at this time.  - Plan to follow-up with Boni in 2 weeks and with me in 4 weeks.  - On days she sees Boni she will have labs done the day before and in 4 weeks she will have labs done in house with me.  - Will plan to have labs done outside of port.   - RTC in 2 weeks with Boni.      12/9/24:   - Patient continues to tolerate treatment  - No dose adjustments at this time.   - Labs reviewed. No contraindications to proceed with cycle 5 on Wednesday  - Cycle 6 will be delayed to 12/31   - Plan scans the week after and follow-up with me January 14th 2025.   - RTC for treatment only in 3 weeks and with me in 5 weeks.     1/14/25:  - CT scan from yesterday shows good treatment response and overall patient states she feels better with systemic chemotherapy.  - We discussed no worsening toxicity and overall no significant GI symptoms or neuropathy.   - She does some cold sensitivity for approximately 5 days.  - Will plan to continue with current dose.  - No treatment changes.  - RTC in 2 weeks for treatment only and in 4 weeks with me.      2/11/25   - No new complaints   - Continues to have neuropathy for approx five days but manageable    - No GI issues or concerns   - Refill provided for pantoprazole, would try to decrease to 20 mg daily  - No  other change in dose  - RTC in 2 weeks for treatment and in 4 weeks with Boni, 6th week for treatment and 8 weeks with me and we will plan for a scan prior which can be ordered in future.  - She should have a CTAP chest before her visit with me in 8 weeks.    3/25/2025:  - Presents for cycle 12 mFOLFOX  - Oxaliplatin was dose reduced to 65 mg/m² last cycle due to prolonged cold sensitivity  - Patient's platelets for cycle 12 were 74,000  - Her labs today will be done when she goes back to treatment  - She does have some residual cold sensitivity and felt that cold sensitivity was more intense this cycle despite dose reduction, discussed further dose reduction or stopping oxaliplatin but patient prefers to push through today before her next set of scans next week  - She is not having any persistent neuropathy, we dose reduced last cycle and so we will go ahead and keep dosages the same as last cycle   - She took an antiallergy medication at home this morning, but I will add Pepcid to her premeds since this is her 12th cycle of oxaliplatin, she has not had any sensitivity reactions  - She will return in 2 weeks with Dr. Smart to review imaging she will have done next week, patient aware that Dr. Smart will provide her recommendations regarding treatment plan at that visit once she has scan results and patient verbalized understanding. She is scheduled for cycle 13 but aware it can be cancelled if she does not need treatment that day.     4/8/25:   - Patient is now status post 6 months of systemic therapy   - She feels well with no new side effects, still with some neuropathy that she describes as mild   - We discussed that we will plan to have her be evaluated by Dr Ontiveros for consideration of HIPEC and cytoreductive surgery. She is agreeable and his team with reach out to her today for next steps.    - Will likely plan for diagnostic laparotomy and will plan for surgery likely after her vacation to [facility]  with me in 6 weeks for port flush and labs    - We will ensure there is a plan moving forward   - Will plan for treatment holiday from systemic therapy at this time   - RTC in 6 weeks with me     5/13/25:   - Today we reviewed that during her evaluation with Dr. Ontiveros, there was too much peritoneal disease to be considered for HIPEC or cytoreductive surgery   - We discussed the overall goal of treatment would be for palliation and to prevent or control further systemic disease   - We will plan to continue 5Fu and add Avastin   - Reviewed side effects of Avastin and consent signed   - We discussed at progression, we would conider Irinotecan as the next step   - RTC in 2 weeks for treatment, with Boni   - Will delay next cycle due to travel to [place]   - RTC with me 6/16/25 5/28/25:  - Proceed with cycle 2 5FU + Avastin today  - Cycles repeated every 2 weeks  - Next cycle delayed x 1 week for a trip  - Increase gabapentin to 100 mg in AM and 300 mg in PM  - OK for CBD lotion   - Follow up in 3 weeks with Dr. Smart and next cycle    6/17/25:   - Labs reviewed   - No contraindications to proceed with next dose 5FU and Avastin   - We will plan for treatment only in 2 weeks, with me in 4 weeks   - Reviewed this is a high risk therapy for an illness that poses threat to life. Labs, exam, and history are appropriate for treatment   - Plan for scans after cycle 5   - Patient reports neuropathy that is unchanged   - Will titrate up gabapentin slowly to 400 mg in the morning and 600 mg in the evening   - She will call with any new worsening symptoms    - RTC in 4 weeks with me       Reviewed ongoing medical problems and how they relate to her malignancy, will continue long term monitoring.    RTC in 4 weeks  This note has been transcribed using a medical scribe and there is a possibility of unintentional typing misprints    Diagnoses and all orders for this visit:  Carcinomatosis (Multi)  -     CBC and Auto Differential;  Future  -     Comprehensive metabolic panel; Future  -     Clinic Appointment Request; Future  -     Infusion Appointment Request; Future  -     CBC and Auto Differential; Future  -     Comprehensive metabolic panel; Future  -     Urinalysis with Reflex Microscopic; Future  -     Infusion Appointment Request; Future  -     Clinic Appointment Request; Future  -     Infusion Appointment Request; Future  -     CBC and Auto Differential; Future  -     Comprehensive metabolic panel; Future  -     Infusion Appointment Request; Future  -     CT chest abdomen pelvis w and wo IV contrast; Future  -     gabapentin (Neurontin) 100 mg capsule; Take 4 capsules (400 mg) by mouth once daily in the morning AND 6 capsules (600 mg) once daily at bedtime.  Malignant neoplasm of appendix (Multi)  -     Clinic Appointment Request  -     CBC and Auto Differential; Future  -     Comprehensive metabolic panel; Future  -     Clinic Appointment Request; Future  -     Infusion Appointment Request; Future  -     CBC and Auto Differential; Future  -     Comprehensive metabolic panel; Future  -     Urinalysis with Reflex Microscopic; Future  -     Infusion Appointment Request; Future  -     Clinic Appointment Request; Future  -     Infusion Appointment Request; Future  -     CBC and Auto Differential; Future  -     Comprehensive metabolic panel; Future  -     Infusion Appointment Request; Future  -     CT chest abdomen pelvis w and wo IV contrast; Future  -     gabapentin (Neurontin) 100 mg capsule; Take 4 capsules (400 mg) by mouth once daily in the morning AND 6 capsules (600 mg) once daily at bedtime.  Other orders  -     ondansetron (Zofran) injection 8 mg  -     prochlorperazine (Compazine) tablet 10 mg  -     prochlorperazine (Compazine) injection 10 mg  -     bevacizumab-awwb (Mvasi) 450 mg in sodium chloride 0.9% 118 mL IV  -     leucovorin 820 mg in dextrose 5% 141 mL IV  -     fluorouracil (Adrucil) IV syringe 825 mg  -      fluorouracil (Adrucil) 4,900 mg in sodium chloride 0.9% 138 mL IV via Home Infusion  -     sodium chloride 0.9 % bolus 500 mL  -     dextrose 5 % in water (D5W) bolus 500 mL  -     diphenhydrAMINE (BENADryl) injection 50 mg  -     methylPREDNISolone sod succinate (SOLU-Medrol) 40 mg/mL injection 40 mg  -     famotidine PF (Pepcid) injection 20 mg  -     EPINEPHrine (Epipen) injection syringe 0.3 mg  -     albuterol 2.5 mg /3 mL (0.083 %) nebulizer solution 3 mL  -     ondansetron (Zofran) injection 8 mg  -     prochlorperazine (Compazine) tablet 10 mg  -     prochlorperazine (Compazine) injection 10 mg  -     bevacizumab-awwb (Mvasi) 450 mg in sodium chloride 0.9% 118 mL IV  -     leucovorin 820 mg in dextrose 5% 141 mL IV  -     fluorouracil (Adrucil) IV syringe 825 mg  -     fluorouracil (Adrucil) 4,900 mg in sodium chloride 0.9% 138 mL IV via Home Infusion  -     sodium chloride 0.9 % bolus 500 mL  -     dextrose 5 % in water (D5W) bolus 500 mL  -     diphenhydrAMINE (BENADryl) injection 50 mg  -     methylPREDNISolone sod succinate (SOLU-Medrol) 40 mg/mL injection 40 mg  -     famotidine PF (Pepcid) injection 20 mg  -     EPINEPHrine (Epipen) injection syringe 0.3 mg  -     albuterol 2.5 mg /3 mL (0.083 %) nebulizer solution 3 mL  -     ondansetron (Zofran) injection 8 mg  -     prochlorperazine (Compazine) tablet 10 mg  -     prochlorperazine (Compazine) injection 10 mg  -     bevacizumab-awwb (Mvasi) 450 mg in sodium chloride 0.9% 118 mL IV  -     leucovorin 820 mg in dextrose 5% 141 mL IV  -     fluorouracil (Adrucil) IV syringe 825 mg  -     fluorouracil (Adrucil) 4,900 mg in sodium chloride 0.9% 138 mL IV via Home Infusion  -     sodium chloride 0.9 % bolus 500 mL  -     dextrose 5 % in water (D5W) bolus 500 mL  -     diphenhydrAMINE (BENADryl) injection 50 mg  -     methylPREDNISolone sod succinate (SOLU-Medrol) 40 mg/mL injection 40 mg  -     famotidine PF (Pepcid) injection 20 mg  -     EPINEPHrine  (Epipen) injection syringe 0.3 mg  -     albuterol 2.5 mg /3 mL (0.083 %) nebulizer solution 3 mL  -     Treatment Conditions - OK to Treat      Margot Smart MD  Hematology/Oncology  [facility]  By signing my name below, I, Radha Sal, attest that this documentation has been prepared under the direction and in the presence of Margot Smart MD.

## 2025-06-17 ENCOUNTER — OFFICE VISIT (OUTPATIENT)
Dept: HEMATOLOGY/ONCOLOGY | Facility: CLINIC | Age: 70
End: 2025-06-17
Payer: MEDICARE

## 2025-06-17 ENCOUNTER — INFUSION (OUTPATIENT)
Dept: HEMATOLOGY/ONCOLOGY | Facility: CLINIC | Age: 70
End: 2025-06-17
Payer: MEDICARE

## 2025-06-17 VITALS
BODY MASS INDEX: 33.97 KG/M2 | SYSTOLIC BLOOD PRESSURE: 135 MMHG | DIASTOLIC BLOOD PRESSURE: 72 MMHG | RESPIRATION RATE: 18 BRPM | HEART RATE: 71 BPM | TEMPERATURE: 97.7 F | WEIGHT: 205.14 LBS | OXYGEN SATURATION: 93 %

## 2025-06-17 DIAGNOSIS — C18.1 MALIGNANT NEOPLASM OF APPENDIX (MULTI): ICD-10-CM

## 2025-06-17 DIAGNOSIS — C80.0 CARCINOMATOSIS (MULTI): ICD-10-CM

## 2025-06-17 DIAGNOSIS — C80.0 CARCINOMATOSIS (MULTI): Primary | ICD-10-CM

## 2025-06-17 LAB
ALBUMIN SERPL BCP-MCNC: 4.5 G/DL (ref 3.4–5)
ALP SERPL-CCNC: 101 U/L (ref 33–136)
ALT SERPL W P-5'-P-CCNC: 40 U/L (ref 7–45)
ANION GAP SERPL CALC-SCNC: 15 MMOL/L (ref 10–20)
APPEARANCE UR: CLEAR
AST SERPL W P-5'-P-CCNC: 40 U/L (ref 9–39)
BASOPHILS # BLD AUTO: 0.03 X10*3/UL (ref 0–0.1)
BASOPHILS NFR BLD AUTO: 0.7 %
BILIRUB SERPL-MCNC: 0.6 MG/DL (ref 0–1.2)
BILIRUB UR STRIP.AUTO-MCNC: NEGATIVE MG/DL
BUN SERPL-MCNC: 18 MG/DL (ref 6–23)
CALCIUM SERPL-MCNC: 9.7 MG/DL (ref 8.6–10.3)
CHLORIDE SERPL-SCNC: 107 MMOL/L (ref 98–107)
CO2 SERPL-SCNC: 23 MMOL/L (ref 21–32)
COLOR UR: ABNORMAL
CREAT SERPL-MCNC: 0.67 MG/DL (ref 0.5–1.05)
EGFRCR SERPLBLD CKD-EPI 2021: >90 ML/MIN/1.73M*2
EOSINOPHIL # BLD AUTO: 0.15 X10*3/UL (ref 0–0.7)
EOSINOPHIL NFR BLD AUTO: 3.4 %
ERYTHROCYTE [DISTWIDTH] IN BLOOD BY AUTOMATED COUNT: 15.3 % (ref 11.5–14.5)
GLUCOSE SERPL-MCNC: 189 MG/DL (ref 74–99)
GLUCOSE UR STRIP.AUTO-MCNC: ABNORMAL MG/DL
HCT VFR BLD AUTO: 40.4 % (ref 36–46)
HGB BLD-MCNC: 13.4 G/DL (ref 12–16)
IMM GRANULOCYTES # BLD AUTO: 0.01 X10*3/UL (ref 0–0.7)
IMM GRANULOCYTES NFR BLD AUTO: 0.2 % (ref 0–0.9)
KETONES UR STRIP.AUTO-MCNC: NEGATIVE MG/DL
LEUKOCYTE ESTERASE UR QL STRIP.AUTO: NEGATIVE
LYMPHOCYTES # BLD AUTO: 1.47 X10*3/UL (ref 1.2–4.8)
LYMPHOCYTES NFR BLD AUTO: 33.6 %
MCH RBC QN AUTO: 31.5 PG (ref 26–34)
MCHC RBC AUTO-ENTMCNC: 33.2 G/DL (ref 32–36)
MCV RBC AUTO: 95 FL (ref 80–100)
MONOCYTES # BLD AUTO: 0.67 X10*3/UL (ref 0.1–1)
MONOCYTES NFR BLD AUTO: 15.3 %
NEUTROPHILS # BLD AUTO: 2.04 X10*3/UL (ref 1.2–7.7)
NEUTROPHILS NFR BLD AUTO: 46.8 %
NITRITE UR QL STRIP.AUTO: NEGATIVE
PH UR STRIP.AUTO: 5 [PH]
PLATELET # BLD AUTO: 140 X10*3/UL (ref 150–450)
POTASSIUM SERPL-SCNC: 4 MMOL/L (ref 3.5–5.3)
PROT SERPL-MCNC: 7 G/DL (ref 6.4–8.2)
PROT UR STRIP.AUTO-MCNC: NEGATIVE MG/DL
RBC # BLD AUTO: 4.25 X10*6/UL (ref 4–5.2)
RBC # UR STRIP.AUTO: NEGATIVE MG/DL
SODIUM SERPL-SCNC: 141 MMOL/L (ref 136–145)
SP GR UR STRIP.AUTO: 1.02
UROBILINOGEN UR STRIP.AUTO-MCNC: NORMAL MG/DL
WBC # BLD AUTO: 4.4 X10*3/UL (ref 4.4–11.3)

## 2025-06-17 PROCEDURE — 85025 COMPLETE CBC W/AUTO DIFF WBC: CPT

## 2025-06-17 PROCEDURE — 96375 TX/PRO/DX INJ NEW DRUG ADDON: CPT | Mod: INF

## 2025-06-17 PROCEDURE — 2500000004 HC RX 250 GENERAL PHARMACY W/ HCPCS (ALT 636 FOR OP/ED): Performed by: INTERNAL MEDICINE

## 2025-06-17 PROCEDURE — 96367 TX/PROPH/DG ADDL SEQ IV INF: CPT

## 2025-06-17 PROCEDURE — 99215 OFFICE O/P EST HI 40 MIN: CPT | Mod: 25 | Performed by: INTERNAL MEDICINE

## 2025-06-17 PROCEDURE — 96416 CHEMO PROLONG INFUSE W/PUMP: CPT

## 2025-06-17 PROCEDURE — 1125F AMNT PAIN NOTED PAIN PRSNT: CPT | Performed by: INTERNAL MEDICINE

## 2025-06-17 PROCEDURE — 1159F MED LIST DOCD IN RCRD: CPT | Performed by: INTERNAL MEDICINE

## 2025-06-17 PROCEDURE — 3044F HG A1C LEVEL LT 7.0%: CPT | Performed by: INTERNAL MEDICINE

## 2025-06-17 PROCEDURE — 99215 OFFICE O/P EST HI 40 MIN: CPT | Performed by: INTERNAL MEDICINE

## 2025-06-17 PROCEDURE — 96411 CHEMO IV PUSH ADDL DRUG: CPT

## 2025-06-17 PROCEDURE — 81003 URINALYSIS AUTO W/O SCOPE: CPT

## 2025-06-17 PROCEDURE — 3075F SYST BP GE 130 - 139MM HG: CPT | Performed by: INTERNAL MEDICINE

## 2025-06-17 PROCEDURE — G2211 COMPLEX E/M VISIT ADD ON: HCPCS | Performed by: INTERNAL MEDICINE

## 2025-06-17 PROCEDURE — 3078F DIAST BP <80 MM HG: CPT | Performed by: INTERNAL MEDICINE

## 2025-06-17 PROCEDURE — 80053 COMPREHEN METABOLIC PANEL: CPT

## 2025-06-17 PROCEDURE — 96366 THER/PROPH/DIAG IV INF ADDON: CPT | Mod: INF

## 2025-06-17 PROCEDURE — 96409 CHEMO IV PUSH SNGL DRUG: CPT

## 2025-06-17 RX ORDER — ALBUTEROL SULFATE 0.83 MG/ML
3 SOLUTION RESPIRATORY (INHALATION) AS NEEDED
Status: DISCONTINUED | OUTPATIENT
Start: 2025-06-17 | End: 2025-06-17 | Stop reason: HOSPADM

## 2025-06-17 RX ORDER — PROCHLORPERAZINE EDISYLATE 5 MG/ML
10 INJECTION INTRAMUSCULAR; INTRAVENOUS EVERY 6 HOURS PRN
OUTPATIENT
Start: 2025-07-15

## 2025-06-17 RX ORDER — FAMOTIDINE 10 MG/ML
20 INJECTION, SOLUTION INTRAVENOUS ONCE AS NEEDED
Status: DISCONTINUED | OUTPATIENT
Start: 2025-06-17 | End: 2025-06-17 | Stop reason: HOSPADM

## 2025-06-17 RX ORDER — ALBUTEROL SULFATE 0.83 MG/ML
3 SOLUTION RESPIRATORY (INHALATION) AS NEEDED
OUTPATIENT
Start: 2025-07-15

## 2025-06-17 RX ORDER — ONDANSETRON HYDROCHLORIDE 2 MG/ML
8 INJECTION, SOLUTION INTRAVENOUS ONCE
OUTPATIENT
Start: 2025-07-01

## 2025-06-17 RX ORDER — DIPHENHYDRAMINE HYDROCHLORIDE 50 MG/ML
50 INJECTION, SOLUTION INTRAMUSCULAR; INTRAVENOUS AS NEEDED
OUTPATIENT
Start: 2025-07-29

## 2025-06-17 RX ORDER — PROCHLORPERAZINE EDISYLATE 5 MG/ML
10 INJECTION INTRAMUSCULAR; INTRAVENOUS EVERY 6 HOURS PRN
Status: DISCONTINUED | OUTPATIENT
Start: 2025-06-17 | End: 2025-06-17 | Stop reason: HOSPADM

## 2025-06-17 RX ORDER — FLUOROURACIL 50 MG/ML
400 INJECTION, SOLUTION INTRAVENOUS ONCE
OUTPATIENT
Start: 2025-07-15

## 2025-06-17 RX ORDER — PROCHLORPERAZINE MALEATE 10 MG
10 TABLET ORAL EVERY 6 HOURS PRN
OUTPATIENT
Start: 2025-07-29

## 2025-06-17 RX ORDER — DIPHENHYDRAMINE HYDROCHLORIDE 50 MG/ML
50 INJECTION, SOLUTION INTRAMUSCULAR; INTRAVENOUS AS NEEDED
Status: DISCONTINUED | OUTPATIENT
Start: 2025-06-17 | End: 2025-06-17 | Stop reason: HOSPADM

## 2025-06-17 RX ORDER — PROCHLORPERAZINE MALEATE 10 MG
10 TABLET ORAL EVERY 6 HOURS PRN
OUTPATIENT
Start: 2025-07-01

## 2025-06-17 RX ORDER — FAMOTIDINE 10 MG/ML
20 INJECTION, SOLUTION INTRAVENOUS ONCE AS NEEDED
OUTPATIENT
Start: 2025-07-15

## 2025-06-17 RX ORDER — EPINEPHRINE 0.3 MG/.3ML
0.3 INJECTION SUBCUTANEOUS EVERY 5 MIN PRN
OUTPATIENT
Start: 2025-07-01

## 2025-06-17 RX ORDER — DIPHENHYDRAMINE HYDROCHLORIDE 50 MG/ML
50 INJECTION, SOLUTION INTRAMUSCULAR; INTRAVENOUS AS NEEDED
OUTPATIENT
Start: 2025-07-15

## 2025-06-17 RX ORDER — GABAPENTIN 100 MG/1
CAPSULE ORAL
Qty: 300 CAPSULE | Refills: 3 | Status: SHIPPED | OUTPATIENT
Start: 2025-06-17

## 2025-06-17 RX ORDER — HEPARIN SODIUM,PORCINE/PF 10 UNIT/ML
50 SYRINGE (ML) INTRAVENOUS AS NEEDED
Status: CANCELLED | OUTPATIENT
Start: 2025-06-17

## 2025-06-17 RX ORDER — PROCHLORPERAZINE EDISYLATE 5 MG/ML
10 INJECTION INTRAMUSCULAR; INTRAVENOUS EVERY 6 HOURS PRN
OUTPATIENT
Start: 2025-07-29

## 2025-06-17 RX ORDER — EPINEPHRINE 0.3 MG/.3ML
0.3 INJECTION SUBCUTANEOUS EVERY 5 MIN PRN
Status: DISCONTINUED | OUTPATIENT
Start: 2025-06-17 | End: 2025-06-17 | Stop reason: HOSPADM

## 2025-06-17 RX ORDER — PROCHLORPERAZINE MALEATE 10 MG
10 TABLET ORAL EVERY 6 HOURS PRN
OUTPATIENT
Start: 2025-07-15

## 2025-06-17 RX ORDER — PROCHLORPERAZINE MALEATE 10 MG
10 TABLET ORAL EVERY 6 HOURS PRN
Status: DISCONTINUED | OUTPATIENT
Start: 2025-06-17 | End: 2025-06-17 | Stop reason: HOSPADM

## 2025-06-17 RX ORDER — EPINEPHRINE 0.3 MG/.3ML
0.3 INJECTION SUBCUTANEOUS EVERY 5 MIN PRN
OUTPATIENT
Start: 2025-07-29

## 2025-06-17 RX ORDER — HEPARIN 100 UNIT/ML
500 SYRINGE INTRAVENOUS AS NEEDED
Status: CANCELLED | OUTPATIENT
Start: 2025-06-17

## 2025-06-17 RX ORDER — PROCHLORPERAZINE EDISYLATE 5 MG/ML
10 INJECTION INTRAMUSCULAR; INTRAVENOUS EVERY 6 HOURS PRN
OUTPATIENT
Start: 2025-07-01

## 2025-06-17 RX ORDER — ONDANSETRON HYDROCHLORIDE 2 MG/ML
8 INJECTION, SOLUTION INTRAVENOUS ONCE
Status: COMPLETED | OUTPATIENT
Start: 2025-06-17 | End: 2025-06-17

## 2025-06-17 RX ORDER — EPINEPHRINE 0.3 MG/.3ML
0.3 INJECTION SUBCUTANEOUS EVERY 5 MIN PRN
OUTPATIENT
Start: 2025-07-15

## 2025-06-17 RX ORDER — FAMOTIDINE 10 MG/ML
20 INJECTION, SOLUTION INTRAVENOUS ONCE AS NEEDED
OUTPATIENT
Start: 2025-07-01

## 2025-06-17 RX ORDER — FLUOROURACIL 50 MG/ML
400 INJECTION, SOLUTION INTRAVENOUS ONCE
OUTPATIENT
Start: 2025-07-29

## 2025-06-17 RX ORDER — FAMOTIDINE 10 MG/ML
20 INJECTION, SOLUTION INTRAVENOUS ONCE AS NEEDED
OUTPATIENT
Start: 2025-07-29

## 2025-06-17 RX ORDER — FLUOROURACIL 50 MG/ML
400 INJECTION, SOLUTION INTRAVENOUS ONCE
OUTPATIENT
Start: 2025-07-01

## 2025-06-17 RX ORDER — DIPHENHYDRAMINE HYDROCHLORIDE 50 MG/ML
50 INJECTION, SOLUTION INTRAMUSCULAR; INTRAVENOUS AS NEEDED
OUTPATIENT
Start: 2025-07-01

## 2025-06-17 RX ORDER — ONDANSETRON HYDROCHLORIDE 2 MG/ML
8 INJECTION, SOLUTION INTRAVENOUS ONCE
OUTPATIENT
Start: 2025-07-15

## 2025-06-17 RX ORDER — ALBUTEROL SULFATE 0.83 MG/ML
3 SOLUTION RESPIRATORY (INHALATION) AS NEEDED
OUTPATIENT
Start: 2025-07-29

## 2025-06-17 RX ORDER — ALBUTEROL SULFATE 0.83 MG/ML
3 SOLUTION RESPIRATORY (INHALATION) AS NEEDED
OUTPATIENT
Start: 2025-07-01

## 2025-06-17 RX ORDER — ONDANSETRON HYDROCHLORIDE 2 MG/ML
8 INJECTION, SOLUTION INTRAVENOUS ONCE
OUTPATIENT
Start: 2025-07-29

## 2025-06-17 RX ORDER — FLUOROURACIL 50 MG/ML
400 INJECTION, SOLUTION INTRAVENOUS ONCE
Status: COMPLETED | OUTPATIENT
Start: 2025-06-17 | End: 2025-06-17

## 2025-06-17 RX ADMIN — LEUCOVORIN CALCIUM 820 MG: 350 INJECTION, POWDER, LYOPHILIZED, FOR SOLUTION INTRAMUSCULAR; INTRAVENOUS at 10:27

## 2025-06-17 RX ADMIN — FLUOROURACIL 825 MG: 50 INJECTION, SOLUTION INTRAVENOUS at 12:31

## 2025-06-17 RX ADMIN — BEVACIZUMAB-AWWB 450 MG: 400 INJECTION, SOLUTION INTRAVENOUS at 10:09

## 2025-06-17 RX ADMIN — ONDANSETRON 8 MG: 2 INJECTION INTRAMUSCULAR; INTRAVENOUS at 10:04

## 2025-06-17 RX ADMIN — FLUOROURACIL 4900 MG: 50 INJECTION, SOLUTION INTRAVENOUS at 12:44

## 2025-06-17 ASSESSMENT — PAIN SCALES - GENERAL: PAINLEVEL_OUTOF10: 5

## 2025-06-19 ENCOUNTER — INFUSION (OUTPATIENT)
Dept: HEMATOLOGY/ONCOLOGY | Facility: CLINIC | Age: 70
End: 2025-06-19
Payer: MEDICARE

## 2025-06-19 VITALS
DIASTOLIC BLOOD PRESSURE: 73 MMHG | TEMPERATURE: 97.7 F | SYSTOLIC BLOOD PRESSURE: 151 MMHG | OXYGEN SATURATION: 95 % | WEIGHT: 206.79 LBS | BODY MASS INDEX: 34.25 KG/M2 | HEART RATE: 83 BPM | RESPIRATION RATE: 16 BRPM

## 2025-06-19 DIAGNOSIS — C18.1 MALIGNANT NEOPLASM OF APPENDIX (MULTI): ICD-10-CM

## 2025-06-19 PROCEDURE — 96523 IRRIG DRUG DELIVERY DEVICE: CPT

## 2025-06-19 RX ORDER — HEPARIN SODIUM,PORCINE/PF 10 UNIT/ML
50 SYRINGE (ML) INTRAVENOUS AS NEEDED
OUTPATIENT
Start: 2025-06-19

## 2025-06-19 RX ORDER — HEPARIN 100 UNIT/ML
500 SYRINGE INTRAVENOUS AS NEEDED
OUTPATIENT
Start: 2025-06-19

## 2025-06-19 ASSESSMENT — PAIN SCALES - GENERAL: PAINLEVEL_OUTOF10: 0-NO PAIN

## 2025-06-30 ENCOUNTER — LAB (OUTPATIENT)
Dept: LAB | Facility: CLINIC | Age: 70
End: 2025-06-30
Payer: MEDICARE

## 2025-06-30 DIAGNOSIS — C18.1 MALIGNANT NEOPLASM OF APPENDIX (MULTI): ICD-10-CM

## 2025-06-30 DIAGNOSIS — C80.0 CARCINOMATOSIS (MULTI): ICD-10-CM

## 2025-06-30 LAB
ALBUMIN SERPL BCP-MCNC: 4.4 G/DL (ref 3.4–5)
ALP SERPL-CCNC: 104 U/L (ref 33–136)
ALT SERPL W P-5'-P-CCNC: 34 U/L (ref 7–45)
ANION GAP SERPL CALC-SCNC: 15 MMOL/L (ref 10–20)
AST SERPL W P-5'-P-CCNC: 31 U/L (ref 9–39)
BASOPHILS # BLD AUTO: 0.02 X10*3/UL (ref 0–0.1)
BASOPHILS NFR BLD AUTO: 0.5 %
BILIRUB SERPL-MCNC: 0.6 MG/DL (ref 0–1.2)
BUN SERPL-MCNC: 15 MG/DL (ref 6–23)
CALCIUM SERPL-MCNC: 9.4 MG/DL (ref 8.6–10.3)
CHLORIDE SERPL-SCNC: 108 MMOL/L (ref 98–107)
CO2 SERPL-SCNC: 23 MMOL/L (ref 21–32)
CREAT SERPL-MCNC: 0.64 MG/DL (ref 0.5–1.05)
EGFRCR SERPLBLD CKD-EPI 2021: >90 ML/MIN/1.73M*2
EOSINOPHIL # BLD AUTO: 0.11 X10*3/UL (ref 0–0.7)
EOSINOPHIL NFR BLD AUTO: 2.8 %
ERYTHROCYTE [DISTWIDTH] IN BLOOD BY AUTOMATED COUNT: 15.2 % (ref 11.5–14.5)
GLUCOSE SERPL-MCNC: 165 MG/DL (ref 74–99)
HCT VFR BLD AUTO: 40.3 % (ref 36–46)
HGB BLD-MCNC: 13.6 G/DL (ref 12–16)
IMM GRANULOCYTES # BLD AUTO: 0.02 X10*3/UL (ref 0–0.7)
IMM GRANULOCYTES NFR BLD AUTO: 0.5 % (ref 0–0.9)
LYMPHOCYTES # BLD AUTO: 1.37 X10*3/UL (ref 1.2–4.8)
LYMPHOCYTES NFR BLD AUTO: 34.6 %
MCH RBC QN AUTO: 31.3 PG (ref 26–34)
MCHC RBC AUTO-ENTMCNC: 33.7 G/DL (ref 32–36)
MCV RBC AUTO: 93 FL (ref 80–100)
MONOCYTES # BLD AUTO: 0.51 X10*3/UL (ref 0.1–1)
MONOCYTES NFR BLD AUTO: 12.9 %
NEUTROPHILS # BLD AUTO: 1.93 X10*3/UL (ref 1.2–7.7)
NEUTROPHILS NFR BLD AUTO: 48.7 %
PLATELET # BLD AUTO: 92 X10*3/UL (ref 150–450)
POTASSIUM SERPL-SCNC: 3.7 MMOL/L (ref 3.5–5.3)
PROT SERPL-MCNC: 7.2 G/DL (ref 6.4–8.2)
RBC # BLD AUTO: 4.35 X10*6/UL (ref 4–5.2)
SODIUM SERPL-SCNC: 142 MMOL/L (ref 136–145)
WBC # BLD AUTO: 4 X10*3/UL (ref 4.4–11.3)

## 2025-06-30 PROCEDURE — 85025 COMPLETE CBC W/AUTO DIFF WBC: CPT

## 2025-06-30 PROCEDURE — 36415 COLL VENOUS BLD VENIPUNCTURE: CPT

## 2025-06-30 PROCEDURE — 84075 ASSAY ALKALINE PHOSPHATASE: CPT

## 2025-07-01 ENCOUNTER — INFUSION (OUTPATIENT)
Dept: HEMATOLOGY/ONCOLOGY | Facility: CLINIC | Age: 70
End: 2025-07-01
Payer: MEDICARE

## 2025-07-01 ENCOUNTER — APPOINTMENT (OUTPATIENT)
Dept: HEMATOLOGY/ONCOLOGY | Facility: CLINIC | Age: 70
End: 2025-07-01
Payer: MEDICARE

## 2025-07-01 VITALS
RESPIRATION RATE: 16 BRPM | HEART RATE: 62 BPM | DIASTOLIC BLOOD PRESSURE: 82 MMHG | BODY MASS INDEX: 34.83 KG/M2 | OXYGEN SATURATION: 95 % | SYSTOLIC BLOOD PRESSURE: 153 MMHG | WEIGHT: 210.32 LBS | TEMPERATURE: 97.5 F

## 2025-07-01 DIAGNOSIS — C80.0 CARCINOMATOSIS (MULTI): ICD-10-CM

## 2025-07-01 DIAGNOSIS — C18.1 MALIGNANT NEOPLASM OF APPENDIX (MULTI): ICD-10-CM

## 2025-07-01 PROCEDURE — 96416 CHEMO PROLONG INFUSE W/PUMP: CPT

## 2025-07-01 PROCEDURE — 2500000004 HC RX 250 GENERAL PHARMACY W/ HCPCS (ALT 636 FOR OP/ED): Performed by: INTERNAL MEDICINE

## 2025-07-01 PROCEDURE — 96409 CHEMO IV PUSH SNGL DRUG: CPT

## 2025-07-01 PROCEDURE — 96367 TX/PROPH/DG ADDL SEQ IV INF: CPT

## 2025-07-01 PROCEDURE — 96366 THER/PROPH/DIAG IV INF ADDON: CPT | Mod: INF

## 2025-07-01 PROCEDURE — 96375 TX/PRO/DX INJ NEW DRUG ADDON: CPT | Mod: INF

## 2025-07-01 PROCEDURE — 2500000004 HC RX 250 GENERAL PHARMACY W/ HCPCS (ALT 636 FOR OP/ED): Mod: TB | Performed by: INTERNAL MEDICINE

## 2025-07-01 PROCEDURE — 96413 CHEMO IV INFUSION 1 HR: CPT

## 2025-07-01 PROCEDURE — 96411 CHEMO IV PUSH ADDL DRUG: CPT

## 2025-07-01 RX ORDER — DIPHENHYDRAMINE HYDROCHLORIDE 50 MG/ML
50 INJECTION, SOLUTION INTRAMUSCULAR; INTRAVENOUS AS NEEDED
Status: DISCONTINUED | OUTPATIENT
Start: 2025-07-01 | End: 2025-07-01 | Stop reason: HOSPADM

## 2025-07-01 RX ORDER — HEPARIN 100 UNIT/ML
500 SYRINGE INTRAVENOUS AS NEEDED
Status: CANCELLED | OUTPATIENT
Start: 2025-07-01

## 2025-07-01 RX ORDER — ALBUTEROL SULFATE 0.83 MG/ML
3 SOLUTION RESPIRATORY (INHALATION) AS NEEDED
Status: DISCONTINUED | OUTPATIENT
Start: 2025-07-01 | End: 2025-07-01 | Stop reason: HOSPADM

## 2025-07-01 RX ORDER — FAMOTIDINE 10 MG/ML
20 INJECTION, SOLUTION INTRAVENOUS ONCE AS NEEDED
Status: DISCONTINUED | OUTPATIENT
Start: 2025-07-01 | End: 2025-07-01 | Stop reason: HOSPADM

## 2025-07-01 RX ORDER — FLUOROURACIL 50 MG/ML
400 INJECTION, SOLUTION INTRAVENOUS ONCE
Status: COMPLETED | OUTPATIENT
Start: 2025-07-01 | End: 2025-07-01

## 2025-07-01 RX ORDER — HEPARIN SODIUM,PORCINE/PF 10 UNIT/ML
50 SYRINGE (ML) INTRAVENOUS AS NEEDED
Status: CANCELLED | OUTPATIENT
Start: 2025-07-01

## 2025-07-01 RX ORDER — PROCHLORPERAZINE EDISYLATE 5 MG/ML
10 INJECTION INTRAMUSCULAR; INTRAVENOUS EVERY 6 HOURS PRN
Status: DISCONTINUED | OUTPATIENT
Start: 2025-07-01 | End: 2025-07-01 | Stop reason: HOSPADM

## 2025-07-01 RX ORDER — ONDANSETRON HYDROCHLORIDE 2 MG/ML
8 INJECTION, SOLUTION INTRAVENOUS ONCE
Status: COMPLETED | OUTPATIENT
Start: 2025-07-01 | End: 2025-07-01

## 2025-07-01 RX ORDER — PROCHLORPERAZINE MALEATE 10 MG
10 TABLET ORAL EVERY 6 HOURS PRN
Status: DISCONTINUED | OUTPATIENT
Start: 2025-07-01 | End: 2025-07-01 | Stop reason: HOSPADM

## 2025-07-01 RX ORDER — EPINEPHRINE 0.3 MG/.3ML
0.3 INJECTION SUBCUTANEOUS EVERY 5 MIN PRN
Status: DISCONTINUED | OUTPATIENT
Start: 2025-07-01 | End: 2025-07-01 | Stop reason: HOSPADM

## 2025-07-01 RX ADMIN — FLUOROURACIL 825 MG: 50 INJECTION, SOLUTION INTRAVENOUS at 13:58

## 2025-07-01 RX ADMIN — ONDANSETRON 8 MG: 2 INJECTION INTRAMUSCULAR; INTRAVENOUS at 11:14

## 2025-07-01 RX ADMIN — FLUOROURACIL 4900 MG: 50 INJECTION, SOLUTION INTRAVENOUS at 14:17

## 2025-07-01 RX ADMIN — LEUCOVORIN CALCIUM 820 MG: 350 INJECTION, POWDER, LYOPHILIZED, FOR SOLUTION INTRAMUSCULAR; INTRAVENOUS at 11:38

## 2025-07-01 RX ADMIN — BEVACIZUMAB-AWWB 450 MG: 400 INJECTION, SOLUTION INTRAVENOUS at 11:23

## 2025-07-01 ASSESSMENT — PAIN SCALES - GENERAL: PAINLEVEL_OUTOF10: 0-NO PAIN

## 2025-07-01 NOTE — PATIENT INSTRUCTIONS
Come in for pump disconnect 7/3/25 at 12:20pm. Please call us with any questions and/or concerns.

## 2025-07-03 ENCOUNTER — INFUSION (OUTPATIENT)
Dept: HEMATOLOGY/ONCOLOGY | Facility: CLINIC | Age: 70
End: 2025-07-03
Payer: MEDICARE

## 2025-07-03 VITALS
WEIGHT: 209.44 LBS | HEART RATE: 79 BPM | DIASTOLIC BLOOD PRESSURE: 81 MMHG | OXYGEN SATURATION: 97 % | TEMPERATURE: 97.2 F | RESPIRATION RATE: 16 BRPM | BODY MASS INDEX: 34.68 KG/M2 | SYSTOLIC BLOOD PRESSURE: 146 MMHG

## 2025-07-03 DIAGNOSIS — C18.1 MALIGNANT NEOPLASM OF APPENDIX (MULTI): ICD-10-CM

## 2025-07-03 DIAGNOSIS — C80.0 CARCINOMATOSIS (MULTI): ICD-10-CM

## 2025-07-03 PROCEDURE — 96523 IRRIG DRUG DELIVERY DEVICE: CPT

## 2025-07-03 RX ORDER — HEPARIN 100 UNIT/ML
500 SYRINGE INTRAVENOUS AS NEEDED
OUTPATIENT
Start: 2025-07-03

## 2025-07-03 RX ORDER — HEPARIN SODIUM,PORCINE/PF 10 UNIT/ML
50 SYRINGE (ML) INTRAVENOUS AS NEEDED
OUTPATIENT
Start: 2025-07-03

## 2025-07-03 ASSESSMENT — PAIN SCALES - GENERAL: PAINLEVEL_OUTOF10: 0-NO PAIN

## 2025-07-14 ENCOUNTER — LAB (OUTPATIENT)
Dept: LAB | Facility: CLINIC | Age: 70
End: 2025-07-14
Payer: MEDICARE

## 2025-07-14 DIAGNOSIS — C18.1 MALIGNANT NEOPLASM OF APPENDIX (MULTI): ICD-10-CM

## 2025-07-14 DIAGNOSIS — C80.0 CARCINOMATOSIS (MULTI): ICD-10-CM

## 2025-07-14 LAB
ALBUMIN SERPL BCP-MCNC: 4.4 G/DL (ref 3.4–5)
ALP SERPL-CCNC: 95 U/L (ref 33–136)
ALT SERPL W P-5'-P-CCNC: 48 U/L (ref 7–45)
ANION GAP SERPL CALC-SCNC: 16 MMOL/L (ref 10–20)
APPEARANCE UR: CLEAR
AST SERPL W P-5'-P-CCNC: 47 U/L (ref 9–39)
BASOPHILS # BLD AUTO: 0.02 X10*3/UL (ref 0–0.1)
BASOPHILS NFR BLD AUTO: 0.5 %
BILIRUB SERPL-MCNC: 0.7 MG/DL (ref 0–1.2)
BILIRUB UR STRIP.AUTO-MCNC: NEGATIVE MG/DL
BUN SERPL-MCNC: 15 MG/DL (ref 6–23)
CALCIUM SERPL-MCNC: 9.4 MG/DL (ref 8.6–10.3)
CHLORIDE SERPL-SCNC: 107 MMOL/L (ref 98–107)
CO2 SERPL-SCNC: 22 MMOL/L (ref 21–32)
COLOR UR: YELLOW
CREAT SERPL-MCNC: 0.61 MG/DL (ref 0.5–1.05)
EGFRCR SERPLBLD CKD-EPI 2021: >90 ML/MIN/1.73M*2
EOSINOPHIL # BLD AUTO: 0.16 X10*3/UL (ref 0–0.7)
EOSINOPHIL NFR BLD AUTO: 3.9 %
ERYTHROCYTE [DISTWIDTH] IN BLOOD BY AUTOMATED COUNT: 15.8 % (ref 11.5–14.5)
GLUCOSE SERPL-MCNC: 159 MG/DL (ref 74–99)
GLUCOSE UR STRIP.AUTO-MCNC: ABNORMAL MG/DL
HCT VFR BLD AUTO: 40.5 % (ref 36–46)
HGB BLD-MCNC: 13.8 G/DL (ref 12–16)
IMM GRANULOCYTES # BLD AUTO: 0.04 X10*3/UL (ref 0–0.7)
IMM GRANULOCYTES NFR BLD AUTO: 1 % (ref 0–0.9)
KETONES UR STRIP.AUTO-MCNC: NEGATIVE MG/DL
LEUKOCYTE ESTERASE UR QL STRIP.AUTO: ABNORMAL
LYMPHOCYTES # BLD AUTO: 1.31 X10*3/UL (ref 1.2–4.8)
LYMPHOCYTES NFR BLD AUTO: 32.2 %
MCH RBC QN AUTO: 32.1 PG (ref 26–34)
MCHC RBC AUTO-ENTMCNC: 34.1 G/DL (ref 32–36)
MCV RBC AUTO: 94 FL (ref 80–100)
MONOCYTES # BLD AUTO: 0.6 X10*3/UL (ref 0.1–1)
MONOCYTES NFR BLD AUTO: 14.7 %
MUCOUS THREADS #/AREA URNS AUTO: ABNORMAL /LPF
NEUTROPHILS # BLD AUTO: 1.94 X10*3/UL (ref 1.2–7.7)
NEUTROPHILS NFR BLD AUTO: 47.7 %
NITRITE UR QL STRIP.AUTO: NEGATIVE
PH UR STRIP.AUTO: 5.5 [PH]
PLATELET # BLD AUTO: 78 X10*3/UL (ref 150–450)
POTASSIUM SERPL-SCNC: 4 MMOL/L (ref 3.5–5.3)
PROT SERPL-MCNC: 7 G/DL (ref 6.4–8.2)
PROT UR STRIP.AUTO-MCNC: ABNORMAL MG/DL
RBC # BLD AUTO: 4.3 X10*6/UL (ref 4–5.2)
RBC # UR STRIP.AUTO: NEGATIVE MG/DL
RBC #/AREA URNS AUTO: ABNORMAL /HPF
SODIUM SERPL-SCNC: 141 MMOL/L (ref 136–145)
SP GR UR STRIP.AUTO: >1.03
SQUAMOUS #/AREA URNS AUTO: ABNORMAL /HPF
UROBILINOGEN UR STRIP.AUTO-MCNC: ABNORMAL MG/DL
WBC # BLD AUTO: 4.1 X10*3/UL (ref 4.4–11.3)
WBC #/AREA URNS AUTO: ABNORMAL /HPF

## 2025-07-14 PROCEDURE — 80053 COMPREHEN METABOLIC PANEL: CPT

## 2025-07-14 PROCEDURE — 85025 COMPLETE CBC W/AUTO DIFF WBC: CPT

## 2025-07-14 PROCEDURE — 36415 COLL VENOUS BLD VENIPUNCTURE: CPT

## 2025-07-14 PROCEDURE — 81001 URINALYSIS AUTO W/SCOPE: CPT

## 2025-07-15 ENCOUNTER — APPOINTMENT (OUTPATIENT)
Dept: HEMATOLOGY/ONCOLOGY | Facility: CLINIC | Age: 70
End: 2025-07-15
Payer: MEDICARE

## 2025-07-15 ENCOUNTER — OFFICE VISIT (OUTPATIENT)
Dept: HEMATOLOGY/ONCOLOGY | Facility: CLINIC | Age: 70
End: 2025-07-15
Payer: MEDICARE

## 2025-07-15 ENCOUNTER — INFUSION (OUTPATIENT)
Dept: HEMATOLOGY/ONCOLOGY | Facility: CLINIC | Age: 70
End: 2025-07-15
Payer: MEDICARE

## 2025-07-15 VITALS
WEIGHT: 211.64 LBS | RESPIRATION RATE: 16 BRPM | DIASTOLIC BLOOD PRESSURE: 77 MMHG | SYSTOLIC BLOOD PRESSURE: 141 MMHG | BODY MASS INDEX: 35.05 KG/M2 | TEMPERATURE: 97.5 F | HEART RATE: 63 BPM | OXYGEN SATURATION: 97 %

## 2025-07-15 DIAGNOSIS — C80.0 CARCINOMATOSIS (MULTI): ICD-10-CM

## 2025-07-15 DIAGNOSIS — C18.1 MALIGNANT NEOPLASM OF APPENDIX (MULTI): ICD-10-CM

## 2025-07-15 PROCEDURE — 2500000004 HC RX 250 GENERAL PHARMACY W/ HCPCS (ALT 636 FOR OP/ED): Performed by: INTERNAL MEDICINE

## 2025-07-15 PROCEDURE — 96367 TX/PROPH/DG ADDL SEQ IV INF: CPT

## 2025-07-15 PROCEDURE — 99213 OFFICE O/P EST LOW 20 MIN: CPT | Mod: 25

## 2025-07-15 PROCEDURE — 3044F HG A1C LEVEL LT 7.0%: CPT

## 2025-07-15 PROCEDURE — 3077F SYST BP >= 140 MM HG: CPT

## 2025-07-15 PROCEDURE — 96416 CHEMO PROLONG INFUSE W/PUMP: CPT

## 2025-07-15 PROCEDURE — 99213 OFFICE O/P EST LOW 20 MIN: CPT

## 2025-07-15 PROCEDURE — 3078F DIAST BP <80 MM HG: CPT

## 2025-07-15 PROCEDURE — 96366 THER/PROPH/DIAG IV INF ADDON: CPT | Mod: INF

## 2025-07-15 PROCEDURE — 1126F AMNT PAIN NOTED NONE PRSNT: CPT

## 2025-07-15 PROCEDURE — 96409 CHEMO IV PUSH SNGL DRUG: CPT

## 2025-07-15 PROCEDURE — 96411 CHEMO IV PUSH ADDL DRUG: CPT

## 2025-07-15 PROCEDURE — 1159F MED LIST DOCD IN RCRD: CPT

## 2025-07-15 PROCEDURE — 1036F TOBACCO NON-USER: CPT

## 2025-07-15 PROCEDURE — 96376 TX/PRO/DX INJ SAME DRUG ADON: CPT

## 2025-07-15 PROCEDURE — 96375 TX/PRO/DX INJ NEW DRUG ADDON: CPT | Mod: INF

## 2025-07-15 RX ORDER — ALBUTEROL SULFATE 0.83 MG/ML
3 SOLUTION RESPIRATORY (INHALATION) AS NEEDED
Status: DISCONTINUED | OUTPATIENT
Start: 2025-07-15 | End: 2025-07-15 | Stop reason: HOSPADM

## 2025-07-15 RX ORDER — EPINEPHRINE 0.3 MG/.3ML
0.3 INJECTION SUBCUTANEOUS EVERY 5 MIN PRN
Status: DISCONTINUED | OUTPATIENT
Start: 2025-07-15 | End: 2025-07-15 | Stop reason: HOSPADM

## 2025-07-15 RX ORDER — PROCHLORPERAZINE EDISYLATE 5 MG/ML
10 INJECTION INTRAMUSCULAR; INTRAVENOUS EVERY 6 HOURS PRN
Status: DISCONTINUED | OUTPATIENT
Start: 2025-07-15 | End: 2025-07-15 | Stop reason: HOSPADM

## 2025-07-15 RX ORDER — PROCHLORPERAZINE MALEATE 10 MG
10 TABLET ORAL EVERY 6 HOURS PRN
Status: DISCONTINUED | OUTPATIENT
Start: 2025-07-15 | End: 2025-07-15 | Stop reason: HOSPADM

## 2025-07-15 RX ORDER — ONDANSETRON HYDROCHLORIDE 2 MG/ML
8 INJECTION, SOLUTION INTRAVENOUS ONCE
Status: COMPLETED | OUTPATIENT
Start: 2025-07-15 | End: 2025-07-15

## 2025-07-15 RX ORDER — DIPHENHYDRAMINE HYDROCHLORIDE 50 MG/ML
50 INJECTION, SOLUTION INTRAMUSCULAR; INTRAVENOUS AS NEEDED
Status: DISCONTINUED | OUTPATIENT
Start: 2025-07-15 | End: 2025-07-15 | Stop reason: HOSPADM

## 2025-07-15 RX ORDER — FLUOROURACIL 50 MG/ML
400 INJECTION, SOLUTION INTRAVENOUS ONCE
Status: COMPLETED | OUTPATIENT
Start: 2025-07-15 | End: 2025-07-15

## 2025-07-15 RX ORDER — FAMOTIDINE 10 MG/ML
20 INJECTION, SOLUTION INTRAVENOUS ONCE AS NEEDED
Status: DISCONTINUED | OUTPATIENT
Start: 2025-07-15 | End: 2025-07-15 | Stop reason: HOSPADM

## 2025-07-15 RX ADMIN — BEVACIZUMAB-AWWB 450 MG: 400 INJECTION, SOLUTION INTRAVENOUS at 09:57

## 2025-07-15 RX ADMIN — LEUCOVORIN CALCIUM 820 MG: 350 INJECTION, POWDER, LYOPHILIZED, FOR SOLUTION INTRAMUSCULAR; INTRAVENOUS at 10:14

## 2025-07-15 RX ADMIN — FLUOROURACIL 4900 MG: 50 INJECTION, SOLUTION INTRAVENOUS at 12:27

## 2025-07-15 RX ADMIN — FLUOROURACIL 825 MG: 50 INJECTION, SOLUTION INTRAVENOUS at 12:17

## 2025-07-15 RX ADMIN — ONDANSETRON 8 MG: 2 INJECTION INTRAMUSCULAR; INTRAVENOUS at 09:48

## 2025-07-15 ASSESSMENT — PAIN SCALES - GENERAL: PAINLEVEL_OUTOF10: 0-NO PAIN

## 2025-07-15 NOTE — PROGRESS NOTES
Patient ID: Monica Musa is a 70 y.o. female.    Oncology History Overview Note   - 7/8/2022 TLH, BSO, SLND combined case with Dr. Lal. 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 There was no lymphovascular invasion. There was a focus of MELF pattern invasion. Tumor board recommendations: Surveillance. MMR testing on prior endometrial biopsy specimen was normal.   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  - CT 8/26/24: Small volume ascites. Left pelvic nodule with some nodularity  along the left paracolic gutter worrisome for peritoneal carcinomatosis.  Admitted with bowel obstruction.  No lesion large enough for biopsy  - 9/2024 diagnostic laparoscopy - biopsy c/w non gyn primary - referred to med onc     Endometrial carcinoma   5/22/2023 Initial Diagnosis    Endometrial carcinoma (Multi)     Malignant neoplasm of appendix (Multi)   10/3/2024 Initial Diagnosis    Malignant neoplasm of appendix (Multi)     10/16/2024 - 3/27/2025 Chemotherapy    mFOLFOX6 (Fluorouracil Continuous Infusion / Leucovorin / Oxaliplatin), 14 Day Cycles     5/13/2025 -  Chemotherapy    Bevacizumab + Simplified Biweekly Infusional Fluorouracil / Leucovorin, 14 Day Cycles     Carcinomatosis (Multi)   10/3/2024 Initial Diagnosis    Carcinomatosis (Multi)     10/16/2024 - 3/27/2025 Chemotherapy    mFOLFOX6 (Fluorouracil Continuous Infusion / Leucovorin / Oxaliplatin), 14 Day Cycles     5/13/2025 -  Chemotherapy    Bevacizumab + Simplified Biweekly Infusional Fluorouracil / Leucovorin, 14 Day Cycles         Subjective    HPI  Monica Musa is a 70 y.o. female with carcinomatosis.     Patient presents for next dose of Avastin and 5FU. She reports her neuropathy is a little better but she often has to take Tylenol at night for discomfort. She is on 300 mg and 400 mg gabapentin am/pm. She has some constipation at times. She denies any issues with chest pain,  palpitations, SOB, new/worsening pain, edema, n/v/d or other concerns       Patient's past medical history, surgical history, family history and social history reviewed.    Review of Systems:    Positive per HPI, otherwise negative.       Objective    Visit Vitals  OB Status Hysterectomy   Smoking Status Former     Visit Vitals  /77 (BP Location: Right arm, Patient Position: Sitting, BP Cuff Size: Adult)   Pulse 63   Temp 36.4 °C (97.5 °F) (Temporal)   Resp 16   Wt 96 kg (211 lb 10.3 oz)   SpO2 97%   BMI 35.05 kg/m²   OB Status Hysterectomy   Smoking Status Former   BSA 2.1 m²        Physical Exam  Gen: appears well in clinic, NAD  HEENT: atraumatic head, normocephalic, EOMI, conjunctiva normal  LUNG: no increased WOB, CTAB  CV: No JVD. RRR  GI: soft, NT, ND  LE: no LE edema  Skin: no obvious rashes or lesions on visible skin  Neuro: interactive, no focal deficits noted  Psych: normal mood and affect      Performance Status:  Symptomatic; fully ambulatory    Labs/Imaging/Pathology: personally reviewed reports and images in Epic electronic medical record system. Pertinent results as it related to the plan represented in below in assessment and plan.     Labs:  Lab Results   Component Value Date    WBC 4.1 (L) 07/14/2025    NEUTROABS 1.94 07/14/2025    IGABSOL 0.04 07/14/2025    LYMPHSABS 1.31 07/14/2025    MONOSABS 0.60 07/14/2025    EOSABS 0.16 07/14/2025    BASOSABS 0.02 07/14/2025    RBC 4.30 07/14/2025    MCV 94 07/14/2025    MCHC 34.1 07/14/2025    HGB 13.8 07/14/2025    HCT 40.5 07/14/2025    PLT 78 (L) 07/14/2025     Lab Results   Component Value Date    CREATININE 0.61 07/14/2025    BUN 15 07/14/2025    EGFR >90 07/14/2025     07/14/2025    K 4.0 07/14/2025     07/14/2025    CO2 22 07/14/2025      Lab Results   Component Value Date    ALT 48 (H) 07/14/2025    AST 47 (H) 07/14/2025    ALKPHOS 95 07/14/2025    BILITOT 0.7 07/14/2025          Assessment/Plan   Adenocarcinoma of GI origin,  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  There are some features of goblet cells which favor appendiceal primary however pancreaticobiliary origin cannot be excluded.  MSI status intact  - [id number] also completed on blood from 9/30/2024 which was unremarkable  - Tempus on tissue in process  - Colonoscopy on 7/31/2024 was technically difficult due to scattered diverticulosis in the ascending colon and some hemorrhoids  - EGD 10/2/24 shows abnormal mucosa at the GE junction but no clear mass however there was diffuse nodular gastritis which was biopsied and pathology report states: neg for H Pylori.   - No variants detected in the DPYD gene   - Signatera in process  - 10/3/24: CA19.9 <4.00, CEA 1.4   10/3/24  Today we discussed diagnosis of likely advanced appendiceal carcinoma given there is no mass seen on recent imaging 8/26/2024 with no masses seen  -Goblet cells seen on pathology likely appendiceal origin  -Will plan for CA 19-9 and CEA as baseline  -Will await EGD pathology to confirm no evidence of malignancy in the esophagus  -Discussed neck step is systemic therapy with modified FOLFOX   - could consider HIPEC after 3-4 cycles based on response  - Dr Ontiveros aware of patient  -Refer port placement next week and cycle 1 day 1 on 10/9/2024  -Reviewed side effects of 5-FU, oxaliplatin and consent signed  -Prescription sent for nausea to be used as needed  -We will plan for day 3 Aloxi  -Given limited support at home will likely hold off on disconnect at home for now  10/23/2024:   - Mediport placed 10/10/24  - Cycle 1 mFOLFOX given 10/16/24: Fluorouracil 2,400 mg/m2,  oxaliplatin 85 mg/m2 with pre meds zofran 16 mg and decadron 12 mg, nothing given on day 3   - Last visit with GYN, Dr. Greene, was on 10/7/24         10/29/24  - symptoms improved after supportive care and now close to her baseline  - states dysphagia improved after chemo which hopefully is a sign of tx response  -Lower extremity swelling we discussed is likely related to poor p.o. intake, albumin is low and we discussed importance of protein in addition to ambulating and compression  -Overall for nausea which is her largest complaint we discussed starting Zyprexa at bedtime, given she is not taking much of Reglan we will hold off for now  -She will continue Zofran every 8 hours as needed  -Labs reviewed from today no contraindications to proceed with treatment tomorrow  -Plan for supportive care again with Boni the following week     11/5/2024:  - Patient presents for toxicity check after cycle 2  - Reports that she is feeling well and tolerating treatment better   - She does not need IVF or medications today but we will check cmp/mag and notify her if she needs to continue home K+, she prefers to change to smaller dosage   - Eating more solid foods and will continue to try to prevent further weight loss, declines to talk with dietician today   - She is taking Zyprexa nightly   - She is unsure which prn anti-nausea she is taking but will update me  - Taking imodium BID   - She will RTC on 11/12 for port flush/labs and a visit the same day with Dr. Smart   - She will then RTC the next day for treatment      11/12/24:  - Patient continues to tolerate treatment with no major toxicity.   - No dose adjustments at this time.  - Plan to follow-up with Boni in 2 weeks and with me in 4 weeks.  - On days she sees Boni she will have labs done the day before and in 4 weeks she will have labs done in house with me.  - Will plan to have labs done outside of port.   - RTC in 2 weeks with Boni.      12/9/24:   - Patient  continues to tolerate treatment  - No dose adjustments at this time.   - Labs reviewed. No contraindications to proceed with cycle 5 on Wednesday  - Cycle 6 will be delayed to 12/31   - Plan scans the week after and follow-up with me January 14th 2025.   - RTC for treatment only in 3 weeks and with me in 5 weeks.     1/14/25:  - CT scan from yesterday shows good treatment response and overall patient states she feels better with systemic chemotherapy.  - We discussed no worsening toxicity and overall no significant GI symptoms or neuropathy.   - She does some cold sensitivity for approximately 5 days.  - Will plan to continue with current dose.  - No treatment changes.  - RTC in 2 weeks for treatment only and in 4 weeks with me.      2/11/25   - No new complaints   - Continues to have neuropathy for approx five days but manageable    - No GI issues or concerns   - Refill provided for pantoprazole, would try to decrease to 20 mg daily  - No other change in dose  - RTC in 2 weeks for treatment and in 4 weeks with Boni, 6th week for treatment and 8 weeks with me and we will plan for a scan prior which can be ordered in future.  - She should have a CTAP chest before her visit with me in 8 weeks.    3/25/2025:  - Presents for cycle 12 mFOLFOX  - Oxaliplatin was dose reduced to 65 mg/m² last cycle due to prolonged cold sensitivity  - Patient's platelets for cycle 12 were 74,000  - Her labs today will be done when she goes back to treatment  - She does have some residual cold sensitivity and felt that cold sensitivity was more intense this cycle despite dose reduction, discussed further dose reduction or stopping oxaliplatin but patient prefers to push through today before her next set of scans next week  - She is not having any persistent neuropathy, we dose reduced last cycle and so we will go ahead and keep dosages the same as last cycle   - She took an antiallergy medication at home this morning, but I will add Pepcid  to her premeds since this is her 12th cycle of oxaliplatin, she has not had any sensitivity reactions  - She will return in 2 weeks with Dr. Smart to review imaging she will have done next week, patient aware that Dr. Smart will provide her recommendations regarding treatment plan at that visit once she has scan results and patient verbalized understanding. She is scheduled for cycle 13 but aware it can be cancelled if she does not need treatment that day.     4/8/25:   - Patient is now status post 6 months of systemic therapy   - She feels well with no new side effects, still with some neuropathy that she describes as mild   - We discussed that we will plan to have her be evaluated by Dr Ontiveros for consideration of HIPEC and cytoreductive surgery. She is agreeable and his team with reach out to her today for next steps.    - Will likely plan for diagnostic laparotomy and will plan for surgery likely after her vacation to [place]   - RTC with me in 6 weeks for port flush and labs    - We will ensure there is a plan moving forward   - Will plan for treatment holiday from systemic therapy at this time   - RTC in 6 weeks with me     5/13/25:   - Today we reviewed that during her evaluation with Dr. Ontiveros, there was too much peritoneal disease to be considered for HIPEC or cytoreductive surgery   - We discussed the overall goal of treatment would be for palliation and to prevent or control further systemic disease   - We will plan to continue 5Fu and add Avastin   - Reviewed side effects of Avastin and consent signed   - We discussed at progression, we would conider Irinotecan as the next step   - RTC in 2 weeks for treatment, with Boni   - Will delay next cycle due to travel to [place]   - RTC with me 6/16/25 5/28/25:  - Proceed with cycle 2 5FU + Avastin today  - Cycles repeated every 2 weeks  - Next cycle delayed x 1 week for a trip  - Increase gabapentin to 100 mg in AM and 300 mg in PM  - OK for CBD lotion   -  Follow up in 3 weeks with Dr. Smart and next cycle    6/17/25:   - Labs reviewed   - No contraindications to proceed with next dose 5FU and Avastin   - We will plan for treatment only in 2 weeks, with me in 4 weeks   - Reviewed this is a high risk therapy for an illness that poses threat to life. Labs, exam, and history are appropriate for treatment   - Plan for scans after cycle 5   - Patient reports neuropathy that is unchanged   - Will titrate up gabapentin slowly to 400 mg in the morning and 600 mg in the evening   - She will call with any new worsening symptoms    - RTC in 4 weeks with me     7/15/2025:  - Presents for cycle 5 Avastin + 5FU + Leucovorin   - Reviewed prior instructions for gabapentin, she will work on slow titration up   - Labs reviewed and meets parameters for treatment   - Discussed prune juice for mild constipation, uses miralax   - She is set up for scans on 7/24/25   - RTC on 7/29/25 with Dr. Smart and next treatment       Reviewed ongoing medical problems and how they relate to her malignancy, will continue long term monitoring.      Diagnoses and all orders for this visit:  Carcinomatosis (Multi)  -     Clinic Appointment Request  Malignant neoplasm of appendix (Multi)  -     Clinic Appointment Request      ZAYDA Cavanaugh-CNP

## 2025-07-17 ENCOUNTER — INFUSION (OUTPATIENT)
Dept: HEMATOLOGY/ONCOLOGY | Facility: CLINIC | Age: 70
End: 2025-07-17
Payer: MEDICARE

## 2025-07-17 VITALS
RESPIRATION RATE: 18 BRPM | HEART RATE: 79 BPM | SYSTOLIC BLOOD PRESSURE: 155 MMHG | DIASTOLIC BLOOD PRESSURE: 54 MMHG | BODY MASS INDEX: 34.79 KG/M2 | TEMPERATURE: 97.5 F | OXYGEN SATURATION: 96 % | WEIGHT: 210.1 LBS

## 2025-07-17 DIAGNOSIS — C80.0 CARCINOMATOSIS (MULTI): ICD-10-CM

## 2025-07-17 DIAGNOSIS — C18.1 MALIGNANT NEOPLASM OF APPENDIX (MULTI): ICD-10-CM

## 2025-07-17 PROCEDURE — 96523 IRRIG DRUG DELIVERY DEVICE: CPT

## 2025-07-17 RX ORDER — HEPARIN 100 UNIT/ML
500 SYRINGE INTRAVENOUS AS NEEDED
OUTPATIENT
Start: 2025-07-17

## 2025-07-17 RX ORDER — HEPARIN 100 UNIT/ML
500 SYRINGE INTRAVENOUS AS NEEDED
Status: DISCONTINUED | OUTPATIENT
Start: 2025-07-17 | End: 2025-07-17 | Stop reason: HOSPADM

## 2025-07-17 RX ORDER — HEPARIN SODIUM,PORCINE/PF 10 UNIT/ML
50 SYRINGE (ML) INTRAVENOUS AS NEEDED
OUTPATIENT
Start: 2025-07-17

## 2025-07-17 RX ORDER — HEPARIN SODIUM,PORCINE/PF 10 UNIT/ML
50 SYRINGE (ML) INTRAVENOUS AS NEEDED
Status: DISCONTINUED | OUTPATIENT
Start: 2025-07-17 | End: 2025-07-17 | Stop reason: HOSPADM

## 2025-07-17 ASSESSMENT — PAIN SCALES - GENERAL: PAINLEVEL_OUTOF10: 0-NO PAIN

## 2025-07-23 ENCOUNTER — HOSPITAL ENCOUNTER (OUTPATIENT)
Dept: RADIOLOGY | Facility: CLINIC | Age: 70
Discharge: HOME | End: 2025-07-23
Payer: MEDICARE

## 2025-07-23 DIAGNOSIS — C80.0 CARCINOMATOSIS (MULTI): ICD-10-CM

## 2025-07-23 DIAGNOSIS — C18.1 MALIGNANT NEOPLASM OF APPENDIX (MULTI): ICD-10-CM

## 2025-07-23 PROCEDURE — 71260 CT THORAX DX C+: CPT | Performed by: RADIOLOGY

## 2025-07-23 PROCEDURE — 2550000001 HC RX 255 CONTRASTS: Performed by: INTERNAL MEDICINE

## 2025-07-23 PROCEDURE — 74177 CT ABD & PELVIS W/CONTRAST: CPT

## 2025-07-23 PROCEDURE — 74177 CT ABD & PELVIS W/CONTRAST: CPT | Performed by: RADIOLOGY

## 2025-07-23 RX ADMIN — IOHEXOL 75 ML: 350 INJECTION, SOLUTION INTRAVENOUS at 12:57

## 2025-07-24 ENCOUNTER — APPOINTMENT (OUTPATIENT)
Dept: RADIOLOGY | Facility: CLINIC | Age: 70
End: 2025-07-24
Payer: MEDICARE

## 2025-07-28 ENCOUNTER — LAB (OUTPATIENT)
Dept: LAB | Facility: CLINIC | Age: 70
End: 2025-07-28
Payer: MEDICARE

## 2025-07-28 DIAGNOSIS — C18.1 MALIGNANT NEOPLASM OF APPENDIX (MULTI): ICD-10-CM

## 2025-07-28 DIAGNOSIS — C80.0 CARCINOMATOSIS (MULTI): ICD-10-CM

## 2025-07-28 LAB
ALBUMIN SERPL BCP-MCNC: 4.5 G/DL (ref 3.4–5)
ALP SERPL-CCNC: 87 U/L (ref 33–136)
ALT SERPL W P-5'-P-CCNC: 48 U/L (ref 7–45)
ANION GAP SERPL CALC-SCNC: 16 MMOL/L (ref 10–20)
AST SERPL W P-5'-P-CCNC: 43 U/L (ref 9–39)
BASOPHILS # BLD AUTO: 0.06 X10*3/UL (ref 0–0.1)
BASOPHILS NFR BLD AUTO: 1.4 %
BILIRUB SERPL-MCNC: 0.7 MG/DL (ref 0–1.2)
BUN SERPL-MCNC: 16 MG/DL (ref 6–23)
CALCIUM SERPL-MCNC: 9.5 MG/DL (ref 8.6–10.3)
CHLORIDE SERPL-SCNC: 106 MMOL/L (ref 98–107)
CO2 SERPL-SCNC: 23 MMOL/L (ref 21–32)
CREAT SERPL-MCNC: 0.69 MG/DL (ref 0.5–1.05)
EGFRCR SERPLBLD CKD-EPI 2021: >90 ML/MIN/1.73M*2
EOSINOPHIL # BLD AUTO: 0.2 X10*3/UL (ref 0–0.7)
EOSINOPHIL NFR BLD AUTO: 4.8 %
ERYTHROCYTE [DISTWIDTH] IN BLOOD BY AUTOMATED COUNT: 16.4 % (ref 11.5–14.5)
GLUCOSE SERPL-MCNC: 173 MG/DL (ref 74–99)
HCT VFR BLD AUTO: 42.6 % (ref 36–46)
HGB BLD-MCNC: 14.3 G/DL (ref 12–16)
IMM GRANULOCYTES # BLD AUTO: 0.05 X10*3/UL (ref 0–0.7)
IMM GRANULOCYTES NFR BLD AUTO: 1.2 % (ref 0–0.9)
LYMPHOCYTES # BLD AUTO: 1.51 X10*3/UL (ref 1.2–4.8)
LYMPHOCYTES NFR BLD AUTO: 36.2 %
MCH RBC QN AUTO: 31.4 PG (ref 26–34)
MCHC RBC AUTO-ENTMCNC: 33.6 G/DL (ref 32–36)
MCV RBC AUTO: 93 FL (ref 80–100)
MONOCYTES # BLD AUTO: 0.57 X10*3/UL (ref 0.1–1)
MONOCYTES NFR BLD AUTO: 13.7 %
NEUTROPHILS # BLD AUTO: 1.78 X10*3/UL (ref 1.2–7.7)
NEUTROPHILS NFR BLD AUTO: 42.7 %
NRBC BLD-RTO: ABNORMAL /100{WBCS}
PLATELET # BLD AUTO: 97 X10*3/UL (ref 150–450)
POTASSIUM SERPL-SCNC: 4.1 MMOL/L (ref 3.5–5.3)
PROT SERPL-MCNC: 7.1 G/DL (ref 6.4–8.2)
RBC # BLD AUTO: 4.56 X10*6/UL (ref 4–5.2)
SODIUM SERPL-SCNC: 141 MMOL/L (ref 136–145)
WBC # BLD AUTO: 4.2 X10*3/UL (ref 4.4–11.3)

## 2025-07-28 PROCEDURE — 36415 COLL VENOUS BLD VENIPUNCTURE: CPT

## 2025-07-28 PROCEDURE — 85025 COMPLETE CBC W/AUTO DIFF WBC: CPT

## 2025-07-28 PROCEDURE — 80053 COMPREHEN METABOLIC PANEL: CPT

## 2025-07-28 NOTE — PROGRESS NOTES
Patient ID: Monica Musa is a 70 y.o. female.    Oncology History Overview Note   - 7/8/2022 TLH, BSO, SLND combined case with Dr. Lal. 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 There was no lymphovascular invasion. There was a focus of MELF pattern invasion. Tumor board recommendations: Surveillance. MMR testing on prior endometrial biopsy specimen was normal.   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  - CT 8/26/24: Small volume ascites. Left pelvic nodule with some nodularity  along the left paracolic gutter worrisome for peritoneal carcinomatosis.  Admitted with bowel obstruction.  No lesion large enough for biopsy  - 9/2024 diagnostic laparoscopy - biopsy c/w non gyn primary - referred to med onc     Endometrial carcinoma   5/22/2023 Initial Diagnosis    Endometrial carcinoma (Multi)     Malignant neoplasm of appendix (Multi)   10/3/2024 Initial Diagnosis    Malignant neoplasm of appendix (Multi)     10/16/2024 - 3/27/2025 Chemotherapy    mFOLFOX6 (Fluorouracil Continuous Infusion / Leucovorin / Oxaliplatin), 14 Day Cycles     5/13/2025 -  Chemotherapy    Bevacizumab + Simplified Biweekly Infusional Fluorouracil / Leucovorin, 14 Day Cycles     Carcinomatosis (Multi)   10/3/2024 Initial Diagnosis    Carcinomatosis (Multi)     10/16/2024 - 3/27/2025 Chemotherapy    mFOLFOX6 (Fluorouracil Continuous Infusion / Leucovorin / Oxaliplatin), 14 Day Cycles     5/13/2025 -  Chemotherapy    Bevacizumab + Simplified Biweekly Infusional Fluorouracil / Leucovorin, 14 Day Cycles         Subjective    HPI  Monica Musa is a 70 y.o. female presenting for follow up for carcinomatosis. CBC, CMP unremarkable outside of some mild thrombocytopenia. CT CAP on 7/23/25 shows stable disease with similar size of appendiceal lesion along with peritoneal carcinomatosis unchanged.     She reports doing well today but notes her neuropathy persists,  occasionally interfering with her activity of daily living. She denies diarrhea, nausea, new pain, palpitations, nail or skin changes.     Patient's past medical history, surgical history, family history and social history reviewed.    Review of Systems:   Review of Systems:    Positive per HPI, otherwise negative.         Objective    Visit Vitals  /79 (BP Location: Right arm, Patient Position: Sitting, BP Cuff Size: Adult)   Pulse 76   Temp 36 °C (96.8 °F) (Temporal)   Resp 16   Wt 96.7 kg (213 lb 3 oz)   SpO2 96%   BMI 35.31 kg/m²   OB Status Hysterectomy   Smoking Status Former   BSA 2.11 m²        Physical Exam  Gen: appears well in clinic, NAD  HEENT: atraumatic head, normocephalic, EOMI, conjunctiva normal  LUNG: no increased WOB, CTAB  CV: No JVD. RRR  GI: soft, NT, ND  LE: no LE edema  Skin: no obvious rashes or lesions on visible skin  Neuro: interactive, no focal deficits noted  Psych: normal mood and affect      Performance Status:  Symptomatic; fully ambulatory    Labs/Imaging/Pathology: personally reviewed reports and images in Epic electronic medical record system. Pertinent results as it related to the plan represented in below in assessment and plan.     Labs:  Lab Results   Component Value Date    WBC 4.2 (L) 07/28/2025    NEUTROABS 1.78 07/28/2025    IGABSOL 0.05 07/28/2025    LYMPHSABS 1.51 07/28/2025    MONOSABS 0.57 07/28/2025    EOSABS 0.20 07/28/2025    BASOSABS 0.06 07/28/2025    RBC 4.56 07/28/2025    MCV 93 07/28/2025    MCHC 33.6 07/28/2025    HGB 14.3 07/28/2025    HCT 42.6 07/28/2025    PLT 97 (L) 07/28/2025     Lab Results   Component Value Date    CREATININE 0.69 07/28/2025    BUN 16 07/28/2025    EGFR >90 07/28/2025     07/28/2025    K 4.1 07/28/2025     07/28/2025    CO2 23 07/28/2025      Lab Results   Component Value Date    ALT 48 (H) 07/28/2025    AST 43 (H) 07/28/2025    ALKPHOS 87 07/28/2025    BILITOT 0.7 07/28/2025          Assessment/Plan   Adenocarcinoma  of GI origin, 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  There are some features of goblet cells which favor appendiceal primary however pancreaticobiliary origin cannot be excluded.  MSI status intact  - [id number] also completed on blood from 9/30/2024 which was unremarkable  - Tempus on tissue in process  - Colonoscopy on 7/31/2024 was technically difficult due to scattered diverticulosis in the ascending colon and some hemorrhoids  - EGD 10/2/24 shows abnormal mucosa at the GE junction but no clear mass however there was diffuse nodular gastritis which was biopsied and pathology report states: neg for H Pylori.   - No variants detected in the DPYD gene   - Signatera in process  - 10/3/24: CA19.9 <4.00, CEA 1.4   10/3/24  Today we discussed diagnosis of likely advanced appendiceal carcinoma given there is no mass seen on recent imaging 8/26/2024 with no masses seen  -Goblet cells seen on pathology likely appendiceal origin  -Will plan for CA 19-9 and CEA as baseline  -Will await EGD pathology to confirm no evidence of malignancy in the esophagus  -Discussed neck step is systemic therapy with modified FOLFOX   - could consider HIPEC after 3-4 cycles based on response  - Dr Ontiveros aware of patient  -Refer port placement next week and cycle 1 day 1 on 10/9/2024  -Reviewed side effects of 5-FU, oxaliplatin and consent signed  -Prescription sent for nausea to be used as needed  -We will plan for day 3 Aloxi  -Given limited support at home will likely hold off on disconnect at home for now  10/23/2024:   - Mediport placed 10/10/24  - Cycle 1 mFOLFOX given 10/16/24: Fluorouracil  2,400 mg/m2, oxaliplatin 85 mg/m2 with pre meds zofran 16 mg and decadron 12 mg, nothing given on day 3   - Last visit with GYN, Dr. Greene, was on 10/7/24         10/29/24  - symptoms improved after supportive care and now close to her baseline  - states dysphagia improved after chemo which hopefully is a sign of tx response  -Lower extremity swelling we discussed is likely related to poor p.o. intake, albumin is low and we discussed importance of protein in addition to ambulating and compression  -Overall for nausea which is her largest complaint we discussed starting Zyprexa at bedtime, given she is not taking much of Reglan we will hold off for now  -She will continue Zofran every 8 hours as needed  -Labs reviewed from today no contraindications to proceed with treatment tomorrow  -Plan for supportive care again with Boni the following week     11/5/2024:  - Patient presents for toxicity check after cycle 2  - Reports that she is feeling well and tolerating treatment better   - She does not need IVF or medications today but we will check cmp/mag and notify her if she needs to continue home K+, she prefers to change to smaller dosage   - Eating more solid foods and will continue to try to prevent further weight loss, declines to talk with dietician today   - She is taking Zyprexa nightly   - She is unsure which prn anti-nausea she is taking but will update me  - Taking imodium BID   - She will RTC on 11/12 for port flush/labs and a visit the same day with Dr. Smart   - She will then RTC the next day for treatment      11/12/24:  - Patient continues to tolerate treatment with no major toxicity.   - No dose adjustments at this time.  - Plan to follow-up with Boni in 2 weeks and with me in 4 weeks.  - On days she sees Boni she will have labs done the day before and in 4 weeks she will have labs done in house with me.  - Will plan to have labs done outside of port.   - RTC in 2 weeks with Boni.      12/9/24:    - Patient continues to tolerate treatment  - No dose adjustments at this time.   - Labs reviewed. No contraindications to proceed with cycle 5 on Wednesday  - Cycle 6 will be delayed to 12/31   - Plan scans the week after and follow-up with me January 14th 2025.   - RTC for treatment only in 3 weeks and with me in 5 weeks.     1/14/25:  - CT scan from yesterday shows good treatment response and overall patient states she feels better with systemic chemotherapy.  - We discussed no worsening toxicity and overall no significant GI symptoms or neuropathy.   - She does some cold sensitivity for approximately 5 days.  - Will plan to continue with current dose.  - No treatment changes.  - RTC in 2 weeks for treatment only and in 4 weeks with me.      2/11/25   - No new complaints   - Continues to have neuropathy for approx five days but manageable    - No GI issues or concerns   - Refill provided for pantoprazole, would try to decrease to 20 mg daily  - No other change in dose  - RTC in 2 weeks for treatment and in 4 weeks with Boni, 6th week for treatment and 8 weeks with me and we will plan for a scan prior which can be ordered in future.  - She should have a CTAP chest before her visit with me in 8 weeks.    3/25/2025:  - Presents for cycle 12 mFOLFOX  - Oxaliplatin was dose reduced to 65 mg/m² last cycle due to prolonged cold sensitivity  - Patient's platelets for cycle 12 were 74,000  - Her labs today will be done when she goes back to treatment  - She does have some residual cold sensitivity and felt that cold sensitivity was more intense this cycle despite dose reduction, discussed further dose reduction or stopping oxaliplatin but patient prefers to push through today before her next set of scans next week  - She is not having any persistent neuropathy, we dose reduced last cycle and so we will go ahead and keep dosages the same as last cycle   - She took an antiallergy medication at home this morning, but I  will add Pepcid to her premeds since this is her 12th cycle of oxaliplatin, she has not had any sensitivity reactions  - She will return in 2 weeks with Dr. Smart to review imaging she will have done next week, patient aware that Dr. Smart will provide her recommendations regarding treatment plan at that visit once she has scan results and patient verbalized understanding. She is scheduled for cycle 13 but aware it can be cancelled if she does not need treatment that day.     4/8/25:   - Patient is now status post 6 months of systemic therapy   - She feels well with no new side effects, still with some neuropathy that she describes as mild   - We discussed that we will plan to have her be evaluated by Dr Ontiveros for consideration of HIPEC and cytoreductive surgery. She is agreeable and his team with reach out to her today for next steps.    - Will likely plan for diagnostic laparotomy and will plan for surgery likely after her vacation to [place]   - RTC with me in 6 weeks for port flush and labs    - We will ensure there is a plan moving forward   - Will plan for treatment holiday from systemic therapy at this time   - RTC in 6 weeks with me     5/13/25:   - Today we reviewed that during her evaluation with Dr. Ontiveros, there was too much peritoneal disease to be considered for HIPEC or cytoreductive surgery   - We discussed the overall goal of treatment would be for palliation and to prevent or control further systemic disease   - We will plan to continue 5Fu and add Avastin   - Reviewed side effects of Avastin and consent signed   - We discussed at progression, we would conider Irinotecan as the next step   - RTC in 2 weeks for treatment, with Boni   - Will delay next cycle due to travel to [place]   - RTC with me 6/16/25 5/28/25:  - Proceed with cycle 2 5FU + Avastin today  - Cycles repeated every 2 weeks  - Next cycle delayed x 1 week for a trip  - Increase gabapentin to 100 mg in AM and 300 mg in PM  - OK for  CBD lotion   - Follow up in 3 weeks with Dr. Smart and next cycle    6/17/25:   - Labs reviewed   - No contraindications to proceed with next dose 5FU and Avastin   - We will plan for treatment only in 2 weeks, with me in 4 weeks   - Reviewed this is a high risk therapy for an illness that poses threat to life. Labs, exam, and history are appropriate for treatment   - Plan for scans after cycle 5   - Patient reports neuropathy that is unchanged   - Will titrate up gabapentin slowly to 400 mg in the morning and 600 mg in the evening   - She will call with any new worsening symptoms    - RTC in 4 weeks with me     7/15/2025:  - Presents for cycle 5 Avastin + 5FU + Leucovorin   - Reviewed prior instructions for gabapentin, she will work on slow titration up   - Labs reviewed and meets parameters for treatment   - Discussed prune juice for mild constipation, uses miralax   - She is set up for scans on 7/24/25   - RTC on 7/29/25 with Dr. Smart and next treatment     7/29/25:   - Patient overall tolerating ok without significant fatigue   - She does have mild thrombocytopenia and will continue at same dose   - We discussed recent CT scan 7/23/25, with stable disease   - We discussed that keeping disease stable is a good goal as long as there if not disease progression or new toxicity  - labs reviewed, Reviewed this is a high risk therapy for an illness that poses threat to life. Labs, exam, and history are appropriate for treatment   - Will plan for treatment only in 2 weeks   - RTC with Josseline in 4 weeks, treatment only in 6 weeks, with me in 8 weeks      Reviewed ongoing medical problems and how they relate to her malignancy, will continue long term monitoring.    RTC with Josseline in 4 weeks, treatment only in 6 weeks, with me in 8 weeks  This note has been transcribed using a medical scribe and there is a possibility of unintentional typing misprints    Diagnoses and all orders for this visit:  Malignant neoplasm of  appendix (Multi)  -     Clinic Appointment Request  -     gabapentin (Neurontin) 100 mg capsule; Take 5 capsules (500 mg) by mouth once daily in the morning AND 6 capsules (600 mg) once daily at bedtime.  -     Infusion Appointment Request; Future  -     CBC and Auto Differential; Future  -     Comprehensive metabolic panel; Future  -     Urinalysis with Reflex Microscopic; Future  -     Infusion Appointment Request; Future  -     Infusion Appointment Request; Future  -     CBC and Auto Differential; Future  -     Comprehensive metabolic panel; Future  -     Infusion Appointment Request; Future  -     Clinic Appointment Request [facility]; Future  -     Infusion Appointment Request; Future  -     CBC and Auto Differential; Future  -     Comprehensive metabolic panel; Future  -     Urinalysis with Reflex Microscopic; Future  -     Infusion Appointment Request; Future  -     Clinic Appointment Request; Future  -     Infusion Appointment Request; Future  -     CBC and Auto Differential; Future  -     Comprehensive metabolic panel; Future  -     Infusion Appointment Request; Future  Carcinomatosis (Multi)  -     Clinic Appointment Request  -     gabapentin (Neurontin) 100 mg capsule; Take 5 capsules (500 mg) by mouth once daily in the morning AND 6 capsules (600 mg) once daily at bedtime.  -     Infusion Appointment Request; Future  -     CBC and Auto Differential; Future  -     Comprehensive metabolic panel; Future  -     Urinalysis with Reflex Microscopic; Future  -     Infusion Appointment Request; Future  -     Infusion Appointment Request; Future  -     CBC and Auto Differential; Future  -     Comprehensive metabolic panel; Future  -     Infusion Appointment Request; Future  -     Clinic Appointment Request [facility]; Future  -     Infusion Appointment Request; Future  -     CBC and Auto Differential; Future  -     Comprehensive metabolic panel; Future  -     Urinalysis with Reflex Microscopic;  Future  -     Infusion Appointment Request; Future  -     Clinic Appointment Request; Future  -     Infusion Appointment Request; Future  -     CBC and Auto Differential; Future  -     Comprehensive metabolic panel; Future  -     Infusion Appointment Request; Future  Other orders  -     ondansetron (Zofran) injection 8 mg  -     prochlorperazine (Compazine) tablet 10 mg  -     prochlorperazine (Compazine) injection 10 mg  -     bevacizumab-awwb (Mvasi) 450 mg in sodium chloride 0.9% 118 mL IV  -     leucovorin 820 mg in dextrose 5% 141 mL IV  -     fluorouracil (Adrucil) IV syringe 825 mg  -     fluorouracil (Adrucil) 4,900 mg in sodium chloride 0.9% 138 mL IV via Home Infusion  -     sodium chloride 0.9 % bolus 500 mL  -     dextrose 5 % in water (D5W) bolus 500 mL  -     diphenhydrAMINE (BENADryl) injection 50 mg  -     methylPREDNISolone sod succinate (SOLU-Medrol) 40 mg/mL injection 40 mg  -     famotidine PF (Pepcid) injection 20 mg  -     EPINEPHrine (Epipen) injection syringe 0.3 mg  -     albuterol 2.5 mg /3 mL (0.083 %) nebulizer solution 3 mL  -     ondansetron (Zofran) injection 8 mg  -     prochlorperazine (Compazine) tablet 10 mg  -     prochlorperazine (Compazine) injection 10 mg  -     bevacizumab-awwb (Mvasi) 450 mg in sodium chloride 0.9% 118 mL IV  -     leucovorin 820 mg in dextrose 5% 141 mL IV  -     fluorouracil (Adrucil) IV syringe 825 mg  -     fluorouracil (Adrucil) 4,900 mg in sodium chloride 0.9% 138 mL IV via Home Infusion  -     sodium chloride 0.9 % bolus 500 mL  -     dextrose 5 % in water (D5W) bolus 500 mL  -     diphenhydrAMINE (BENADryl) injection 50 mg  -     methylPREDNISolone sod succinate (SOLU-Medrol) 40 mg/mL injection 40 mg  -     famotidine PF (Pepcid) injection 20 mg  -     EPINEPHrine (Epipen) injection syringe 0.3 mg  -     albuterol 2.5 mg /3 mL (0.083 %) nebulizer solution 3 mL  -     ondansetron (Zofran) injection 8 mg  -     prochlorperazine (Compazine) tablet 10  mg  -     prochlorperazine (Compazine) injection 10 mg  -     bevacizumab-awwb (Mvasi) 450 mg in sodium chloride 0.9% 118 mL IV  -     leucovorin 820 mg in dextrose 5% 141 mL IV  -     fluorouracil (Adrucil) IV syringe 825 mg  -     fluorouracil (Adrucil) 4,900 mg in sodium chloride 0.9% 138 mL IV via Home Infusion  -     sodium chloride 0.9 % bolus 500 mL  -     dextrose 5 % in water (D5W) bolus 500 mL  -     diphenhydrAMINE (BENADryl) injection 50 mg  -     methylPREDNISolone sod succinate (SOLU-Medrol) 40 mg/mL injection 40 mg  -     famotidine PF (Pepcid) injection 20 mg  -     EPINEPHrine (Epipen) injection syringe 0.3 mg  -     albuterol 2.5 mg /3 mL (0.083 %) nebulizer solution 3 mL  -     ondansetron (Zofran) injection 8 mg  -     prochlorperazine (Compazine) tablet 10 mg  -     prochlorperazine (Compazine) injection 10 mg  -     bevacizumab-awwb (Mvasi) 450 mg in sodium chloride 0.9% 118 mL IV  -     leucovorin 820 mg in dextrose 5% 141 mL IV  -     fluorouracil (Adrucil) IV syringe 825 mg  -     fluorouracil (Adrucil) 4,900 mg in sodium chloride 0.9% 138 mL IV via Home Infusion  -     sodium chloride 0.9 % bolus 500 mL  -     dextrose 5 % in water (D5W) bolus 500 mL  -     diphenhydrAMINE (BENADryl) injection 50 mg  -     methylPREDNISolone sod succinate (SOLU-Medrol) 40 mg/mL injection 40 mg  -     famotidine PF (Pepcid) injection 20 mg  -     EPINEPHrine (Epipen) injection syringe 0.3 mg  -     albuterol 2.5 mg /3 mL (0.083 %) nebulizer solution 3 mL  -     Treatment Conditions - OK to Treat      Margot Smart MD  Hematology/Oncology  [facility]      Scribe Attestation  By signing my name below, Corrina DE LA CRUZ Scribe, attest that this documentation has been prepared under the direction and in the presence of Margot Smart MD.    Provider Attestation  Margot DE LA CRUZ MD, personally performed the services described in the documentation as scribed by Corrina Argueta, in my  presence, and confirm it is both accurate and complete.

## 2025-07-29 ENCOUNTER — INFUSION (OUTPATIENT)
Dept: HEMATOLOGY/ONCOLOGY | Facility: CLINIC | Age: 70
End: 2025-07-29
Payer: MEDICARE

## 2025-07-29 ENCOUNTER — OFFICE VISIT (OUTPATIENT)
Dept: HEMATOLOGY/ONCOLOGY | Facility: CLINIC | Age: 70
End: 2025-07-29
Payer: MEDICARE

## 2025-07-29 VITALS
OXYGEN SATURATION: 96 % | WEIGHT: 213.19 LBS | BODY MASS INDEX: 35.31 KG/M2 | DIASTOLIC BLOOD PRESSURE: 79 MMHG | TEMPERATURE: 96.8 F | HEART RATE: 76 BPM | RESPIRATION RATE: 16 BRPM | SYSTOLIC BLOOD PRESSURE: 155 MMHG

## 2025-07-29 DIAGNOSIS — C80.0 CARCINOMATOSIS (MULTI): ICD-10-CM

## 2025-07-29 DIAGNOSIS — C18.1 MALIGNANT NEOPLASM OF APPENDIX (MULTI): ICD-10-CM

## 2025-07-29 DIAGNOSIS — C18.1 MALIGNANT NEOPLASM OF APPENDIX (MULTI): Primary | ICD-10-CM

## 2025-07-29 PROCEDURE — 1126F AMNT PAIN NOTED NONE PRSNT: CPT | Performed by: INTERNAL MEDICINE

## 2025-07-29 PROCEDURE — 96409 CHEMO IV PUSH SNGL DRUG: CPT

## 2025-07-29 PROCEDURE — 96411 CHEMO IV PUSH ADDL DRUG: CPT

## 2025-07-29 PROCEDURE — 2500000004 HC RX 250 GENERAL PHARMACY W/ HCPCS (ALT 636 FOR OP/ED): Performed by: INTERNAL MEDICINE

## 2025-07-29 PROCEDURE — G2211 COMPLEX E/M VISIT ADD ON: HCPCS | Performed by: INTERNAL MEDICINE

## 2025-07-29 PROCEDURE — 96367 TX/PROPH/DG ADDL SEQ IV INF: CPT

## 2025-07-29 PROCEDURE — 99215 OFFICE O/P EST HI 40 MIN: CPT | Performed by: INTERNAL MEDICINE

## 2025-07-29 PROCEDURE — 3077F SYST BP >= 140 MM HG: CPT | Performed by: INTERNAL MEDICINE

## 2025-07-29 PROCEDURE — 3078F DIAST BP <80 MM HG: CPT | Performed by: INTERNAL MEDICINE

## 2025-07-29 PROCEDURE — 1159F MED LIST DOCD IN RCRD: CPT | Performed by: INTERNAL MEDICINE

## 2025-07-29 PROCEDURE — 3044F HG A1C LEVEL LT 7.0%: CPT | Performed by: INTERNAL MEDICINE

## 2025-07-29 PROCEDURE — 96416 CHEMO PROLONG INFUSE W/PUMP: CPT

## 2025-07-29 PROCEDURE — 96375 TX/PRO/DX INJ NEW DRUG ADDON: CPT | Mod: INF

## 2025-07-29 PROCEDURE — 96366 THER/PROPH/DIAG IV INF ADDON: CPT | Mod: INF

## 2025-07-29 PROCEDURE — 99215 OFFICE O/P EST HI 40 MIN: CPT | Mod: 25 | Performed by: INTERNAL MEDICINE

## 2025-07-29 RX ORDER — FAMOTIDINE 10 MG/ML
20 INJECTION, SOLUTION INTRAVENOUS ONCE AS NEEDED
Status: DISCONTINUED | OUTPATIENT
Start: 2025-07-29 | End: 2025-07-29 | Stop reason: HOSPADM

## 2025-07-29 RX ORDER — ALBUTEROL SULFATE 0.83 MG/ML
3 SOLUTION RESPIRATORY (INHALATION) AS NEEDED
OUTPATIENT
Start: 2025-08-26

## 2025-07-29 RX ORDER — EPINEPHRINE 0.3 MG/.3ML
0.3 INJECTION SUBCUTANEOUS EVERY 5 MIN PRN
OUTPATIENT
Start: 2025-09-09

## 2025-07-29 RX ORDER — DIPHENHYDRAMINE HYDROCHLORIDE 50 MG/ML
50 INJECTION, SOLUTION INTRAMUSCULAR; INTRAVENOUS AS NEEDED
OUTPATIENT
Start: 2025-08-12

## 2025-07-29 RX ORDER — PROCHLORPERAZINE MALEATE 10 MG
10 TABLET ORAL EVERY 6 HOURS PRN
OUTPATIENT
Start: 2025-09-09

## 2025-07-29 RX ORDER — FLUOROURACIL 50 MG/ML
400 INJECTION, SOLUTION INTRAVENOUS ONCE
OUTPATIENT
Start: 2025-08-26

## 2025-07-29 RX ORDER — GABAPENTIN 100 MG/1
CAPSULE ORAL
Qty: 270 CAPSULE | Refills: 3 | Status: SHIPPED | OUTPATIENT
Start: 2025-07-29 | End: 2025-07-29 | Stop reason: DRUGHIGH

## 2025-07-29 RX ORDER — FLUOROURACIL 50 MG/ML
400 INJECTION, SOLUTION INTRAVENOUS ONCE
OUTPATIENT
Start: 2025-08-12

## 2025-07-29 RX ORDER — FAMOTIDINE 10 MG/ML
20 INJECTION, SOLUTION INTRAVENOUS ONCE AS NEEDED
OUTPATIENT
Start: 2025-09-09

## 2025-07-29 RX ORDER — PROCHLORPERAZINE EDISYLATE 5 MG/ML
10 INJECTION INTRAMUSCULAR; INTRAVENOUS EVERY 6 HOURS PRN
OUTPATIENT
Start: 2025-09-09

## 2025-07-29 RX ORDER — HEPARIN SODIUM,PORCINE/PF 10 UNIT/ML
50 SYRINGE (ML) INTRAVENOUS AS NEEDED
Status: CANCELLED | OUTPATIENT
Start: 2025-07-29

## 2025-07-29 RX ORDER — FLUOROURACIL 50 MG/ML
400 INJECTION, SOLUTION INTRAVENOUS ONCE
Status: COMPLETED | OUTPATIENT
Start: 2025-07-29 | End: 2025-07-29

## 2025-07-29 RX ORDER — DIPHENHYDRAMINE HYDROCHLORIDE 50 MG/ML
50 INJECTION, SOLUTION INTRAMUSCULAR; INTRAVENOUS AS NEEDED
OUTPATIENT
Start: 2025-09-09

## 2025-07-29 RX ORDER — ALBUTEROL SULFATE 0.83 MG/ML
3 SOLUTION RESPIRATORY (INHALATION) AS NEEDED
OUTPATIENT
Start: 2025-09-09

## 2025-07-29 RX ORDER — ALBUTEROL SULFATE 0.83 MG/ML
3 SOLUTION RESPIRATORY (INHALATION) AS NEEDED
OUTPATIENT
Start: 2025-08-12

## 2025-07-29 RX ORDER — ONDANSETRON HYDROCHLORIDE 2 MG/ML
8 INJECTION, SOLUTION INTRAVENOUS ONCE
OUTPATIENT
Start: 2025-08-12

## 2025-07-29 RX ORDER — ONDANSETRON HYDROCHLORIDE 2 MG/ML
8 INJECTION, SOLUTION INTRAVENOUS ONCE
Status: COMPLETED | OUTPATIENT
Start: 2025-07-29 | End: 2025-07-29

## 2025-07-29 RX ORDER — EPINEPHRINE 0.3 MG/.3ML
0.3 INJECTION SUBCUTANEOUS EVERY 5 MIN PRN
OUTPATIENT
Start: 2025-09-23

## 2025-07-29 RX ORDER — FLUOROURACIL 50 MG/ML
400 INJECTION, SOLUTION INTRAVENOUS ONCE
OUTPATIENT
Start: 2025-09-09

## 2025-07-29 RX ORDER — FAMOTIDINE 10 MG/ML
20 INJECTION, SOLUTION INTRAVENOUS ONCE AS NEEDED
OUTPATIENT
Start: 2025-09-23

## 2025-07-29 RX ORDER — DIPHENHYDRAMINE HYDROCHLORIDE 50 MG/ML
50 INJECTION, SOLUTION INTRAMUSCULAR; INTRAVENOUS AS NEEDED
OUTPATIENT
Start: 2025-08-26

## 2025-07-29 RX ORDER — PROCHLORPERAZINE MALEATE 10 MG
10 TABLET ORAL EVERY 6 HOURS PRN
OUTPATIENT
Start: 2025-08-26

## 2025-07-29 RX ORDER — HEPARIN 100 UNIT/ML
500 SYRINGE INTRAVENOUS AS NEEDED
Status: CANCELLED | OUTPATIENT
Start: 2025-07-29

## 2025-07-29 RX ORDER — FAMOTIDINE 10 MG/ML
20 INJECTION, SOLUTION INTRAVENOUS ONCE AS NEEDED
OUTPATIENT
Start: 2025-08-12

## 2025-07-29 RX ORDER — FAMOTIDINE 10 MG/ML
20 INJECTION, SOLUTION INTRAVENOUS ONCE AS NEEDED
OUTPATIENT
Start: 2025-08-26

## 2025-07-29 RX ORDER — PROCHLORPERAZINE MALEATE 10 MG
10 TABLET ORAL EVERY 6 HOURS PRN
OUTPATIENT
Start: 2025-09-23

## 2025-07-29 RX ORDER — ALBUTEROL SULFATE 0.83 MG/ML
3 SOLUTION RESPIRATORY (INHALATION) AS NEEDED
OUTPATIENT
Start: 2025-09-23

## 2025-07-29 RX ORDER — EPINEPHRINE 0.3 MG/.3ML
0.3 INJECTION SUBCUTANEOUS EVERY 5 MIN PRN
OUTPATIENT
Start: 2025-08-26

## 2025-07-29 RX ORDER — PROCHLORPERAZINE EDISYLATE 5 MG/ML
10 INJECTION INTRAMUSCULAR; INTRAVENOUS EVERY 6 HOURS PRN
OUTPATIENT
Start: 2025-08-12

## 2025-07-29 RX ORDER — ONDANSETRON HYDROCHLORIDE 2 MG/ML
8 INJECTION, SOLUTION INTRAVENOUS ONCE
OUTPATIENT
Start: 2025-09-09

## 2025-07-29 RX ORDER — EPINEPHRINE 0.3 MG/.3ML
0.3 INJECTION SUBCUTANEOUS EVERY 5 MIN PRN
OUTPATIENT
Start: 2025-08-12

## 2025-07-29 RX ORDER — PROCHLORPERAZINE EDISYLATE 5 MG/ML
10 INJECTION INTRAMUSCULAR; INTRAVENOUS EVERY 6 HOURS PRN
OUTPATIENT
Start: 2025-08-26

## 2025-07-29 RX ORDER — DIPHENHYDRAMINE HYDROCHLORIDE 50 MG/ML
50 INJECTION, SOLUTION INTRAMUSCULAR; INTRAVENOUS AS NEEDED
Status: DISCONTINUED | OUTPATIENT
Start: 2025-07-29 | End: 2025-07-29 | Stop reason: HOSPADM

## 2025-07-29 RX ORDER — EPINEPHRINE 0.3 MG/.3ML
0.3 INJECTION SUBCUTANEOUS EVERY 5 MIN PRN
Status: DISCONTINUED | OUTPATIENT
Start: 2025-07-29 | End: 2025-07-29 | Stop reason: HOSPADM

## 2025-07-29 RX ORDER — PROCHLORPERAZINE EDISYLATE 5 MG/ML
10 INJECTION INTRAMUSCULAR; INTRAVENOUS EVERY 6 HOURS PRN
OUTPATIENT
Start: 2025-09-23

## 2025-07-29 RX ORDER — FLUOROURACIL 50 MG/ML
400 INJECTION, SOLUTION INTRAVENOUS ONCE
OUTPATIENT
Start: 2025-09-23

## 2025-07-29 RX ORDER — DIPHENHYDRAMINE HYDROCHLORIDE 50 MG/ML
50 INJECTION, SOLUTION INTRAMUSCULAR; INTRAVENOUS AS NEEDED
OUTPATIENT
Start: 2025-09-23

## 2025-07-29 RX ORDER — GABAPENTIN 100 MG/1
CAPSULE ORAL
Qty: 330 CAPSULE | Refills: 3 | Status: SHIPPED | OUTPATIENT
Start: 2025-07-29

## 2025-07-29 RX ORDER — ALBUTEROL SULFATE 0.83 MG/ML
3 SOLUTION RESPIRATORY (INHALATION) AS NEEDED
Status: DISCONTINUED | OUTPATIENT
Start: 2025-07-29 | End: 2025-07-29 | Stop reason: HOSPADM

## 2025-07-29 RX ORDER — ONDANSETRON HYDROCHLORIDE 2 MG/ML
8 INJECTION, SOLUTION INTRAVENOUS ONCE
OUTPATIENT
Start: 2025-08-26

## 2025-07-29 RX ORDER — ONDANSETRON HYDROCHLORIDE 2 MG/ML
8 INJECTION, SOLUTION INTRAVENOUS ONCE
OUTPATIENT
Start: 2025-09-23

## 2025-07-29 RX ORDER — PROCHLORPERAZINE MALEATE 10 MG
10 TABLET ORAL EVERY 6 HOURS PRN
OUTPATIENT
Start: 2025-08-12

## 2025-07-29 RX ADMIN — LEUCOVORIN CALCIUM 820 MG: 350 INJECTION, POWDER, LYOPHILIZED, FOR SOLUTION INTRAMUSCULAR; INTRAVENOUS at 12:00

## 2025-07-29 RX ADMIN — FLUOROURACIL 825 MG: 50 INJECTION, SOLUTION INTRAVENOUS at 14:35

## 2025-07-29 RX ADMIN — FLUOROURACIL 4900 MG: 50 INJECTION, SOLUTION INTRAVENOUS at 14:52

## 2025-07-29 RX ADMIN — ONDANSETRON 8 MG: 2 INJECTION INTRAMUSCULAR; INTRAVENOUS at 11:21

## 2025-07-29 RX ADMIN — BEVACIZUMAB-AWWB 450 MG: 400 INJECTION, SOLUTION INTRAVENOUS at 11:38

## 2025-07-29 ASSESSMENT — PAIN SCALES - GENERAL: PAINLEVEL_OUTOF10: 0-NO PAIN

## 2025-07-31 ENCOUNTER — INFUSION (OUTPATIENT)
Dept: HEMATOLOGY/ONCOLOGY | Facility: CLINIC | Age: 70
End: 2025-07-31
Payer: MEDICARE

## 2025-07-31 VITALS
HEART RATE: 78 BPM | BODY MASS INDEX: 34.9 KG/M2 | SYSTOLIC BLOOD PRESSURE: 131 MMHG | TEMPERATURE: 97.3 F | WEIGHT: 210.76 LBS | DIASTOLIC BLOOD PRESSURE: 77 MMHG | OXYGEN SATURATION: 97 % | RESPIRATION RATE: 16 BRPM

## 2025-07-31 DIAGNOSIS — C18.1 MALIGNANT NEOPLASM OF APPENDIX (MULTI): ICD-10-CM

## 2025-07-31 DIAGNOSIS — C80.0 CARCINOMATOSIS (MULTI): ICD-10-CM

## 2025-07-31 PROCEDURE — 96523 IRRIG DRUG DELIVERY DEVICE: CPT

## 2025-07-31 RX ORDER — HEPARIN 100 UNIT/ML
500 SYRINGE INTRAVENOUS AS NEEDED
OUTPATIENT
Start: 2025-07-31

## 2025-07-31 RX ORDER — HEPARIN SODIUM,PORCINE/PF 10 UNIT/ML
50 SYRINGE (ML) INTRAVENOUS AS NEEDED
OUTPATIENT
Start: 2025-07-31

## 2025-07-31 ASSESSMENT — PAIN SCALES - GENERAL: PAINLEVEL_OUTOF10: 0-NO PAIN

## 2025-08-11 ENCOUNTER — LAB (OUTPATIENT)
Dept: LAB | Facility: CLINIC | Age: 70
End: 2025-08-11
Payer: MEDICARE

## 2025-08-11 DIAGNOSIS — C80.0 CARCINOMATOSIS (MULTI): ICD-10-CM

## 2025-08-11 DIAGNOSIS — C18.1 MALIGNANT NEOPLASM OF APPENDIX (MULTI): ICD-10-CM

## 2025-08-11 LAB
ALBUMIN SERPL BCP-MCNC: 4.5 G/DL (ref 3.4–5)
ALP SERPL-CCNC: 89 U/L (ref 33–136)
ALT SERPL W P-5'-P-CCNC: 49 U/L (ref 7–45)
ANION GAP SERPL CALC-SCNC: 14 MMOL/L (ref 10–20)
APPEARANCE UR: CLEAR
AST SERPL W P-5'-P-CCNC: 44 U/L (ref 9–39)
BASOPHILS # BLD AUTO: 0.05 X10*3/UL (ref 0–0.1)
BASOPHILS NFR BLD AUTO: 1.2 %
BILIRUB SERPL-MCNC: 0.7 MG/DL (ref 0–1.2)
BILIRUB UR STRIP.AUTO-MCNC: NEGATIVE MG/DL
BUN SERPL-MCNC: 19 MG/DL (ref 6–23)
CALCIUM SERPL-MCNC: 9.3 MG/DL (ref 8.6–10.3)
CHLORIDE SERPL-SCNC: 107 MMOL/L (ref 98–107)
CO2 SERPL-SCNC: 24 MMOL/L (ref 21–32)
COLOR UR: YELLOW
CREAT SERPL-MCNC: 0.8 MG/DL (ref 0.5–1.05)
EGFRCR SERPLBLD CKD-EPI 2021: 80 ML/MIN/1.73M*2
EOSINOPHIL # BLD AUTO: 0.17 X10*3/UL (ref 0–0.7)
EOSINOPHIL NFR BLD AUTO: 4.1 %
ERYTHROCYTE [DISTWIDTH] IN BLOOD BY AUTOMATED COUNT: 16.5 % (ref 11.5–14.5)
GLUCOSE SERPL-MCNC: 177 MG/DL (ref 74–99)
GLUCOSE UR STRIP.AUTO-MCNC: ABNORMAL MG/DL
HCT VFR BLD AUTO: 40.4 % (ref 36–46)
HGB BLD-MCNC: 13.6 G/DL (ref 12–16)
IMM GRANULOCYTES # BLD AUTO: 0.05 X10*3/UL (ref 0–0.7)
IMM GRANULOCYTES NFR BLD AUTO: 1.2 % (ref 0–0.9)
KETONES UR STRIP.AUTO-MCNC: NEGATIVE MG/DL
LEUKOCYTE ESTERASE UR QL STRIP.AUTO: NEGATIVE
LYMPHOCYTES # BLD AUTO: 1.52 X10*3/UL (ref 1.2–4.8)
LYMPHOCYTES NFR BLD AUTO: 36.5 %
MCH RBC QN AUTO: 31.7 PG (ref 26–34)
MCHC RBC AUTO-ENTMCNC: 33.7 G/DL (ref 32–36)
MCV RBC AUTO: 94 FL (ref 80–100)
MONOCYTES # BLD AUTO: 0.56 X10*3/UL (ref 0.1–1)
MONOCYTES NFR BLD AUTO: 13.5 %
MUCOUS THREADS #/AREA URNS AUTO: NORMAL /LPF
NEUTROPHILS # BLD AUTO: 1.81 X10*3/UL (ref 1.2–7.7)
NEUTROPHILS NFR BLD AUTO: 43.5 %
NITRITE UR QL STRIP.AUTO: NEGATIVE
NRBC BLD-RTO: ABNORMAL /100{WBCS}
PH UR STRIP.AUTO: 5.5 [PH]
PLATELET # BLD AUTO: 78 X10*3/UL (ref 150–450)
POTASSIUM SERPL-SCNC: 3.9 MMOL/L (ref 3.5–5.3)
PROT SERPL-MCNC: 6.9 G/DL (ref 6.4–8.2)
PROT UR STRIP.AUTO-MCNC: ABNORMAL MG/DL
RBC # BLD AUTO: 4.29 X10*6/UL (ref 4–5.2)
RBC # UR STRIP.AUTO: NEGATIVE MG/DL
RBC #/AREA URNS AUTO: NORMAL /HPF
SODIUM SERPL-SCNC: 141 MMOL/L (ref 136–145)
SP GR UR STRIP.AUTO: >1.03
UROBILINOGEN UR STRIP.AUTO-MCNC: ABNORMAL MG/DL
WBC # BLD AUTO: 4.2 X10*3/UL (ref 4.4–11.3)
WBC #/AREA URNS AUTO: NORMAL /HPF

## 2025-08-11 PROCEDURE — 36415 COLL VENOUS BLD VENIPUNCTURE: CPT

## 2025-08-11 PROCEDURE — 80053 COMPREHEN METABOLIC PANEL: CPT

## 2025-08-11 PROCEDURE — 85025 COMPLETE CBC W/AUTO DIFF WBC: CPT

## 2025-08-11 PROCEDURE — 81001 URINALYSIS AUTO W/SCOPE: CPT

## 2025-08-12 ENCOUNTER — INFUSION (OUTPATIENT)
Dept: HEMATOLOGY/ONCOLOGY | Facility: CLINIC | Age: 70
End: 2025-08-12
Payer: MEDICARE

## 2025-08-12 VITALS
OXYGEN SATURATION: 95 % | SYSTOLIC BLOOD PRESSURE: 143 MMHG | RESPIRATION RATE: 16 BRPM | HEART RATE: 80 BPM | WEIGHT: 214.29 LBS | DIASTOLIC BLOOD PRESSURE: 81 MMHG | TEMPERATURE: 97.2 F | BODY MASS INDEX: 35.49 KG/M2

## 2025-08-12 DIAGNOSIS — C18.1 MALIGNANT NEOPLASM OF APPENDIX (MULTI): ICD-10-CM

## 2025-08-12 DIAGNOSIS — C80.0 CARCINOMATOSIS (MULTI): Primary | ICD-10-CM

## 2025-08-12 PROCEDURE — 2500000004 HC RX 250 GENERAL PHARMACY W/ HCPCS (ALT 636 FOR OP/ED): Performed by: INTERNAL MEDICINE

## 2025-08-12 PROCEDURE — 96366 THER/PROPH/DIAG IV INF ADDON: CPT | Mod: INF

## 2025-08-12 PROCEDURE — 96409 CHEMO IV PUSH SNGL DRUG: CPT

## 2025-08-12 PROCEDURE — 96416 CHEMO PROLONG INFUSE W/PUMP: CPT

## 2025-08-12 PROCEDURE — 96411 CHEMO IV PUSH ADDL DRUG: CPT

## 2025-08-12 PROCEDURE — 96375 TX/PRO/DX INJ NEW DRUG ADDON: CPT | Mod: INF

## 2025-08-12 PROCEDURE — 96367 TX/PROPH/DG ADDL SEQ IV INF: CPT

## 2025-08-12 RX ORDER — FLUOROURACIL 50 MG/ML
400 INJECTION, SOLUTION INTRAVENOUS ONCE
Status: COMPLETED | OUTPATIENT
Start: 2025-08-12 | End: 2025-08-12

## 2025-08-12 RX ORDER — DIPHENHYDRAMINE HYDROCHLORIDE 50 MG/ML
50 INJECTION, SOLUTION INTRAMUSCULAR; INTRAVENOUS AS NEEDED
Status: DISCONTINUED | OUTPATIENT
Start: 2025-08-12 | End: 2025-08-12 | Stop reason: HOSPADM

## 2025-08-12 RX ORDER — HEPARIN SODIUM,PORCINE/PF 10 UNIT/ML
50 SYRINGE (ML) INTRAVENOUS AS NEEDED
Status: CANCELLED | OUTPATIENT
Start: 2025-08-12

## 2025-08-12 RX ORDER — ALBUTEROL SULFATE 0.83 MG/ML
3 SOLUTION RESPIRATORY (INHALATION) AS NEEDED
Status: DISCONTINUED | OUTPATIENT
Start: 2025-08-12 | End: 2025-08-12 | Stop reason: HOSPADM

## 2025-08-12 RX ORDER — HEPARIN 100 UNIT/ML
500 SYRINGE INTRAVENOUS AS NEEDED
Status: CANCELLED | OUTPATIENT
Start: 2025-08-12

## 2025-08-12 RX ORDER — LIDOCAINE AND PRILOCAINE 25; 25 MG/G; MG/G
CREAM TOPICAL ONCE
Qty: 30 G | Refills: 2 | Status: SHIPPED | OUTPATIENT
Start: 2025-08-12 | End: 2025-08-12

## 2025-08-12 RX ORDER — FAMOTIDINE 10 MG/ML
20 INJECTION, SOLUTION INTRAVENOUS ONCE AS NEEDED
Status: DISCONTINUED | OUTPATIENT
Start: 2025-08-12 | End: 2025-08-12 | Stop reason: HOSPADM

## 2025-08-12 RX ORDER — EPINEPHRINE 0.3 MG/.3ML
0.3 INJECTION SUBCUTANEOUS EVERY 5 MIN PRN
Status: DISCONTINUED | OUTPATIENT
Start: 2025-08-12 | End: 2025-08-12 | Stop reason: HOSPADM

## 2025-08-12 RX ORDER — ONDANSETRON HYDROCHLORIDE 2 MG/ML
8 INJECTION, SOLUTION INTRAVENOUS ONCE
Status: COMPLETED | OUTPATIENT
Start: 2025-08-12 | End: 2025-08-12

## 2025-08-12 RX ADMIN — FLUOROURACIL 4900 MG: 50 INJECTION, SOLUTION INTRAVENOUS at 13:27

## 2025-08-12 RX ADMIN — FLUOROURACIL 825 MG: 50 INJECTION, SOLUTION INTRAVENOUS at 13:12

## 2025-08-12 RX ADMIN — BEVACIZUMAB-AWWB 450 MG: 400 INJECTION, SOLUTION INTRAVENOUS at 10:36

## 2025-08-12 RX ADMIN — ONDANSETRON 8 MG: 2 INJECTION INTRAMUSCULAR; INTRAVENOUS at 10:16

## 2025-08-12 RX ADMIN — LEUCOVORIN CALCIUM 820 MG: 350 INJECTION, POWDER, LYOPHILIZED, FOR SOLUTION INTRAMUSCULAR; INTRAVENOUS at 11:14

## 2025-08-12 ASSESSMENT — PAIN SCALES - GENERAL: PAINLEVEL_OUTOF10: 0-NO PAIN

## 2025-08-14 ENCOUNTER — INFUSION (OUTPATIENT)
Dept: HEMATOLOGY/ONCOLOGY | Facility: CLINIC | Age: 70
End: 2025-08-14
Payer: MEDICARE

## 2025-08-14 VITALS
HEART RATE: 81 BPM | SYSTOLIC BLOOD PRESSURE: 138 MMHG | RESPIRATION RATE: 17 BRPM | DIASTOLIC BLOOD PRESSURE: 77 MMHG | OXYGEN SATURATION: 95 % | WEIGHT: 213.85 LBS | BODY MASS INDEX: 35.41 KG/M2 | TEMPERATURE: 97.3 F

## 2025-08-14 DIAGNOSIS — C80.0 CARCINOMATOSIS (MULTI): ICD-10-CM

## 2025-08-14 DIAGNOSIS — C18.1 MALIGNANT NEOPLASM OF APPENDIX (MULTI): ICD-10-CM

## 2025-08-14 PROCEDURE — 96523 IRRIG DRUG DELIVERY DEVICE: CPT

## 2025-08-14 RX ORDER — HEPARIN 100 UNIT/ML
500 SYRINGE INTRAVENOUS AS NEEDED
OUTPATIENT
Start: 2025-08-14

## 2025-08-14 RX ORDER — HEPARIN 100 UNIT/ML
500 SYRINGE INTRAVENOUS AS NEEDED
Status: DISCONTINUED | OUTPATIENT
Start: 2025-08-14 | End: 2025-08-14 | Stop reason: HOSPADM

## 2025-08-14 RX ORDER — HEPARIN SODIUM,PORCINE/PF 10 UNIT/ML
50 SYRINGE (ML) INTRAVENOUS AS NEEDED
OUTPATIENT
Start: 2025-08-14

## 2025-08-14 RX ORDER — HEPARIN SODIUM,PORCINE/PF 10 UNIT/ML
50 SYRINGE (ML) INTRAVENOUS AS NEEDED
Status: DISCONTINUED | OUTPATIENT
Start: 2025-08-14 | End: 2025-08-14 | Stop reason: HOSPADM

## 2025-08-14 ASSESSMENT — PAIN SCALES - GENERAL: PAINLEVEL_OUTOF10: 0-NO PAIN

## 2025-08-21 PROBLEM — G72.0 STATIN MYOPATHY: Status: ACTIVE | Noted: 2025-08-21

## 2025-08-21 PROBLEM — T46.6X5A STATIN MYOPATHY: Status: ACTIVE | Noted: 2025-08-21

## 2025-08-25 ENCOUNTER — LAB (OUTPATIENT)
Dept: LAB | Facility: CLINIC | Age: 70
End: 2025-08-25
Payer: MEDICARE

## 2025-08-25 DIAGNOSIS — C18.1 MALIGNANT NEOPLASM OF APPENDIX (MULTI): ICD-10-CM

## 2025-08-25 DIAGNOSIS — C80.0 CARCINOMATOSIS (MULTI): ICD-10-CM

## 2025-08-25 LAB
ALBUMIN SERPL BCP-MCNC: 4.6 G/DL (ref 3.4–5)
ALP SERPL-CCNC: 101 U/L (ref 33–136)
ALT SERPL W P-5'-P-CCNC: 56 U/L (ref 7–45)
ANION GAP SERPL CALC-SCNC: 16 MMOL/L (ref 10–20)
AST SERPL W P-5'-P-CCNC: 46 U/L (ref 9–39)
BASOPHILS # BLD AUTO: 0.03 X10*3/UL (ref 0–0.1)
BASOPHILS NFR BLD AUTO: 0.7 %
BILIRUB SERPL-MCNC: 0.8 MG/DL (ref 0–1.2)
BUN SERPL-MCNC: 21 MG/DL (ref 6–23)
CALCIUM SERPL-MCNC: 9.9 MG/DL (ref 8.6–10.3)
CHLORIDE SERPL-SCNC: 104 MMOL/L (ref 98–107)
CO2 SERPL-SCNC: 26 MMOL/L (ref 21–32)
CREAT SERPL-MCNC: 0.77 MG/DL (ref 0.5–1.05)
EGFRCR SERPLBLD CKD-EPI 2021: 84 ML/MIN/1.73M*2
EOSINOPHIL # BLD AUTO: 0.15 X10*3/UL (ref 0–0.7)
EOSINOPHIL NFR BLD AUTO: 3.5 %
ERYTHROCYTE [DISTWIDTH] IN BLOOD BY AUTOMATED COUNT: 16.8 % (ref 11.5–14.5)
GLUCOSE SERPL-MCNC: 197 MG/DL (ref 74–99)
HCT VFR BLD AUTO: 42.5 % (ref 36–46)
HGB BLD-MCNC: 14.3 G/DL (ref 12–16)
IMM GRANULOCYTES # BLD AUTO: 0.04 X10*3/UL (ref 0–0.7)
IMM GRANULOCYTES NFR BLD AUTO: 0.9 % (ref 0–0.9)
LYMPHOCYTES # BLD AUTO: 1.61 X10*3/UL (ref 1.2–4.8)
LYMPHOCYTES NFR BLD AUTO: 38.1 %
MCH RBC QN AUTO: 31.8 PG (ref 26–34)
MCHC RBC AUTO-ENTMCNC: 33.6 G/DL (ref 32–36)
MCV RBC AUTO: 95 FL (ref 80–100)
MONOCYTES # BLD AUTO: 0.72 X10*3/UL (ref 0.1–1)
MONOCYTES NFR BLD AUTO: 17 %
NEUTROPHILS # BLD AUTO: 1.68 X10*3/UL (ref 1.2–7.7)
NEUTROPHILS NFR BLD AUTO: 39.8 %
NRBC BLD-RTO: ABNORMAL /100{WBCS}
PLATELET # BLD AUTO: 90 X10*3/UL (ref 150–450)
POTASSIUM SERPL-SCNC: 4.1 MMOL/L (ref 3.5–5.3)
PROT SERPL-MCNC: 7.5 G/DL (ref 6.4–8.2)
RBC # BLD AUTO: 4.49 X10*6/UL (ref 4–5.2)
SODIUM SERPL-SCNC: 142 MMOL/L (ref 136–145)
WBC # BLD AUTO: 4.2 X10*3/UL (ref 4.4–11.3)

## 2025-08-25 PROCEDURE — 36415 COLL VENOUS BLD VENIPUNCTURE: CPT

## 2025-08-25 PROCEDURE — 80053 COMPREHEN METABOLIC PANEL: CPT

## 2025-08-25 PROCEDURE — 85025 COMPLETE CBC W/AUTO DIFF WBC: CPT

## 2025-08-25 ASSESSMENT — ENCOUNTER SYMPTOMS
CHILLS: 0
PSYCHIATRIC NEGATIVE: 1
HEMATOLOGIC/LYMPHATIC NEGATIVE: 1
NUMBNESS: 1
VOMITING: 0
FATIGUE: 0
APPETITE CHANGE: 0
SHORTNESS OF BREATH: 0
FEVER: 0
UNEXPECTED WEIGHT CHANGE: 0
CARDIOVASCULAR NEGATIVE: 1
NAUSEA: 0
EYES NEGATIVE: 1
DIARRHEA: 0
CONSTIPATION: 0
COUGH: 0

## 2025-08-26 ENCOUNTER — INFUSION (OUTPATIENT)
Dept: HEMATOLOGY/ONCOLOGY | Facility: CLINIC | Age: 70
End: 2025-08-26
Payer: MEDICARE

## 2025-08-26 ENCOUNTER — OFFICE VISIT (OUTPATIENT)
Dept: HEMATOLOGY/ONCOLOGY | Facility: CLINIC | Age: 70
End: 2025-08-26
Payer: MEDICARE

## 2025-08-26 VITALS
HEART RATE: 77 BPM | WEIGHT: 215.17 LBS | BODY MASS INDEX: 35.63 KG/M2 | RESPIRATION RATE: 16 BRPM | OXYGEN SATURATION: 95 % | SYSTOLIC BLOOD PRESSURE: 148 MMHG | TEMPERATURE: 97.3 F | DIASTOLIC BLOOD PRESSURE: 80 MMHG

## 2025-08-26 DIAGNOSIS — C18.1 MALIGNANT NEOPLASM OF APPENDIX (MULTI): ICD-10-CM

## 2025-08-26 DIAGNOSIS — C80.0 CARCINOMATOSIS (MULTI): ICD-10-CM

## 2025-08-26 PROCEDURE — 3044F HG A1C LEVEL LT 7.0%: CPT

## 2025-08-26 PROCEDURE — 2500000004 HC RX 250 GENERAL PHARMACY W/ HCPCS (ALT 636 FOR OP/ED): Performed by: INTERNAL MEDICINE

## 2025-08-26 PROCEDURE — 96367 TX/PROPH/DG ADDL SEQ IV INF: CPT

## 2025-08-26 PROCEDURE — 1159F MED LIST DOCD IN RCRD: CPT

## 2025-08-26 PROCEDURE — 96366 THER/PROPH/DIAG IV INF ADDON: CPT | Mod: INF

## 2025-08-26 PROCEDURE — 96411 CHEMO IV PUSH ADDL DRUG: CPT

## 2025-08-26 PROCEDURE — 96416 CHEMO PROLONG INFUSE W/PUMP: CPT

## 2025-08-26 PROCEDURE — 96375 TX/PRO/DX INJ NEW DRUG ADDON: CPT | Mod: INF

## 2025-08-26 PROCEDURE — 3079F DIAST BP 80-89 MM HG: CPT

## 2025-08-26 PROCEDURE — 1126F AMNT PAIN NOTED NONE PRSNT: CPT

## 2025-08-26 PROCEDURE — 99214 OFFICE O/P EST MOD 30 MIN: CPT

## 2025-08-26 PROCEDURE — 99214 OFFICE O/P EST MOD 30 MIN: CPT | Mod: 25

## 2025-08-26 PROCEDURE — 1160F RVW MEDS BY RX/DR IN RCRD: CPT

## 2025-08-26 PROCEDURE — 96409 CHEMO IV PUSH SNGL DRUG: CPT

## 2025-08-26 PROCEDURE — 3077F SYST BP >= 140 MM HG: CPT

## 2025-08-26 RX ORDER — HEPARIN SODIUM,PORCINE/PF 10 UNIT/ML
50 SYRINGE (ML) INTRAVENOUS AS NEEDED
OUTPATIENT
Start: 2025-08-26

## 2025-08-26 RX ORDER — FLUOROURACIL 50 MG/ML
400 INJECTION, SOLUTION INTRAVENOUS ONCE
Status: COMPLETED | OUTPATIENT
Start: 2025-08-26 | End: 2025-08-26

## 2025-08-26 RX ORDER — PROCHLORPERAZINE MALEATE 10 MG
10 TABLET ORAL EVERY 6 HOURS PRN
Status: DISCONTINUED | OUTPATIENT
Start: 2025-08-26 | End: 2025-08-26 | Stop reason: HOSPADM

## 2025-08-26 RX ORDER — ALBUTEROL SULFATE 0.83 MG/ML
3 SOLUTION RESPIRATORY (INHALATION) AS NEEDED
Status: DISCONTINUED | OUTPATIENT
Start: 2025-08-26 | End: 2025-08-26 | Stop reason: HOSPADM

## 2025-08-26 RX ORDER — DIPHENHYDRAMINE HYDROCHLORIDE 50 MG/ML
50 INJECTION, SOLUTION INTRAMUSCULAR; INTRAVENOUS AS NEEDED
Status: DISCONTINUED | OUTPATIENT
Start: 2025-08-26 | End: 2025-08-26 | Stop reason: HOSPADM

## 2025-08-26 RX ORDER — ONDANSETRON HYDROCHLORIDE 2 MG/ML
8 INJECTION, SOLUTION INTRAVENOUS ONCE
Status: COMPLETED | OUTPATIENT
Start: 2025-08-26 | End: 2025-08-26

## 2025-08-26 RX ORDER — HEPARIN 100 UNIT/ML
500 SYRINGE INTRAVENOUS AS NEEDED
OUTPATIENT
Start: 2025-08-26

## 2025-08-26 RX ORDER — PROCHLORPERAZINE EDISYLATE 5 MG/ML
10 INJECTION INTRAMUSCULAR; INTRAVENOUS EVERY 6 HOURS PRN
Status: DISCONTINUED | OUTPATIENT
Start: 2025-08-26 | End: 2025-08-26 | Stop reason: HOSPADM

## 2025-08-26 RX ORDER — EPINEPHRINE 0.3 MG/.3ML
0.3 INJECTION SUBCUTANEOUS EVERY 5 MIN PRN
Status: DISCONTINUED | OUTPATIENT
Start: 2025-08-26 | End: 2025-08-26 | Stop reason: HOSPADM

## 2025-08-26 RX ORDER — FAMOTIDINE 10 MG/ML
20 INJECTION, SOLUTION INTRAVENOUS ONCE AS NEEDED
Status: DISCONTINUED | OUTPATIENT
Start: 2025-08-26 | End: 2025-08-26 | Stop reason: HOSPADM

## 2025-08-26 RX ADMIN — FLUOROURACIL 825 MG: 50 INJECTION, SOLUTION INTRAVENOUS at 13:19

## 2025-08-26 RX ADMIN — FLUOROURACIL 4900 MG: 50 INJECTION, SOLUTION INTRAVENOUS at 13:32

## 2025-08-26 RX ADMIN — BEVACIZUMAB-AWWB 450 MG: 400 INJECTION, SOLUTION INTRAVENOUS at 10:39

## 2025-08-26 RX ADMIN — LEUCOVORIN CALCIUM 820 MG: 350 INJECTION, POWDER, LYOPHILIZED, FOR SOLUTION INTRAMUSCULAR; INTRAVENOUS at 11:04

## 2025-08-26 RX ADMIN — ONDANSETRON 8 MG: 2 INJECTION INTRAMUSCULAR; INTRAVENOUS at 10:12

## 2025-08-26 ASSESSMENT — PAIN SCALES - GENERAL: PAINLEVEL_OUTOF10: 0-NO PAIN

## 2025-08-28 ENCOUNTER — INFUSION (OUTPATIENT)
Dept: HEMATOLOGY/ONCOLOGY | Facility: CLINIC | Age: 70
End: 2025-08-28
Payer: MEDICARE

## 2025-08-28 VITALS
OXYGEN SATURATION: 96 % | BODY MASS INDEX: 34.56 KG/M2 | DIASTOLIC BLOOD PRESSURE: 83 MMHG | SYSTOLIC BLOOD PRESSURE: 146 MMHG | HEART RATE: 75 BPM | RESPIRATION RATE: 16 BRPM | WEIGHT: 208.67 LBS | TEMPERATURE: 97.3 F

## 2025-08-28 DIAGNOSIS — C80.0 CARCINOMATOSIS (MULTI): ICD-10-CM

## 2025-08-28 DIAGNOSIS — C18.1 MALIGNANT NEOPLASM OF APPENDIX (MULTI): ICD-10-CM

## 2025-08-28 PROCEDURE — 96523 IRRIG DRUG DELIVERY DEVICE: CPT

## 2025-08-28 ASSESSMENT — PAIN SCALES - GENERAL: PAINLEVEL_OUTOF10: 0-NO PAIN

## (undated) DEVICE — TOWEL PACK, STERILE, 16X24, XRAY DETECTABLE, BLUE, 4/PK

## (undated) DEVICE — NEEDLE, SAFETY, 25 GA X 1.5 IN

## (undated) DEVICE — Device

## (undated) DEVICE — GOWN, SURGICAL, ROYAL SILK, XL, STERILE

## (undated) DEVICE — SUTURE, VICRYL, 4-0, 18 IN, UNDYED BR PS-2

## (undated) DEVICE — SUTURE, ETHILON, 4-0, BLK, MONO, PS-2 18

## (undated) DEVICE — APPLICATOR, CHLORAPREP, W/ORANGE TINT, 26ML

## (undated) DEVICE — SOLUTION, IRRIGATION, SODIUM CHLORIDE 0.9%, 1000 ML, POUR BOTTLE

## (undated) DEVICE — PREP TRAY, SKIN, DRY, W/GLOVES

## (undated) DEVICE — TOWELS 4-PK

## (undated) DEVICE — SUTURE, ETHILON, 3-0, 18 IN, PS1, BLACK

## (undated) DEVICE — TROCAR, KII OPTICAL BLADELESS 5MM Z THREAD 100MM LNGTH

## (undated) DEVICE — TUBE, SALEM SUMP, 16 FR X 48IN, ENFIT

## (undated) DEVICE — SYRINGE, 20 CC, LUER LOCK

## (undated) DEVICE — SUTURE, VICRYL PLUS 3-0, SH, 27IN

## (undated) DEVICE — GLOVE, SURGICAL, PROTEXIS PI BLUE W/NEUTHERA, 8.0, PF, LF

## (undated) DEVICE — WARMER, DUAL SCOPE

## (undated) DEVICE — GLOVE, SURGICAL, PROTEXIS PI , 7.5, PF, LF

## (undated) DEVICE — STRAP, VELCRO, BODY, 4 X 60IN, NS

## (undated) DEVICE — DRAPE, SHEET, ENDOSCOPY, GENERAL, FENESTRATED, ARMBOARD COVER, 98 X 123.5 IN, DISPOSABLE, LF, STERILE

## (undated) DEVICE — BANDAGE, COHESIVE, HAND TEAR, COFLEX, 2 X 5 YDS, LF

## (undated) DEVICE — SUTURE, VICRYL PLUS, 0, 27IN, UR-6, VIOLET, BRAIDED

## (undated) DEVICE — SUTURE, MONOCRYL, 4-0, 27 IN, PS-2, UNDYED

## (undated) DEVICE — DRESSING, NON-ADHERENT, 3 X 3 IN, STERILE

## (undated) DEVICE — SYRINGE, 10 CC, LUER LOCK

## (undated) DEVICE — COVER, CART, 45 X 27 X 48 IN, CLEAR

## (undated) DEVICE — TOWEL, SURGICAL, NEURO, O/R, 16 X 26, BLUE, STERILE

## (undated) DEVICE — REST, HEAD, BAGEL, 9 IN

## (undated) DEVICE — TROCAR SYSTEM, BALLOON, KII GELPORT, 12 X 100MM

## (undated) DEVICE — TROCAR, OPTICAL, BLADELESS, KII FIOS, 5 X 100MM, THREADED

## (undated) DEVICE — HOLSTER, JET SAFETY

## (undated) DEVICE — ADHESIVE, SKIN, DERMABOND ADVANCED, 15CM, PEN-STYLE

## (undated) DEVICE — GLOVE, SURGICAL, PROTEXIS PI , 7.0, PF, LF

## (undated) DEVICE — LIGASURE, V SEALER/DIVIDER  5MM BLUNT TIP

## (undated) DEVICE — SPONGE GAUZE, XRAY SC+RFID, 4X4 16 PLY, STERILE

## (undated) DEVICE — DRAPE, INCISE, ANTIMICROBIAL, IOBAN 2, LARGE, 17 X 23 IN, DISPOSABLE, STERILE

## (undated) DEVICE — STRAP, ARM BOARD, 32 X 1.5

## (undated) DEVICE — SUTURE, VICRYL, 0, 27 IN, UR-6, VIOLET

## (undated) DEVICE — PADDING, CAST, SPECIALIST, 3 IN X 4 YD, STERILE

## (undated) DEVICE — MANIFOLD, 4 PORT NEPTUNE STANDARD

## (undated) DEVICE — SYRINGE, 60 CC, IRRIGATION, BULB, CONTRO-BULB, PAPER POUCH

## (undated) DEVICE — GOWN, SURGICAL, ROYAL SILK, LG, STERILE